# Patient Record
Sex: FEMALE | Race: WHITE | NOT HISPANIC OR LATINO | URBAN - METROPOLITAN AREA
[De-identification: names, ages, dates, MRNs, and addresses within clinical notes are randomized per-mention and may not be internally consistent; named-entity substitution may affect disease eponyms.]

---

## 2024-09-22 ENCOUNTER — OUTSIDE PLACE OF SERVICE (OUTPATIENT)
Dept: CARDIOTHORACIC SURGERY | Facility: CLINIC | Age: 74
End: 2024-09-22
Payer: MEDICARE

## 2024-09-22 PROCEDURE — 99222 1ST HOSP IP/OBS MODERATE 55: CPT | Mod: ,,, | Performed by: NURSE PRACTITIONER

## 2024-09-23 ENCOUNTER — OUTSIDE PLACE OF SERVICE (OUTPATIENT)
Dept: CARDIOTHORACIC SURGERY | Facility: CLINIC | Age: 74
End: 2024-09-23
Payer: MEDICARE

## 2024-09-23 PROCEDURE — 99232 SBSQ HOSP IP/OBS MODERATE 35: CPT | Mod: ,,, | Performed by: NURSE PRACTITIONER

## 2024-09-24 ENCOUNTER — HOSPITAL ENCOUNTER (INPATIENT)
Facility: HOSPITAL | Age: 74
LOS: 27 days | Discharge: NURSING, REHAB OR LONG-TERM CARE | DRG: 987 | End: 2024-10-21
Attending: INTERNAL MEDICINE | Admitting: INTERNAL MEDICINE
Payer: MEDICARE

## 2024-09-24 ENCOUNTER — OUTSIDE PLACE OF SERVICE (OUTPATIENT)
Dept: INTERVENTIONAL RADIOLOGY/VASCULAR | Facility: CLINIC | Age: 74
End: 2024-09-24
Payer: MEDICARE

## 2024-09-24 ENCOUNTER — OUTSIDE PLACE OF SERVICE (OUTPATIENT)
Dept: CARDIOTHORACIC SURGERY | Facility: CLINIC | Age: 74
End: 2024-09-24
Payer: MEDICARE

## 2024-09-24 DIAGNOSIS — I05.9 ENDOCARDITIS OF MITRAL VALVE: ICD-10-CM

## 2024-09-24 DIAGNOSIS — M79.89 LEG SWELLING: ICD-10-CM

## 2024-09-24 DIAGNOSIS — M00.9 PYOGENIC ARTHRITIS OF RIGHT KNEE JOINT, DUE TO UNSPECIFIED ORGANISM: ICD-10-CM

## 2024-09-24 DIAGNOSIS — I61.9 BRAIN BLEED: ICD-10-CM

## 2024-09-24 DIAGNOSIS — I67.1 SACCULAR ANEURYSM: ICD-10-CM

## 2024-09-24 DIAGNOSIS — I61.9 INTRAPARENCHYMAL HEMORRHAGE OF BRAIN: ICD-10-CM

## 2024-09-24 DIAGNOSIS — S06.300A: ICD-10-CM
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
## 2024-09-24 DIAGNOSIS — I49.9 ARRHYTHMIA: ICD-10-CM

## 2024-09-24 DIAGNOSIS — R52 PAIN: Primary | ICD-10-CM

## 2024-09-24 PROBLEM — B95.61 MSSA BACTEREMIA: Status: ACTIVE | Noted: 2024-09-24

## 2024-09-24 PROBLEM — R78.81 MSSA BACTEREMIA: Status: ACTIVE | Noted: 2024-09-24

## 2024-09-24 PROBLEM — S06.33AA INTRAPARENCHYMAL HEMATOMA OF BRAIN: Status: ACTIVE | Noted: 2024-09-24

## 2024-09-24 LAB
ABORH RETYPE: NORMAL
ALBUMIN SERPL-MCNC: 1.9 G/DL (ref 3.4–4.8)
ALBUMIN/GLOB SERPL: 0.6 RATIO (ref 1.1–2)
ALP SERPL-CCNC: 272 UNIT/L (ref 40–150)
ALT SERPL-CCNC: 79 UNIT/L (ref 0–55)
ANION GAP SERPL CALC-SCNC: 12 MEQ/L
APTT PPP: 27.5 SECONDS (ref 23.2–33.7)
AST SERPL-CCNC: 80 UNIT/L (ref 5–34)
BACTERIA #/AREA URNS AUTO: ABNORMAL /HPF
BASOPHILS # BLD AUTO: 0.05 X10(3)/MCL
BASOPHILS NFR BLD AUTO: 0.2 %
BILIRUB SERPL-MCNC: 0.5 MG/DL
BILIRUB UR QL STRIP.AUTO: NEGATIVE
BUN SERPL-MCNC: 25 MG/DL (ref 9.8–20.1)
CALCIUM SERPL-MCNC: 8.1 MG/DL (ref 8.4–10.2)
CHLORIDE SERPL-SCNC: 106 MMOL/L (ref 98–107)
CLARITY UR: ABNORMAL
CO2 SERPL-SCNC: 25 MMOL/L (ref 23–31)
COLOR UR AUTO: ABNORMAL
CREAT SERPL-MCNC: 0.61 MG/DL (ref 0.55–1.02)
CREAT/UREA NIT SERPL: 41
EOSINOPHIL # BLD AUTO: 0.01 X10(3)/MCL (ref 0–0.9)
EOSINOPHIL NFR BLD AUTO: 0 %
ERYTHROCYTE [DISTWIDTH] IN BLOOD BY AUTOMATED COUNT: 13.9 % (ref 11.5–17)
EST. AVERAGE GLUCOSE BLD GHB EST-MCNC: 108.3 MG/DL
GFR SERPLBLD CREATININE-BSD FMLA CKD-EPI: >60 ML/MIN/1.73/M2
GLOBULIN SER-MCNC: 3.3 GM/DL (ref 2.4–3.5)
GLUCOSE SERPL-MCNC: 131 MG/DL (ref 82–115)
GLUCOSE UR QL STRIP: NORMAL
GROUP & RH: NORMAL
HBA1C MFR BLD: 5.4 %
HCT VFR BLD AUTO: 33.3 % (ref 37–47)
HGB BLD-MCNC: 11.4 G/DL (ref 12–16)
HGB UR QL STRIP: ABNORMAL
IMM GRANULOCYTES # BLD AUTO: 0.74 X10(3)/MCL (ref 0–0.04)
IMM GRANULOCYTES NFR BLD AUTO: 3.4 %
INDIRECT COOMBS: NORMAL
INR PPP: 1.1
KETONES UR QL STRIP: NEGATIVE
LEUKOCYTE ESTERASE UR QL STRIP: 250
LYMPHOCYTES # BLD AUTO: 1.09 X10(3)/MCL (ref 0.6–4.6)
LYMPHOCYTES NFR BLD AUTO: 5 %
MAGNESIUM SERPL-MCNC: 2.2 MG/DL (ref 1.6–2.6)
MCH RBC QN AUTO: 31.9 PG (ref 27–31)
MCHC RBC AUTO-ENTMCNC: 34.2 G/DL (ref 33–36)
MCV RBC AUTO: 93.3 FL (ref 80–94)
MONOCYTES # BLD AUTO: 1.2 X10(3)/MCL (ref 0.1–1.3)
MONOCYTES NFR BLD AUTO: 5.5 %
MUCOUS THREADS URNS QL MICRO: ABNORMAL /LPF
NEUTROPHILS # BLD AUTO: 18.72 X10(3)/MCL (ref 2.1–9.2)
NEUTROPHILS NFR BLD AUTO: 85.9 %
NITRITE UR QL STRIP: NEGATIVE
NRBC BLD AUTO-RTO: 0 %
PH UR STRIP: 5.5 [PH]
PHOSPHATE SERPL-MCNC: 3.8 MG/DL (ref 2.3–4.7)
PLATELET # BLD AUTO: 212 X10(3)/MCL (ref 130–400)
PMV BLD AUTO: 9.2 FL (ref 7.4–10.4)
POTASSIUM SERPL-SCNC: 3.4 MMOL/L (ref 3.5–5.1)
PROT SERPL-MCNC: 5.2 GM/DL (ref 5.8–7.6)
PROT UR QL STRIP: ABNORMAL
PROTHROMBIN TIME: 14.8 SECONDS (ref 12.5–14.5)
RBC # BLD AUTO: 3.57 X10(6)/MCL (ref 4.2–5.4)
RBC #/AREA URNS AUTO: >100 /HPF
SODIUM SERPL-SCNC: 143 MMOL/L (ref 136–145)
SP GR UR STRIP.AUTO: 1.02 (ref 1–1.03)
SPECIMEN OUTDATE: NORMAL
SQUAMOUS #/AREA URNS LPF: ABNORMAL /HPF
TSH SERPL-ACNC: 1.82 UIU/ML (ref 0.35–4.94)
UROBILINOGEN UR STRIP-ACNC: NORMAL
WBC # BLD AUTO: 21.81 X10(3)/MCL (ref 4.5–11.5)
WBC #/AREA URNS AUTO: ABNORMAL /HPF

## 2024-09-24 PROCEDURE — 86900 BLOOD TYPING SEROLOGIC ABO: CPT | Performed by: STUDENT IN AN ORGANIZED HEALTH CARE EDUCATION/TRAINING PROGRAM

## 2024-09-24 PROCEDURE — 25000003 PHARM REV CODE 250: Performed by: STUDENT IN AN ORGANIZED HEALTH CARE EDUCATION/TRAINING PROGRAM

## 2024-09-24 PROCEDURE — 83735 ASSAY OF MAGNESIUM: CPT | Performed by: STUDENT IN AN ORGANIZED HEALTH CARE EDUCATION/TRAINING PROGRAM

## 2024-09-24 PROCEDURE — 63600175 PHARM REV CODE 636 W HCPCS: Performed by: STUDENT IN AN ORGANIZED HEALTH CARE EDUCATION/TRAINING PROGRAM

## 2024-09-24 PROCEDURE — 36415 COLL VENOUS BLD VENIPUNCTURE: CPT | Performed by: STUDENT IN AN ORGANIZED HEALTH CARE EDUCATION/TRAINING PROGRAM

## 2024-09-24 PROCEDURE — 84443 ASSAY THYROID STIM HORMONE: CPT | Performed by: STUDENT IN AN ORGANIZED HEALTH CARE EDUCATION/TRAINING PROGRAM

## 2024-09-24 PROCEDURE — 86850 RBC ANTIBODY SCREEN: CPT | Performed by: STUDENT IN AN ORGANIZED HEALTH CARE EDUCATION/TRAINING PROGRAM

## 2024-09-24 PROCEDURE — 25000003 PHARM REV CODE 250: Performed by: INTERNAL MEDICINE

## 2024-09-24 PROCEDURE — 99232 SBSQ HOSP IP/OBS MODERATE 35: CPT | Mod: ,,, | Performed by: THORACIC SURGERY (CARDIOTHORACIC VASCULAR SURGERY)

## 2024-09-24 PROCEDURE — 80053 COMPREHEN METABOLIC PANEL: CPT | Performed by: STUDENT IN AN ORGANIZED HEALTH CARE EDUCATION/TRAINING PROGRAM

## 2024-09-24 PROCEDURE — 87154 CUL TYP ID BLD PTHGN 6+ TRGT: CPT | Performed by: STUDENT IN AN ORGANIZED HEALTH CARE EDUCATION/TRAINING PROGRAM

## 2024-09-24 PROCEDURE — 51702 INSERT TEMP BLADDER CATH: CPT

## 2024-09-24 PROCEDURE — 87184 SC STD DISK METHOD PER PLATE: CPT | Performed by: STUDENT IN AN ORGANIZED HEALTH CARE EDUCATION/TRAINING PROGRAM

## 2024-09-24 PROCEDURE — 36415 COLL VENOUS BLD VENIPUNCTURE: CPT | Performed by: INTERNAL MEDICINE

## 2024-09-24 PROCEDURE — 85730 THROMBOPLASTIN TIME PARTIAL: CPT | Performed by: STUDENT IN AN ORGANIZED HEALTH CARE EDUCATION/TRAINING PROGRAM

## 2024-09-24 PROCEDURE — 87086 URINE CULTURE/COLONY COUNT: CPT | Performed by: STUDENT IN AN ORGANIZED HEALTH CARE EDUCATION/TRAINING PROGRAM

## 2024-09-24 PROCEDURE — 63600175 PHARM REV CODE 636 W HCPCS: Performed by: INTERNAL MEDICINE

## 2024-09-24 PROCEDURE — 81001 URINALYSIS AUTO W/SCOPE: CPT | Performed by: STUDENT IN AN ORGANIZED HEALTH CARE EDUCATION/TRAINING PROGRAM

## 2024-09-24 PROCEDURE — 86901 BLOOD TYPING SEROLOGIC RH(D): CPT | Performed by: STUDENT IN AN ORGANIZED HEALTH CARE EDUCATION/TRAINING PROGRAM

## 2024-09-24 PROCEDURE — 85610 PROTHROMBIN TIME: CPT | Performed by: STUDENT IN AN ORGANIZED HEALTH CARE EDUCATION/TRAINING PROGRAM

## 2024-09-24 PROCEDURE — 84100 ASSAY OF PHOSPHORUS: CPT | Performed by: STUDENT IN AN ORGANIZED HEALTH CARE EDUCATION/TRAINING PROGRAM

## 2024-09-24 PROCEDURE — 85025 COMPLETE CBC W/AUTO DIFF WBC: CPT | Performed by: STUDENT IN AN ORGANIZED HEALTH CARE EDUCATION/TRAINING PROGRAM

## 2024-09-24 PROCEDURE — 83036 HEMOGLOBIN GLYCOSYLATED A1C: CPT | Performed by: STUDENT IN AN ORGANIZED HEALTH CARE EDUCATION/TRAINING PROGRAM

## 2024-09-24 PROCEDURE — 87040 BLOOD CULTURE FOR BACTERIA: CPT | Performed by: STUDENT IN AN ORGANIZED HEALTH CARE EDUCATION/TRAINING PROGRAM

## 2024-09-24 PROCEDURE — 20000000 HC ICU ROOM

## 2024-09-24 RX ORDER — SODIUM CHLORIDE 0.9 % (FLUSH) 0.9 %
10 SYRINGE (ML) INJECTION
Status: DISCONTINUED | OUTPATIENT
Start: 2024-09-24 | End: 2024-10-21 | Stop reason: HOSPADM

## 2024-09-24 RX ORDER — BISACODYL 10 MG/1
10 SUPPOSITORY RECTAL DAILY PRN
Status: DISCONTINUED | OUTPATIENT
Start: 2024-09-24 | End: 2024-10-21 | Stop reason: HOSPADM

## 2024-09-24 RX ORDER — FAMOTIDINE 10 MG/ML
20 INJECTION INTRAVENOUS 2 TIMES DAILY
Status: DISCONTINUED | OUTPATIENT
Start: 2024-09-24 | End: 2024-10-02

## 2024-09-24 RX ORDER — LABETALOL HYDROCHLORIDE 5 MG/ML
10 INJECTION, SOLUTION INTRAVENOUS
Status: DISCONTINUED | OUTPATIENT
Start: 2024-09-24 | End: 2024-10-21 | Stop reason: HOSPADM

## 2024-09-24 RX ORDER — ACETAMINOPHEN 650 MG/1
650 SUPPOSITORY RECTAL EVERY 4 HOURS PRN
Status: DISCONTINUED | OUTPATIENT
Start: 2024-09-24 | End: 2024-09-25

## 2024-09-24 RX ORDER — MUPIROCIN 20 MG/G
OINTMENT TOPICAL 2 TIMES DAILY
Status: COMPLETED | OUTPATIENT
Start: 2024-09-26 | End: 2024-09-30

## 2024-09-24 RX ADMIN — PIPERACILLIN AND TAZOBACTAM 4.5 G: 4; .5 INJECTION, POWDER, LYOPHILIZED, FOR SOLUTION INTRAVENOUS; PARENTERAL at 09:09

## 2024-09-24 RX ADMIN — FAMOTIDINE 20 MG: 10 INJECTION, SOLUTION INTRAVENOUS at 09:09

## 2024-09-24 RX ADMIN — VANCOMYCIN HYDROCHLORIDE 1500 MG: 1.5 INJECTION, POWDER, LYOPHILIZED, FOR SOLUTION INTRAVENOUS at 11:09

## 2024-09-24 RX ADMIN — LABETALOL HYDROCHLORIDE 10 MG: 5 INJECTION, SOLUTION INTRAVENOUS at 10:09

## 2024-09-25 ENCOUNTER — ANESTHESIA (OUTPATIENT)
Dept: INTERVENTIONAL RADIOLOGY/VASCULAR | Facility: HOSPITAL | Age: 74
End: 2024-09-25
Payer: MEDICARE

## 2024-09-25 PROBLEM — I67.1 SACCULAR ANEURYSM: Status: ACTIVE | Noted: 2024-09-25

## 2024-09-25 PROBLEM — S06.300A: Status: ACTIVE | Noted: 2024-09-25

## 2024-09-25 LAB
ACINETOBACTER CALCOACETICUS-BAUMANNII COMPLEX (OHS): NOT DETECTED
ALBUMIN SERPL-MCNC: 1.8 G/DL (ref 3.4–4.8)
ALBUMIN/GLOB SERPL: 0.6 RATIO (ref 1.1–2)
ALP SERPL-CCNC: 239 UNIT/L (ref 40–150)
ALT SERPL-CCNC: 64 UNIT/L (ref 0–55)
ANION GAP SERPL CALC-SCNC: 11 MEQ/L
APICAL FOUR CHAMBER EJECTION FRACTION: 64 %
APICAL TWO CHAMBER EJECTION FRACTION: 64 %
AST SERPL-CCNC: 46 UNIT/L (ref 5–34)
AV INDEX (PROSTH): 0.69
AV MEAN GRADIENT: 6 MMHG
AV PEAK GRADIENT: 12 MMHG
AV REGURGITATION PRESSURE HALF TIME: 375 MS
AV VALVE AREA BY VELOCITY RATIO: 2.01 CM²
AV VALVE AREA: 2.4 CM²
AV VELOCITY RATIO: 0.58
BACTEROIDES FRAGILIS (OHS): NOT DETECTED
BASOPHILS # BLD AUTO: 0.05 X10(3)/MCL
BASOPHILS NFR BLD AUTO: 0.3 %
BILIRUB SERPL-MCNC: 0.7 MG/DL
BSA FOR ECHO PROCEDURE: 1.62 M2
BUN SERPL-MCNC: 21.2 MG/DL (ref 9.8–20.1)
C AURIS DNA BLD POS QL NAA+NON-PROBE: NOT DETECTED
C GATTII+NEOFOR DNA CSF QL NAA+NON-PROBE: NOT DETECTED
CALCIUM SERPL-MCNC: 8 MG/DL (ref 8.4–10.2)
CANDIDA ALBICANS (OHS): NOT DETECTED
CANDIDA GLABRATA (OHS): NOT DETECTED
CANDIDA KRUSEI (OHS): NOT DETECTED
CANDIDA PARAPSILOSIS (OHS): NOT DETECTED
CANDIDA TROPICALIS (OHS): NOT DETECTED
CHLORIDE SERPL-SCNC: 106 MMOL/L (ref 98–107)
CO2 SERPL-SCNC: 26 MMOL/L (ref 23–31)
CREAT SERPL-MCNC: 0.62 MG/DL (ref 0.55–1.02)
CREAT/UREA NIT SERPL: 34
CRP SERPL-MCNC: 227.5 MG/L
CTX-M (OHS): ABNORMAL
CV ECHO LV RWT: 0.45 CM
DOP CALC AO PEAK VEL: 1.76 M/S
DOP CALC AO VTI: 23.8 CM
DOP CALC LVOT AREA: 3.5 CM2
DOP CALC LVOT DIAMETER: 2.1 CM
DOP CALC LVOT PEAK VEL: 1.02 M/S
DOP CALC LVOT STROKE VOLUME: 57.12 CM3
DOP CALC MV VTI: 39.3 CM
DOP CALCLVOT PEAK VEL VTI: 16.5 CM
E WAVE DECELERATION TIME: 185 MSEC
E/A RATIO: 0.89
E/E' RATIO: 11.4 M/S
ECHO LV POSTERIOR WALL: 1.22 CM (ref 0.6–1.1)
ENTEROBACTER CLOACAE COMPLEX (OHS): NOT DETECTED
ENTEROBACTERALES (OHS): NOT DETECTED
ENTEROCOCCUS FAECALIS (OHS): NOT DETECTED
ENTEROCOCCUS FAECIUM (OHS): NOT DETECTED
EOSINOPHIL # BLD AUTO: 0.03 X10(3)/MCL (ref 0–0.9)
EOSINOPHIL NFR BLD AUTO: 0.2 %
ERYTHROCYTE [DISTWIDTH] IN BLOOD BY AUTOMATED COUNT: 14.1 % (ref 11.5–17)
ERYTHROCYTE [SEDIMENTATION RATE] IN BLOOD: 95 MM/HR (ref 0–20)
ESCHERICHIA COLI (OHS): NOT DETECTED
FRACTIONAL SHORTENING: 31 % (ref 28–44)
GFR SERPLBLD CREATININE-BSD FMLA CKD-EPI: >60 ML/MIN/1.73/M2
GLOBULIN SER-MCNC: 3.1 GM/DL (ref 2.4–3.5)
GLUCOSE SERPL-MCNC: 134 MG/DL (ref 82–115)
GP B STREP DNA CSF QL NAA+NON-PROBE: NOT DETECTED
HAEM INFLU DNA CSF QL NAA+NON-PROBE: NOT DETECTED
HCT VFR BLD AUTO: 32.8 % (ref 37–47)
HGB BLD-MCNC: 11 G/DL (ref 12–16)
IMM GRANULOCYTES # BLD AUTO: 0.59 X10(3)/MCL (ref 0–0.04)
IMM GRANULOCYTES NFR BLD AUTO: 3.1 %
IMP (OHS): ABNORMAL
INTERVENTRICULAR SEPTUM: 1.29 CM (ref 0.6–1.1)
KLEBSIELLA AEROGENES (OHS): NOT DETECTED
KLEBSIELLA OXYTOCA (OHS): NOT DETECTED
KLEBSIELLA PNEUMONIAE GROUP (OHS): NOT DETECTED
KPC (OHS): ABNORMAL
L MONOCYTOG DNA CSF QL NAA+NON-PROBE: NOT DETECTED
LEFT ATRIUM AREA SYSTOLIC (APICAL 2 CHAMBER): 24.9 CM2
LEFT ATRIUM AREA SYSTOLIC (APICAL 4 CHAMBER): 25.4 CM2
LEFT ATRIUM SIZE: 3.9 CM
LEFT ATRIUM VOLUME INDEX MOD: 50.2 ML/M2
LEFT ATRIUM VOLUME MOD: 83.8 ML
LEFT INTERNAL DIMENSION IN SYSTOLE: 3.74 CM (ref 2.1–4)
LEFT VENTRICLE DIASTOLIC VOLUME INDEX: 84.43 ML/M2
LEFT VENTRICLE DIASTOLIC VOLUME: 141 ML
LEFT VENTRICLE END DIASTOLIC VOLUME APICAL 2 CHAMBER: 73.9 ML
LEFT VENTRICLE END DIASTOLIC VOLUME APICAL 4 CHAMBER: 79 ML
LEFT VENTRICLE END SYSTOLIC VOLUME APICAL 2 CHAMBER: 82.8 ML
LEFT VENTRICLE END SYSTOLIC VOLUME APICAL 4 CHAMBER: 82.8 ML
LEFT VENTRICLE MASS INDEX: 168 G/M2
LEFT VENTRICLE SYSTOLIC VOLUME INDEX: 35.7 ML/M2
LEFT VENTRICLE SYSTOLIC VOLUME: 59.6 ML
LEFT VENTRICULAR INTERNAL DIMENSION IN DIASTOLE: 5.39 CM (ref 3.5–6)
LEFT VENTRICULAR MASS: 280.52 G
LV LATERAL E/E' RATIO: 11.4 M/S
LV SEPTAL E/E' RATIO: 11.4 M/S
LVED V (TEICH): 141 ML
LVES V (TEICH): 59.6 ML
LVOT MG: 2 MMHG
LVOT MV: 0.68 CM/S
LYMPHOCYTES # BLD AUTO: 1.11 X10(3)/MCL (ref 0.6–4.6)
LYMPHOCYTES NFR BLD AUTO: 5.9 %
MAGNESIUM SERPL-MCNC: 2.1 MG/DL (ref 1.6–2.6)
MCH RBC QN AUTO: 31.9 PG (ref 27–31)
MCHC RBC AUTO-ENTMCNC: 33.5 G/DL (ref 33–36)
MCR-1 (OHS): ABNORMAL
MCV RBC AUTO: 95.1 FL (ref 80–94)
MECA/C (OHS): ABNORMAL
MECA/C AND MREJ (MRSA)(OHS): NOT DETECTED
MONOCYTES # BLD AUTO: 1.19 X10(3)/MCL (ref 0.1–1.3)
MONOCYTES NFR BLD AUTO: 6.3 %
MV MEAN GRADIENT: 5 MMHG
MV PEAK A VEL: 1.28 M/S
MV PEAK E VEL: 1.14 M/S
MV PEAK GRADIENT: 10 MMHG
MV STENOSIS PRESSURE HALF TIME: 68 MS
MV VALVE AREA BY CONTINUITY EQUATION: 1.45 CM2
MV VALVE AREA P 1/2 METHOD: 3.24 CM2
N MEN DNA CSF QL NAA+NON-PROBE: NOT DETECTED
NDM (OHS): ABNORMAL
NEUTROPHILS # BLD AUTO: 15.9 X10(3)/MCL (ref 2.1–9.2)
NEUTROPHILS NFR BLD AUTO: 84.2 %
NRBC BLD AUTO-RTO: 0 %
OHS CV RV/LV RATIO: 0.42 CM
OHS LV EJECTION FRACTION SIMPSONS BIPLANE MOD: 62 %
OXA-48-LIKE (OHS): ABNORMAL
PHOSPHATE SERPL-MCNC: 3.5 MG/DL (ref 2.3–4.7)
PISA AR MAX VEL: 2.27 M/S
PISA MRMAX VEL: 4.5 M/S
PISA TR MAX VEL: 2.73 M/S
PLATELET # BLD AUTO: 236 X10(3)/MCL (ref 130–400)
PMV BLD AUTO: 9.4 FL (ref 7.4–10.4)
POTASSIUM SERPL-SCNC: 3.1 MMOL/L (ref 3.5–5.1)
PROT SERPL-MCNC: 4.9 GM/DL (ref 5.8–7.6)
PROTEUS SPP. (OHS): NOT DETECTED
PSEUDOMONAS AERUGINOSA (OHS): NOT DETECTED
PV PEAK GRADIENT: 6 MMHG
PV PEAK VELOCITY: 1.25 M/S
RA PRESSURE ESTIMATED: 8 MMHG
RBC # BLD AUTO: 3.45 X10(6)/MCL (ref 4.2–5.4)
RIGHT VENTRICULAR END-DIASTOLIC DIMENSION: 2.24 CM
RV TB RVSP: 11 MMHG
S ENT+BONG DNA STL QL NAA+NON-PROBE: NOT DETECTED
S PNEUM DNA CSF QL NAA+NON-PROBE: NOT DETECTED
SERRATIA MARCESCENS (OHS): NOT DETECTED
SODIUM SERPL-SCNC: 143 MMOL/L (ref 136–145)
STAPHYLOCOCCUS AUREUS (OHS): DETECTED
STAPHYLOCOCCUS EPIDERMIDIS (OHS): NOT DETECTED
STAPHYLOCOCCUS LUGDUNENSIS (OHS): NOT DETECTED
STAPHYLOCOCCUS SPP. (OHS): DETECTED
STENOTROPHOMONAS MALTOPHILIA (OHS): NOT DETECTED
STREPTOCOCCUS PYOGENES (GROUP A)(OHS): NOT DETECTED
STREPTOCOCCUS SPP. (OHS): NOT DETECTED
TDI LATERAL: 0.1 M/S
TDI SEPTAL: 0.1 M/S
TDI: 0.1 M/S
TR MAX PG: 30 MMHG
TRICUSPID ANNULAR PLANE SYSTOLIC EXCURSION: 2.96 CM
TV REST PULMONARY ARTERY PRESSURE: 38 MMHG
VANA/B (OHS): ABNORMAL
VIM (OHS): ABNORMAL
WBC # BLD AUTO: 18.87 X10(3)/MCL (ref 4.5–11.5)
Z-SCORE OF LEFT VENTRICULAR DIMENSION IN END DIASTOLE: 1.44
Z-SCORE OF LEFT VENTRICULAR DIMENSION IN END SYSTOLE: 2.03

## 2024-09-25 PROCEDURE — 63600175 PHARM REV CODE 636 W HCPCS: Performed by: GENERAL PRACTICE

## 2024-09-25 PROCEDURE — 25000003 PHARM REV CODE 250: Performed by: GENERAL PRACTICE

## 2024-09-25 PROCEDURE — 36227 PLACE CATH XTRNL CAROTID: CPT | Mod: 50,,, | Performed by: PSYCHIATRY & NEUROLOGY

## 2024-09-25 PROCEDURE — 80053 COMPREHEN METABOLIC PANEL: CPT | Performed by: STUDENT IN AN ORGANIZED HEALTH CARE EDUCATION/TRAINING PROGRAM

## 2024-09-25 PROCEDURE — 63600175 PHARM REV CODE 636 W HCPCS: Performed by: INTERNAL MEDICINE

## 2024-09-25 PROCEDURE — 76937 US GUIDE VASCULAR ACCESS: CPT | Mod: 26,,, | Performed by: PSYCHIATRY & NEUROLOGY

## 2024-09-25 PROCEDURE — 51798 US URINE CAPACITY MEASURE: CPT

## 2024-09-25 PROCEDURE — 36224 PLACE CATH CAROTD ART: CPT | Mod: 50,,, | Performed by: PSYCHIATRY & NEUROLOGY

## 2024-09-25 PROCEDURE — 99223 1ST HOSP IP/OBS HIGH 75: CPT | Mod: FS,,, | Performed by: GENERAL PRACTICE

## 2024-09-25 PROCEDURE — 76377 3D RENDER W/INTRP POSTPROCES: CPT | Mod: 26,,, | Performed by: PSYCHIATRY & NEUROLOGY

## 2024-09-25 PROCEDURE — 25000003 PHARM REV CODE 250: Performed by: NURSE ANESTHETIST, CERTIFIED REGISTERED

## 2024-09-25 PROCEDURE — 85652 RBC SED RATE AUTOMATED: CPT | Performed by: PHYSICIAN ASSISTANT

## 2024-09-25 PROCEDURE — 25500020 PHARM REV CODE 255: Performed by: INTERNAL MEDICINE

## 2024-09-25 PROCEDURE — 85025 COMPLETE CBC W/AUTO DIFF WBC: CPT | Performed by: STUDENT IN AN ORGANIZED HEALTH CARE EDUCATION/TRAINING PROGRAM

## 2024-09-25 PROCEDURE — 25000003 PHARM REV CODE 250: Performed by: PSYCHIATRY & NEUROLOGY

## 2024-09-25 PROCEDURE — 36415 COLL VENOUS BLD VENIPUNCTURE: CPT | Performed by: STUDENT IN AN ORGANIZED HEALTH CARE EDUCATION/TRAINING PROGRAM

## 2024-09-25 PROCEDURE — 63600175 PHARM REV CODE 636 W HCPCS: Performed by: STUDENT IN AN ORGANIZED HEALTH CARE EDUCATION/TRAINING PROGRAM

## 2024-09-25 PROCEDURE — 99223 1ST HOSP IP/OBS HIGH 75: CPT | Mod: 25,FS,, | Performed by: PSYCHIATRY & NEUROLOGY

## 2024-09-25 PROCEDURE — B3181ZZ FLUOROSCOPY OF BILATERAL INTERNAL CAROTID ARTERIES USING LOW OSMOLAR CONTRAST: ICD-10-PCS | Performed by: PSYCHIATRY & NEUROLOGY

## 2024-09-25 PROCEDURE — 86140 C-REACTIVE PROTEIN: CPT | Performed by: PHYSICIAN ASSISTANT

## 2024-09-25 PROCEDURE — 51700 IRRIGATION OF BLADDER: CPT

## 2024-09-25 PROCEDURE — 99223 1ST HOSP IP/OBS HIGH 75: CPT | Mod: FS,,, | Performed by: NEUROLOGICAL SURGERY

## 2024-09-25 PROCEDURE — 84100 ASSAY OF PHOSPHORUS: CPT | Performed by: STUDENT IN AN ORGANIZED HEALTH CARE EDUCATION/TRAINING PROGRAM

## 2024-09-25 PROCEDURE — 63600175 PHARM REV CODE 636 W HCPCS: Performed by: NURSE ANESTHETIST, CERTIFIED REGISTERED

## 2024-09-25 PROCEDURE — B3121ZZ FLUOROSCOPY OF LEFT SUBCLAVIAN ARTERY USING LOW OSMOLAR CONTRAST: ICD-10-PCS | Performed by: PSYCHIATRY & NEUROLOGY

## 2024-09-25 PROCEDURE — 36226 PLACE CATH VERTEBRAL ART: CPT | Mod: LT,,, | Performed by: PSYCHIATRY & NEUROLOGY

## 2024-09-25 PROCEDURE — 36415 COLL VENOUS BLD VENIPUNCTURE: CPT | Performed by: PHYSICIAN ASSISTANT

## 2024-09-25 PROCEDURE — B31C1ZZ FLUOROSCOPY OF BILATERAL EXTERNAL CAROTID ARTERIES USING LOW OSMOLAR CONTRAST: ICD-10-PCS | Performed by: PSYCHIATRY & NEUROLOGY

## 2024-09-25 PROCEDURE — 83735 ASSAY OF MAGNESIUM: CPT | Performed by: STUDENT IN AN ORGANIZED HEALTH CARE EDUCATION/TRAINING PROGRAM

## 2024-09-25 PROCEDURE — 36225 PLACE CATH SUBCLAVIAN ART: CPT | Mod: 59,RT,, | Performed by: PSYCHIATRY & NEUROLOGY

## 2024-09-25 PROCEDURE — 25000003 PHARM REV CODE 250: Performed by: STUDENT IN AN ORGANIZED HEALTH CARE EDUCATION/TRAINING PROGRAM

## 2024-09-25 PROCEDURE — B31F1ZZ FLUOROSCOPY OF LEFT VERTEBRAL ARTERY USING LOW OSMOLAR CONTRAST: ICD-10-PCS | Performed by: PSYCHIATRY & NEUROLOGY

## 2024-09-25 PROCEDURE — B3151ZZ FLUOROSCOPY OF BILATERAL COMMON CAROTID ARTERIES USING LOW OSMOLAR CONTRAST: ICD-10-PCS | Performed by: PSYCHIATRY & NEUROLOGY

## 2024-09-25 PROCEDURE — 20000000 HC ICU ROOM

## 2024-09-25 RX ORDER — POTASSIUM CHLORIDE 14.9 MG/ML
40 INJECTION INTRAVENOUS ONCE
Status: COMPLETED | OUTPATIENT
Start: 2024-09-25 | End: 2024-09-25

## 2024-09-25 RX ORDER — FENTANYL CITRATE 50 UG/ML
INJECTION, SOLUTION INTRAMUSCULAR; INTRAVENOUS
Status: DISCONTINUED | OUTPATIENT
Start: 2024-09-25 | End: 2024-09-25

## 2024-09-25 RX ORDER — QUETIAPINE FUMARATE 25 MG/1
25 TABLET, FILM COATED ORAL NIGHTLY PRN
Status: DISCONTINUED | OUTPATIENT
Start: 2024-09-25 | End: 2024-09-26

## 2024-09-25 RX ORDER — MORPHINE SULFATE 4 MG/ML
2 INJECTION, SOLUTION INTRAMUSCULAR; INTRAVENOUS EVERY 6 HOURS PRN
Status: DISCONTINUED | OUTPATIENT
Start: 2024-09-25 | End: 2024-10-21 | Stop reason: HOSPADM

## 2024-09-25 RX ORDER — SODIUM CHLORIDE 9 MG/ML
INJECTION, SOLUTION INTRAVENOUS CONTINUOUS
Status: ACTIVE | OUTPATIENT
Start: 2024-09-25 | End: 2024-09-26

## 2024-09-25 RX ORDER — DEXMEDETOMIDINE HYDROCHLORIDE 4 UG/ML
0-1.4 INJECTION, SOLUTION INTRAVENOUS CONTINUOUS
Status: DISCONTINUED | OUTPATIENT
Start: 2024-09-25 | End: 2024-09-26

## 2024-09-25 RX ORDER — HYDROCODONE BITARTRATE AND ACETAMINOPHEN 5; 325 MG/1; MG/1
1 TABLET ORAL
OUTPATIENT
Start: 2024-09-25

## 2024-09-25 RX ORDER — SODIUM CHLORIDE 9 MG/ML
INJECTION, SOLUTION INTRAVENOUS CONTINUOUS
Status: DISCONTINUED | OUTPATIENT
Start: 2024-09-25 | End: 2024-09-25

## 2024-09-25 RX ORDER — ACETAMINOPHEN 325 MG/1
650 TABLET ORAL EVERY 6 HOURS PRN
Status: DISCONTINUED | OUTPATIENT
Start: 2024-09-25 | End: 2024-09-25

## 2024-09-25 RX ORDER — IOPAMIDOL 755 MG/ML
80 INJECTION, SOLUTION INTRAVASCULAR
Status: COMPLETED | OUTPATIENT
Start: 2024-09-25 | End: 2024-09-25

## 2024-09-25 RX ORDER — ONDANSETRON 4 MG/1
8 TABLET, ORALLY DISINTEGRATING ORAL EVERY 8 HOURS PRN
Status: DISCONTINUED | OUTPATIENT
Start: 2024-09-25 | End: 2024-10-21 | Stop reason: HOSPADM

## 2024-09-25 RX ORDER — POTASSIUM CHLORIDE 14.9 MG/ML
20 INJECTION INTRAVENOUS ONCE
Status: COMPLETED | OUTPATIENT
Start: 2024-09-25 | End: 2024-09-25

## 2024-09-25 RX ORDER — ACETAMINOPHEN 325 MG/1
650 TABLET ORAL EVERY 6 HOURS PRN
Status: DISCONTINUED | OUTPATIENT
Start: 2024-09-25 | End: 2024-10-21 | Stop reason: HOSPADM

## 2024-09-25 RX ORDER — LIDOCAINE 50 MG/G
1 PATCH TOPICAL
Status: DISCONTINUED | OUTPATIENT
Start: 2024-09-25 | End: 2024-10-21 | Stop reason: HOSPADM

## 2024-09-25 RX ADMIN — FAMOTIDINE 20 MG: 10 INJECTION, SOLUTION INTRAVENOUS at 08:09

## 2024-09-25 RX ADMIN — IOHEXOL 50 ML: 350 INJECTION, SOLUTION INTRAVENOUS at 04:09

## 2024-09-25 RX ADMIN — POTASSIUM CHLORIDE 40 MEQ: 14.9 INJECTION, SOLUTION INTRAVENOUS at 08:09

## 2024-09-25 RX ADMIN — FENTANYL CITRATE 50 MCG: 50 INJECTION, SOLUTION INTRAMUSCULAR; INTRAVENOUS at 05:09

## 2024-09-25 RX ADMIN — OXACILLIN 2 G: 2 INJECTION, POWDER, FOR SOLUTION INTRAMUSCULAR; INTRAVENOUS at 03:09

## 2024-09-25 RX ADMIN — IOPAMIDOL 80 ML: 755 INJECTION, SOLUTION INTRAVENOUS at 06:09

## 2024-09-25 RX ADMIN — SODIUM CHLORIDE: 9 INJECTION, SOLUTION INTRAVENOUS at 06:09

## 2024-09-25 RX ADMIN — PIPERACILLIN AND TAZOBACTAM 4.5 G: 4; .5 INJECTION, POWDER, LYOPHILIZED, FOR SOLUTION INTRAVENOUS; PARENTERAL at 12:09

## 2024-09-25 RX ADMIN — LABETALOL HYDROCHLORIDE 10 MG: 5 INJECTION, SOLUTION INTRAVENOUS at 08:09

## 2024-09-25 RX ADMIN — OXACILLIN 2 G: 2 INJECTION, POWDER, FOR SOLUTION INTRAMUSCULAR; INTRAVENOUS at 06:09

## 2024-09-25 RX ADMIN — LIDOCAINE 1 PATCH: 50 PATCH CUTANEOUS at 04:09

## 2024-09-25 RX ADMIN — SODIUM CHLORIDE: 9 INJECTION, SOLUTION INTRAVENOUS at 01:09

## 2024-09-25 RX ADMIN — POTASSIUM CHLORIDE 20 MEQ: 14.9 INJECTION, SOLUTION INTRAVENOUS at 01:09

## 2024-09-25 RX ADMIN — SODIUM CHLORIDE, SODIUM GLUCONATE, SODIUM ACETATE, POTASSIUM CHLORIDE AND MAGNESIUM CHLORIDE: 526; 502; 368; 37; 30 INJECTION, SOLUTION INTRAVENOUS at 05:09

## 2024-09-25 RX ADMIN — Medication: at 04:09

## 2024-09-25 RX ADMIN — MORPHINE SULFATE 2 MG: 4 INJECTION, SOLUTION INTRAMUSCULAR; INTRAVENOUS at 04:09

## 2024-09-25 RX ADMIN — MORPHINE SULFATE 2 MG: 4 INJECTION, SOLUTION INTRAMUSCULAR; INTRAVENOUS at 10:09

## 2024-09-25 RX ADMIN — DEXMEDETOMIDINE HYDROCHLORIDE 0.4 MCG/KG/HR: 400 INJECTION INTRAVENOUS at 01:09

## 2024-09-25 RX ADMIN — DEXMEDETOMIDINE HYDROCHLORIDE 0.8 MCG/KG/HR: 400 INJECTION INTRAVENOUS at 11:09

## 2024-09-25 RX ADMIN — LABETALOL HYDROCHLORIDE 10 MG: 5 INJECTION, SOLUTION INTRAVENOUS at 12:09

## 2024-09-25 RX ADMIN — OXACILLIN 2 G: 2 INJECTION, POWDER, FOR SOLUTION INTRAMUSCULAR; INTRAVENOUS at 11:09

## 2024-09-25 RX ADMIN — PIPERACILLIN AND TAZOBACTAM 4.5 G: 4; .5 INJECTION, POWDER, LYOPHILIZED, FOR SOLUTION INTRAVENOUS; PARENTERAL at 04:09

## 2024-09-25 RX ADMIN — LABETALOL HYDROCHLORIDE 10 MG: 5 INJECTION, SOLUTION INTRAVENOUS at 02:09

## 2024-09-25 NOTE — BRIEF OP NOTE
Name: Orlando Black  MRN: [id number]  Age: 73 y.o.  Gender: female    Date of Procedure: 09/25/2024    Pre-operative Diagnosis: Left occipital hemorrhage, endocarditis, concern for mycotic aneurysm    Post-operative Diagnosis: no evidence of mycotic aneurysm, incidental left cavernous ICA aneurysm    Procedure: Diagnostic cerebral angiogram    Access/Closure: Right femoral artery access closed with manual compression    Surgeon: Zion Solis MD    Anesthesia: LA and MAC    EBL: min ml    Description of findings:  - 5.5 x 4.6 mm wide necked left cavernous ICA aneurysm  - no evidence of mycotic aneurysm, AVM, AV fistula    Patient condition:   stable    Implant/specimen(s):  none    Plan:  - -140  - no antiplatelet or anticoagulation for now  - follow up outpatient for the cavernous ICA aneurysm   - no further endovascular intervention indicated at this time  - treatment of endocarditis per primary team      Zion Solis MD  Interventional Neurology

## 2024-09-25 NOTE — CONSULTS
[facility] - 7th Floor ICU  Neurology  Consult Note    Patient Name: Orlando Black  MRN: [id number]  Admission Date: 9/24/2024  Hospital Length of Stay: 1 days  Code Status: Full Code   Attending Provider: Quinn Juarez MD   Consulting Provider: Silvana Mayo NP  Primary Care Physician: No primary care provider on file.  Principal Problem:Intraparenchymal hematoma of brain    Inpatient consult to Vascular (Stroke) Neurology  Consult performed by: Silvana Mayo NP  Consult ordered by: Divya Lewis MD         Subjective:     Chief Complaint:  altered mental status.      HPI:   Orlando Black is a 73 y.o. female no known past medical history (last hospitalization 1970s for childbirth) presented as a transfer patient from [facility] in [place] on 09/24 to [facility] for neurosurgery and CV services related to new left occipital intraparenchymal hemorrhage and mitral valve endocarditis in setting of staph bacteremia.  Presented on 9/21/2024 to outside hospital with fever, arthralgia, and back pain.  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  Patient developed altered mental status overnight on 9/23/2024 and CT head 9/24/2024 revealed new left occipital intraparenchymal hematoma measuring 2.02 x 1.8 x 1.5 cm.   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          No past medical history on file.    No past surgical history on file.    Review of patient's allergies indicates:  Not on File    No current facility-administered medications on file prior to encounter.     No current outpatient medications on file prior to encounter.      Family History    None       Tobacco Use    Smoking status: Not on file    Smokeless tobacco: Not on file   Substance and Sexual Activity    Alcohol use: Not on file    Drug use: Not on file    Sexual activity: Not on file     Review of Systems   Constitutional:  Positive for activity change. Negative for appetite change.   Neurological:  Positive for weakness (of both legs reported limited by pain of hips). Negative for dizziness, seizures, syncope, facial asymmetry, speech difficulty, light-headedness and headaches.       Objective:     Vital Signs (Most Recent):  Temp: 98.4 °F (36.9 °C) (09/25/24 0400)  Pulse: 95 (09/25/24 0600)  Resp: 19 (09/25/24 0600)  BP: (!) 145/65 (09/25/24 0600)  SpO2: 95 % (09/25/24 0600) Vital Signs (24h Range):  Temp:  [97.5 °F (36.4 °C)-98.6 °F (37 °C)] 98.4 °F (36.9 °C)  Pulse:  [] 95  Resp:  [12-20] 19  SpO2:  [92 %-97 %] 95 %  BP: (112-154)/(62-99) 145/65     Weight: 52.7 kg (116 lb 2.9 oz)  Body mass index is 16.2 kg/m².     Physical Exam  Vitals and nursing note reviewed.   Constitutional:       General: She is awake. She is not in acute distress.     Appearance: Normal appearance. She is well-developed. She is not ill-appearing or toxic-appearing.   HENT:      Head: Normocephalic.      Right Ear: External ear normal.      Left Ear: External ear normal.      Nose: Nose normal.      Mouth/Throat:      Mouth: Mucous membranes are moist.      Pharynx: Oropharynx is clear.   Eyes:      Extraocular Movements: Extraocular movements intact.      Conjunctiva/sclera: Conjunctivae normal.      Pupils: Pupils are equal, round, and reactive to light.   Cardiovascular:      Rate and Rhythm: Normal rate.   Pulmonary:      Effort: Pulmonary effort is normal.   Abdominal:      General: Abdomen is flat.   Musculoskeletal:         General: Normal range of motion.   Lymphadenopathy:      Cervical: No cervical adenopathy.   Skin:     General: Skin is warm and dry.   Neurological:       General: No focal deficit present.      Mental Status: She is alert and oriented to person, place, and time.      Cranial Nerves: Cranial nerves 2-12 are intact.      Coordination: Finger-Nose-Finger Test and Heel to Shin Test normal.   Psychiatric:         Mood and Affect: Mood normal.         Speech: Speech normal.         Behavior: Behavior normal. Behavior is cooperative.         Thought Content: Thought content normal.         Judgment: Judgment normal.          NEUROLOGICAL EXAMINATION:     MENTAL STATUS   Oriented to person, place, and time.   Attention: normal.   Speech: speech is normal   Level of consciousness: alert  Knowledge: good.   Able to name object. Able to repeat.     CRANIAL NERVES   Cranial nerves II through XII intact.     CN II   Visual fields full to confrontation.     CN III, IV, VI   Pupils are equal, round, and reactive to light.    CN V   Facial sensation intact.     CN VII   Facial expression full, symmetric.     CN XII   Tongue: not atrophic  Fasciculations: absent  Tongue deviation: none    MOTOR EXAM   Muscle bulk: normal  Overall muscle tone: normal       Drift.  Strength of bilateral upper and lower extremities normal 5/5.     SENSORY EXAM   Light touch normal.     GAIT AND COORDINATION      Coordination   Finger to nose coordination: normal  Heel to shin coordination: normal    Tremor   Resting tremor: absent    Significant Labs:   Recent Lab Results         09/25/24  0407   09/24/24  2154   09/24/24  2153   09/24/24  2034        Albumin/Globulin Ratio 0.6       0.6       ABO and RH   O POS           Albumin 1.8       1.9              272       ALT 64       79       Anion Gap 11.0       12.0       Appearance, UA     Turbid         PTT       27.5  Comment: For Minimal Heparin Infusion, the goal aPTT 64-85 seconds corresponds to an anti-Xa of 0.3-0.5.    For Low Intensity and High Intensity Heparin, the goal aPTT  seconds corresponds to an anti-Xa of 0.3-0.7       AST  46       80       Bacteria, UA     Occasional         Baso # 0.05       0.05       Basophil % 0.3       0.2       Bilirubin (UA)     Negative         BILIRUBIN TOTAL 0.7       0.5       BUN 21.2       25.0       BUN/CREAT RATIO 34       41       Calcium 8.0       8.1       Chloride 106       106       CO2 26       25       Color, UA     Dark-Brown         Creatinine 0.62       0.61       eGFR >60       >60       Eos # 0.03       0.01       Eos % 0.2       0.0       Estimated Avg Glucose       108.3       Globulin, Total 3.1       3.3       Glucose 134       131       Glucose, UA     Normal         Group & Rh       O POS       Hematocrit 32.8       33.3       Hemoglobin 11.0       11.4       Hemoglobin A1C External       5.4       Immature Grans (Abs) 0.59       0.74       Immature Granulocytes 3.1       3.4       Indirect Rayray GEL       NEG       INR       1.1       Ketones, UA     Negative         Leukocyte Esterase, UA     250         Lymph # 1.11       1.09       LYMPH % 5.9       5.0       Magnesium  2.10       2.20       MCH 31.9       31.9       MCHC 33.5       34.2       MCV 95.1       93.3       Mono # 1.19       1.20       Mono % 6.3       5.5       MPV 9.4       9.2       Mucous, UA     Trace         Neut # 15.90       18.72       Neut % 84.2       85.9       NITRITE UA     Negative         nRBC 0.0       0.0       Blood, UA     3+         pH, UA     5.5         Phosphorus Level 3.5       3.8       Platelet Count 236       212       Potassium 3.1       3.4       PROTEIN TOTAL 4.9       5.2       Protein, UA     1+         PT       14.8       RBC 3.45       3.57       RBC, UA     >100         RDW 14.1       13.9       Sodium 143       143       Specific Gravity,UA     1.016         Specimen Outdate       09/27/2024 23:59       Squamous Epithelial Cells, UA     None Seen         TSH       1.825       Urobilinogen, UA     Normal         WBC, UA     11-20         WBC 18.87       21.81                Significant Imaging: I have reviewed all pertinent imaging results/findings within the past 24 hours.  Assessment and Plan:     * Intraparenchymal hematoma of brain  Assessment  CTH stable Left occipital lobe parenchymal hematoma with mild surrounding edema. Chronic microvascular ischemic changes.  No drift. No slurred speech, oriented x 3  Plan  Continued management with Neurosurgery     4 mm saccular aneurysm of the left cavernous ICA  Assessment  - 9/25 CTH Impression Left occipital lobe parenchymal hematoma with mild surrounding edema. Chronic microvascular ischemic changes.  - 9/25 CTA head and neck Impression:  1. No large vessel occlusion or flow-limiting stenosis.  2. 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  3. Partially visualized indeterminate retrosternal soft tissue.    Current NIH Stroke Score Values      Flowsheet Row Most Recent Value   Interval baseline   1a. Level of Consciousness 1-->Not alert, but arousable by minor stimulation to obey, answer, or respond   1b. LOC Questions 1-->Answers one question correctly   1c. LOC Commands 0-->Performs both tasks correctly   2. Best Gaze 0-->Normal   3. Visual 0-->No visual loss   4. Facial Palsy 0-->Normal symmetrical movements   5a. Motor Arm, Left 0-->No drift, limb holds 90 (or 45) degrees for full 10 secs   5b. Motor Arm, Right 0-->No drift, limb holds 90 (or 45) degrees for full 10 secs   6a. Motor Leg, Left 0-->No drift, leg holds 30 degree position for full 5 secs   6b. Motor Leg, Right 3-->No effort against gravity, leg falls to bed immediately   7. Limb Ataxia 0-->Absent   8. Sensory 0-->Normal, no sensory loss   9. Best Language 0-->No aphasia, normal   10. Dysarthria 0-->Normal   11. Extinction and Inattention (formerly Neglect) 0-->No abnormality   Total (NIH Stroke Scale) 5            Plan  - NPO   - Diagnostic angiogram of brain today with intervention if indicated  - Further recommendations to follow from  MD          VTE Risk Mitigation (From admission, onward)           Ordered     IP VTE HIGH RISK PATIENT  Once         09/24/24 1938     Place sequential compression device  Until discontinued         09/24/24 1938     Reason for No Pharmacological VTE Prophylaxis  Once        Question:  Reasons:  Answer:  Physician Provided (leave comment)  Comment:  intraparenchymal hemorrhage    09/24/24 1938     Place sequential compression device  Until discontinued         09/24/24 1902                    Silvana Mayo NP  Neurology  [facility] - 7th Floor ICU

## 2024-09-25 NOTE — NURSING
Nurses Note -- 4 Eyes      9/25/2024   7:33 AM      Skin assessed during: Daily Assessment      [] No Altered Skin Integrity Present    [x]Prevention Measures Documented      [x] Yes- Altered Skin Integrity Present or Discovered   [] LDA Added if Not in Epic (Describe Wound)   [] New Altered Skin Integrity was Present on Admit and Documented in LDA   [] Wound Image Taken    Wound Care Consulted? No    Attending Nurse:  GLYNN Camacho    Second RN/Staff Member:  GLYNN Sinha

## 2024-09-25 NOTE — SUBJECTIVE & OBJECTIVE
No past medical history on file.    No past surgical history on file.    Review of patient's allergies indicates:  Not on File    No current facility-administered medications on file prior to encounter.     No current outpatient medications on file prior to encounter.     Family History    None       Tobacco Use    Smoking status: Not on file    Smokeless tobacco: Not on file   Substance and Sexual Activity    Alcohol use: Not on file    Drug use: Not on file    Sexual activity: Not on file     Review of Systems   Constitutional:  Positive for activity change. Negative for appetite change.   Neurological:  Positive for weakness (of both legs reported limited by pain of hips). Negative for dizziness, seizures, syncope, facial asymmetry, speech difficulty, light-headedness and headaches.       Objective:     Vital Signs (Most Recent):  Temp: 98.4 °F (36.9 °C) (09/25/24 0400)  Pulse: 95 (09/25/24 0600)  Resp: 19 (09/25/24 0600)  BP: (!) 145/65 (09/25/24 0600)  SpO2: 95 % (09/25/24 0600) Vital Signs (24h Range):  Temp:  [97.5 °F (36.4 °C)-98.6 °F (37 °C)] 98.4 °F (36.9 °C)  Pulse:  [] 95  Resp:  [12-20] 19  SpO2:  [92 %-97 %] 95 %  BP: (112-154)/(62-99) 145/65     Weight: 52.7 kg (116 lb 2.9 oz)  Body mass index is 16.2 kg/m².     Physical Exam  Vitals and nursing note reviewed.   Constitutional:       General: She is awake. She is not in acute distress.     Appearance: Normal appearance. She is well-developed. She is not ill-appearing or toxic-appearing.   HENT:      Head: Normocephalic.      Right Ear: External ear normal.      Left Ear: External ear normal.      Nose: Nose normal.      Mouth/Throat:      Mouth: Mucous membranes are moist.      Pharynx: Oropharynx is clear.   Eyes:      Extraocular Movements: Extraocular movements intact.      Conjunctiva/sclera: Conjunctivae normal.      Pupils: Pupils are equal, round, and reactive to light.   Cardiovascular:      Rate and Rhythm: Normal rate.   Pulmonary:       Effort: Pulmonary effort is normal.   Abdominal:      General: Abdomen is flat.   Musculoskeletal:         General: Normal range of motion.   Lymphadenopathy:      Cervical: No cervical adenopathy.   Skin:     General: Skin is warm and dry.   Neurological:      General: No focal deficit present.      Mental Status: She is alert and oriented to person, place, and time.      Cranial Nerves: Cranial nerves 2-12 are intact.      Coordination: Finger-Nose-Finger Test and Heel to Shin Test normal.   Psychiatric:         Mood and Affect: Mood normal.         Speech: Speech normal.         Behavior: Behavior normal. Behavior is cooperative.         Thought Content: Thought content normal.         Judgment: Judgment normal.          NEUROLOGICAL EXAMINATION:     MENTAL STATUS   Oriented to person, place, and time.   Attention: normal.   Speech: speech is normal   Level of consciousness: alert  Knowledge: good.   Able to name object. Able to repeat.     CRANIAL NERVES   Cranial nerves II through XII intact.     CN II   Visual fields full to confrontation.     CN III, IV, VI   Pupils are equal, round, and reactive to light.    CN V   Facial sensation intact.     CN VII   Facial expression full, symmetric.     CN XII   Tongue: not atrophic  Fasciculations: absent  Tongue deviation: none    MOTOR EXAM   Muscle bulk: normal  Overall muscle tone: normal       Drift.  Strength of bilateral upper and lower extremities normal 5/5.     SENSORY EXAM   Light touch normal.     GAIT AND COORDINATION      Coordination   Finger to nose coordination: normal  Heel to shin coordination: normal    Tremor   Resting tremor: absent    Significant Labs:   Recent Lab Results         09/25/24  0407   09/24/24  2154   09/24/24  2153   09/24/24  2034        Albumin/Globulin Ratio 0.6       0.6       ABO and RH   O POS           Albumin 1.8       1.9              272       ALT 64       79       Anion Gap 11.0       12.0       Appearance, UA      Turbid         PTT       27.5  Comment: For Minimal Heparin Infusion, the goal aPTT 64-85 seconds corresponds to an anti-Xa of 0.3-0.5.    For Low Intensity and High Intensity Heparin, the goal aPTT  seconds corresponds to an anti-Xa of 0.3-0.7       AST 46       80       Bacteria, UA     Occasional         Baso # 0.05       0.05       Basophil % 0.3       0.2       Bilirubin (UA)     Negative         BILIRUBIN TOTAL 0.7       0.5       BUN 21.2       25.0       BUN/CREAT RATIO 34       41       Calcium 8.0       8.1       Chloride 106       106       CO2 26       25       Color, UA     Dark-Brown         Creatinine 0.62       0.61       eGFR >60       >60       Eos # 0.03       0.01       Eos % 0.2       0.0       Estimated Avg Glucose       108.3       Globulin, Total 3.1       3.3       Glucose 134       131       Glucose, UA     Normal         Group & Rh       O POS       Hematocrit 32.8       33.3       Hemoglobin 11.0       11.4       Hemoglobin A1C External       5.4       Immature Grans (Abs) 0.59       0.74       Immature Granulocytes 3.1       3.4       Indirect Rayray GEL       NEG       INR       1.1       Ketones, UA     Negative         Leukocyte Esterase, UA     250         Lymph # 1.11       1.09       LYMPH % 5.9       5.0       Magnesium  2.10       2.20       MCH 31.9       31.9       MCHC 33.5       34.2       MCV 95.1       93.3       Mono # 1.19       1.20       Mono % 6.3       5.5       MPV 9.4       9.2       Mucous, UA     Trace         Neut # 15.90       18.72       Neut % 84.2       85.9       NITRITE UA     Negative         nRBC 0.0       0.0       Blood, UA     3+         pH, UA     5.5         Phosphorus Level 3.5       3.8       Platelet Count 236       212       Potassium 3.1       3.4       PROTEIN TOTAL 4.9       5.2       Protein, UA     1+         PT       14.8       RBC 3.45       3.57       RBC, UA     >100         RDW 14.1       13.9       Sodium 143       143        Specific Gravity,UA     1.016         Specimen Outdate       09/27/2024 23:59       Squamous Epithelial Cells, UA     None Seen         TSH       1.825       Urobilinogen, UA     Normal         WBC, UA     11-20         WBC 18.87       21.81               Significant Imaging: I have reviewed all pertinent imaging results/findings within the past 24 hours.

## 2024-09-25 NOTE — ANESTHESIA PREPROCEDURE EVALUATION
09/25/2024  Orlando Black is a 73 y.o., female.  HPI:   Orlando Black is a 73 y.o. female no known past medical history (last hospitalization 1970s for childbirth) presented as a transfer patient from [facility] on 09/24 to [facility] for neurosurgery and CV services related to new left occipital intraparenchymal hemorrhage and mitral valve endocarditis in setting of staph bacteremia.  Presented on 9/21/2024 to outside hospital with fever, arthralgia, and back pain.  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  Patient developed altered mental status overnight on 9/23/2024 and CT head 9/24/2024 revealed new left occipital intraparenchymal hematoma measuring 2.02 x 1.8 x 1.5 cm.   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       Assessment and Plan:      * Intraparenchymal hematoma of brain  Assessment  CTH stable Left occipital lobe parenchymal hematoma with mild surrounding edema. Chronic microvascular ischemic changes.  No drift. No slurred speech, oriented x 3  Plan  Continued management with Neurosurgery      4 mm saccular aneurysm of the left cavernous ICA  Assessment  - 9/25 CTH Impression Left occipital lobe parenchymal hematoma with mild surrounding edema. Chronic microvascular ischemic changes.  - 9/25 CTA head and neck Impression:  1. No large vessel occlusion or flow-limiting stenosis.  2. 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  3. Partially visualized indeterminate retrosternal soft  tissue.         Pre-op Assessment    I have reviewed the NPO Status.      Review of Systems  TTE      Summary         Left Ventricle: The left ventricle is normal in size. Mildly increased wall thickness. There is normal systolic function with a visually estimated ejection fraction of 55 - 60%. There is normal diastolic function.    Right Ventricle: Normal right ventricular cavity size. Systolic function is normal.    Aortic Valve: There is no stenosis. Aortic valve peak velocity is 1.76 m/s. Mean gradient is 6 mmHg. There is mild aortic regurgitation.    Mitral Valve: There is a possible medium mobile mass on the posterior leaflet. There is moderate regurgitation.    Tricuspid Valve: There is moderate regurgitation with a RVSP of 38mmHg.    IVC/SVC: Intermediate venous pressure at 8 mmHg.    Pericardium: There is no pericardial effusion.    Consider TAWANDA      Latest Reference Range & Units 09/25/24 04:07   WBC 4.50 - 11.50 x10(3)/mcL 18.87 (H)   Hemoglobin 12.0 - 16.0 g/dL 11.0 (L)   Hematocrit 37.0 - 47.0 % 32.8 (L)   Platelet Count 130 - 400 x10(3)/mcL 236   Sodium 136 - 145 mmol/L 143   Potassium 3.5 - 5.1 mmol/L 3.1 (L)   Chloride 98 - 107 mmol/L 106   CO2 23 - 31 mmol/L 26   Anion Gap mEq/L 11.0   BUN 9.8 - 20.1 mg/dL 21.2 (H)   Creatinine 0.55 - 1.02 mg/dL 0.62   BUN/CREAT RATIO  34   eGFR mL/min/1.73/m2 >60   Glucose 82 - 115 mg/dL 134 (H)   Calcium 8.4 - 10.2 mg/dL 8.0 (L)   Phosphorus Level 2.3 - 4.7 mg/dL 3.5   Magnesium  1.60 - 2.60 mg/dL 2.10   ALP 40 - 150 unit/L 239 (H)   PROTEIN TOTAL 5.8 - 7.6 gm/dL 4.9 (L)   Albumin 3.4 - 4.8 g/dL 1.8 (L)   Albumin/Globulin Ratio 1.1 - 2.0 ratio 0.6 (L)   BILIRUBIN TOTAL <=1.5 mg/dL 0.7   AST 5 - 34 unit/L 46 (H)   ALT 0 - 55 unit/L 64 (H)   (H): Data is abnormally high  (L): Data is abnormally low       Anesthesia Plan  Type of Anesthesia, risks & benefits discussed:    Anesthesia Type: Gen Natural Airway, Gen ETT  Intra-op Monitoring Plan: Standard ASA  Monitors  Post Op Pain Control Plan: IV/PO Opioids PRN  Induction:  IV  Airway Plan: Direct  Informed Consent: Informed consent signed with the Patient and all parties understand the risks and agree with anesthesia plan.  All questions answered. Patient consented to blood products? No  ASA Score: 4 Emergent  Day of Surgery Review of History & Physical: H&P Update referred to the surgeon/provider.  Anesthesia Plan Notes: Nasal cannula vs facemask supplemental oxygenation   For patients with REGLA/obesity, may consider SuperNoval Nasal CPAP versus GETA  Possible arterial line  ICU postop       Ready For Surgery From Anesthesia Perspective.     .

## 2024-09-25 NOTE — OP NOTE
[facility]                       [address] [place] [id number]     PATIENT NAME:Orlando Black    YOB: 1950      DATE OF SURGERY: 9/25/24      SURGEON:  Zion Solis MD     PREOPERATIVE DIAGNOSIS:  left occipital hemorrhage, concern for mycotic aneurysm.      POSTOPERATIVE DIAGNOSIS:  no evidence of mycotic aneurysm, incidental left cavernous ICA aneurysm     PROCEDURE PERFORMED:    Cerebral angiogram with catheter insertion in the following arteries:  Right common carotid, right internal carotid, right external carotid artery, right subclavian, left common carotid, left internal carotid, left external carotid artery, left subclavian, left vertebral.  Interpretation of images  3D cerebral angiogram with interpretation of films on a separate workstation  Ultrasound-guided right femoral artery access       LEVEL OF SEDATION:  MAC provided by anesthesia team     TOTAL INTRASERVICE SEDATION TIME:  45 minutes.     COMPLICATIONS:  None.    APPROACH:  Right femoral artery      VESSELS SELECTIVELY CATHETERIZED:   Right common carotid artery   Right internal carotid artery   Right external carotid artery   Left common carotid artery  Left internal carotid artery   Left external carotid artery   Right subclavian artery   Left subclavian artery   Left vertebral artery    DEVICES USED:  5 Fr short sheath  5 Fr sim 2 catheter  035 glidewire  Mynx  Micropuncture kit       INDICATION:  73 y.o. years old female with a history of endocarditis, osteomyelitis presented with left occipital ICH. She presents for a cerebral angiogram for evaluation of mycotic aneurysm.    DETAILED DESCRIPTION:      Risks/benefits were thoroughly reviewed with patient/family prior to the procedure.  Risks inclusive but not limited to death, stroke, contrast reaction/nephropathy, access/vascular injury, and other unforeseen risks.  Patient/family provided  informed consent.  Patient supine on the angio table, groin prepped and draped in routine fashion.  Moderate conscious sedation was administered along with local anesthesia.    The right femoral artery was sonographically evaluated and judged to be patent.  Real-time ultrasound was used to visualize needle entry into the vessel and a permanent image was stored. Using a micropuncture kit modified Seldinger technique,, an arterial sheath was placed the right femoral artery.  Diagnostic catheter telescoping over the 035 glidewire advanced to the arch and double flushed.  Enumerated vessels were then selectively catheterized and angiograms obtained as listed below.  Multiple cervical and intracranial runs were obtained in AP and lateral projection.  The films were reviewed and determined to be of good diagnostic quality.  Three-dimensional angiography was performed, the source data transferred to a separate workstation and personally processed by the angiographer. Specific views were then chosen that would best showed the lesion on multiple planes.  The selected coordinates were then transferred to the biplane angiographic suite and used to obtain confirmatory angiographic views of the lesion.  Diagnostic catheter and sheath were then withdrawn and hemostasis was obtained with the manual compression.  No immediate complications were noted.  Patient tolerated the procedure well.    Angiograms performed and findings:    Right femoral artery:  Sheath placement in the right common femoral artery above the femoral bifurcation.  There is no evidence of stenosis, dissection, atherosclerosis or contrast extravasation at the site of puncture.  Right common carotid artery - cervical:  Unremarkable right common carotid artery and carotid bifurcation.  The internal carotid artery is widely patent distally.  The right external carotid artery and its branches appear unremarkable.    Right internal carotid artery - cerebral:  The  distal cervical, petrous, cavernous, supraclinoid segments of the right ICA appear patent.  There is flash filling of the  posterior communicating artery.  There is normal flow and branching noted in right CATRINA and MCA territory.  The capillary and venous phases appear unremarkable.no evidence of mycotic aneurysm noted.   Right external carotid artery - cerebral:  The right external carotid artery and its branches appear unremarkable.  There is no evidence of AV fistula.  Right subclavian artery - cervical/cranial:  Normal origin and cervical course of the right vertebral artery.  No evidence of obvious aneurysm of right PICA. No evidence of AVM or AVF.   Left common carotid artery - cervical:  Unremarkable left common carotid and carotid bifurcation.  The left internal carotid artery is widely patent distally the left external carotid artery and its branches appear unremarkable.    Left internal carotid artery - cerebral:  The distal cervical, petrous, cavernous, supraclinoid segments of the left ICA appeared patent.  There is a posterior communicating artery supplying the PCA territory.  There is normal flow and branching noted in the left CATRINA and MCA territory.  The capillary and venous phases appear unremarkable. No evidence of mycotic aneurysm, AVM, AVF.   3D angiogram of the left ICA injection demonstrates the wide necked aneurysm in the cavernous segment measuring 5.5 x 4.6 mm.   Left external carotid artery - cerebral:  The left external carotid artery and its branches appear unremarkable.  There is no evidence of AV fistula.    Left vertebral artery - cerebral:  Left vertebral artery is codominant.  Unremarkable distal cervical and intracranial segments of the left vertebral artery.  Left PICA, bilateral AICA, SCA, PCA visualized.  The basilar artery appears patent.  There is normal capillary and venous phase.      OVERALL IMPRESSION:  No evidence of mycotic aneurysm in the left occipita region noted.   No  evidence of AVM or AV fistula.   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         ______________________________  Zion Solis MD  Vascular and Interventional Neurology

## 2024-09-25 NOTE — TRANSFER OF CARE
"Anesthesia Transfer of Care Note    Patient: Orlando Black    Procedure(s) Performed: * No procedures listed *    Patient location: ICU    Anesthesia Type: MAC    Transport from OR: Transported from OR on 2-3 L/min O2 by NC with adequate spontaneous ventilation    Post pain: adequate analgesia    Post assessment: no apparent anesthetic complications and tolerated procedure well    Post vital signs: stable    Level of consciousness: responds to stimulation    Nausea/Vomiting: no nausea/vomiting    Complications: none    Transfer of care protocol was followed    Last vitals: Visit Vitals  /72 (BP Location: Left arm)   Pulse 97   Temp 36.3 °C (97.3 °F)   Resp 18   Ht 5' 11" (1.803 m)   Wt 52.7 kg (116 lb 2.9 oz)   SpO2 97%   BMI 16.20 kg/m²     "

## 2024-09-25 NOTE — ASSESSMENT & PLAN NOTE
Assessment  CTH stable Left occipital lobe parenchymal hematoma with mild surrounding edema. Chronic microvascular ischemic changes.  No drift. No slurred speech, oriented x 3  Plan  Continued management with Neurosurgery

## 2024-09-25 NOTE — ASSESSMENT & PLAN NOTE
Assessment  - 9/25 CTH Impression Left occipital lobe parenchymal hematoma with mild surrounding edema. Chronic microvascular ischemic changes.  - 9/25 CTA head and neck Impression:  1. No large vessel occlusion or flow-limiting stenosis.  2. 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  3. Partially visualized indeterminate retrosternal soft tissue.    Current NIH Stroke Score Values      Flowsheet Row Most Recent Value   Interval baseline   1a. Level of Consciousness 1-->Not alert, but arousable by minor stimulation to obey, answer, or respond   1b. LOC Questions 1-->Answers one question correctly   1c. LOC Commands 0-->Performs both tasks correctly   2. Best Gaze 0-->Normal   3. Visual 0-->No visual loss   4. Facial Palsy 0-->Normal symmetrical movements   5a. Motor Arm, Left 0-->No drift, limb holds 90 (or 45) degrees for full 10 secs   5b. Motor Arm, Right 0-->No drift, limb holds 90 (or 45) degrees for full 10 secs   6a. Motor Leg, Left 0-->No drift, leg holds 30 degree position for full 5 secs   6b. Motor Leg, Right 3-->No effort against gravity, leg falls to bed immediately   7. Limb Ataxia 0-->Absent   8. Sensory 0-->Normal, no sensory loss   9. Best Language 0-->No aphasia, normal   10. Dysarthria 0-->Normal   11. Extinction and Inattention (formerly Neglect) 0-->No abnormality   Total (NIH Stroke Scale) 5            Plan  - NPO   - Diagnostic angiogram of brain today with intervention if indicated  - Further recommendations to follow from MD

## 2024-09-25 NOTE — PLAN OF CARE
Called Geraldo and sent request for blood cultures with speciation and sensitivities, body fluid culture results, urine culture results via fax cover sheet (fax [phone]).

## 2024-09-25 NOTE — H&P
[facility] - 7th Floor ICU  Pulmonary Critical Care Note    Patient Name: Orlando Black  MRN: [id number]  Admission Date: 9/24/2024  Hospital Length of Stay: 0 days  Code Status: Full Code  Attending Provider: Quinn Juarez MD  Primary Care Provider: No primary care provider on file.     Subjective:     HPI:   Orlando Black is a 73 y.o. female with an no known pmh (last hospitalization in 1970s for childbirth) who presented to [facility] as a transfer from [facility] on 9/24/2024 for Neurosurgery and CV Surgery services given new left occipital intraparenchymal hemorrhage and mitral valve endocarditis in the setting of Staph bacteremia. Patient presented to outside hospital on 9/21/2024 for fever, arthralgia, and back pain. Admitted for sepsis work up. 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 Patient then developed altered mental status overnight on 9/23/2024 and underwent CT Head on 9/24/2024 which showed new left occipital intraparenchymal hematoma measuring 2.0 x 1.8 x 1.5 cm. Transferred to [facility] for Neurosurgery and CV Surgery services.     Hospital Course/Significant events:  9/24/2024: Admitted to ICU with new left occipital IPH, transfer from [facility]    24 Hour Interval History:  .    No past medical history on file.    No past surgical history on file.    Social History     Socioeconomic History    Marital status: Single           No current outpatient medications    Current Inpatient Medications      Current Intravenous Infusions        Review of Systems   Unable to perform ROS: Mental acuity          Objective:     No intake or output data in the 24 hours ending 09/24/24 1903      Vital Signs (Most Recent):     There is no height or weight on file to calculate BMI.    Vital Signs (24h Range):        Physical  "Exam  Constitutional:       General: She is not in acute distress.     Appearance: She is not diaphoretic.   HENT:      Head: Normocephalic and atraumatic.   Eyes:      Conjunctiva/sclera: Conjunctivae normal.      Pupils: Pupils are equal, round, and reactive to light.   Cardiovascular:      Rate and Rhythm: Normal rate.      Heart sounds: Murmur heard.   Pulmonary:      Effort: No respiratory distress.      Breath sounds: Normal breath sounds. No wheezing.   Abdominal:      General: There is no distension.      Palpations: Abdomen is soft.      Tenderness: There is no abdominal tenderness.   Musculoskeletal:         General: Swelling (Right knee edema) and tenderness (Right hip tender to palpation) present.   Skin:     General: Skin is warm and dry.   Neurological:      Comments: Lethargic, awakens to voice, delayed responses to questions, moves extremities, difficult to assess motor strength as patient does not consistently follow commands, facial symmetry noted           Lines/Drains/Airways       None                   Significant Labs:    No results found for: "WBC", "HGB", "HCT", "MCV", "PLT"        BMP  No results found for: "NA", "K", "CHLORIDE", "CO2", "BUN", "CREATININE", "CALCIUM", "AGAP", "ESTGFRAFRICA", "EGFRNONAA"      ABG  No results for input(s): "PH", "PO2", "PCO2", "HCO3", "POCBASEDEF" in the last 168 hours.    Mechanical Ventilation Support:         Micro:  Outside Hospital  Blood culture 9/22/2024: Staph aureus (BCID mecA not applicable) so MSSA    Urine culture 9/22/2024: no growth in 24 hours    Body Fluid Culture 9/24/2024 (right knee effusion): Rare GPC, Many WBCs    Significant Imaging:  I have reviewed the pertinent imaging within the past 24 hours.    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 No convincing acute infection in the cervical spine. Multilevel DJD. Osteopenia about left C3-4 facet joint is favored to be degenerative although it is difficult to exclude osteomyelitis entirely. MRI with and without contrast suggested.  2. Suspected acute osteomyelitis/septic arthritis of the sternomanubrial joint.  3. No definitive acute infection in the thoracic spine  4. No definitive acute infection in the lumbar spine  5. Incidental note of a left occipital parenchymal hematoma  6. Cholelithiasis     CT Head wo 9/24/2024 1004:  There is a 2 cm intraparenchymal hematoma within the left occipital lobe. Measures 2.0 x 1.8 x 1.5 cm. No intracranial mass. No mass effect.    US Abdomen 9/22/2024 0958: Cholelithiasis, fatty hepatomegaly, 1 cm hepatic hemangioma suspected in left lobe, non-emergent liver protocol CT recommended for further characterization     CT Head 9/21/2024: No acute intracranial abnormalities    CT Chest 9/21/2024: No acute or suspicious intrathoracic disease    CT Abdomen Pelvis 9/21/2024: No acute or suspicious abnormalities. Cholelithiasis.    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    Plan  Admitted to ICU for close monitoring  Consult Neurosurgery and Vascular Neuro for IPH  Q1h neuro checks, SBP<140  CT Head in the morning, repeat if neuro status change overnight  Consult CV Surgery for MV endocarditis  CMP, CBC, Mg, Ph, TSH, A1c, PT/INR, PTT, Type & Screen  Blood cultures, UA with reflex to urine culture  Start broad spectrum antibiotics: Vanc and Zosyn  Spoke to patient's  via phone  who expressed the desire for FULL CODE status tonight, states he will visit tomorrow 9/25/2024    DVT Prophylaxis: SCDs  GI Prophylaxis: H2B     32 minutes of critical care was time spent personally by me on the following activities: development of treatment plan with patient or surrogate and bedside caregivers, discussions with consultants, evaluation of patient's response to treatment, examination of patient, ordering and performing treatments and interventions, ordering and review of laboratory studies, ordering and review of radiographic studies, pulse oximetry, re-evaluation of patient's condition.  This critical care time did not overlap with that of any other provider or involve time for any procedures.     Divya Lewis MD  Pulmonary Critical Care Medicine  [facility] - 7th Floor ICU  DOS: 09/24/2024

## 2024-09-25 NOTE — ASSESSMENT & PLAN NOTE
IPH   - NPO   - Diagnostic angiogram today   - 9/25 CTH Impression Left occipital lobe parenchymal hematoma with mild surrounding edema. Chronic microvascular ischemic changes.  - 9/25 CTA head and neck Impression:  1. No large vessel occlusion or flow-limiting stenosis.  2. 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  3. Partially visualized indeterminate retrosternal soft tissue.    -hourly neuro checks ... notify neurology of any neuro change  -strict blood pressure control ... -150 for the first 24 hours, then SBP less than 140  -avoid antiplatelet or anticoagulation at this time  -seizure precautions ... notify neurology of any seizure-like activity  -therapy evaluations     Current NIH Stroke Score Values      Flowsheet Row Most Recent Value   Interval baseline   1a. Level of Consciousness 1-->Not alert, but arousable by minor stimulation to obey, answer, or respond   1b. LOC Questions 1-->Answers one question correctly   1c. LOC Commands 0-->Performs both tasks correctly   2. Best Gaze 0-->Normal   3. Visual 0-->No visual loss   4. Facial Palsy 0-->Normal symmetrical movements   5a. Motor Arm, Left 0-->No drift, limb holds 90 (or 45) degrees for full 10 secs   5b. Motor Arm, Right 0-->No drift, limb holds 90 (or 45) degrees for full 10 secs   6a. Motor Leg, Left 0-->No drift, leg holds 30 degree position for full 5 secs   6b. Motor Leg, Right 3-->No effort against gravity, leg falls to bed immediately   7. Limb Ataxia 0-->Absent   8. Sensory 0-->Normal, no sensory loss   9. Best Language 0-->No aphasia, normal   10. Dysarthria 0-->Normal   11. Extinction and Inattention (formerly Neglect) 0-->No abnormality   Total (NIH Stroke Scale) 5

## 2024-09-25 NOTE — HPI
Orlando Black is a 73 y.o. female no known past medical history (last hospitalization 1970s for childbirth) presented as a transfer patient from [facility] in [place] on 09/24 to [facility] for neurosurgery and CV services related to new left occipital intraparenchymal hemorrhage and mitral valve endocarditis in setting of staph bacteremia.  Presented on 9/21/2024 to outside hospital with fever, arthralgia, and back pain.  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  Patient developed altered mental status overnight on 9/23/2024 and CT head 9/24/2024 revealed new left occipital intraparenchymal hematoma measuring 2.02 x 1.8 x 1.5 cm.   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

## 2024-09-25 NOTE — CONSULTS
[facility] - 7th Floor ICU  Neurosurgery  Consult Note    Inpatient consult to Neurosurgery  Consult performed by: Yani Kaplan PA  Consult ordered by: Divya Lewis MD        Subjective:     Chief Complaint/Reason for Admission: Head trauma s/p syncopal fall    History of Present Illness: Patient is a 73 y.o. female with no known pmh (last hospitalization in 1970s for childbirth) who presented to [facility] as a transfer from [place] on 9/24/2024 for Neurosurgery and CV Surgery services given new left occipital intraparenchymal hemorrhage and mitral valve endocarditis in the setting of Staph bacteremia. Patient presented to outside hospital on 9/21/2024 for fever, arthralgia, and back pain. Admitted for sepsis work up. 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 Patient then developed altered mental status overnight on 9/23/2024 and underwent CT Head on 9/24/2024 which showed new left occipital intraparenchymal hematoma measuring 2.0 x 1.8 x 1.5 cm. Transferred to [facility] for Neurosurgery and CV Surgery services. Dr. Macedo was consulted for evaluation and treatment recommendations.    On PE today she is sitting up in bed, NAD. She denies HA, blurred vision and N/V. Speech is clear. She is very confused about situation and how she got here but she is oriented to self, place and year. She has no c/o pain at time of rounds, undergoing echo. She denies numbness and tingling in all ext.     WBC: 18.87, repeat blood cultures pending.    No medications prior to admission.       Review of patient's allergies indicates:  Not on File    No past medical history on file.  No past surgical history on file.  Family History    None       Tobacco Use    Smoking status: Not on file    Smokeless tobacco: Not on file   Substance and Sexual Activity     "Alcohol use: Not on file    Drug use: Not on file    Sexual activity: Not on file     Review of Systems  Objective:   12 pt ROS WNL, except for HPI    Weight: 52.7 kg (116 lb 2.9 oz)  Body mass index is 16.2 kg/m².  Vital Signs (Most Recent):  Temp: 100 °F (37.8 °C) (09/25/24 0800)  Pulse: 93 (09/25/24 0800)  Resp: 15 (09/25/24 0800)  BP: 138/72 (09/25/24 0800)  SpO2: 96 % (09/25/24 0800) Vital Signs (24h Range):  Temp:  [97.5 °F (36.4 °C)-100 °F (37.8 °C)] 100 °F (37.8 °C)  Pulse:  [] 93  Resp:  [12-23] 15  SpO2:  [92 %-97 %] 96 %  BP: (112-154)/(62-99) 138/72     Date 09/25/24 0700 - 09/26/24 0659   Shift 4520-1904 9180-3037 5565-4551 24 Hour Total   INTAKE   Shift Total(mL/kg)       OUTPUT   Urine(mL/kg/hr) 225   225   Shift Total(mL/kg) 225(4.3)   225(4.3)   Weight (kg) 52.7 52.7 52.7 52.7                            Urethral Catheter 09/24/24 2130 Silicone 16 Fr. (Active)   $ Tom Insertion Bedside Insertion Performed 09/24/24 2200   Site Assessment Clean;Intact;Dry 09/25/24 0800   Collection Container Urimeter 09/25/24 0800   Securement Method secured to top of thigh w/ adhesive device 09/25/24 0800   Catheter Care Performed yes 09/25/24 0800   Reason for Continuing Urinary Catheterization Critically ill in ICU and requiring hourly monitoring of intake/output 09/25/24 0800   CAUTI Prevention Bundle Securement Device in place with 1" slack;Intact seal between catheter & drainage tubing;Drainage bag/urimeter off the floor;Sheeting clip in use;No dependent loops or kinks;Drainage bag/urimeter not overfilled (<2/3 full);Drainage bag/urimeter below bladder 09/25/24 0800   Output (mL) 225 mL 09/25/24 0800       Physical Exam:  Nursing note and vitals reviewed.    Constitutional: She appears well-developed and well-nourished. No distress.     Eyes: Pupils are equal, round, and reactive to light. EOM are normal.     Cardiovascular: Intact distal pulses.     Abdominal: Soft.     Psych/Behavior: She is alert. She " is oriented to person, place, and time. She has a normal mood and affect.   Confused on situation.     Musculoskeletal:        Neck: Range of motion is full. There is no tenderness.        Back: Range of motion is full. There is no tenderness.        Right Upper Extremities: Range of motion is full. Muscle strength is 5/5.        Left Upper Extremities: Range of motion is full. Muscle strength is 5/5.       Right Lower Extremities: Range of motion is limited. There is tenderness. Tenderness is located Right hip and knee. Some guarding with motion.. Muscle strength is 5/5.        Left Lower Extremities: Range of motion is full. Muscle strength is 5/5.     Neurological:        Sensory: There is no sensory deficit in the trunk. There is no sensory deficit in the extremities.        DTRs: DTRs are DTRS NORMAL AND SYMMETRICnormal and symmetric. She displays no Babinski's sign on the right side. She displays no Babinski's sign on the left side.        Cranial nerves: Cranial nerve(s) II, III, IV, V, VI, VII, VIII, IX, X, XI and XII are intact.       Significant Labs:  Recent Labs   Lab 09/24/24 2034 09/25/24  0407    143   K 3.4* 3.1*    106   CO2 25 26   BUN 25.0* 21.2*   CREATININE 0.61 0.62   CALCIUM 8.1* 8.0*   MG 2.20 2.10     Recent Labs   Lab 09/24/24 2034 09/25/24  0407   WBC 21.81* 18.87*   HGB 11.4* 11.0*   HCT 33.3* 32.8*    236     Recent Labs   Lab 09/24/24 2034   INR 1.1   APTT 27.5     Microbiology Results (last 7 days)       Procedure Component Value Units Date/Time    Urine culture [[id number]] Collected: 09/24/24 2153    Order Status: Completed Specimen: Urine, Catheterized Updated: 09/25/24 0735     Urine Culture No Growth At 24 Hours    Blood Culture [[id number]] Collected: 09/24/24 2034    Order Status: Resulted Specimen: Blood Updated: 09/24/24 2108    Blood Culture [[id number]] Collected: 09/24/24 2034    Order Status: Resulted Specimen: Blood Updated: 09/24/24 2108           Significant Diagnostics:  CTA Head and Neck (xpd) [[id number]] Resulted: 09/25/24 1023   Order Status: Completed Updated: 09/25/24 1026   Narrative:     EXAMINATION:  CTA HEAD AND NECK (XPD)    CLINICAL HISTORY:  Stroke, hemorrhagic;    TECHNIQUE:  Axial images obtained through the cervical region and [place] of [place] before and after the administration of intravenous contrast.    Coronal, sagittal, MIP and 3D reconstructions were obtained from the axial data.    Automatic exposure control was utilized to limit radiation dose.    Radiation Dose:    Total DLP: 1550 mGy*cm    COMPARISON:  CT head dated 09/25/2024    FINDINGS:  Head CT with contrast:    No interval changes when compared to the previous CT.    No enhancing abnormalities.    If present, stenosis of the carotid bulbs is measured based on NASCET criteria,    i.e. area of maximal stenosis compared to the cervical ICA distal to the bulb.    Cervical CTA:    The origins of the great vessels are patent.    The common carotid arteries are patent.  There are calcifications at the carotid bulbs without hemodynamically significant stenosis.  The internal carotid arteries are patent.    The vertebral arteries are patent.    Intracranial CTA:    The internal carotid arteries are patent with minimal scattered calcifications.  There is a saccular aneurysm arising from the cavernous segment of the left internal carotid artery measuring up to 4 mm in size (series 17, image 148).  The middle cerebral arteries and anterior cerebral arteries are patent.    The vertebral arteries, basilar artery and posterior cerebral arteries are patent.    The dural venous sinuses are patent.    Additional findings:    There is partially visualized indeterminate soft tissue in the retrosternal space (series 11, image 119).    Small left pleural effusion.   Impression:       1. No large vessel occlusion or flow-limiting stenosis.  2. 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  3. Partially visualized indeterminate retrosternal soft tissue.      Electronically signed by: Petrona Nguyen  Date: 09/25/2024  Time: 10:23   CT Head Without Contrast [[id number]] Resulted: 09/25/24 1017   Order Status: Completed Updated: 09/25/24 1019   Narrative:     EXAMINATION:  CT HEAD WITHOUT CONTRAST    CLINICAL HISTORY:  Stroke, follow up;Left occipital IPH;    TECHNIQUE:  Axial scans were obtained from skull base to the vertex.    Coronal and sagittal reconstructions obtained from the axial data.    Automatic exposure control was utilized to limit radiation dose.    Contrast: None    Radiation Dose:    Total DLP: 970 mGy*cm    COMPARISON:  None    FINDINGS:  A known left occipital lobe parenchymal hemorrhage measures 1.1 x 2.2 cm in size with mild surrounding edema.  Patchy hypodensities in the subcortical and periventricular white matter likely represent chronic microvascular ischemic changes.    There is no significant mass effect or midline shift.  The basal cisterns are patent.  There is mild diffuse parenchymal volume loss.  There is no hydrocephalus or abnormal extra-axial fluid collection.  The calvarium and skull base are intact.  There is mild scattered paranasal sinus mucosal thickening.   Impression:       1. Left occipital lobe parenchymal hematoma with mild surrounding edema.  2. Chronic microvascular ischemic changes.     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 I will follow-up with patient. Please contact us if you have any additional questions.    AFRICA Lakhani  Neurosurgery  [facility] - 7th Floor ICU

## 2024-09-25 NOTE — ANESTHESIA POSTPROCEDURE EVALUATION
Anesthesia Post Evaluation    Patient: Orlando Black    Procedure(s) Performed: * No procedures listed *    Final Anesthesia Type: MAC      Patient location during evaluation: PACU  Patient participation: No - Unable to Participate, Coma/Other Inability to Communicate  Level of consciousness: awake  Post-procedure vital signs: reviewed and stable  Pain management: adequate  Airway patency: patent    PONV status at discharge: No PONV  Anesthetic complications: no      Cardiovascular status: blood pressure returned to baseline and hemodynamically stable  Respiratory status: unassisted and spontaneous ventilation  Hydration status: euvolemic  Follow-up not needed.              Vitals Value Taken Time   /57 09/25/24 1630   Temp 37.1 °C (98.7 °F) 09/25/24 1200   Pulse 90 09/25/24 1639   Resp 12 09/25/24 1639   SpO2 92 % 09/25/24 1639   Vitals shown include unfiled device data.      No case tracking events are documented in the log.      Pain/Britta Score: Pain Rating Prior to Med Admin: 10 (9/25/2024  4:12 PM)

## 2024-09-25 NOTE — PLAN OF CARE
Problem: Adult Inpatient Plan of Care  Goal: Plan of Care Review  Outcome: Progressing  Goal: Patient-Specific Goal (Individualized)  Outcome: Progressing  Goal: Absence of Hospital-Acquired Illness or Injury  Outcome: Progressing  Goal: Optimal Comfort and Wellbeing  Outcome: Progressing  Goal: Readiness for Transition of Care  Outcome: Progressing     Problem: Stroke, Subarachnoid Hemorrhage  Goal: Optimal Coping  Outcome: Progressing  Goal: Effective Bowel Elimination  Outcome: Progressing  Goal: Optimal Cerebral Tissue Perfusion  Outcome: Progressing  Goal: Optimal Cognitive Function  Outcome: Progressing  Goal: Effective Communication Skills  Outcome: Progressing  Goal: Optimal Functional Ability  Outcome: Progressing  Goal: Optimal Nutrition Intake  Outcome: Progressing  Goal: Optimal Pain Control and Function  Outcome: Progressing  Goal: Effective Oxygenation and Ventilation  Outcome: Progressing  Goal: Improved Sensorimotor Function  Outcome: Progressing  Goal: Safe and Effective Swallow  Outcome: Progressing  Goal: Effective Urinary Elimination  Outcome: Progressing     Problem: Skin Injury Risk Increased  Goal: Skin Health and Integrity  Outcome: Progressing     Problem: Wound  Goal: Optimal Coping  Outcome: Progressing  Goal: Optimal Functional Ability  Outcome: Progressing  Goal: Absence of Infection Signs and Symptoms  Outcome: Progressing  Goal: Improved Oral Intake  Outcome: Progressing  Goal: Optimal Pain Control and Function  Outcome: Progressing  Goal: Skin Health and Integrity  Outcome: Progressing  Goal: Optimal Wound Healing  Outcome: Progressing     Problem: Infection  Goal: Absence of Infection Signs and Symptoms  Outcome: Progressing

## 2024-09-25 NOTE — PLAN OF CARE
09/25/24 1307   Discharge Assessment   Assessment Type Discharge Planning Assessment   Confirmed/corrected address, phone number and insurance Yes   Confirmed Demographics Correct on Facesheet   Source of Information family;patient   Communicated JALYN with patient/caregiver Date not available/Unable to determine   Reason For Admission Intraparenchymal hematoma of brain   People in Home spouse;child(wilber), adult;grandchild(wilber)   Do you expect to return to your current living situation? Yes   Do you have help at home or someone to help you manage your care at home? Yes   Who are your caregiver(s) and their phone number(s)? , Edil Wayne [phone] and daughterLenore   Prior to hospitilization cognitive status: Alert/Oriented   Current cognitive status: Alert/Oriented   Walking or Climbing Stairs Difficulty no   Dressing/Bathing Difficulty no   Home Accessibility wheelchair accessible   Equipment Currently Used at Home none   Patient currently being followed by outpatient case management? No   Do you currently have service(s) that help you manage your care at home? No   Do you take prescription medications? No   Do you have prescription coverage? Yes   Coverage [employer] Managed care   Who is going to help you get home at discharge? family   How do you get to doctors appointments? car, drives self   Are you on dialysis? No   Do you take coumadin? No   Discharge Plan A Other  (to be determined)   Discharge Plan B Other  (to be determined)   DME Needed Upon Discharge  other (see comments)  (to be determined)   Discharge Plan discussed with: Spouse/sig other;Adult children   Name(s) and Number(s) Edil and daughter Lenore   Transition of Care Barriers None   Housing Stability   In the last 12 months, was there a time when you were not able to pay the mortgage or rent on time? N   At any time in the past 12 months, were you homeless or living in a shelter (including now)? N   Transportation Needs    Has the lack of transportation kept you from medical appointments, meetings, work or from getting things needed for daily living? No   Food Insecurity   Within the past 12 months, you worried that your food would run out before you got the money to buy more. Never true   Within the past 12 months, the food you bought just didn't last and you didn't have money to get more. Never true   Social Isolation   How often do you feel lonely or isolated from those around you?  Never   RIWI   In the past 12 months has the electric, gas, oil, or water company threatened to shut off services in your home? No   Health Literacy   How often do you need to have someone help you when you read instructions, pamphlets, or other written material from your doctor or pharmacy? Never   OTHER   Name(s) of People in Home Lenore, daughter and her family and , Edil     Patient lives with family Was working and driving. Independent prior to hospitalization. Has not seen PCP yet. Will need appointment. No DME in home.

## 2024-09-25 NOTE — CONSULTS
[facility]                       [address] [place] [address]    PATIENT NAME:       DORCAS MCKEON   YOB: 1950  CSN:                [id number]   MRN:                [id number]  ADMIT DATE:         09/24/2024 18:31:00  PHYSICIAN:          Todd Wise MD                            CONSULTATION    DATE OF CONSULT:  09/25/2024 00:00:00    REASON FOR CONSULTATION:  This is a consult note for a mitral valve   endocarditis.    HISTORY OF PRESENT ILLNESS:  The patient is a 73-year-old female, who was   transferred from [place] for left occipital intraparenchymal hemorrhage and   mitral valve endocarditis with Staph bacteremia.  I was consulted for possible   valve replacement.  She has an echocardiogram revealed evidence of moderate   mitral regurgitation with mitral valve endocarditis.  CT of the head on   09/24/2024 revealed an intraparenchymal hematoma measuring 2 x 1.8 x 1.5 cm in   the left occipital intraparenchymal area.  CTA of the head and neck revealed a 4   mm saccular aneurysm of the left cavernous internal carotid artery.    MEDICATIONS:  Per MAR.    REVIEW OF SYSTEM:  Positive for activity change.  Positive for weakness in both   legs.    PHYSICAL EXAMINATION:  GENERAL:  She is currently in no acute distress.  VITAL SIGNS:  Revealed temperature of 94 and blood pressure 145/65.  NECK:  Trachea midline.  No carotid bruits.  HEENT:  Eyes, EOMI.  PERRLA.  LUNGS:  Good air entry bilaterally.  HEART:  Normal S1, S2.    ABDOMEN:  Soft.  EXTREMITIES:  Warm.  NEUROLOGICAL:  2+ motor intact.  She is little bit confused.    PSYCHIATRIC:  Mood appropriate.    ASSESSMENT AND PLAN:  Mitral valve endocarditis with moderate mitral   regurgitation.  Recommend 6 weeks of antibiotics with repeat echocardiogram and   evaluation.  Please reconsult if needed.        ______________________________  Todd Wise  MD    MAA/CHELSEY  DD:  09/25/2024  Time:  02:15PM  DT:  09/25/2024  Time:  03:12PM  Job #:  [id number]      CONSULTATION

## 2024-09-25 NOTE — PROGRESS NOTES
Orthopaedic Note    Chart reviewed as patient is going to cath lab currently.  Orthopedics consulted for evaluation of her right hip and knee.  CT of the right knee and right hip were performed on 09/22.  She was noted to have arthritis of the knee with the effusion and Baker's cyst.  Arthritis and suspected avascular necrosis of the femoral head on her hip CT.  I do not have images.  X-rays has been ordered.  No mention of any fluid around the hip.  Effusion to the knee possibly related to arthritic changes.  No enhancement noted.  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    I will follow up imaging and evaluate.    Omid Castro MD

## 2024-09-26 PROBLEM — I61.9 INTRAPARENCHYMAL HEMORRHAGE OF BRAIN: Status: ACTIVE | Noted: 2024-09-24

## 2024-09-26 LAB
ALBUMIN SERPL-MCNC: 1.6 G/DL (ref 3.4–4.8)
ALBUMIN/GLOB SERPL: 0.5 RATIO (ref 1.1–2)
ALP SERPL-CCNC: 254 UNIT/L (ref 40–150)
ALT SERPL-CCNC: 76 UNIT/L (ref 0–55)
ANION GAP SERPL CALC-SCNC: 10 MEQ/L
AST SERPL-CCNC: 74 UNIT/L (ref 5–34)
BACTERIA UR CULT: NO GROWTH
BASOPHILS # BLD AUTO: 0.02 X10(3)/MCL
BASOPHILS NFR BLD AUTO: 0.1 %
BILIRUB SERPL-MCNC: 0.7 MG/DL
BUN SERPL-MCNC: 16.3 MG/DL (ref 9.8–20.1)
CALCIUM SERPL-MCNC: 7.6 MG/DL (ref 8.4–10.2)
CHLORIDE SERPL-SCNC: 113 MMOL/L (ref 98–107)
CO2 SERPL-SCNC: 23 MMOL/L (ref 23–31)
CREAT SERPL-MCNC: 0.59 MG/DL (ref 0.55–1.02)
CREAT/UREA NIT SERPL: 28
EOSINOPHIL # BLD AUTO: 0.06 X10(3)/MCL (ref 0–0.9)
EOSINOPHIL NFR BLD AUTO: 0.4 %
ERYTHROCYTE [DISTWIDTH] IN BLOOD BY AUTOMATED COUNT: 14.1 % (ref 11.5–17)
GFR SERPLBLD CREATININE-BSD FMLA CKD-EPI: >60 ML/MIN/1.73/M2
GLOBULIN SER-MCNC: 3.1 GM/DL (ref 2.4–3.5)
GLUCOSE SERPL-MCNC: 117 MG/DL (ref 82–115)
HCT VFR BLD AUTO: 30.5 % (ref 37–47)
HGB BLD-MCNC: 10.3 G/DL (ref 12–16)
IMM GRANULOCYTES # BLD AUTO: 0.29 X10(3)/MCL (ref 0–0.04)
IMM GRANULOCYTES NFR BLD AUTO: 1.7 %
LYMPHOCYTES # BLD AUTO: 0.96 X10(3)/MCL (ref 0.6–4.6)
LYMPHOCYTES NFR BLD AUTO: 5.8 %
MAGNESIUM SERPL-MCNC: 2.2 MG/DL (ref 1.6–2.6)
MCH RBC QN AUTO: 31.8 PG (ref 27–31)
MCHC RBC AUTO-ENTMCNC: 33.8 G/DL (ref 33–36)
MCV RBC AUTO: 94.1 FL (ref 80–94)
MONOCYTES # BLD AUTO: 0.85 X10(3)/MCL (ref 0.1–1.3)
MONOCYTES NFR BLD AUTO: 5.1 %
NEUTROPHILS # BLD AUTO: 14.48 X10(3)/MCL (ref 2.1–9.2)
NEUTROPHILS NFR BLD AUTO: 86.9 %
NRBC BLD AUTO-RTO: 0 %
PHOSPHATE SERPL-MCNC: 3.7 MG/DL (ref 2.3–4.7)
PLATELET # BLD AUTO: 251 X10(3)/MCL (ref 130–400)
PMV BLD AUTO: 9.3 FL (ref 7.4–10.4)
POTASSIUM SERPL-SCNC: 3.8 MMOL/L (ref 3.5–5.1)
PROT SERPL-MCNC: 4.7 GM/DL (ref 5.8–7.6)
RBC # BLD AUTO: 3.24 X10(6)/MCL (ref 4.2–5.4)
SODIUM SERPL-SCNC: 146 MMOL/L (ref 136–145)
WBC # BLD AUTO: 16.66 X10(3)/MCL (ref 4.5–11.5)

## 2024-09-26 PROCEDURE — 84100 ASSAY OF PHOSPHORUS: CPT | Performed by: STUDENT IN AN ORGANIZED HEALTH CARE EDUCATION/TRAINING PROGRAM

## 2024-09-26 PROCEDURE — A9577 INJ MULTIHANCE: HCPCS | Performed by: INTERNAL MEDICINE

## 2024-09-26 PROCEDURE — 80053 COMPREHEN METABOLIC PANEL: CPT | Performed by: STUDENT IN AN ORGANIZED HEALTH CARE EDUCATION/TRAINING PROGRAM

## 2024-09-26 PROCEDURE — 83735 ASSAY OF MAGNESIUM: CPT | Performed by: STUDENT IN AN ORGANIZED HEALTH CARE EDUCATION/TRAINING PROGRAM

## 2024-09-26 PROCEDURE — 25000003 PHARM REV CODE 250: Performed by: INTERNAL MEDICINE

## 2024-09-26 PROCEDURE — 93005 ELECTROCARDIOGRAM TRACING: CPT

## 2024-09-26 PROCEDURE — 87077 CULTURE AEROBIC IDENTIFY: CPT | Performed by: GENERAL PRACTICE

## 2024-09-26 PROCEDURE — 63600175 PHARM REV CODE 636 W HCPCS

## 2024-09-26 PROCEDURE — 63600175 PHARM REV CODE 636 W HCPCS: Performed by: STUDENT IN AN ORGANIZED HEALTH CARE EDUCATION/TRAINING PROGRAM

## 2024-09-26 PROCEDURE — 85025 COMPLETE CBC W/AUTO DIFF WBC: CPT | Performed by: STUDENT IN AN ORGANIZED HEALTH CARE EDUCATION/TRAINING PROGRAM

## 2024-09-26 PROCEDURE — 99223 1ST HOSP IP/OBS HIGH 75: CPT | Mod: 57,,, | Performed by: REHABILITATION UNIT

## 2024-09-26 PROCEDURE — 63600175 PHARM REV CODE 636 W HCPCS: Performed by: GENERAL PRACTICE

## 2024-09-26 PROCEDURE — 20000000 HC ICU ROOM

## 2024-09-26 PROCEDURE — 25500020 PHARM REV CODE 255: Performed by: INTERNAL MEDICINE

## 2024-09-26 PROCEDURE — 25000003 PHARM REV CODE 250: Performed by: GENERAL PRACTICE

## 2024-09-26 PROCEDURE — 36415 COLL VENOUS BLD VENIPUNCTURE: CPT | Performed by: GENERAL PRACTICE

## 2024-09-26 PROCEDURE — 36415 COLL VENOUS BLD VENIPUNCTURE: CPT | Performed by: STUDENT IN AN ORGANIZED HEALTH CARE EDUCATION/TRAINING PROGRAM

## 2024-09-26 PROCEDURE — 99232 SBSQ HOSP IP/OBS MODERATE 35: CPT | Mod: FS,,, | Performed by: PSYCHIATRY & NEUROLOGY

## 2024-09-26 PROCEDURE — 87184 SC STD DISK METHOD PER PLATE: CPT | Performed by: GENERAL PRACTICE

## 2024-09-26 PROCEDURE — 93010 ELECTROCARDIOGRAM REPORT: CPT | Mod: ,,, | Performed by: INTERNAL MEDICINE

## 2024-09-26 PROCEDURE — 25000003 PHARM REV CODE 250: Performed by: STUDENT IN AN ORGANIZED HEALTH CARE EDUCATION/TRAINING PROGRAM

## 2024-09-26 PROCEDURE — 99291 CRITICAL CARE FIRST HOUR: CPT | Mod: FS,,, | Performed by: GENERAL PRACTICE

## 2024-09-26 RX ORDER — LORAZEPAM 2 MG/ML
INJECTION INTRAMUSCULAR
Status: COMPLETED
Start: 2024-09-26 | End: 2024-09-26

## 2024-09-26 RX ORDER — QUETIAPINE FUMARATE 25 MG/1
25 TABLET, FILM COATED ORAL 2 TIMES DAILY PRN
Status: DISCONTINUED | OUTPATIENT
Start: 2024-09-26 | End: 2024-09-30

## 2024-09-26 RX ORDER — MIDAZOLAM HYDROCHLORIDE 2 MG/2ML
0.5 INJECTION, SOLUTION INTRAMUSCULAR; INTRAVENOUS ONCE
Status: COMPLETED | OUTPATIENT
Start: 2024-09-26 | End: 2024-09-26

## 2024-09-26 RX ORDER — LEVETIRACETAM 500 MG/1
500 TABLET ORAL 2 TIMES DAILY
Status: DISCONTINUED | OUTPATIENT
Start: 2024-09-27 | End: 2024-10-21 | Stop reason: HOSPADM

## 2024-09-26 RX ADMIN — FAMOTIDINE 20 MG: 10 INJECTION, SOLUTION INTRAVENOUS at 08:09

## 2024-09-26 RX ADMIN — MORPHINE SULFATE 2 MG: 4 INJECTION, SOLUTION INTRAMUSCULAR; INTRAVENOUS at 04:09

## 2024-09-26 RX ADMIN — OXACILLIN 2 G: 2 INJECTION, POWDER, FOR SOLUTION INTRAMUSCULAR; INTRAVENOUS at 02:09

## 2024-09-26 RX ADMIN — OXACILLIN 2 G: 2 INJECTION, POWDER, FOR SOLUTION INTRAMUSCULAR; INTRAVENOUS at 08:09

## 2024-09-26 RX ADMIN — DEXMEDETOMIDINE HYDROCHLORIDE 1.2 MCG/KG/HR: 400 INJECTION INTRAVENOUS at 08:09

## 2024-09-26 RX ADMIN — DEXMEDETOMIDINE HYDROCHLORIDE 0.8 MCG/KG/HR: 400 INJECTION INTRAVENOUS at 06:09

## 2024-09-26 RX ADMIN — LORAZEPAM: 2 INJECTION INTRAMUSCULAR; INTRAVENOUS at 02:09

## 2024-09-26 RX ADMIN — MUPIROCIN: 20 OINTMENT TOPICAL at 08:09

## 2024-09-26 RX ADMIN — MORPHINE SULFATE 2 MG: 4 INJECTION, SOLUTION INTRAMUSCULAR; INTRAVENOUS at 08:09

## 2024-09-26 RX ADMIN — IOHEXOL 80 ML: 350 INJECTION, SOLUTION INTRAVENOUS at 04:09

## 2024-09-26 RX ADMIN — MIDAZOLAM HYDROCHLORIDE 0.5 MG: 1 INJECTION, SOLUTION INTRAMUSCULAR; INTRAVENOUS at 01:09

## 2024-09-26 RX ADMIN — OXACILLIN 2 G: 2 INJECTION, POWDER, FOR SOLUTION INTRAMUSCULAR; INTRAVENOUS at 04:09

## 2024-09-26 RX ADMIN — OXACILLIN 2 G: 2 INJECTION, POWDER, FOR SOLUTION INTRAMUSCULAR; INTRAVENOUS at 11:09

## 2024-09-26 RX ADMIN — MUPIROCIN: 20 OINTMENT TOPICAL at 09:09

## 2024-09-26 RX ADMIN — OXACILLIN 2 G: 2 INJECTION, POWDER, FOR SOLUTION INTRAMUSCULAR; INTRAVENOUS at 06:09

## 2024-09-26 RX ADMIN — GADOBENATE DIMEGLUMINE 10 ML: 529 INJECTION, SOLUTION INTRAVENOUS at 02:09

## 2024-09-26 NOTE — PT/OT/SLP PROGRESS
Occupational Therapy      Patient Name:  Orlando Black   MRN:  [id number]    Patient not seen today secondary to pending further neurosx recommendations prior to ADLs/mobility. Will f/u as appropriate.     9/26/2024

## 2024-09-26 NOTE — NURSING
Nurses Note -- 4 Eyes      9/26/2024   4:26 AM      Skin assessed during: Daily Assessment      [] No Altered Skin Integrity Present    [x]Prevention Measures Documented      [x] Yes- Altered Skin Integrity Present or Discovered   [] LDA Added if Not in Epic (Describe Wound)   [] New Altered Skin Integrity was Present on Admit and Documented in LDA   [] Wound Image Taken    Wound Care Consulted? No    Attending Nurse:  Toni Vega RN/Staff Member:  GLYNN Esqueda

## 2024-09-26 NOTE — ASSESSMENT & PLAN NOTE
Assessment/Plan  Status post 9/25/2024 Diagnostic angiogram of brain   OVERALL IMPRESSION:  1. No evidence of mycotic aneurysm in the left occipita region noted.   2. No evidence of AVM or AV fistula.   3. 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     DSA did not reveal any mycotic aneurysms in the region of the hemorrhage but showed an incidental left cavernous ICA aneurysm.      Suspect ICH secondary to hypertension. No evidence of mycotic aneurysm on DSA    9/25/2024 CTH stable Left occipital lobe parenchymal hemorrhage with mild surrounding edema. Chronic microvascular ischemic changes.  No drift. No slurred speech, oriented x 3. Pupils equal/brisk.    Plan:   - continue care in ICU for today, repeat CT head in AM  - IV antibiotics for endocarditis per ID/hospital med/cardiology  - -140  - SCD for DVT prophylaxis.   - PT/OT/speech eval  - if CT head in morning of 9/27 remains stable, neurology will sign off, let us know if you have questions or concerns.   - follow up with Mariann Moser NP in 2 months to discuss management of the cavernous ICA aneurysm.

## 2024-09-26 NOTE — PROGRESS NOTES
[facility] - 7th Floor ICU  Neurology  Progress Note    Patient Name: Orlando Black  MRN: [id number]  Admission Date: 9/24/2024  Hospital Length of Stay: 2 days  Code Status: Full Code   Attending Provider: Quinn Juarez MD  Primary Care Physician: Janeth, Primary Doctor   Principal Problem:Intraparenchymal hemorrhage of brain    HPI:   Orlando Black is a 73 y.o. female no known past medical history (last hospitalization 1970s for childbirth) presented as a transfer patient from [facility] in [place] on 09/24 to [facility] for neurosurgery and CV services related to new left occipital intraparenchymal hemorrhage and mitral valve endocarditis in setting of staph bacteremia.  Presented on 9/21/2024 to outside hospital with fever, arthralgia, and back pain.  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  Patient developed altered mental status overnight on 9/23/2024 and CT head 9/24/2024 revealed new left occipital intraparenchymal hematoma measuring 2.02 x 1.8 x 1.5 cm.   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         Overview/Hospital Course:  No notes on file        Subjective:     Interval History: per nurse report patient had pupil change asymmetry during yesterday evening post angiogram of bran with some increased confusion, Dr. Solis notified, stat CT head done at 20:23 that revealed stable left occipital lobe hemorrhage and mild surrounding edema. No new findings. Patient is improved today, pupils equal and reactive.       Current Facility-Administered Medications   Medication Dose Route Frequency Provider Last Rate Last Admin     0.9%  NaCl infusion   Intravenous Continuous Zion Solis  mL/hr at 09/25/24 1855 New Bag at 09/25/24 1855    acetaminophen tablet 650 mg  650 mg Oral Q6H PRN Divya Lewis MD        bisacodyL suppository 10 mg  10 mg Rectal Daily PRN Divya Lewis MD        camphor-methyl salicyl-menthoL 4-30-10 % Crea   Topical (Top) BID PRN Divya Lewis MD   Given at 09/25/24 1612    dexmedetomidine (PRECEDEX) 400mcg/100mL 0.9% NaCL infusion  0-1.4 mcg/kg/hr Intravenous Continuous Divya Lewis MD 10.54 mL/hr at 09/25/24 2302 0.8 mcg/kg/hr at 09/25/24 2302    famotidine (PF) injection 20 mg  20 mg Intravenous BID Divya Lewis MD   20 mg at 09/25/24 2036    labetaloL injection 10 mg  10 mg Intravenous Q15 Min PRN Divya Lewis MD   10 mg at 09/25/24 1200    LIDOcaine 5 % patch 1 patch  1 patch Transdermal Q24H PRN Divya Lewis MD   1 patch at 09/25/24 1612    morphine injection 2 mg  2 mg Intravenous Q6H PRN Divya Lewis MD   2 mg at 09/25/24 2250    mupirocin 2 % ointment   Nasal BID Quinn Juarez MD        ondansetron disintegrating tablet 8 mg  8 mg Oral Q8H PRN Zion Solis MD        oxacillin 2 g in 0.9% NaCl 100 mL IVPB (MB+)  2 g Intravenous Q4H Saúl Olivarez MD   Stopped at 09/26/24 0431    QUEtiapine tablet 25 mg  25 mg Oral Nightly PRN Divya Lewis MD        sodium chloride 0.9% flush 10 mL  10 mL Intravenous PRN Divya Lewis MD        sodium chloride 0.9% flush 10 mL  10 mL Intravenous PRN Divya Lewis MD           Review of Systems   Constitutional:  Positive for activity change. Negative for appetite change.   Neurological:  Negative for dizziness, tremors, seizures, syncope, facial asymmetry, speech difficulty, light-headedness, numbness and headaches. Weakness: legs limited by pain compliant of hips.      Objective:     Vital Signs (Most Recent):  Temp: 97.9 °F (36.6 °C) (09/26/24 0400)  Pulse: 83 (09/26/24 0515)  Resp: 14 (09/26/24 0515)  BP: 132/64 (09/26/24 0515)  SpO2: (!) 93 %  (09/26/24 0515) Vital Signs (24h Range):  Temp:  [97.3 °F (36.3 °C)-100 °F (37.8 °C)] 97.9 °F (36.6 °C)  Pulse:  [] 83  Resp:  [12-29] 14  SpO2:  [91 %-97 %] 93 %  BP: ()/(56-93) 132/64     Weight: 52.7 kg (116 lb 2.9 oz)  Body mass index is 16.2 kg/m².     Physical Exam  Vitals and nursing note reviewed.   Constitutional:       General: She is awake.      Appearance: She is well-developed.   HENT:      Nose: Nose normal.      Mouth/Throat:      Mouth: Mucous membranes are moist.   Eyes:      Extraocular Movements: Extraocular movements intact.      Conjunctiva/sclera: Conjunctivae normal.      Pupils: Pupils are equal, round, and reactive to light.   Cardiovascular:      Rate and Rhythm: Normal rate.   Pulmonary:      Effort: Pulmonary effort is normal.   Abdominal:      General: Abdomen is flat.   Musculoskeletal:         General: No swelling.   Skin:     General: Skin is warm and dry.      Capillary Refill: Capillary refill takes less than 2 seconds.   Neurological:      Mental Status: She is alert and oriented to person, place, and time.      Coordination: Finger-Nose-Finger Test normal.   Psychiatric:         Mood and Affect: Mood normal.         Speech: Speech normal.         Behavior: Behavior normal. Behavior is cooperative.          NEUROLOGICAL EXAMINATION:     MENTAL STATUS   Oriented to person, place, and time.   Attention: normal. Concentration: decreased.   Speech: speech is normal   Level of consciousness: alert  Knowledge: good.   Able to name object. Able to repeat. Normal comprehension.     CRANIAL NERVES     CN III, IV, VI   Pupils are equal, round, and reactive to light.    CN V   Facial sensation intact.     CN VII   Facial expression full, symmetric.     CN XII   Tongue: not atrophic  Fasciculations: absent  Tongue deviation: none       Patient is inconsistent with visual field exam. Utilized glasses which improved consistency. Able to see peripheral movement all fields,  inconsistent with identifying number of fingers held bilateral peripheral visual fields      MOTOR EXAM   Muscle bulk: normal  Overall muscle tone: normal       No drift bilateral UE and LE. UE strength bilateral normal 5/5. Bilateral LE strength 3-4/5, limited by hip pain complaint      SENSORY EXAM   Light touch normal.     GAIT AND COORDINATION      Coordination   Finger to nose coordination: normal    Tremor   Resting tremor: absent      Significant Labs:   Recent Lab Results         09/26/24  0323   09/25/24  1039   09/25/24  0956        Albumin/Globulin Ratio 0.5           A2C EF     64       A4C EF     64       Albumin 1.6                      ALT 76           Anion Gap 10.0           Ao peak joseph     1.76       Ao VTI     23.80       AR Max Joseph     2.27       AST 74           AV valve area     2.40       ZINA by Velocity Ratio     2.01       AV mean gradient     6       AV index (prosthetic)     0.69       AV peak gradient     12       AV regurgitation pressure 1/2 time     375       AV Velocity Ratio     0.58       Baso # 0.02           Basophil % 0.1           BILIRUBIN TOTAL 0.7           BSA     1.62       BUN 16.3           BUN/CREAT RATIO 28           Calcium 7.6           Chloride 113           CO2 23           Creatinine 0.59           CRP   227.50         Left Ventricle Relative Wall Thickness     0.45       E/A ratio     0.89       E/E' ratio     11.40       eGFR >60           Eos # 0.06           Eos % 0.4           E wave deceleration time     185.00       FS     31       Globulin, Total 3.1           Glucose 117           Hematocrit 30.5           Hemoglobin 10.3           Immature Grans (Abs) 0.29           Immature Granulocytes 1.7           IVSd     1.29       LA area A2C     24.90       LA area A4C     25.40       LA size     3.90       LA volume     83.80       LA Volume Index (Mod)     50.2       LVOT area     3.5       LV LATERAL E/E' RATIO     11.40       LV SEPTAL E/E' RATIO      11.40       LV EDV BP     141.00       LV Diastolic Volume Index     84.43       Left Ventricular End Diastolic Volume by Teichholz Method     141.00       LV EDV A2C     [id number]       LV EDV A4C     79.00       Left Ventricular End Systolic Volume by Teichholz Method     59.60       LV ESV A2C     82.80       LV ESV A4C     82.80       LVIDd     5.39       LVIDs     3.74       LV mass     280.52       LV Mass Index     168       Left Ventricular Outflow Tract Mean Gradient     2.00       Left Ventricular Outflow Tract Mean Velocity     0.68       LVOT diameter     2.10       LVOT peak joseph     1.02       LVOT stroke volume     57.12       LVOT peak VTI     16.50       LV ESV BP     59.60       LV Systolic Volume Index     35.7       Lymph # 0.96           LYMPH % 5.8           Magnesium  2.20           MCH 31.8           MCHC 33.8           MCV 94.1           Mean e'     0.10       Mono # 0.85           Mono % 5.1           MPV 9.3           Mr max joseph     4.50       MV valve area p 1/2 method     3.24       MV valve area by continuity eq     1.45       MV mean gradient     5       MV peak gradient     10       MV Peak A Joseph     1.28       MV Peak E Joseph     1.14       MV stenosis pressure 1/2 time     68.00       MV VTI     39.3       Neut # 14.48           Neut % 86.9           nRBC 0.0           Mcneill's Biplane MOD Ejection Fraction     62       Phosphorus Level 3.7           Platelet Count 251           Potassium 3.8           PROTEIN TOTAL 4.7           PV peak gradient     6       PV PEAK VELOCITY     1.25       Posterior Wall     1.22       Est. RA pres     8       RBC 3.24           RDW 14.1           RV TB RVSP     11       RV/LV Ratio     0.42       RVDD     2.24       Sed Rate   95         Sodium 146           TAPSE     2.96       TDI SEPTAL     0.10       TDI LATERAL     0.10       Triscuspid Valve Regurgitation Peak Gradient     30       TR Max Joseph     2.73       TV resting pulmonary artery  pressure     38       WBC 16.66           ZLVIDD     1.44       ZLVIDS     2.03               Significant Imaging: I have reviewed all pertinent imaging results/findings within the past 24 hours.  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 Chronic microvascular ischemic changes.  - 9/25 CTA head and neck Impression:  1. No large vessel occlusion or flow-limiting stenosis.  2. 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  3. Partially visualized indeterminate retrosternal soft tissue.    Current NIH Stroke Score Values      Flowsheet Row Most Recent Value   Interval baseline   1a. Level of Consciousness 1-->Not alert, but arousable by minor stimulation to obey, answer, or respond   1b. LOC Questions 1-->Answers one question correctly   1c. LOC Commands 0-->Performs both tasks correctly   2. Best Gaze 0-->Normal   3. Visual 0-->No visual loss   4. Facial Palsy 0-->Normal symmetrical movements   5a. Motor Arm, Left 0-->No drift, limb holds 90 (or 45) degrees for full 10 secs   5b. Motor Arm, Right 0-->No drift, limb holds 90 (or 45) degrees for full 10 secs   6a. Motor Leg, Left 0-->No drift, leg holds 30 degree position for full 5 secs   6b. Motor Leg, Right 3-->No effort against gravity, leg falls to bed immediately   7. Limb Ataxia 0-->Absent   8. Sensory 0-->Normal, no sensory loss   9. Best Language 0-->No aphasia, normal   10. Dysarthria 0-->Normal   11. 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   - follow up 2 months with interventional neurology, Dr. Solis's office with Mariann Antoine NP to discuss management of the cavernous ICA aneurysm.     Intraparenchymal  hemorrhage of brain  Assessment/Plan  Status post 9/25/2024 Diagnostic angiogram of brain   OVERALL IMPRESSION:  1. No evidence of mycotic aneurysm in the left occipita region noted.   2. No evidence of AVM or AV fistula.   3. 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     DSA did not reveal any mycotic aneurysms in the region of the hemorrhage but showed an incidental left cavernous ICA aneurysm.      Suspect ICH secondary to hypertension. No evidence of mycotic aneurysm on DSA    9/25/2024 CTH stable Left occipital lobe parenchymal hemorrhage with mild surrounding edema. Chronic microvascular ischemic changes.  No drift. No slurred speech, oriented x 3. Pupils equal/brisk.    Plan:   - continue care in ICU for today, repeat CT head in AM  - IV antibiotics for endocarditis per ID/hospital med/cardiology  - -140  - SCD for DVT prophylaxis.   - PT/OT/speech eval  - if CT head in morning of 9/27 remains stable, neurology will sign off, let us know if you have questions or concerns.   - follow up with Mariann Moser NP in 2 months to discuss management of the cavernous ICA aneurysm.              VTE Risk Mitigation (From admission, onward)           Ordered     IP VTE HIGH RISK PATIENT  Once         09/24/24 1938     Place sequential compression device  Until discontinued         09/24/24 1938     Reason for No Pharmacological VTE Prophylaxis  Once        Question:  Reasons:  Answer:  Physician Provided (leave comment)  Comment:  intraparenchymal hemorrhage    09/24/24 1938     Place sequential compression device  Until discontinued         09/24/24 1902                    Silvana Mayo NP  Neurology  [facility] - 7th Floor ICU

## 2024-09-26 NOTE — PROGRESS NOTES
Inpatient Nutrition Evaluation    Admit Date: 9/24/2024   Total duration of encounter: 2 days   Patient Age: 73 y.o.    Nutrition Recommendation/Prescription     Advance to Regular Diet as appropriate  Add Boost Plus (provides 360 kcal, 14 g protein per serving) once daily    Nutrition Assessment     Chart Review    Reason Seen: malnutrition screening tool (MST)    Malnutrition Screening Tool Results   Have you recently lost weight without trying?: Unsure  Have you been eating poorly because of a decreased appetite?: Yes   MST Score: 3   Diagnosis:  Left occipital intraparenchymal hematoma  Staph bacteremia  MV endocarditis with mitral valve regurgitation  Suspected acute osteomyelitis/septic arthritis of the sternomanubrial joint  Possible osteomyelitis of left C3-4 facet joint vs degenerative   Right hip arthritis, 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    Relevant Medical History:   None on file    Scheduled Medications:  famotidine (PF), 20 mg, BID  mupirocin, , BID  oxacillin IV (PEDS and ADULTS), 2 g, Q4H    Continuous Infusions:   PRN Medications:   Current Facility-Administered Medications:     acetaminophen, 650 mg, Oral, Q6H PRN    bisacodyL, 10 mg, Rectal, Daily PRN    camphor-methyl salicyl-menthoL, , Topical (Top), BID PRN    labetalol, 10 mg, Intravenous, Q15 Min PRN    LIDOcaine, 1 patch, Transdermal, Q24H PRN    morphine, 2 mg, Intravenous, Q6H PRN    ondansetron, 8 mg, Oral, Q8H PRN    QUEtiapine, 25 mg, Oral, BID PRN    sodium chloride 0.9%, 10 mL, Intravenous, PRN    sodium chloride 0.9%, 10 mL, Intravenous, PRN    Recent Labs   Lab 09/24/24 2034 09/25/24  0407 09/25/24  1039 09/26/24  0323    143  --  146*   K 3.4* 3.1*  --  3.8   CALCIUM 8.1* 8.0*  --  7.6*   PHOS 3.8 3.5  --  3.7   MG 2.20 2.10  --  2.20    106  --  113*   CO2 25 26  --  23   BUN 25.0* 21.2*  --  16.3   CREATININE 0.61 0.62  --  0.59   EGFRNORACEVR >60 >60  --  >60   GLUCOSE 131* 134*  --  117*  "  BILITOT 0.5 0.7  --  0.7   ALKPHOS 272* 239*  --  254*   ALT 79* 64*  --  76*   AST 80* 46*  --  74*   ALBUMIN 1.9* 1.8*  --  1.6*   CRP  --   --  227.50*  --    HGBA1C 5.4  --   --   --    WBC 21.81* 18.87*  --  16.66*   HGB 11.4* 11.0*  --  10.3*   HCT 33.3* 32.8*  --  30.5*     Nutrition Orders:  Diet NPO      Appetite/Oral Intake: NPO/NPO  Factors Affecting Nutritional Intake: NPO  Food/Methodist/Cultural Preferences: none reported  Food Allergies: no known food allergies  Last Bowel Movement: 09/23/24  Wound(s):  No partial or full thickness pressure injuries documented    Comments    9/26/24: Pt currently NPO. Pt denies any decreased appetite PTA, unsure of UBW but states she suspects it has been stable. ONS added for extra nourishment once diet has advanced.     Anthropometrics    Height: 5' 11" (180.3 cm), Height Method: Estimated  Last Weight: 52.7 kg (116 lb 2.9 oz) (09/24/24 1835), Weight Method: Bed Scale  BMI (Calculated): 16.2  BMI Classification: underweight (BMI less than 22 if >65 years of age)     Ideal Body Weight (IBW), Female: 155 lb     % Ideal Body Weight, Female (lb): 74.95 %                             Usual Weight Provided By: patient denies unintentional weight loss    Wt Readings from Last 5 Encounters:   09/24/24 52.7 kg (116 lb 2.9 oz)     Weight Change(s) Since Admission:   Wt Readings from Last 1 Encounters:   09/24/24 1835 52.7 kg (116 lb 2.9 oz)   Admit Weight: 52.7 kg (116 lb 2.9 oz) (09/24/24 1835), Weight Method: Bed Scale    Patient Education     Not applicable.    Nutrition Goals & Monitoring     Dietitian will monitor: energy intake and weight    Nutrition Risk/Follow-Up: low (follow-up in 5-7 days)  Patients assigned 'low nutrition risk' status do not qualify for a full nutritional assessment but will be monitored and re-evaluated in a 5-7 day time period. Please consult if re-evaluation needed sooner.    "

## 2024-09-26 NOTE — CONSULTS
Infectious Disease  Consult Note    Patient Name: Orlando Black   MRN: [id number]   Admission Date: 9/24/2024   Hospital Length of Stay: 2 days  Attending Physician: Quinn Juarez MD   Primary Care Provider: No, Primary Doctor     Isolation Status: No active isolations       Assessment/Plan:       73-year-old female patient without any known past medical history, presented to outside hospital on 09/21/2024 with fever, generalized myalgias, back pain, confusion, admitted for sepsis, had MSSA bacteremia, noted to have mitral valve endocarditis, concern for right hip infection as well as right knee effusion, GPCs noted in knee fluid cultures, also had 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  She had worsening mental status on 09/23/2024 had a CT of the head on 09/24/2024 showed new left intraparenchymal hematoma and was transferred to [facility] for Neurosurgery, CV surgery, Infectious Disease Services.  Of note, the patient's  reports that the patient has had some eczematous rash that has been ongoing for the past month or so, patient's  has been helping manage this with over-the-counter medication, has not seen a physician ID is consulted for assistance in management     Severe sepsis   MSSA bacteremia   Mitral valve endocarditis  Concern for C-spine infection   Concern for right knee septic arthritis   Right hip septic arthritis   Intracranial hemorrhage    Recommendations:  -discontinue piperacillin/tazobactam, discontinue vancomycin   -start oxacillin 2 g IV q.4 hours   -repeat blood cultures   -Neurosurgery, CV surgery, orthopedic surgery consulted  -MRI of the brain and cervical spine ordered by Neurosurgery   -specialties input noted and appreciated  -TTE with mitral valve regurgitation, recommend obtaining a TAWANDA as well   -discussed with the patient, family at bedside       Thank you for your consult. ID will continue following. Please contact us with  any questions or concerns.    Antibiotics History:  Piperacillin/tazobactam 09/24 - 09/25  Vancomycin 09/24 - 09/25  Oxacillin 09/25 - Present    Portions of this note dictated using EMR integrated voice recognition software, and may be subject to voice recognition errors not corrected at proofreading. Please contact writer for clarification if needed.    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  She had worsening mental status on 09/23/2024 had a CT of the head on 09/24/2024 showed new left intraparenchymal hematoma and was transferred to [facility] for Neurosurgery, CV surgery, Infectious Disease Services.  Of note, the patient's  reports that the patient has had some eczematous rash that has been ongoing for the past month or so, patient's  has been helping manage this with over-the-counter medication, has not seen a physician ID is consulted for assistance in management     History reviewed. No pertinent past medical history.     History reviewed. No pertinent surgical history.    Review of patient's allergies indicates:  Not on File     Family History    Reviewed and non contributory          Social History     Socioeconomic History    Marital status: Single     Social Determinants of Health     Food Insecurity: No Food Insecurity (9/25/2024)    Hunger Vital Sign     Worried About Running Out of Food in the Last Year: Never true     Ran Out of Food in the Last Year: Never true   Transportation Needs: No Transportation Needs  (9/25/2024)    TRANSPORTATION NEEDS     Transportation : No   Housing Stability: Low Risk  (9/25/2024)    Housing Stability Vital Sign     Unable to Pay for Housing in the Last Year: No     Homeless in the Last Year: No        Lines/Drains/Airways       Drain  Duration                  Urethral Catheter 09/24/24 2130 Silicone 16 Fr. 1 day              Peripheral Intravenous Line  Duration                  Peripheral IV - Single Lumen 09/24/24 2120 20 G Anterior;Left Forearm 1 day         Peripheral IV - Single Lumen 09/25/24 0849 20 G Anterior;Right Upper Arm 1 day                     Medication:  No medications prior to admission.          Antimicrobials:  Antibiotics (From admission, onward)      Start     Stop Route Frequency Ordered    09/26/24 0900  mupirocin 2 % ointment         10/01/24 0859 Nasl 2 times daily 09/24/24 1904    09/25/24 1545  oxacillin 2 g in 0.9% NaCl 100 mL IVPB (MB+)         -- IV Every 4 hours (non-standard times) 09/25/24 1444             Antifungals (From admission, onward)      None            Antivirals (From admission, onward)      None               Review of Systems   Review of Systems   All other systems reviewed and are negative.        Objective:     Vital Signs (Most Recent):  Temp: 97.9 °F (36.6 °C) (09/26/24 0400)  Pulse: 80 (09/26/24 0615)  Resp: 16 (09/26/24 0826)  BP: (!) 120/55 (09/26/24 0615)  SpO2: 95 % (09/26/24 0615)  Vital Signs (24h Range):  Temp:  [97.3 °F (36.3 °C)-98.7 °F (37.1 °C)] 97.9 °F (36.6 °C)  Pulse:  [] 80  Resp:  [12-23] 16  SpO2:  [91 %-97 %] 95 %  BP: ()/(55-89) 120/55      Weight:   Wt Readings from Last 1 Encounters:   09/24/24 52.7 kg (116 lb 2.9 oz)      Body mass index is Body mass index is 16.2 kg/m².     Estimated Creatinine Clearance: Estimated Creatinine Clearance: 70.7 mL/min (based on SCr of 0.59 mg/dL).     Physical Exam  Physical Exam  Constitutional:       Appearance: Normal appearance.   HENT:      Head: Normocephalic and  atraumatic.      Mouth/Throat:      Pharynx: No oropharyngeal exudate or posterior oropharyngeal erythema.   Eyes:      Extraocular Movements: Extraocular movements intact.      Pupils: Pupils are equal, round, and reactive to light.   Cardiovascular:      Rate and Rhythm: Normal rate and regular rhythm.      Heart sounds: Murmur heard.      Comments: Pectus carinatum  Pulmonary:      Effort: No respiratory distress.      Breath sounds: No wheezing, rhonchi or rales.   Abdominal:      General: Bowel sounds are normal. There is no distension.      Palpations: Abdomen is soft.      Tenderness: There is abdominal tenderness (mild non specific, poorly localized).   Musculoskeletal:         General: No swelling or tenderness.      Cervical back: Neck supple. No rigidity or tenderness.      Comments: Tenderness at the cervical spine, non on the thoracic or lumbar spine currently  R knee and hip significant tenderness   Lymphadenopathy:      Cervical: No cervical adenopathy.   Skin:     Findings: Rash (scattered lesions on the delvis extremities) present. No lesion.   Neurological:      General: No focal deficit present.      Mental Status: She is alert and oriented to person, place, and time. Mental status is at baseline.      Cranial Nerves: No cranial nerve deficit.      Motor: No weakness.      Comments: Lethargic but arousable and although has some slurred speech, she is conversational and able to participate in evaluation   Psychiatric:         Mood and Affect: Mood normal.         Behavior: Behavior normal.       Significant Labs: CBC:   Recent Labs   Lab 09/24/24 2034 09/25/24 0407 09/26/24  0323   WBC 21.81* 18.87* 16.66*   HGB 11.4* 11.0* 10.3*   HCT 33.3* 32.8* 30.5*    236 251     CMP:   Recent Labs   Lab 09/24/24 2034 09/25/24 0407 09/26/24  0323    143 146*   K 3.4* 3.1* 3.8    106 113*   CO2 25 26 23   BUN 25.0* 21.2* 16.3   CREATININE 0.61 0.62 0.59   CALCIUM 8.1* 8.0* 7.6*   ALBUMIN  1.9* 1.8* 1.6*   BILITOT 0.5 0.7 0.7   ALKPHOS 272* 239* 254*   AST 80* 46* 74*   ALT 79* 64* 76*         Microbiology Results (last 7 days)       Procedure Component Value Units Date/Time    Urine culture [[id number]] Collected: 09/24/24 2153    Order Status: Completed Specimen: Urine, Catheterized Updated: 09/26/24 0827     Urine Culture No Growth    Blood Culture [[id number]]  (Abnormal) Collected: 09/24/24 2034    Order Status: Completed Specimen: Blood Updated: 09/26/24 0708     Blood Culture Susceptibility To Follow      Staphylococcus aureus     GRAM STAIN Gram Positive Cocci, probable Staphylococcus      Seen in gram stain of broth only      2 of 2 bottles positive    Blood Culture [[id number]]  (Abnormal) Collected: 09/24/24 2034    Order Status: Completed Specimen: Blood Updated: 09/26/24 0706     Blood Culture Susceptibility To Follow      Staphylococcus aureus     GRAM STAIN Gram Positive Cocci, probable Staphylococcus      Seen in gram stain of broth only      2 of 2 bottles positive    Blood Culture [[id number]] Collected: 09/26/24 0003    Order Status: Resulted Specimen: Blood Updated: 09/26/24 0012    Blood Culture [[id number]] Collected: 09/26/24 0004    Order Status: Resulted Specimen: Blood Updated: 09/26/24 0012    BCID2 Panel [[id number]]  (Abnormal) Collected: 09/24/24 2034    Order Status: Completed Specimen: Blood Updated: 09/25/24 1253     CTX-M (ESBL ) N/A     IMP (Cabapenemase ) N/A     KPC resistance gene (Carbapenemase ) N/A     mcr-1 N/A     mecA ID N/A     Comment: Note: Antimicrobial resistance can occur via multiple mechanisms. A Not Detected result for antimicrobial resistance gene(s) does not indicate antimicrobial susceptibility. Subculturing is required for species identification and susceptibility testing of   isolates.        mecA/C and MREJ (MRSA) gene Not Detected     NDM (Carbapenemase ) N/A     OXA-48-like (Carbapenemase ) N/A      Maria L/B (VRE gene) N/A     VIM (Carbapenemase ) N/A     Enterococcus faecalis Not Detected     Enterococcus faecium Not Detected     Listeria monocytogenes Not Detected     Staphylococcus spp. Detected     Staphylococcus aureus Detected     Staphylococcus epidermidis Not Detected     Staphylococcus lugdunensis Not Detected     Streptococcus spp. Not Detected     Streptococcus agalactiae (Group B) Not Detected     Streptococcus pneumoniae Not Detected     Streptococcus pyogenes (Group A) Not Detected     Acinetobacter calcoaceticus/baumannii complex Not Detected     Bacteroides fragilis Not Detected     Enterobacterales Not Detected     Enterobacter cloacae complex Not Detected     Escherichia coli Not Detected     Klebsiella aerogenes Not Detected     Klebsiella oxytoca Not Detected     Klebsiella pneumoniae group Not Detected     Proteus spp. Not Detected     Salmonella spp. Not Detected     Serratia marcescens Not Detected     Haemophilus influenzae Not Detected     Neisseria meningitidis Not Detected     Pseudomonas aeruginosa Not Detected     Stenotrophomonas maltophilia Not Detected     Candida albicans Not Detected     Candida auris Not Detected     Candida glabrata Not Detected     Candida krusei Not Detected     Candida parapsilosis Not Detected     Candida tropicalis Not Detected     Cryptococcus neoformans/gattii Not Detected    Narrative:      The [employer] BCID2 Panel is a multiplexed nucleic acid test intended for the use with Cydan® 2.0 or Cydan® [employer] for the simultaneous qualitative detection and identification of multiple bacterial and yeast nucleic acids and select genetic determinants associated with antimicrobial resistance.  The [employer] BCID2 Panel test is performed directly on blood culture samples identified as positive by a continuous monitoring blood culture system.  Results are intended to be interpreted in conjunction with Gram stain results.              Significant Imaging: I have reviewed all pertinent imaging results/findings within the past 24 hours.      Saúl Olivarez MD  Infectious Disease  [facility]

## 2024-09-26 NOTE — PROGRESS NOTES
Infectious Disease  Progress Note    Patient Name: Orlando Black   MRN: [id number]   Admission Date: 9/24/2024   Hospital Length of Stay: 3 days  Attending Physician: Quinn Juarez MD   Primary Care Provider: No, Primary Doctor     Isolation Status: No active isolations     Assessment/Plan:        73-year-old female patient without any known past medical history, presented to outside hospital on 09/21/2024 with fever, generalized myalgias, back pain, confusion, admitted for sepsis, had MSSA bacteremia, noted to have mitral valve endocarditis, concern for right hip infection as well as right knee effusion, GPCs noted in knee fluid cultures, also had 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  She had worsening mental status on 09/23/2024 had a CT of the head on 09/24/2024 showed new left intraparenchymal hematoma and was transferred to [facility] for Neurosurgery, CV surgery, Infectious Disease Services.  Of note, the patient's  reports that the patient has had some eczematous rash that has been ongoing for the past month or so, patient's  has been helping manage this with over-the-counter medication, has not seen a physician ID is consulted for assistance in management      Severe sepsis   MSSA bacteremia   Mitral valve endocarditis  Concern for C-spine infection   Concern for right knee septic arthritis   Right hip septic arthritis   Intracranial hemorrhage     -CT of the R hip, MRI of the brain and spine pending  -Asked to upload CT scan of images done as outpatient  -Report of Lobulated mass in the mediastinum   -report of TAWANDA with findings of ruptured leaflet of MV  -persistently bacteremic  -R knee with GPCs in the records from the physical chart    Recommendations:  -continue oxacillin 2 g IV q.4 hours   -repeat blood cultures   -Neurosurgery, CV surgery, orthopedic surgery consulted  -Discussed with orthopedics surgeon who will evaluate knee and plan for  washout   -Discussed with CV surgeon who will review CT scan of the chest and consider intervention should family wish to pursue this and bacteremia persists   -MRI of the brain and cervical spine ordered by Neurosurgery done and reports pending   -TTE with mitral valve regurgitation, recommend obtaining a TAWANDA as well, this appears to have been done at OSH, need ot request records (images and official report)   -discussed with the patient, no family at bedside      Thank you for your consult. ID will continue following. Please contact us with any questions or concerns.     Antibiotics History:  Piperacillin/tazobactam 09/24 - 09/25  Vancomycin 09/24 - 09/25  Oxacillin 09/25 - Present     Portions of this note dictated using EMR integrated voice recognition software, and may be subject to voice recognition errors not corrected at proofreading. Please contact writer for clarification if needed.    35 minutes of critical care was time spent personally by me on the following activities: development of treatment plan with patient or surrogate and bedside caregivers, discussions with consultants and or primary team, evaluation of patient's response to treatment, examination of patient, ordering and performing treatments and interventions, ordering and review of laboratory studies, ordering and review of radiographic studies.  This critical care time did not overlap with that of any other provider or involve time for any procedures.      Subjective:     Principal Problem: <principal problem not specified>     Interval History:   Seen post MRI, had just received sedation, arousable but mostly somnolent and lethargic    Review of Systems   Review of Systems   All other systems reviewed and are negative.       Objective:     Vital Signs (Most Recent):  Temp: 98.9 °F (37.2 °C) (09/26/24 2000)  Pulse: (!) 115 (09/26/24 2200)  Resp: 19 (09/26/24 2200)  BP: (!) 138/92 (09/26/24 2000)  SpO2: 99 % (09/26/24 2200)  Vital Signs (24h  Range):  Temp:  [97.9 °F (36.6 °C)-98.9 °F (37.2 °C)] 98.9 °F (37.2 °C)  Pulse:  [] 115  Resp:  [11-19] 19  SpO2:  [85 %-99 %] 99 %  BP: ()/(55-92) 138/92      Weight:   Wt Readings from Last 1 Encounters:   09/24/24 52.7 kg (116 lb 2.9 oz)      Body mass index is Body mass index is 16.2 kg/m².     Estimated Creatinine Clearance: Estimated Creatinine Clearance: 70.7 mL/min (based on SCr of 0.59 mg/dL).     Lines/Drains/Airways       Drain  Duration                  Urethral Catheter 09/24/24 2130 Silicone 16 Fr. 2 days              Peripheral Intravenous Line  Duration                  Peripheral IV - Single Lumen 09/24/24 2120 20 G Anterior;Left Forearm 2 days         Peripheral IV - Single Lumen 09/25/24 0849 20 G Anterior;Right Upper Arm 1 day                     Physical Exam  Physical Exam  Constitutional:       Appearance: She is ill-appearing.   HENT:      Head: Normocephalic and atraumatic.      Mouth/Throat:      Pharynx: No oropharyngeal exudate or posterior oropharyngeal erythema.   Eyes:      Extraocular Movements: Extraocular movements intact.      Pupils: Pupils are equal, round, and reactive to light.   Cardiovascular:      Rate and Rhythm: Normal rate and regular rhythm.      Heart sounds: Murmur heard.   Pulmonary:      Effort: No respiratory distress.      Breath sounds: Rhonchi present. No wheezing or rales.   Abdominal:      General: Bowel sounds are normal. There is no distension.      Palpations: Abdomen is soft.      Tenderness: There is no abdominal tenderness. There is no right CVA tenderness or left CVA tenderness.   Musculoskeletal:         General: Swelling (R knee) and tenderness (R knee and R hip, concern about R shoulder as well) present.      Cervical back: Neck supple. No rigidity or tenderness.   Lymphadenopathy:      Cervical: No cervical adenopathy.   Skin:     Findings: No lesion or rash.   Neurological:      Mental Status: She is alert.      Comments: Lethargic but  responsive, post sedation for MRI   Psychiatric:         Mood and Affect: Mood normal.         Behavior: Behavior normal.          Significant Labs: CBC:   Recent Labs   Lab 09/25/24  0407 09/26/24  0323   WBC 18.87* 16.66*   HGB 11.0* 10.3*   HCT 32.8* 30.5*    251     CMP:   Recent Labs   Lab 09/25/24  0407 09/26/24  0323    146*   K 3.1* 3.8    113*   CO2 26 23   BUN 21.2* 16.3   CREATININE 0.62 0.59   CALCIUM 8.0* 7.6*   ALBUMIN 1.8* 1.6*   BILITOT 0.7 0.7   ALKPHOS 239* 254*   AST 46* 74*   ALT 64* 76*       Microbiology Results (last 7 days)       Procedure Component Value Units Date/Time    Blood Culture [[id number]]  (Abnormal) Collected: 09/26/24 0004    Order Status: Completed Specimen: Blood Updated: 09/26/24 1811     GRAM STAIN Gram Positive Cocci, probable Staphylococcus      Seen in gram stain of broth only      1 of 2 Aerobic bottles positive    Blood Culture [[id number]]  (Abnormal) Collected: 09/26/24 0003    Order Status: Completed Specimen: Blood Updated: 09/26/24 1811     GRAM STAIN Gram Positive Cocci, probable Staphylococcus      Seen in gram stain of broth only      2 of 2 bottles positive    Urine culture [[id number]] Collected: 09/24/24 2153    Order Status: Completed Specimen: Urine, Catheterized Updated: 09/26/24 0827     Urine Culture No Growth    Blood Culture [[id number]]  (Abnormal) Collected: 09/24/24 2034    Order Status: Completed Specimen: Blood Updated: 09/26/24 0708     Blood Culture Susceptibility To Follow      Staphylococcus aureus     GRAM STAIN Gram Positive Cocci, probable Staphylococcus      Seen in gram stain of broth only      2 of 2 bottles positive    Blood Culture [[id number]]  (Abnormal) Collected: 09/24/24 2034    Order Status: Completed Specimen: Blood Updated: 09/26/24 0706     Blood Culture Susceptibility To Follow      Staphylococcus aureus     GRAM STAIN Gram Positive Cocci, probable Staphylococcus      Seen in gram stain of broth only      2  of 2 bottles positive    BCID2 Panel [[id number]]  (Abnormal) Collected: 09/24/24 2034    Order Status: Completed Specimen: Blood Updated: 09/25/24 1253     CTX-M (ESBL ) N/A     IMP (Cabapenemase ) N/A     KPC resistance gene (Carbapenemase ) N/A     mcr-1 N/A     mecA ID N/A     Comment: Note: Antimicrobial resistance can occur via multiple mechanisms. A Not Detected result for antimicrobial resistance gene(s) does not indicate antimicrobial susceptibility. Subculturing is required for species identification and susceptibility testing of   isolates.        mecA/C and MREJ (MRSA) gene Not Detected     NDM (Carbapenemase ) N/A     OXA-48-like (Carbapenemase ) N/A     Maria L/B (VRE gene) N/A     VIM (Carbapenemase ) N/A     Enterococcus faecalis Not Detected     Enterococcus faecium Not Detected     Listeria monocytogenes Not Detected     Staphylococcus spp. Detected     Staphylococcus aureus Detected     Staphylococcus epidermidis Not Detected     Staphylococcus lugdunensis Not Detected     Streptococcus spp. Not Detected     Streptococcus agalactiae (Group B) Not Detected     Streptococcus pneumoniae Not Detected     Streptococcus pyogenes (Group A) Not Detected     Acinetobacter calcoaceticus/baumannii complex Not Detected     Bacteroides fragilis Not Detected     Enterobacterales Not Detected     Enterobacter cloacae complex Not Detected     Escherichia coli Not Detected     Klebsiella aerogenes Not Detected     Klebsiella oxytoca Not Detected     Klebsiella pneumoniae group Not Detected     Proteus spp. Not Detected     Salmonella spp. Not Detected     Serratia marcescens Not Detected     Haemophilus influenzae Not Detected     Neisseria meningitidis Not Detected     Pseudomonas aeruginosa Not Detected     Stenotrophomonas maltophilia Not Detected     Candida albicans Not Detected     Candida auris Not Detected     Candida glabrata Not Detected     Iman krusei Not  Detected     Candida parapsilosis Not Detected     Candida tropicalis Not Detected     Cryptococcus neoformans/gattii Not Detected    Narrative:      The [employer] BCID2 Panel is a multiplexed nucleic acid test intended for the use with [employer]Me® 2.0 or SearchMe® [employer] for the simultaneous qualitative detection and identification of multiple bacterial and yeast nucleic acids and select genetic determinants associated with antimicrobial resistance.  The [employer] BCID2 Panel test is performed directly on blood culture samples identified as positive by a continuous monitoring blood culture system.  Results are intended to be interpreted in conjunction with Gram stain results.             Significant Imaging: I have reviewed all pertinent imaging results/findings within the past 24 hours.      Saúl Olivarez MD  Infectious Disease  [facility]

## 2024-09-26 NOTE — PT/OT/SLP PROGRESS
Physical Therapy      Patient Name:  Orlando Black   MRN:  [id number]    Patient not seen today secondary to pending further neurosx recommendations prior to mobility. Will f/u as appropriate.

## 2024-09-26 NOTE — PROGRESS NOTES
[facility] - 7th Floor ICU  Pulmonary Critical Care Note    Patient Name: Orlando Black  MRN: [id number]  Admission Date: 9/24/2024  Hospital Length of Stay: 2 days  Code Status: Full Code  Attending Provider: Quinn Juarez MD  Primary Care Provider: Janeth, Primary Doctor     Subjective:     HPI:   Orlando Black is a 73 y.o. female with an no known pmh (last hospitalization in 1970s for childbirth) who presented to [facility] as a transfer from [place] on 9/24/2024 for Neurosurgery and CV Surgery services given new left occipital intraparenchymal hemorrhage and mitral valve endocarditis in the setting of Staph bacteremia. Patient presented to outside hospital on 9/21/2024 for fever, arthralgia, and back pain. Admitted for sepsis work up. 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 Patient then developed altered mental status overnight on 9/23/2024 and underwent CT Head on 9/24/2024 which showed new left occipital intraparenchymal hematoma measuring 2.0 x 1.8 x 1.5 cm. Transferred to [facility] for Neurosurgery and CV Surgery services.     Hospital Course/Significant events:  9/24/2024: Admitted to ICU with new left occipital IPH, transfer from [place]  9/25/2024: Cerebral angiogram with incidental finding of left cavernous ICA aneurysm, no mycotic aneurysm    24 Hour Interval History:  Patient became altered and less responsive last night, repeat CT Head with stable left occipital lobe hemorrhage and mild surrounding edema. Patient now awake and oriented, following commands, normal pupillary response. WBC 16 from 18, Na 146 from 143, AST/ALT 74/76 from 46/64.    No past medical history on file.    No past surgical history on file.    Social History     Socioeconomic History    Marital status: Single     Social Determinants of Health      Food Insecurity: No Food Insecurity (9/25/2024)    Hunger Vital Sign     Worried About Running Out of Food in the Last Year: Never true     Ran Out of Food in the Last Year: Never true   Transportation Needs: No Transportation Needs (9/25/2024)    TRANSPORTATION NEEDS     Transportation : No   Housing Stability: Low Risk  (9/25/2024)    Housing Stability Vital Sign     Unable to Pay for Housing in the Last Year: No     Homeless in the Last Year: No           No current outpatient medications    Current Inpatient Medications   famotidine (PF)  20 mg Intravenous BID    mupirocin   Nasal BID    oxacillin IV (PEDS and ADULTS)  2 g Intravenous Q4H       Current Intravenous Infusions   dexmedeTOMIDine (Precedex) infusion (titrating)  0-1.4 mcg/kg/hr Intravenous Continuous 15.81 mL/hr at 09/26/24 0829 1.2 mcg/kg/hr at 09/26/24 0829         Review of Systems   Respiratory:  Negative for shortness of breath.    Cardiovascular:  Negative for chest pain.   Musculoskeletal:  Positive for joint pain (Minimal right hip and right knee pain, improved).          Objective:       Intake/Output Summary (Last 24 hours) at 9/26/2024 0930  Last data filed at 9/26/2024 0622  Gross per 24 hour   Intake 3070.72 ml   Output 2535 ml   Net 535.72 ml         Vital Signs (Most Recent):  Temp: 97.9 °F (36.6 °C) (09/26/24 0400)  Pulse: 80 (09/26/24 0615)  Resp: 16 (09/26/24 0826)  BP: (!) 120/55 (09/26/24 0615)  SpO2: 95 % (09/26/24 0615)  Body mass index is 16.2 kg/m².  Weight: 52.7 kg (116 lb 2.9 oz) Vital Signs (24h Range):  Temp:  [97.3 °F (36.3 °C)-98.7 °F (37.1 °C)] 97.9 °F (36.6 °C)  Pulse:  [] 80  Resp:  [12-29] 16  SpO2:  [91 %-97 %] 95 %  BP: ()/(55-89) 120/55     Physical Exam  Constitutional:       General: She is not in acute distress.     Appearance: She is not diaphoretic.   HENT:      Head: Normocephalic and atraumatic.   Eyes:      Conjunctiva/sclera: Conjunctivae normal.      Pupils: Pupils are equal, round, and  "reactive to light.   Cardiovascular:      Rate and Rhythm: Normal rate.      Heart sounds: Murmur heard.   Pulmonary:      Effort: No respiratory distress.      Breath sounds: Normal breath sounds. No wheezing.   Abdominal:      General: There is no distension.      Palpations: Abdomen is soft.      Tenderness: There is no abdominal tenderness.   Musculoskeletal:         General: Swelling (Right knee edema) and tenderness (Right hip tender to palpation) present.   Skin:     General: Skin is warm and dry.   Neurological:      Comments: Awake, alert, oriented, follows commands, clear speech, BUE 5/5, BLE 4/5           Lines/Drains/Airways       Drain  Duration                  Urethral Catheter 09/24/24 2130 Silicone 16 Fr. 1 day              Peripheral Intravenous Line  Duration                  Peripheral IV - Single Lumen 09/24/24 2120 20 G Anterior;Left Forearm 1 day         Peripheral IV - Single Lumen 09/25/24 0849 20 G Anterior;Right Upper Arm 1 day                    Significant Labs:    Lab Results   Component Value Date    WBC 16.66 (H) 09/26/2024    HGB 10.3 (L) 09/26/2024    HCT 30.5 (L) 09/26/2024    MCV 94.1 (H) 09/26/2024     09/26/2024           BMP  Lab Results   Component Value Date     (H) 09/26/2024    K 3.8 09/26/2024    CO2 23 09/26/2024    BUN 16.3 09/26/2024    CREATININE 0.59 09/26/2024    CALCIUM 7.6 (L) 09/26/2024    AGAP 10.0 09/26/2024         ABG  No results for input(s): "PH", "PO2", "PCO2", "HCO3", "POCBASEDEF" in the last 168 hours.    Mechanical Ventilation Support:         Micro:  Outside Hospital  Blood culture 9/22/2024: Staph aureus (BCID mecA not applicable) so MSSA    Urine culture 9/22/2024: no growth in 24 hours    Body Fluid Culture 9/24/2024 (right knee effusion): Rare GPC, Many WBCs    Significant Imaging:  I have reviewed the pertinent imaging within the past 24 hours.    X-Ray Knee Complete 4 Or More Views Right  Result Date: 9/26/2024  Degenerative changes. " Electronically signed by: Ibrahima Valentin Date:    09/26/2024 Time:    07:46    X-Ray Hip 2 or 3 views Right with Pelvis when performed  Result Date: 9/26/2024  Degenerative changes. Electronically signed by: Ibrahima Valentin Date:    09/26/2024 Time:    07:35    CT Head Without Contrast  Result Date: 9/25/2024  1.  Stable left occipital lobe hemorrhage and mild surrounding edema. 2.  No new findings. 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 No convincing acute infection in the cervical spine. Multilevel DJD. Osteopenia about left C3-4 facet joint is favored to be degenerative although it is difficult to exclude osteomyelitis entirely. MRI with and without contrast suggested.  2. Suspected acute osteomyelitis/septic arthritis of the sternomanubrial joint.  3. No definitive acute infection in the thoracic spine  4. No definitive acute infection in the lumbar spine  5. Incidental note of a left occipital parenchymal hematoma  6. Cholelithiasis     CT Head wo 9/24/2024 1004:  There is a 2 cm intraparenchymal hematoma within the left occipital lobe. Measures 2.0 x 1.8 x 1.5 cm. No intracranial mass. No mass effect.    US Abdomen 9/22/2024 0958: Cholelithiasis, fatty hepatomegaly, 1 cm hepatic hemangioma suspected in left lobe, non-emergent liver protocol CT recommended for further characterization     CT Head 9/21/2024: No acute intracranial abnormalities    CT Chest 9/21/2024: No acute or suspicious intrathoracic disease    CT Abdomen Pelvis 9/21/2024: No acute or suspicious abnormalities. Cholelithiasis.    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    Plan  Admitted to ICU for close monitoring  Neurosurgery and Vascular Neuro following  Q2h neuro checks, -140  Hold antiplatelet and anticoag post cerebral angiogram  Consulted CV Surgery for MV endocarditis, rec 6 week abx with repeat ECHO  Blood cultures 9/24 growing Staph aureus 4/4 bottles, Urine cx 9/24 no growth  Continue oxacillin (day 2)  ID consulted, awaiting recs  Monitor overnight, repeat CT Head in the morning per neuro  Ortho consulted for right hip and right knee, suspected osteonecrosis of femoral head chronic, additional recs pending    DVT Prophylaxis: SCDs  GI Prophylaxis: H2B     32 minutes of critical care was time spent personally by me on the following activities: development of treatment plan with patient or surrogate and bedside caregivers, discussions with consultants, evaluation of patient's response to treatment, examination of patient, ordering and performing treatments and interventions, ordering and review of laboratory studies, ordering and review of radiographic studies, pulse oximetry, re-evaluation of patient's condition.  This critical care time did not overlap with that of any other provider or involve time for any procedures.     Divya Lewis MD  Pulmonary Critical Care Medicine  [facility] - 7th Floor ICU  DOS: 09/26/2024

## 2024-09-26 NOTE — ASSESSMENT & PLAN NOTE
Assessment  - 9/25 CTH Impression Left occipital lobe parenchymal hematoma with mild surrounding edema. Chronic microvascular ischemic changes.  - 9/25 CTA head and neck Impression:  1. No large vessel occlusion or flow-limiting stenosis.  2. 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  3. Partially visualized indeterminate retrosternal soft tissue.    Current NIH Stroke Score Values      Flowsheet Row Most Recent Value   Interval baseline   1a. Level of Consciousness 1-->Not alert, but arousable by minor stimulation to obey, answer, or respond   1b. LOC Questions 1-->Answers one question correctly   1c. LOC Commands 0-->Performs both tasks correctly   2. Best Gaze 0-->Normal   3. Visual 0-->No visual loss   4. Facial Palsy 0-->Normal symmetrical movements   5a. Motor Arm, Left 0-->No drift, limb holds 90 (or 45) degrees for full 10 secs   5b. Motor Arm, Right 0-->No drift, limb holds 90 (or 45) degrees for full 10 secs   6a. Motor Leg, Left 0-->No drift, leg holds 30 degree position for full 5 secs   6b. Motor Leg, Right 3-->No effort against gravity, leg falls to bed immediately   7. Limb Ataxia 0-->Absent   8. Sensory 0-->Normal, no sensory loss   9. Best Language 0-->No aphasia, normal   10. Dysarthria 0-->Normal   11. 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   - follow up 2 months with interventional neurology, Dr. Solis's office with Mariann Antoine NP to discuss management of the cavernous ICA aneurysm.

## 2024-09-26 NOTE — SUBJECTIVE & OBJECTIVE
Subjective:     Interval History: per nurse report patient had pupil change asymmetry during yesterday evening post angiogram of bran with some increased confusion, Dr. Solis notified, stat CT head done at 20:23 that revealed stable left occipital lobe hemorrhage and mild surrounding edema. No new findings. Patient is improved today, pupils equal and reactive.       Current Facility-Administered Medications   Medication Dose Route Frequency Provider Last Rate Last Admin    0.9%  NaCl infusion   Intravenous Continuous Zion Solis  mL/hr at 09/25/24 1855 New Bag at 09/25/24 1855    acetaminophen tablet 650 mg  650 mg Oral Q6H PRN Divya Lewis MD        bisacodyL suppository 10 mg  10 mg Rectal Daily PRN Divya Lewis MD        camphor-methyl salicyl-menthoL 4-30-10 % Crea   Topical (Top) BID PRN Divya Lewis MD   Given at 09/25/24 1612    dexmedetomidine (PRECEDEX) 400mcg/100mL 0.9% NaCL infusion  0-1.4 mcg/kg/hr Intravenous Continuous Divya Lewis MD 10.54 mL/hr at 09/25/24 2302 0.8 mcg/kg/hr at 09/25/24 2302    famotidine (PF) injection 20 mg  20 mg Intravenous BID Divya Lewis MD   20 mg at 09/25/24 2036    labetaloL injection 10 mg  10 mg Intravenous Q15 Min PRN Divya Lewis MD   10 mg at 09/25/24 1200    LIDOcaine 5 % patch 1 patch  1 patch Transdermal Q24H PRN Divya Lewis MD   1 patch at 09/25/24 1612    morphine injection 2 mg  2 mg Intravenous Q6H PRN Divya Lewis MD   2 mg at 09/25/24 2250    mupirocin 2 % ointment   Nasal BID Quinn Juarez MD        ondansetron disintegrating tablet 8 mg  8 mg Oral Q8H PRN Zion Solis MD        oxacillin 2 g in 0.9% NaCl 100 mL IVPB (MB+)  2 g Intravenous Q4H Saúl Olivarez MD   Stopped at 09/26/24 0431    QUEtiapine tablet 25 mg  25 mg Oral Nightly PRN Divya Lewis MD        sodium chloride 0.9% flush 10 mL  10 mL Intravenous PRN Divya Lewis MD        sodium chloride 0.9% flush 10 mL  10 mL Intravenous PRN Divya Lewis MD           Review of  Systems   Constitutional:  Positive for activity change. Negative for appetite change.   Neurological:  Negative for dizziness, tremors, seizures, syncope, facial asymmetry, speech difficulty, light-headedness, numbness and headaches. Weakness: legs limited by pain compliant of hips.      Objective:     Vital Signs (Most Recent):  Temp: 97.9 °F (36.6 °C) (09/26/24 0400)  Pulse: 83 (09/26/24 0515)  Resp: 14 (09/26/24 0515)  BP: 132/64 (09/26/24 0515)  SpO2: (!) 93 % (09/26/24 0515) Vital Signs (24h Range):  Temp:  [97.3 °F (36.3 °C)-100 °F (37.8 °C)] 97.9 °F (36.6 °C)  Pulse:  [] 83  Resp:  [12-29] 14  SpO2:  [91 %-97 %] 93 %  BP: ()/(56-93) 132/64     Weight: 52.7 kg (116 lb 2.9 oz)  Body mass index is 16.2 kg/m².     Physical Exam  Vitals and nursing note reviewed.   Constitutional:       General: She is awake.      Appearance: She is well-developed.   HENT:      Nose: Nose normal.      Mouth/Throat:      Mouth: Mucous membranes are moist.   Eyes:      Extraocular Movements: Extraocular movements intact.      Conjunctiva/sclera: Conjunctivae normal.      Pupils: Pupils are equal, round, and reactive to light.   Cardiovascular:      Rate and Rhythm: Normal rate.   Pulmonary:      Effort: Pulmonary effort is normal.   Abdominal:      General: Abdomen is flat.   Musculoskeletal:         General: No swelling.   Skin:     General: Skin is warm and dry.      Capillary Refill: Capillary refill takes less than 2 seconds.   Neurological:      Mental Status: She is alert and oriented to person, place, and time.      Coordination: Finger-Nose-Finger Test normal.   Psychiatric:         Mood and Affect: Mood normal.         Speech: Speech normal.         Behavior: Behavior normal. Behavior is cooperative.          NEUROLOGICAL EXAMINATION:     MENTAL STATUS   Oriented to person, place, and time.   Attention: normal. Concentration: decreased.   Speech: speech is normal   Level of consciousness: alert  Knowledge:  good.   Able to name object. Able to repeat. Normal comprehension.     CRANIAL NERVES     CN III, IV, VI   Pupils are equal, round, and reactive to light.    CN V   Facial sensation intact.     CN VII   Facial expression full, symmetric.     CN XII   Tongue: not atrophic  Fasciculations: absent  Tongue deviation: none       Patient is inconsistent with visual field exam. Utilized glasses which improved consistency. Able to see peripheral movement all fields, inconsistent with identifying number of fingers held bilateral peripheral visual fields      MOTOR EXAM   Muscle bulk: normal  Overall muscle tone: normal       No drift bilateral UE and LE. UE strength bilateral normal 5/5. Bilateral LE strength 3-4/5, limited by hip pain complaint      SENSORY EXAM   Light touch normal.     GAIT AND COORDINATION      Coordination   Finger to nose coordination: normal    Tremor   Resting tremor: absent      Significant Labs:   Recent Lab Results         09/26/24  0323   09/25/24  1039   09/25/24  0956        Albumin/Globulin Ratio 0.5           A2C EF     64       A4C EF     64       Albumin 1.6                      ALT 76           Anion Gap 10.0           Ao peak joseph     1.76       Ao VTI     23.80       AR Max Joseph     2.27       AST 74           AV valve area     2.40       ZINA by Velocity Ratio     2.01       AV mean gradient     6       AV index (prosthetic)     0.69       AV peak gradient     12       AV regurgitation pressure 1/2 time     375       AV Velocity Ratio     0.58       Baso # 0.02           Basophil % 0.1           BILIRUBIN TOTAL 0.7           BSA     1.62       BUN 16.3           BUN/CREAT RATIO 28           Calcium 7.6           Chloride 113           CO2 23           Creatinine 0.59           CRP   227.50         Left Ventricle Relative Wall Thickness     0.45       E/A ratio     0.89       E/E' ratio     11.40       eGFR >60           Eos # 0.06           Eos % 0.4           E wave deceleration  time     185.00       FS     31       Globulin, Total 3.1           Glucose 117           Hematocrit 30.5           Hemoglobin 10.3           Immature Grans (Abs) 0.29           Immature Granulocytes 1.7           IVSd     1.29       LA area A2C     24.90       LA area A4C     25.40       LA size     3.90       LA volume     83.80       LA Volume Index (Mod)     50.2       LVOT area     3.5       LV LATERAL E/E' RATIO     11.40       LV SEPTAL E/E' RATIO     11.40       LV EDV BP     141.00       LV Diastolic Volume Index     84.43       Left Ventricular End Diastolic Volume by Teichholz Method     141.00       LV EDV A2C     [id number]       LV EDV A4C     79.00       Left Ventricular End Systolic Volume by Teichholz Method     59.60       LV ESV A2C     82.80       LV ESV A4C     82.80       LVIDd     5.39       LVIDs     3.74       LV mass     280.52       LV Mass Index     168       Left Ventricular Outflow Tract Mean Gradient     2.00       Left Ventricular Outflow Tract Mean Velocity     0.68       LVOT diameter     2.10       LVOT peak joseph     1.02       LVOT stroke volume     57.12       LVOT peak VTI     16.50       LV ESV BP     59.60       LV Systolic Volume Index     35.7       Lymph # 0.96           LYMPH % 5.8           Magnesium  2.20           MCH 31.8           MCHC 33.8           MCV 94.1           Mean e'     0.10       Mono # 0.85           Mono % 5.1           MPV 9.3           Mr max joseph     4.50       MV valve area p 1/2 method     3.24       MV valve area by continuity eq     1.45       MV mean gradient     5       MV peak gradient     10       MV Peak A Joseph     1.28       MV Peak E Joseph     1.14       MV stenosis pressure 1/2 time     68.00       MV VTI     39.3       Neut # 14.48           Neut % 86.9           nRBC 0.0           Mcneill's Biplane MOD Ejection Fraction     62       Phosphorus Level 3.7           Platelet Count 251           Potassium 3.8           PROTEIN TOTAL 4.7            PV peak gradient     6       PV PEAK VELOCITY     1.25       Posterior Wall     1.22       Est. RA pres     8       RBC 3.24           RDW 14.1           RV TB RVSP     11       RV/LV Ratio     0.42       RVDD     2.24       Sed Rate   95         Sodium 146           TAPSE     2.96       TDI SEPTAL     0.10       TDI LATERAL     0.10       Triscuspid Valve Regurgitation Peak Gradient     30       TR Max Joseph     2.73       TV resting pulmonary artery pressure     38       WBC 16.66           ZLVIDD     1.44       ZLVIDS     2.03               Significant Imaging: I have reviewed all pertinent imaging results/findings within the past 24 hours.

## 2024-09-26 NOTE — CONSULTS
[facility] - 7th Floor ICU  Orthopedic Surgery  Consult Note    Patient Name: Orlando Black  MRN: [id number]  Admission Date: 9/24/2024  Hospital Length of Stay: 2 days  Attending Provider: Quinn Juarez MD  Primary Care Provider: Janeth Primary Doctor    Inpatient consult to Orthopedic Surgery  Consult performed by: Omid Castro MD  Consult ordered by: Divya Lewis MD        Subjective:     Chief Complaint: R knee pain    HPI:   73F he was transferred in from [place] on 09/24 for Staph bacteremia.  She was consulted for neurosurgery and CV services.  She was a left occipital intraparenchymal hemorrhage and mitral valve endocarditis.  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  Orthopedics was consulted for her right lower extremity.  Her knee was aspirated at the outside hospital.  Initial studies had Gram-positive cocci.  We did not have the culture results.  She reports pain to her knee and hip.  She had a CT scan of her right hip showing arthritis and osteonecrosis of the femoral head.  No mention of effusion or fluid collection.  Her right knee CT scan showed arthritis and an effusion.  Patient has been on IV antibiotics and clinically stable.  She was taken yesterday evening for a cerebral angiogram.  Reportedly had pupil abnormalities post procedure but this has resolved.  She was stable in her ICU room today.    History reviewed. No pertinent past medical history.    History reviewed. No pertinent surgical history.    Review of patient's allergies indicates:  Not on File    Current Facility-Administered Medications   Medication    acetaminophen tablet 650 mg    bisacodyL suppository 10 mg    camphor-methyl salicyl-menthoL 4-30-10 % Crea    famotidine (PF) injection 20 mg    labetaloL injection 10 mg    LIDOcaine 5 % patch 1 patch    morphine injection 2 mg    mupirocin 2 % ointment    ondansetron disintegrating tablet 8 mg    oxacillin 2 g in  "0.9% NaCl 100 mL IVPB (MB+)    QUEtiapine tablet 25 mg    sodium chloride 0.9% flush 10 mL    sodium chloride 0.9% flush 10 mL     Family History    None       Tobacco Use    Smoking status: Not on file    Smokeless tobacco: Not on file   Substance and Sexual Activity    Alcohol use: Not on file    Drug use: Not on file    Sexual activity: Not on file       ROS:  Constitutional: Denies fever chills  Eyes: No change in vision  ENT: No ringing or current infections  CV: No chest pain  Resp: No labored breathing  MSK: Pain evident at site of injury located in HPI,   Integ: No signs of abrasions or lacerations  Neuro: No numbness or tingling  Lymphatic: No swelling outside the area of injury   Objective:     Vital Signs (Most Recent):  Temp: 98 °F (36.7 °C) (09/26/24 1200)  Pulse: 81 (09/26/24 1400)  Resp: 12 (09/26/24 1300)  BP: 131/82 (09/26/24 1300)  SpO2: (!) 94 % (09/26/24 1400) Vital Signs (24h Range):  Temp:  [97.3 °F (36.3 °C)-98.5 °F (36.9 °C)] 98 °F (36.7 °C)  Pulse:  [75-97] 81  Resp:  [11-23] 12  SpO2:  [91 %-97 %] 94 %  BP: ()/(55-89) 131/82     Weight: 52.7 kg (116 lb 2.9 oz)  Height: 5' 11" (180.3 cm)  Body mass index is 16.2 kg/m².      Intake/Output Summary (Last 24 hours) at 9/26/2024 1556  Last data filed at 9/26/2024 1300  Gross per 24 hour   Intake 3070.72 ml   Output 1910 ml   Net 1160.72 ml       General the patient is alert and oriented x3 no acute distress nontoxic-appearing appropriate affect.    Constitutional: Vital signs are examined and stable.  Resp: No signs of labored breathing  CV: Well-perfused  Abdomen: Nondistended    RLE: -Skin: CDI, No signs of new abrasions or lacerations, no scars.  Mild knee effusion.  Reduced and painful motion of the knee and hip.  Pain with log roll.           -MSK: EHL/FHL, Gastroc/Tib anterior Strength 4/5           -Neuro:  Sensation intact to light touch L3-S1 dermatomes           -Lymphatic: No signs of lymphadenopathy           -CV: Capillary " refill is less than 2 seconds. DP/PT pulses  2/4. Compartments soft and compressible.     Significant Labs: BMP:   Recent Labs   Lab 09/26/24 0323   *   K 3.8   *   CO2 23   BUN 16.3   CREATININE 0.59   CALCIUM 7.6*   MG 2.20     CBC:   Recent Labs   Lab 09/24/24 2034 09/25/24 0407 09/26/24  0323   WBC 21.81* 18.87* 16.66*   HGB 11.4* 11.0* 10.3*   HCT 33.3* 32.8* 30.5*    236 251     CMP:   Recent Labs   Lab 09/24/24 2034 09/25/24 0407 09/26/24 0323    143 146*   K 3.4* 3.1* 3.8    106 113*   CO2 25 26 23   BUN 25.0* 21.2* 16.3   CREATININE 0.61 0.62 0.59   CALCIUM 8.1* 8.0* 7.6*   ALBUMIN 1.9* 1.8* 1.6*   BILITOT 0.5 0.7 0.7   ALKPHOS 272* 239* 254*   AST 80* 46* 74*   ALT 79* 64* 76*     All pertinent labs within the past 24 hours have been reviewed.  Recent Lab Results  (Last 5 results in the past 72 hours)        09/26/24 0323 09/25/24  1039   09/25/24  0956   09/25/24 0407   09/24/24  2154        Albumin/Globulin Ratio 0.5       0.6         A2C EF     64           A4C EF     64           ABO and RH         O POS       Albumin 1.6       1.8                239         ALT 76       64         Anion Gap 10.0       11.0         Ao peak joseph     1.76           Ao VTI     23.80           Appearance, UA               AR Max Joseph     2.27           AST 74       46         AV valve area     2.40           ZINA by Velocity Ratio     2.01           AV mean gradient     6           AV index (prosthetic)     0.69           AV peak gradient     12           AV regurgitation pressure 1/2 time     375           AV Velocity Ratio     0.58           Bacteria, UA               Baso # 0.02       0.05         Basophil % 0.1       0.3         Bilirubin (UA)               BILIRUBIN TOTAL 0.7       0.7         BSA     1.62           BUN 16.3       21.2         BUN/CREAT RATIO 28       34         Calcium 7.6       8.0         Chloride 113       106         CO2 23       26         Color, UA                Creatinine 0.59       0.62         CRP   227.50             Left Ventricle Relative Wall Thickness     0.45           E/A ratio     0.89           E/E' ratio     11.40           eGFR >60       >60         Eos # 0.06       0.03         Eos % 0.4       0.2         E wave deceleration time     185.00           FS     31           Globulin, Total 3.1       3.1         Glucose 117       134         Glucose, UA               Hematocrit 30.5       32.8         Hemoglobin 10.3       11.0         Immature Grans (Abs) 0.29       0.59         Immature Granulocytes 1.7       3.1         IVSd     1.29           Ketones, UA               LA area A2C     24.90           LA area A4C     25.40           LA size     3.90           LA volume     83.80           LA Volume Index (Mod)     50.2           LVOT area     3.5           Leukocyte Esterase, UA               LV LATERAL E/E' RATIO     11.40           LV SEPTAL E/E' RATIO     11.40           LV EDV BP     141.00           LV Diastolic Volume Index     84.43           Left Ventricular End Diastolic Volume by Teichholz Method     141.00           LV EDV A2C     [id number]           LV EDV A4C     79.00           Left Ventricular End Systolic Volume by Teichholz Method     59.60           LV ESV A2C     82.80           LV ESV A4C     82.80           LVIDd     5.39           LVIDs     3.74           LV mass     280.52           LV Mass Index     168           Left Ventricular Outflow Tract Mean Gradient     2.00           Left Ventricular Outflow Tract Mean Velocity     0.68           LVOT diameter     2.10           LVOT peak cheli     1.02           LVOT stroke volume     57.12           LVOT peak VTI     16.50           LV ESV BP     59.60           LV Systolic Volume Index     35.7           Lymph # 0.96       1.11         LYMPH % 5.8       5.9         Magnesium  2.20       2.10         MCH 31.8       31.9         MCHC 33.8       33.5         MCV 94.1        95.1         Mean e'     0.10           Mono # 0.85       1.19         Mono % 5.1       6.3         MPV 9.3       9.4         Mr max jospeh     4.50           Mucous, UA               MV valve area p 1/2 method     3.24           MV valve area by continuity eq     1.45           MV mean gradient     5           MV peak gradient     10           MV Peak A Joseph     1.28           MV Peak E Joseph     1.14           MV stenosis pressure 1/2 time     68.00           MV VTI     39.3           Neut # 14.48       15.90         Neut % 86.9       84.2         NITRITE UA               nRBC 0.0       0.0         Blood, UA               Mcneill's Biplane MOD Ejection Fraction     62           pH, UA               Phosphorus Level 3.7       3.5         Platelet Count 251       236         Potassium 3.8       3.1         PROTEIN TOTAL 4.7       4.9         Protein, UA               PV peak gradient     6           PV PEAK VELOCITY     1.25           Posterior Wall     1.22           Est. RA pres     8           RBC 3.24       3.45         RBC, UA               RDW 14.1       14.1         RV TB RVSP     11           RV/LV Ratio     0.42           RVDD     2.24           Sed Rate   95             Sodium 146       143         Specific Gravity,UA               Squamous Epithelial Cells, UA               TAPSE     2.96           TDI SEPTAL     0.10           TDI LATERAL     0.10           Triscuspid Valve Regurgitation Peak Gradient     30           TR Max Joseph     2.73           TV resting pulmonary artery pressure     38           Urine Culture               Urobilinogen, UA               WBC, UA               WBC 16.66       18.87         ZLVIDD     1.44           ZLVIDS     2.03                                   Significant Imaging: I have reviewed all pertinent imaging results/findings.    Radiographs of the right knee and hip show degenerative changes with no acute fractures.    Outside imaging reports:  CT scan of her right hip showing  arthritis and osteonecrosis of the femoral head.  No mention of effusion or fluid collection.  Her right knee CT scan showed arthritis and an effusion.     CT of the hip with and without contrast is pending    MRI Brain W WO Contrast    Result Date: 9/26/2024  EXAMINATION: MRI BRAIN W WO CONTRAST CLINICAL HISTORY: Stroke, follow up;Sepsis; TECHNIQUE: Multiplanar multisequence MR imaging of the brain was performed without and with contrast. COMPARISON: CT scan dated 09/25/2024 FINDINGS: No intracranial mass or lesion is seen.  There is a punctate area of restricted diffusion in the right frontal lobe on image number 10 series 3.  There is a punctate area strict diffusion in the right frontal lobe on image 7 series 3 and in the left frontal region on image 8 series 3.  Other punctate areas of restricted diffusion are seen in the right temporal region.  There is an area of acute parenchymal hemorrhage in the left occipital lobe with some surrounding cytotoxic edema.  This was seen on recent CT scan as well.  It appears unchanged.  No new areas of hemorrhage are seen.  .  There is some cerebral atrophy seen along with some compensatory ventricular dilatation and periventricular white matter change consistent with the patient's age.  Posterior fossa appears normal.  Postcontrast imaging demonstrates no evidence of abnormal enhancement.  No mass is seen.  No vascular enhancement seen.  No dural enhancement is seen.  Calvarium is intact.  Paranasal sinuses appear grossly unremarkable.     Stable acute hemorrhage in the left occipital lobe Punctate areas of scattered restricted diffusion in the frontal lobes bilaterally as well as the right temporal region.  Findings are suggestive of possible embolic phenomena. Electronically signed by: Feliberto Cardona Date:    09/26/2024 Time:    14:56    X-Ray Knee Complete 4 Or More Views Right    Result Date: 9/26/2024  EXAMINATION: XR KNEE COMP 4 OR MORE VIEWS RIGHT CLINICAL HISTORY:  effusion;Pain, unspecified COMPARISON: None. FINDINGS: No acute displaced fractures or dislocations. There is narrowing of the lateral compartment of the knee joint with sclerosis and marginal osteophytosis there is minimal narrowing of the medial compartment of the knee joint articular spaces are otherwise preserved with smooth articular surfaces No blastic or lytic lesions. Soft tissues within normal limits.     Degenerative changes. Electronically signed by: Ibrahima Valentin Date:    09/26/2024 Time:    07:46    X-Ray Hip 2 or 3 views Right with Pelvis when performed    Result Date: 9/26/2024  EXAMINATION: XR HIP WITH PELVIS WHEN PERFORMED 2 OR 3 VIEWS RIGHT CLINICAL HISTORY: osteonecrosis;Pain, unspecified COMPARISON: None. FINDINGS: No acute displaced fractures or dislocations. There is narrowing of the inferior medial aspect of both hip joints with some degenerative changes of the sacroiliac joints and lumbosacral spine articular spaces are otherwise preserved with smooth articular surfaces No blastic or lytic lesions. Soft tissues within normal limits.     Degenerative changes. Electronically signed by: Ibrahima Valentin Date:    09/26/2024 Time:    07:35    CT Head Without Contrast    Result Date: 9/25/2024  EXAMINATION: CT HEAD WITHOUT CONTRAST CLINICAL HISTORY: Mental status change, unknown cause; TECHNIQUE: Sequential axial images were performed of the brain without contrast. Dose product length of 963 mGycm. Automated exposure control was utilized to minimize radiation dose. COMPARISON: September 25, 2024.. FINDINGS: There is no change in the size of left occipital lobe parenchymal hyperdense acute hemorrhage measuring 1.1 x 2.2 cm on image 22 series 2.  Surrounding brain edematous changes are similar.  There is no significant mass effect, midline shift or hydrocephalus..  There is no new sulcal effacement or low attenuation changes to suggest recent large vessel territory infarction.  There is chronic  microvascular ischemia and atrophy. There is no acute extra axial fluid collection. Visualized paranasal sinuses are clear without mucosal thickening, polypoidal abnormality or air-fluid levels. Mastoid air cells aeration is optimal.     1.  Stable left occipital lobe hemorrhage and mild surrounding edema. 2.  No new findings. Electronically signed by: Wade Marie Date:    09/25/2024 Time:    20:33    IR Embolization (CNS) Intracranial    Result Date: 9/25/2024  EXAMINATION: IR EMBOLIZATION (CNS) INTRACRANIAL CLINICAL HISTORY: embo; TECHNIQUE: Fluoroscopic support was utilized for procedural support during procedure.  Please refer to performing provider dictation. COMPARISON: None FINDINGS: Fluoroscopic support. Please refer to procedure of this dictation. Time: 8.6 minutes Dose: 340 mGy     Fluoroscopic support.  Please refer to procedure of this dictation. Electronically signed by: Petrona Nguyen Date:    09/25/2024 Time:    18:05    CTA Head and Neck (xpd)    Result Date: 9/25/2024  EXAMINATION: CTA HEAD AND NECK (XPD) CLINICAL HISTORY: Stroke, hemorrhagic; TECHNIQUE: Axial images obtained through the cervical region and [place] of [place] before and after the administration of intravenous contrast. Coronal, sagittal, MIP and 3D reconstructions were obtained from the axial data. Automatic exposure control was utilized to limit radiation dose. Radiation Dose: Total DLP: 1550 mGy*cm COMPARISON: CT head dated 09/25/2024 FINDINGS: Head CT with contrast: No interval changes when compared to the previous CT. No enhancing abnormalities. If present, stenosis of the carotid bulbs is measured based on NASCET criteria, i.e. area of maximal stenosis compared to the cervical ICA distal to the bulb. Cervical CTA: The origins of the great vessels are patent. The common carotid arteries are patent.  There are calcifications at the carotid bulbs without hemodynamically significant stenosis.  The internal carotid arteries are  patent. The vertebral arteries are patent. Intracranial CTA: The internal carotid arteries are patent with minimal scattered calcifications.  There is a saccular aneurysm arising from the cavernous segment of the left internal carotid artery measuring up to 4 mm in size (series 17, image 148).  The middle cerebral arteries and anterior cerebral arteries are patent. The vertebral arteries, basilar artery and posterior cerebral arteries are patent. The dural venous sinuses are patent. Additional findings: There is partially visualized indeterminate soft tissue in the retrosternal space (series 11, image 119). Small left pleural effusion.     1. No large vessel occlusion or flow-limiting stenosis. 2. 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 3. Partially visualized indeterminate retrosternal soft tissue. Electronically signed by: Petrona Nguyen Date:    09/25/2024 Time:    10:23    CT Head Without Contrast    Result Date: 9/25/2024  EXAMINATION: CT HEAD WITHOUT CONTRAST CLINICAL HISTORY: Stroke, follow up;Left occipital IPH; TECHNIQUE: Axial scans were obtained from skull base to the vertex. Coronal and sagittal reconstructions obtained from the axial data. Automatic exposure control was utilized to limit radiation dose. Contrast: None Radiation Dose: Total DLP: 970 mGy*cm COMPARISON: None FINDINGS: A known left occipital lobe parenchymal hemorrhage measures 1.1 x 2.2 cm in size with mild surrounding edema.  Patchy hypodensities in the subcortical and periventricular white matter likely represent chronic microvascular ischemic changes. There is no significant mass effect or midline shift.  The basal cisterns are patent.  There is mild diffuse parenchymal volume loss.  There is no hydrocephalus or abnormal extra-axial fluid collection.  The calvarium and skull base are intact.  There is mild scattered paranasal sinus mucosal thickening.     1. Left occipital lobe parenchymal hematoma with  mild surrounding edema. 2. Chronic microvascular ischemic changes. Electronically signed by: Petrona Nguyen Date:    09/25/2024 Time:    10:17    Echo    Result Date: 9/25/2024    Left Ventricle: The left ventricle is normal in size. Mildly increased wall thickness. There is normal systolic function with a visually estimated ejection fraction of 55 - 60%. There is normal diastolic function.   Right Ventricle: Normal right ventricular cavity size. Systolic function is normal.   Aortic Valve: There is no stenosis. Aortic valve peak velocity is 1.76 m/s. Mean gradient is 6 mmHg. There is mild aortic regurgitation.   Mitral Valve: There is a possible medium mobile mass on the posterior leaflet. There is moderate regurgitation.   Tricuspid Valve: There is moderate regurgitation with a RVSP of 38mmHg.   IVC/SVC: Intermediate venous pressure at 8 mmHg.   Pericardium: There is no pericardial effusion.   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  I have also discussed the patient with the ICU team and infectious disease.  Right knee aspiration from the outside hospital with GPC.  Plan for irrigation and debridement of the right knee  tomorrow.  CT scan of her hip has been ordered with and without contrast.  No images from her outside hospital are available for review.  The radiology read mentions arthritic changes and osteonecrosis.  No fluid collection or effusion is noted however she does have tenderness on exam.  If she has fluid then would like IR to aspirate.  MRI of her cervical spine has been completed but is still pending and would like clearance from the neurological services team.  NPO at midnight for irrigation and debridement of her right knee.  Continue antibiotics.  Pain control.  DVT prophylaxis as able.    Omid Castro MD  Orthopedic Surgery  [facility] - 7th Floor ICU

## 2024-09-26 NOTE — PROGRESS NOTES
09/26/24 1344   Missed Time Reason   Missed Treatment Reason Testing/imaging (xray/CT/MRI)

## 2024-09-27 ENCOUNTER — ANESTHESIA (OUTPATIENT)
Dept: SURGERY | Facility: HOSPITAL | Age: 74
DRG: 064 | End: 2024-09-27
Payer: MEDICARE

## 2024-09-27 ENCOUNTER — ANESTHESIA EVENT (OUTPATIENT)
Dept: SURGERY | Facility: HOSPITAL | Age: 74
DRG: 064 | End: 2024-09-27
Payer: MEDICARE

## 2024-09-27 LAB
ALBUMIN SERPL-MCNC: 1.7 G/DL (ref 3.4–4.8)
ALBUMIN/GLOB SERPL: 0.4 RATIO (ref 1.1–2)
ALP SERPL-CCNC: 331 UNIT/L (ref 40–150)
ALT SERPL-CCNC: 83 UNIT/L (ref 0–55)
ANION GAP SERPL CALC-SCNC: 12 MEQ/L
AST SERPL-CCNC: 79 UNIT/L (ref 5–34)
BACTERIA BLD CULT: ABNORMAL
BACTERIA BLD CULT: ABNORMAL
BASOPHILS # BLD AUTO: 0.05 X10(3)/MCL
BASOPHILS NFR BLD AUTO: 0.3 %
BILIRUB SERPL-MCNC: 0.7 MG/DL
BUN SERPL-MCNC: 13.4 MG/DL (ref 9.8–20.1)
CALCIUM SERPL-MCNC: 8.4 MG/DL (ref 8.4–10.2)
CHLORIDE SERPL-SCNC: 113 MMOL/L (ref 98–107)
CO2 SERPL-SCNC: 21 MMOL/L (ref 23–31)
CREAT SERPL-MCNC: 0.64 MG/DL (ref 0.55–1.02)
CREAT/UREA NIT SERPL: 21
EOSINOPHIL # BLD AUTO: 0.12 X10(3)/MCL (ref 0–0.9)
EOSINOPHIL NFR BLD AUTO: 0.7 %
ERYTHROCYTE [DISTWIDTH] IN BLOOD BY AUTOMATED COUNT: 14.2 % (ref 11.5–17)
GFR SERPLBLD CREATININE-BSD FMLA CKD-EPI: >60 ML/MIN/1.73/M2
GLOBULIN SER-MCNC: 3.9 GM/DL (ref 2.4–3.5)
GLUCOSE SERPL-MCNC: 92 MG/DL (ref 82–115)
GRAM STN SPEC: ABNORMAL
GRAM STN SPEC: NORMAL
GRAM STN SPEC: NORMAL
HCT VFR BLD AUTO: 32.6 % (ref 37–47)
HGB BLD-MCNC: 10.8 G/DL (ref 12–16)
IMM GRANULOCYTES # BLD AUTO: 0.45 X10(3)/MCL (ref 0–0.04)
IMM GRANULOCYTES NFR BLD AUTO: 2.4 %
LYMPHOCYTES # BLD AUTO: 1.33 X10(3)/MCL (ref 0.6–4.6)
LYMPHOCYTES NFR BLD AUTO: 7.2 %
MAGNESIUM SERPL-MCNC: 2.2 MG/DL (ref 1.6–2.6)
MCH RBC QN AUTO: 31.9 PG (ref 27–31)
MCHC RBC AUTO-ENTMCNC: 33.1 G/DL (ref 33–36)
MCV RBC AUTO: 96.2 FL (ref 80–94)
MONOCYTES # BLD AUTO: 1.09 X10(3)/MCL (ref 0.1–1.3)
MONOCYTES NFR BLD AUTO: 5.9 %
NEUTROPHILS # BLD AUTO: 15.4 X10(3)/MCL (ref 2.1–9.2)
NEUTROPHILS NFR BLD AUTO: 83.5 %
NRBC BLD AUTO-RTO: 0 %
OHS QRS DURATION: 78 MS
OHS QTC CALCULATION: 465 MS
PHOSPHATE SERPL-MCNC: 3.4 MG/DL (ref 2.3–4.7)
PLATELET # BLD AUTO: 320 X10(3)/MCL (ref 130–400)
PMV BLD AUTO: 9.1 FL (ref 7.4–10.4)
POTASSIUM SERPL-SCNC: 2.9 MMOL/L (ref 3.5–5.1)
PROT SERPL-MCNC: 5.6 GM/DL (ref 5.8–7.6)
RBC # BLD AUTO: 3.39 X10(6)/MCL (ref 4.2–5.4)
SODIUM SERPL-SCNC: 146 MMOL/L (ref 136–145)
WBC # BLD AUTO: 18.44 X10(3)/MCL (ref 4.5–11.5)

## 2024-09-27 PROCEDURE — 29871 ARTHRS KNEE SURG FOR INFCTJ: CPT | Mod: RT,,, | Performed by: REHABILITATION UNIT

## 2024-09-27 PROCEDURE — 63600175 PHARM REV CODE 636 W HCPCS: Performed by: GENERAL PRACTICE

## 2024-09-27 PROCEDURE — 25000003 PHARM REV CODE 250: Performed by: INTERNAL MEDICINE

## 2024-09-27 PROCEDURE — 71000033 HC RECOVERY, INTIAL HOUR: Performed by: REHABILITATION UNIT

## 2024-09-27 PROCEDURE — 85025 COMPLETE CBC W/AUTO DIFF WBC: CPT | Performed by: STUDENT IN AN ORGANIZED HEALTH CARE EDUCATION/TRAINING PROGRAM

## 2024-09-27 PROCEDURE — 27201423 OPTIME MED/SURG SUP & DEVICES STERILE SUPPLY: Performed by: REHABILITATION UNIT

## 2024-09-27 PROCEDURE — 25000003 PHARM REV CODE 250: Performed by: STUDENT IN AN ORGANIZED HEALTH CARE EDUCATION/TRAINING PROGRAM

## 2024-09-27 PROCEDURE — 20000000 HC ICU ROOM

## 2024-09-27 PROCEDURE — 87075 CULTR BACTERIA EXCEPT BLOOD: CPT | Performed by: REHABILITATION UNIT

## 2024-09-27 PROCEDURE — 36000710: Performed by: REHABILITATION UNIT

## 2024-09-27 PROCEDURE — 80053 COMPREHEN METABOLIC PANEL: CPT | Performed by: STUDENT IN AN ORGANIZED HEALTH CARE EDUCATION/TRAINING PROGRAM

## 2024-09-27 PROCEDURE — 63600175 PHARM REV CODE 636 W HCPCS

## 2024-09-27 PROCEDURE — 87205 SMEAR GRAM STAIN: CPT | Performed by: REHABILITATION UNIT

## 2024-09-27 PROCEDURE — 25000003 PHARM REV CODE 250: Performed by: GENERAL PRACTICE

## 2024-09-27 PROCEDURE — 99232 SBSQ HOSP IP/OBS MODERATE 35: CPT | Mod: ,,, | Performed by: REHABILITATION UNIT

## 2024-09-27 PROCEDURE — 37000008 HC ANESTHESIA 1ST 15 MINUTES: Performed by: REHABILITATION UNIT

## 2024-09-27 PROCEDURE — 25000003 PHARM REV CODE 250: Performed by: NEUROLOGICAL SURGERY

## 2024-09-27 PROCEDURE — 36000711: Performed by: REHABILITATION UNIT

## 2024-09-27 PROCEDURE — 37000009 HC ANESTHESIA EA ADD 15 MINS: Performed by: REHABILITATION UNIT

## 2024-09-27 PROCEDURE — 87070 CULTURE OTHR SPECIMN AEROBIC: CPT | Performed by: REHABILITATION UNIT

## 2024-09-27 PROCEDURE — 36415 COLL VENOUS BLD VENIPUNCTURE: CPT | Performed by: STUDENT IN AN ORGANIZED HEALTH CARE EDUCATION/TRAINING PROGRAM

## 2024-09-27 PROCEDURE — 63600175 PHARM REV CODE 636 W HCPCS: Performed by: STUDENT IN AN ORGANIZED HEALTH CARE EDUCATION/TRAINING PROGRAM

## 2024-09-27 PROCEDURE — 99233 SBSQ HOSP IP/OBS HIGH 50: CPT | Mod: FS,,, | Performed by: PSYCHIATRY & NEUROLOGY

## 2024-09-27 PROCEDURE — 84100 ASSAY OF PHOSPHORUS: CPT | Performed by: STUDENT IN AN ORGANIZED HEALTH CARE EDUCATION/TRAINING PROGRAM

## 2024-09-27 PROCEDURE — 25000003 PHARM REV CODE 250: Performed by: REHABILITATION UNIT

## 2024-09-27 PROCEDURE — 25000003 PHARM REV CODE 250

## 2024-09-27 PROCEDURE — 0SBC4ZZ EXCISION OF RIGHT KNEE JOINT, PERCUTANEOUS ENDOSCOPIC APPROACH: ICD-10-PCS | Performed by: REHABILITATION UNIT

## 2024-09-27 PROCEDURE — 83735 ASSAY OF MAGNESIUM: CPT | Performed by: STUDENT IN AN ORGANIZED HEALTH CARE EDUCATION/TRAINING PROGRAM

## 2024-09-27 RX ORDER — DEXAMETHASONE SODIUM PHOSPHATE 4 MG/ML
INJECTION, SOLUTION INTRA-ARTICULAR; INTRALESIONAL; INTRAMUSCULAR; INTRAVENOUS; SOFT TISSUE
Status: DISCONTINUED | OUTPATIENT
Start: 2024-09-27 | End: 2024-09-27

## 2024-09-27 RX ORDER — HYDROMORPHONE HYDROCHLORIDE 2 MG/ML
0.2 INJECTION, SOLUTION INTRAMUSCULAR; INTRAVENOUS; SUBCUTANEOUS EVERY 5 MIN PRN
Status: DISCONTINUED | OUTPATIENT
Start: 2024-09-27 | End: 2024-09-27

## 2024-09-27 RX ORDER — ONDANSETRON HYDROCHLORIDE 2 MG/ML
INJECTION, SOLUTION INTRAVENOUS
Status: DISCONTINUED | OUTPATIENT
Start: 2024-09-27 | End: 2024-09-27

## 2024-09-27 RX ORDER — FENTANYL CITRATE 50 UG/ML
INJECTION, SOLUTION INTRAMUSCULAR; INTRAVENOUS
Status: DISCONTINUED | OUTPATIENT
Start: 2024-09-27 | End: 2024-09-27

## 2024-09-27 RX ORDER — BUPIVACAINE HYDROCHLORIDE AND EPINEPHRINE 5; 5 MG/ML; UG/ML
INJECTION, SOLUTION EPIDURAL; INTRACAUDAL; PERINEURAL
Status: DISCONTINUED | OUTPATIENT
Start: 2024-09-27 | End: 2024-09-27 | Stop reason: HOSPADM

## 2024-09-27 RX ORDER — MEPERIDINE HYDROCHLORIDE 25 MG/ML
6.25 INJECTION INTRAMUSCULAR; INTRAVENOUS; SUBCUTANEOUS ONCE AS NEEDED
Status: DISCONTINUED | OUTPATIENT
Start: 2024-09-27 | End: 2024-09-27

## 2024-09-27 RX ORDER — PROCHLORPERAZINE EDISYLATE 5 MG/ML
5 INJECTION INTRAMUSCULAR; INTRAVENOUS EVERY 30 MIN PRN
Status: DISCONTINUED | OUTPATIENT
Start: 2024-09-27 | End: 2024-09-27

## 2024-09-27 RX ORDER — ONDANSETRON HYDROCHLORIDE 2 MG/ML
4 INJECTION, SOLUTION INTRAVENOUS ONCE
Status: DISCONTINUED | OUTPATIENT
Start: 2024-09-27 | End: 2024-09-27

## 2024-09-27 RX ORDER — HYDROMORPHONE HYDROCHLORIDE 2 MG/ML
0.5 INJECTION, SOLUTION INTRAMUSCULAR; INTRAVENOUS; SUBCUTANEOUS EVERY 5 MIN PRN
Status: DISCONTINUED | OUTPATIENT
Start: 2024-09-27 | End: 2024-09-27

## 2024-09-27 RX ORDER — LIDOCAINE HYDROCHLORIDE 20 MG/ML
INJECTION INTRAVENOUS
Status: DISCONTINUED | OUTPATIENT
Start: 2024-09-27 | End: 2024-09-27

## 2024-09-27 RX ORDER — GLUCAGON 1 MG
1 KIT INJECTION
Status: DISCONTINUED | OUTPATIENT
Start: 2024-09-27 | End: 2024-09-27

## 2024-09-27 RX ORDER — HYDROMORPHONE HYDROCHLORIDE 2 MG/ML
0.4 INJECTION, SOLUTION INTRAMUSCULAR; INTRAVENOUS; SUBCUTANEOUS EVERY 5 MIN PRN
Status: DISCONTINUED | OUTPATIENT
Start: 2024-09-27 | End: 2024-09-27

## 2024-09-27 RX ORDER — PROPOFOL 10 MG/ML
INJECTION, EMULSION INTRAVENOUS
Status: DISCONTINUED | OUTPATIENT
Start: 2024-09-27 | End: 2024-09-27

## 2024-09-27 RX ORDER — HALOPERIDOL 5 MG/ML
0.5 INJECTION INTRAMUSCULAR EVERY 10 MIN PRN
Status: DISCONTINUED | OUTPATIENT
Start: 2024-09-27 | End: 2024-09-27

## 2024-09-27 RX ORDER — CEFAZOLIN 2 G/1
INJECTION, POWDER, FOR SOLUTION INTRAMUSCULAR; INTRAVENOUS
Status: DISCONTINUED | OUTPATIENT
Start: 2024-09-27 | End: 2024-09-27

## 2024-09-27 RX ORDER — DIPHENHYDRAMINE HYDROCHLORIDE 50 MG/ML
25 INJECTION INTRAMUSCULAR; INTRAVENOUS EVERY 6 HOURS PRN
Status: DISCONTINUED | OUTPATIENT
Start: 2024-09-27 | End: 2024-09-27

## 2024-09-27 RX ORDER — MEPERIDINE HYDROCHLORIDE 25 MG/ML
12.5 INJECTION INTRAMUSCULAR; INTRAVENOUS; SUBCUTANEOUS ONCE
Status: DISCONTINUED | OUTPATIENT
Start: 2024-09-27 | End: 2024-09-27

## 2024-09-27 RX ADMIN — LEVETIRACETAM 500 MG: 500 TABLET, FILM COATED ORAL at 09:09

## 2024-09-27 RX ADMIN — OXACILLIN 2 G: 2 INJECTION, POWDER, FOR SOLUTION INTRAMUSCULAR; INTRAVENOUS at 03:09

## 2024-09-27 RX ADMIN — MUPIROCIN: 20 OINTMENT TOPICAL at 08:09

## 2024-09-27 RX ADMIN — OXACILLIN 2 G: 2 INJECTION, POWDER, FOR SOLUTION INTRAMUSCULAR; INTRAVENOUS at 07:09

## 2024-09-27 RX ADMIN — LIDOCAINE HYDROCHLORIDE 40 MG: 20 INJECTION INTRAVENOUS at 12:09

## 2024-09-27 RX ADMIN — SODIUM CHLORIDE, SODIUM GLUCONATE, SODIUM ACETATE, POTASSIUM CHLORIDE AND MAGNESIUM CHLORIDE: 526; 502; 368; 37; 30 INJECTION, SOLUTION INTRAVENOUS at 12:09

## 2024-09-27 RX ADMIN — FENTANYL CITRATE 25 MCG: 50 INJECTION, SOLUTION INTRAMUSCULAR; INTRAVENOUS at 12:09

## 2024-09-27 RX ADMIN — ONDANSETRON 4 MG: 2 INJECTION INTRAMUSCULAR; INTRAVENOUS at 12:09

## 2024-09-27 RX ADMIN — FAMOTIDINE 20 MG: 10 INJECTION, SOLUTION INTRAVENOUS at 08:09

## 2024-09-27 RX ADMIN — LABETALOL HYDROCHLORIDE 10 MG: 5 INJECTION, SOLUTION INTRAVENOUS at 04:09

## 2024-09-27 RX ADMIN — OXACILLIN 2 G: 2 INJECTION, POWDER, FOR SOLUTION INTRAMUSCULAR; INTRAVENOUS at 08:09

## 2024-09-27 RX ADMIN — LEVETIRACETAM 500 MG: 500 TABLET, FILM COATED ORAL at 08:09

## 2024-09-27 RX ADMIN — QUETIAPINE FUMARATE 25 MG: 25 TABLET ORAL at 08:09

## 2024-09-27 RX ADMIN — DEXAMETHASONE SODIUM PHOSPHATE 4 MG: 4 INJECTION, SOLUTION INTRA-ARTICULAR; INTRALESIONAL; INTRAMUSCULAR; INTRAVENOUS; SOFT TISSUE at 12:09

## 2024-09-27 RX ADMIN — FENTANYL CITRATE 25 MCG: 50 INJECTION, SOLUTION INTRAMUSCULAR; INTRAVENOUS at 01:09

## 2024-09-27 RX ADMIN — OXACILLIN 2 G: 2 INJECTION, POWDER, FOR SOLUTION INTRAMUSCULAR; INTRAVENOUS at 11:09

## 2024-09-27 RX ADMIN — PROPOFOL 70 MG: 10 INJECTION, EMULSION INTRAVENOUS at 12:09

## 2024-09-27 RX ADMIN — LABETALOL HYDROCHLORIDE 10 MG: 5 INJECTION, SOLUTION INTRAVENOUS at 12:09

## 2024-09-27 RX ADMIN — CEFAZOLIN 2 G: 2 INJECTION, POWDER, FOR SOLUTION INTRAMUSCULAR; INTRAVENOUS at 12:09

## 2024-09-27 NOTE — PT/OT/SLP PROGRESS
SLP attempting speech, language, cognitive evaluation, however pt lethargic and difficult to arouse secondary to medication. SLP to re-attempt as schedule allows.

## 2024-09-27 NOTE — ANESTHESIA PROCEDURE NOTES
Intubation    Date/Time: 9/27/2024 12:32 PM    Performed by: Irena Eduardo CRNA  Authorized by: Irena Eduardo CRNA    Intubation:     Induction:  Intravenous    Intubated:  Postinduction    Mask Ventilation:  Not attempted    Attempts:  1    Attempted By:  CRNA    Difficult Airway Encountered?: No      Complications:  None    Airway Device:  Supraglottic airway/LMA    Airway Device Size:  3.0    Placement Verified By:  Capnometry    Complicating Factors:  None    Findings Post-Intubation:  BS equal bilateral and atraumatic/condition of teeth unchanged

## 2024-09-27 NOTE — ANESTHESIA PREPROCEDURE EVALUATION
09/27/2024  Orlando Black is a 73 y.o., female.    new left occipital intraparenchymal hemorrhage and mitral valve endocarditis in the setting of Staph bacteremia. Patient presented to outside hospital on 9/21/2024 for fever, arthralgia, and back pain. Admitted for sepsis work up. 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 Patient then developed altered mental status overnight on 9/23/2024 and underwent CT Head on 9/24/2024 which showed new left occipital intraparenchymal hematoma measuring 2.0 x 1.8 x 1.5 cm     9/25/2024: Cerebral angiogram with incidental finding of left cavernous ICA aneurysm, no mycotic aneurysm     24 Hour Interval History:  Patient continues to have episodes where she becomes less responsive. Speech is slurred a bit more today compared to yesterday. Only oriented to self now. CT Head with stable left occipital lobe hemorrhage. WBC 18 from 16, Na 146, AST/ALT 79/83 form 74/76.   Pre-op Assessment    I have reviewed the Patient Summary Reports.     I have reviewed the Nursing Notes. I have reviewed the NPO Status.   I have reviewed the Medications.     Review of Systems      Physical Exam  General: Well nourished, Cooperative, Alert and Oriented    Airway:  Mallampati: II   Mouth Opening: Normal  TM Distance: Normal  Tongue: Normal  Neck ROM: Normal ROM    Dental:  Intact        Anesthesia Plan  Type of Anesthesia, risks & benefits discussed:    Anesthesia Type: Gen Supraglottic Airway  Intra-op Monitoring Plan: Standard ASA Monitors  Post Op Pain Control Plan: multimodal analgesia  Induction:  IV  Airway Plan: Direct, Post-Induction  Informed Consent: Informed consent signed with the Patient representative and all parties  understand the risks and agree with anesthesia plan.  All questions answered. Patient consented to blood products? Yes  ASA Score: 4  Day of Surgery Review of History & Physical: H&P Update referred to the surgeon/provider.    Ready For Surgery From Anesthesia Perspective.     .

## 2024-09-27 NOTE — SUBJECTIVE & OBJECTIVE
Subjective:     Interval History: RN reported increased lethargy and confusion. Repeat CT head showed stable small left occipital lobe hematoma. Mobile thrombus on MV on ECHO yesterday.       Current Neurological Medications:     Current Facility-Administered Medications   Medication Dose Route Frequency Provider Last Rate Last Admin    acetaminophen tablet 650 mg  650 mg Oral Q6H PRN Divya Lewis MD        bisacodyL suppository 10 mg  10 mg Rectal Daily PRN Divya Lewis MD        camphor-methyl salicyl-menthoL 4-30-10 % Crea   Topical (Top) BID PRN Divya Lewis MD   Given at 09/25/24 1612    dextrose 10% bolus 125 mL 125 mL  12.5 g Intravenous PRN Rosemary Mcdonnell MD        dextrose 10% bolus 250 mL 250 mL  25 g Intravenous PRN Rosemary Mcdonnell MD        diphenhydrAMINE injection 25 mg  25 mg Intravenous Q6H PRN Rosemary Mcdonnell MD        famotidine (PF) injection 20 mg  20 mg Intravenous BID Divya Lewis MD   20 mg at 09/27/24 0827    glucagon (human recombinant) injection 1 mg  1 mg Intramuscular PRN Rosemary Mcdonnell MD        haloperidol lactate injection 0.5 mg  0.5 mg Intravenous Q10 Min PRN Chelo Reddy MD        HYDROmorphone (PF) injection 0.2 mg  0.2 mg Intravenous Q5 Min PRN Rosemary Mcdonnell MD        HYDROmorphone (PF) injection 0.4 mg  0.4 mg Intravenous Q5 Min PRN Chelo Reddy MD        HYDROmorphone (PF) injection 0.5 mg  0.5 mg Intravenous Q5 Min PRN Rosemary Mcdonnell MD        labetaloL injection 10 mg  10 mg Intravenous Q15 Min PRN Divya Lewis MD   10 mg at 09/27/24 0411    levETIRAcetam tablet 500 mg  500 mg Oral BID Radha Macedo MD        LIDOcaine 5 % patch 1 patch  1 patch Transdermal Q24H PRN Divya Lewis MD   1 patch at 09/25/24 1612    meperidine (PF) injection 12.5 mg  12.5 mg Intravenous Once Rosemary Mcdonnell MD        meperidine (PF) injection 6.25 mg  6.25 mg Intravenous Once PRN Chelo Reddy MD        morphine injection 2 mg  2 mg Intravenous Q6H PRN Joshua  MD Divya   2 mg at 09/26/24 1612    mupirocin 2 % ointment   Nasal BID Quinn Juarez MD   Given at 09/27/24 0828    ondansetron disintegrating tablet 8 mg  8 mg Oral Q8H PRN Zion Solis MD        ondansetron injection 4 mg  4 mg Intravenous Once Many, Rosemary BOOKER MD        oxacillin 2 g in 0.9% NaCl 100 mL IVPB (MB+)  2 g Intravenous Q4H Saúl Olivarez MD   Stopped at 09/27/24 0857    prochlorperazine injection Soln 5 mg  5 mg Intravenous Q30 Min PRN Chelo Reddy MD        QUEtiapine tablet 25 mg  25 mg Oral BID PRN Divya Lewis MD   25 mg at 09/27/24 0827    sodium chloride 0.9% flush 10 mL  10 mL Intravenous PRN Divya Lewis MD        sodium chloride 0.9% flush 10 mL  10 mL Intravenous PRN Divya Lewis MD           Review of Systems  Objective:     Vital Signs (Most Recent):  Temp: 96.8 °F (36 °C) (09/27/24 1332)  Pulse: 86 (09/27/24 1400)  Resp: 13 (09/27/24 1400)  BP: (!) 167/92 (09/27/24 1400)  SpO2: 99 % (09/27/24 1400) Vital Signs (24h Range):  Temp:  [96.8 °F (36 °C)-98.9 °F (37.2 °C)] 96.8 °F (36 °C)  Pulse:  [] 86  Resp:  [10-21] 13  SpO2:  [81 %-99 %] 99 %  BP: (138-168)/(64-92) 167/92     Weight: 52.7 kg (116 lb 2.9 oz)  Body mass index is 16.2 kg/m².     Physical Exam   Patient not following commands and unable to participate in PE.           Significant Labs: BMP:   Recent Labs   Lab 09/26/24  0323 09/27/24  0325   * 146*   K 3.8 2.9*   * 113*   CO2 23 21*   BUN 16.3 13.4   CREATININE 0.59 0.64   CALCIUM 7.6* 8.4   MG 2.20 2.20     CBC:   Recent Labs   Lab 09/26/24  0323 09/27/24  0325   WBC 16.66* 18.44*   HGB 10.3* 10.8*   HCT 30.5* 32.6*    320       Significant Imaging: I have reviewed all pertinent imaging results/findings within the past 24 hours.

## 2024-09-27 NOTE — NURSING
Nurses Note -- 4 Eyes      9/27/2024   5:52 PM      Skin assessed during: Daily Assessment      [x] No Altered Skin Integrity Present    [x]Prevention Measures Documented      [] Yes- Altered Skin Integrity Present or Discovered   [] LDA Added if Not in Epic (Describe Wound)   [] New Altered Skin Integrity was Present on Admit and Documented in LDA   [] Wound Image Taken    Wound Care Consulted? No    Attending Nurse:  GLYNN Camacho    Second RN/Staff Member:  GLYNN Castellanos

## 2024-09-27 NOTE — PROGRESS NOTES
[facility] - 7th Floor ICU  Pulmonary Critical Care Note    Patient Name: Orlando Black  MRN: [id number]  Admission Date: 9/24/2024  Hospital Length of Stay: 3 days  Code Status: Full Code  Attending Provider: Quinn Juarez MD  Primary Care Provider: Janeth, Primary Doctor     Subjective:     HPI:   Orlando Black is a 73 y.o. female with an no known pmh (last hospitalization in 1970s for childbirth) who presented to [facility] as a transfer from [place] on 9/24/2024 for Neurosurgery and CV Surgery services given new left occipital intraparenchymal hemorrhage and mitral valve endocarditis in the setting of Staph bacteremia. Patient presented to outside hospital on 9/21/2024 for fever, arthralgia, and back pain. Admitted for sepsis work up. 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 Patient then developed altered mental status overnight on 9/23/2024 and underwent CT Head on 9/24/2024 which showed new left occipital intraparenchymal hematoma measuring 2.0 x 1.8 x 1.5 cm. Transferred to [facility] for Neurosurgery and CV Surgery services.     Hospital Course/Significant events:  9/24/2024: Admitted to ICU with new left occipital IPH, transfer from [facility]  9/25/2024: Cerebral angiogram with incidental finding of left cavernous ICA aneurysm, no mycotic aneurysm    24 Hour Interval History:  Patient continues to have episodes where she becomes less responsive. Speech is slurred a bit more today compared to yesterday. Only oriented to self now. CT Head with stable left occipital lobe hemorrhage. WBC 18 from 16, Na 146, AST/ALT 79/83 form 74/76.     History reviewed. No pertinent past medical history.    History reviewed. No pertinent surgical history.    Social History     Socioeconomic History    Marital status: Single     Social Determinants  of Health     Financial Resource Strain: Patient Declined (9/26/2024)    Overall Financial Resource Strain (CARDIA)     Difficulty of Paying Living Expenses: Patient declined   Food Insecurity: Patient Declined (9/26/2024)    Hunger Vital Sign     Worried About Running Out of Food in the Last Year: Patient declined     Ran Out of Food in the Last Year: Patient declined   Transportation Needs: Patient Declined (9/26/2024)    TRANSPORTATION NEEDS     Transportation : Patient declined   Stress: Patient Declined (9/26/2024)    [employer] [place] - Occupational Stress Questionnaire     Feeling of Stress : Patient declined   Housing Stability: Patient Declined (9/26/2024)    Housing Stability Vital Sign     Unable to Pay for Housing in the Last Year: Patient declined     Homeless in the Last Year: Patient declined           No current outpatient medications    Current Inpatient Medications   famotidine (PF)  20 mg Intravenous BID    levETIRAcetam  500 mg Oral BID    mupirocin   Nasal BID    oxacillin IV (PEDS and ADULTS)  2 g Intravenous Q4H       Current Intravenous Infusions          Review of Systems   Respiratory:  Negative for shortness of breath.    Cardiovascular:  Negative for chest pain.   Musculoskeletal:  Positive for joint pain (Minimal right hip and right knee pain, improved).          Objective:       Intake/Output Summary (Last 24 hours) at 9/27/2024 0958  Last data filed at 9/26/2024 1600  Gross per 24 hour   Intake 0 ml   Output 750 ml   Net -750 ml         Vital Signs (Most Recent):  Temp: 98.7 °F (37.1 °C) (09/27/24 0800)  Pulse: 103 (09/27/24 0800)  Resp: 13 (09/27/24 0800)  BP: (!) 156/64 (09/27/24 0800)  SpO2: (!) 94 % (09/27/24 0800)  Body mass index is 16.2 kg/m².  Weight: 52.7 kg (116 lb 2.9 oz) Vital Signs (24h Range):  Temp:  [98 °F (36.7 °C)-98.9 °F (37.2 °C)] 98.7 °F (37.1 °C)  Pulse:  [] 103  Resp:  [11-21] 13  SpO2:  [81 %-99 %] 94 %  BP: (128-160)/(64-92) 156/64  "    Physical Exam  Constitutional:       General: She is not in acute distress.     Appearance: She is not diaphoretic.   HENT:      Head: Normocephalic and atraumatic.   Eyes:      Conjunctiva/sclera: Conjunctivae normal.      Pupils: Pupils are equal, round, and reactive to light.   Cardiovascular:      Rate and Rhythm: Normal rate.      Comments: Loud S1  Pulmonary:      Effort: No respiratory distress.      Breath sounds: Normal breath sounds. No wheezing.   Abdominal:      General: There is no distension.      Palpations: Abdomen is soft.      Tenderness: There is no abdominal tenderness.   Musculoskeletal:         General: Swelling (Right knee edema) and tenderness (Right hip tender to palpation) present.   Skin:     General: Skin is warm and dry.   Neurological:      Comments: Lethargic, oriented to self only, follows commands, slurred speech, BUE 5/5, BLE 3/5           Lines/Drains/Airways       Drain  Duration                  Urethral Catheter 09/24/24 2130 Silicone 16 Fr. 2 days              Peripheral Intravenous Line  Duration                  Peripheral IV - Single Lumen 09/25/24 0849 20 G Anterior;Right Upper Arm 2 days         Peripheral IV - Single Lumen 09/27/24 0815 22 G Anterior;Left Wrist <1 day         Peripheral IV - Single Lumen 09/27/24 0816 22 G Right Antecubital <1 day                    Significant Labs:    Lab Results   Component Value Date    WBC 18.44 (H) 09/27/2024    HGB 10.8 (L) 09/27/2024    HCT 32.6 (L) 09/27/2024    MCV 96.2 (H) 09/27/2024     09/27/2024           BMP  Lab Results   Component Value Date     (H) 09/27/2024    K 2.9 (L) 09/27/2024    CO2 21 (L) 09/27/2024    BUN 13.4 09/27/2024    CREATININE 0.64 09/27/2024    CALCIUM 8.4 09/27/2024    AGAP 12.0 09/27/2024         ABG  No results for input(s): "PH", "PO2", "PCO2", "HCO3", "POCBASEDEF" in the last 168 hours.    Mechanical Ventilation Support:         Micro:  Outside Hospital  Blood culture 9/22/2024: " Staph aureus (BCID mecA not applicable) so MSSA    Urine culture 9/22/2024: no growth in 24 hours    Body Fluid Culture 9/24/2024 (right knee effusion): Rare GPC, Many WBCs    Significant Imaging:  I have reviewed the pertinent imaging within the past 24 hours.    CT Head Without Contrast  Result Date: 9/27/2024  Stable small left occipital lobe hematoma. Electronically signed by: Petrona Nguyen Date:    09/27/2024 Time:    06:32    MRI Brain W WO Contrast  Result Date: 9/26/2024  Stable acute hemorrhage in the left occipital lobe Punctate areas of scattered restricted diffusion in the frontal lobes bilaterally as well as the right temporal region.  Findings are suggestive of possible embolic phenomena. Electronically signed by: Feliberto Cardona Date:    09/26/2024 Time:    14:56      X-Ray Knee Complete 4 Or More Views Right  Result Date: 9/26/2024  Degenerative changes. Electronically signed by: Ibrahima Valentin Date:    09/26/2024 Time:    07:46    X-Ray Hip 2 or 3 views Right with Pelvis when performed  Result Date: 9/26/2024  Degenerative changes. Electronically signed by: Ibrahima Valentin Date:    09/26/2024 Time:    07:35    CT Head Without Contrast  Result Date: 9/25/2024  1.  Stable left occipital lobe hemorrhage and mild surrounding edema. 2.  No new findings. 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 No convincing acute infection in the cervical spine. Multilevel DJD. Osteopenia about left C3-4 facet joint is favored to be degenerative although it is difficult to exclude osteomyelitis entirely. MRI with and without contrast suggested.  2. Suspected acute osteomyelitis/septic arthritis of the  sternomanubrial joint.  3. No definitive acute infection in the thoracic spine  4. No definitive acute infection in the lumbar spine  5. Incidental note of a left occipital parenchymal hematoma  6. Cholelithiasis     CT Head wo 9/24/2024 1004:  There is a 2 cm intraparenchymal hematoma within the left occipital lobe. Measures 2.0 x 1.8 x 1.5 cm. No intracranial mass. No mass effect.    US Abdomen 9/22/2024 0958: Cholelithiasis, fatty hepatomegaly, 1 cm hepatic hemangioma suspected in left lobe, non-emergent liver protocol CT recommended for further characterization     CT Head 9/21/2024: No acute intracranial abnormalities    CT Chest 9/21/2024: No acute or suspicious intrathoracic disease    CT Abdomen Pelvis 9/21/2024: No acute or suspicious abnormalities. Cholelithiasis.    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    Plan  Admitted to ICU for close monitoring  Continues to have episodes of altered mentation, MRI concerning for possible embolic phenomena   CT Head 9/27 stable  Hold antiplatelet and anticoag post cerebral angiogram  Consulted CV Surgery for MV endocarditis, rec 6 week abx with repeat ECHO  Repeat Blood cultures 9/26 remain positive  ID consulted, continue oxacillin (day 3), CV surgery consulted to consider intervention if persistent bacteremia  Ortho consulted for right hip and right knee, suspected osteonecrosis of femoral head chronic, additional recs pending  Patient  will follow up with Neuro outpatient for incidental cavernous ICA aneurysm  Neurosurgery and Neurology signing off, Downgrade pending CV Surgery final recs regarding intervention     DVT Prophylaxis: SCDs  GI Prophylaxis: H2B     32 minutes of critical care was time spent personally by me on the following activities: development of treatment plan with patient or surrogate and bedside caregivers, discussions with consultants, evaluation of patient's response to treatment, examination of patient, ordering and performing treatments and interventions, ordering and review of laboratory studies, ordering and review of radiographic studies, pulse oximetry, re-evaluation of patient's condition.  This critical care time did not overlap with that of any other provider or involve time for any procedures.     Divya Lewis MD  Pulmonary Critical Care Medicine  [facility] - 7th Floor ICU  DOS: 09/27/2024

## 2024-09-27 NOTE — PT/OT/SLP PROGRESS
Physical Therapy      Patient Name:  Orlando Black   MRN:  [id number]    Pt not seen today 2/2 to pending further neurosx recommendations and WB recs from ortho prior to mobility. PT will f/u as appropriate.

## 2024-09-27 NOTE — PROGRESS NOTES
"[facility] - 7th Floor ICU  Orthopedics  Progress Note    Patient Name: Orlando Black  MRN: [id number]  Admission Date: 9/24/2024  Hospital Length of Stay: 3 days  Attending Provider: Quinn Juarez MD  Primary Care Provider: Janeth Primary Doctor  Follow-up For: Procedure(s) (LRB):  ARTHROSCOPY, KNEE, WITH DEBRIDEMENT (Right)    Post-Operative Day:    Subjective:     Principal Problem: sepsis    Principal Orthopedic Problem: R septic knee    Interval History:   Resting in ICU. Less interactive today.     Review of patient's allergies indicates:  Not on File    Current Facility-Administered Medications   Medication    acetaminophen tablet 650 mg    bisacodyL suppository 10 mg    camphor-methyl salicyl-menthoL 4-30-10 % Crea    famotidine (PF) injection 20 mg    labetaloL injection 10 mg    levETIRAcetam tablet 500 mg    LIDOcaine 5 % patch 1 patch    morphine injection 2 mg    mupirocin 2 % ointment    ondansetron disintegrating tablet 8 mg    oxacillin 2 g in 0.9% NaCl 100 mL IVPB (MB+)    QUEtiapine tablet 25 mg    sodium chloride 0.9% flush 10 mL    sodium chloride 0.9% flush 10 mL     Objective:     Vital Signs (Most Recent):  Temp: 98.7 °F (37.1 °C) (09/27/24 0800)  Pulse: 103 (09/27/24 0800)  Resp: 13 (09/27/24 0800)  BP: (!) 156/64 (09/27/24 0800)  SpO2: (!) 94 % (09/27/24 0800) Vital Signs (24h Range):  Temp:  [98 °F (36.7 °C)-98.9 °F (37.2 °C)] 98.7 °F (37.1 °C)  Pulse:  [] 103  Resp:  [12-21] 13  SpO2:  [81 %-99 %] 94 %  BP: (131-160)/(64-92) 156/64     Weight: 52.7 kg (116 lb 2.9 oz)  Height: 5' 11" (180.3 cm)  Body mass index is 16.2 kg/m².      Intake/Output Summary (Last 24 hours) at 9/27/2024 1130  Last data filed at 9/26/2024 1600  Gross per 24 hour   Intake 0 ml   Output 550 ml   Net -550 ml     General the patient is awake     Constitutional: Vital signs are examined and stable.  Resp: No signs of labored breathing  CV: Well-perfused  Abdomen: Nondistended     RLE: -Skin: CDI, " No signs of new abrasions or lacerations, no scars.  Mild knee effusion.  Reduced and painful motion of the knee and hip.  Pain with log roll.           -christopher sensory/motor but moving toes            -Lymphatic: No signs of lymphadenopathy           -CV: Capillary refill is less than 2 seconds. DP/PT pulses  2/4. Compartments soft and compressible.     Significant Labs: BMP:   Recent Labs   Lab 09/27/24  0325   *   K 2.9*   *   CO2 21*   BUN 13.4   CREATININE 0.64   CALCIUM 8.4   MG 2.20     CBC:   Recent Labs   Lab 09/26/24 0323 09/27/24 0325   WBC 16.66* 18.44*   HGB 10.3* 10.8*   HCT 30.5* 32.6*    320     CMP:   Recent Labs   Lab 09/26/24 0323 09/27/24 0325   * 146*   K 3.8 2.9*   * 113*   CO2 23 21*   BUN 16.3 13.4   CREATININE 0.59 0.64   CALCIUM 7.6* 8.4   ALBUMIN 1.6* 1.7*   BILITOT 0.7 0.7   ALKPHOS 254* 331*   AST 74* 79*   ALT 76* 83*     All pertinent labs within the past 24 hours have been reviewed.    Significant Imaging: I have reviewed and interpreted all pertinent imaging results/findings.    CT hip with no effusion or fluid collection. Degenerative changes. 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 Right knee aspiration from the outside hospital with GPC.  Plan for irrigation and  debridement of the right knee today.  CT scan of her hip shows no effusion or fluid collection. Arthritic changes and osteonecrosis. No plans for surgical intervention to the hip. Continue IV abx. NPO. Pain control.  DVT prophylaxis as able.     We discussed operative and nonoperative options. The patient and  had an opportunity to ask questions. All questions were answered. The risks and benefits of surgery were discussed with the patient, including but not limited to bleeding, continued infection, damage to surrounding nerves and structures, need for further surgery, anesthesia risk, progression of arthritis, blood clots, and medical risks of surgery. The patient voiced understanding of the risks and benefits and provided written consent to the procedure. Plan for arthroscopic I&D R knee. OR today.      Omid Castro MD  Orthopedics  [facility] - 7th Floor ICU

## 2024-09-27 NOTE — ASSESSMENT & PLAN NOTE
Assessment/Plan  Status post 9/25/2024 Diagnostic angiogram of brain   OVERALL IMPRESSION:  1. No evidence of mycotic aneurysm in the left occipita region noted.   2. No evidence of AVM or AV fistula.   3. 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     DSA did not reveal any mycotic aneurysms in the region of the hemorrhage but showed an incidental left cavernous ICA aneurysm.      Suspect ICH secondary to hypertension. No evidence of mycotic aneurysm on DSA    9/25/2024 CTH stable Left occipital lobe parenchymal hemorrhage with mild surrounding edema. Chronic microvascular ischemic changes.  No drift. No slurred speech, oriented x 3. Pupils equal/brisk.    Plan:   -ok to downgrade to neuro checks every 4 hours.  - IV antibiotics for endocarditis per ID/hospital med/cardiology  - -140  - SCD for DVT prophylaxis.   - appreciate PT/OT/speech. She is very deconditioned and will need inpatient rehab.e  - follow up with Mariann Moser NP in 2 months to discuss management of the cavernous ICA aneurysm.     No further recommendations from a stroke stand point. Will sign off.      Further recommendations may follow per MD

## 2024-09-27 NOTE — PROGRESS NOTES
[facility] - Periop Services  Neurology  Progress Note    Patient Name: Orlando Black  MRN: [id number]  Admission Date: 9/24/2024  Hospital Length of Stay: 3 days  Code Status: Full Code   Attending Provider: Quinn Juarez MD  Primary Care Physician: No, Primary Doctor   Principal Problem:<principal problem not specified>    HPI:   Orlando Black is a 73 y.o. female no known past medical history (last hospitalization 1970s for childbirth) presented as a transfer patient from [facility] in [place] on 09/24 to [facility] for neurosurgery and CV services related to new left occipital intraparenchymal hemorrhage and mitral valve endocarditis in setting of staph bacteremia.  Presented on 9/21/2024 to outside hospital with fever, arthralgia, and back pain.  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  Patient developed altered mental status overnight on 9/23/2024 and CT head 9/24/2024 revealed new left occipital intraparenchymal hematoma measuring 2.02 x 1.8 x 1.5 cm.   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         Overview/Hospital Course:  No notes on file        Subjective:     Interval History: RN reported increased lethargy and confusion. Repeat CT head showed stable small left occipital lobe hematoma. Mobile thrombus on MV on ECHO yesterday.       Current Neurological Medications:     Current Facility-Administered Medications   Medication Dose Route Frequency Provider Last Rate Last Admin    acetaminophen tablet 650 mg  650 mg Oral Q6H PRN Divya Lewis MD        bisacodyL suppository 10 mg  10 mg Rectal Daily PRN Divya Lewis,  MD        camphor-methyl salicyl-menthoL 4-30-10 % Crea   Topical (Top) BID PRN Divya Lewis MD   Given at 09/25/24 1612    dextrose 10% bolus 125 mL 125 mL  12.5 g Intravenous PRN Rosemary Mcdonnell MD        dextrose 10% bolus 250 mL 250 mL  25 g Intravenous PRN Rosemary Mcdonnell MD        diphenhydrAMINE injection 25 mg  25 mg Intravenous Q6H PRN Rosemary Mcdonnell MD        famotidine (PF) injection 20 mg  20 mg Intravenous BID Divya Lewis MD   20 mg at 09/27/24 0827    glucagon (human recombinant) injection 1 mg  1 mg Intramuscular PRN Rosemary Mcdonnell MD        haloperidol lactate injection 0.5 mg  0.5 mg Intravenous Q10 Min PRN Chelo Reddy MD        HYDROmorphone (PF) injection 0.2 mg  0.2 mg Intravenous Q5 Min PRN Rosemary Mcdonnell MD        HYDROmorphone (PF) injection 0.4 mg  0.4 mg Intravenous Q5 Min PRN Chelo Reddy MD        HYDROmorphone (PF) injection 0.5 mg  0.5 mg Intravenous Q5 Min PRN Rosemary Mcdonnell MD        labetaloL injection 10 mg  10 mg Intravenous Q15 Min PRN Divya Lewis MD   10 mg at 09/27/24 0411    levETIRAcetam tablet 500 mg  500 mg Oral BID Radha Macedo MD        LIDOcaine 5 % patch 1 patch  1 patch Transdermal Q24H PRN Divya Lewis MD   1 patch at 09/25/24 1612    meperidine (PF) injection 12.5 mg  12.5 mg Intravenous Once Rosemary Mcdonnell MD        meperidine (PF) injection 6.25 mg  6.25 mg Intravenous Once PRN Chelo Reddy MD        morphine injection 2 mg  2 mg Intravenous Q6H PRN Divya Lewis MD   2 mg at 09/26/24 1612    mupirocin 2 % ointment   Nasal BID Quinn Juarez MD   Given at 09/27/24 0828    ondansetron disintegrating tablet 8 mg  8 mg Oral Q8H PRN Zion Solis MD        ondansetron injection 4 mg  4 mg Intravenous Once Rosemary Mcdonnell MD        oxacillin 2 g in 0.9% NaCl 100 mL IVPB (MB+)  2 g Intravenous Q4H Saúl Olivarez MD   Stopped at 09/27/24 0857    prochlorperazine injection Soln 5 mg  5 mg Intravenous Q30 Min PRN Chelo Reddy,  MD        QUEtiapine tablet 25 mg  25 mg Oral BID PRN Divya Lewis MD   25 mg at 09/27/24 0827    sodium chloride 0.9% flush 10 mL  10 mL Intravenous PRN Divya Lewis MD        sodium chloride 0.9% flush 10 mL  10 mL Intravenous PRN Divya Lewis MD           Review of Systems  Objective:     Vital Signs (Most Recent):  Temp: 96.8 °F (36 °C) (09/27/24 1332)  Pulse: 86 (09/27/24 1400)  Resp: 13 (09/27/24 1400)  BP: (!) 167/92 (09/27/24 1400)  SpO2: 99 % (09/27/24 1400) Vital Signs (24h Range):  Temp:  [96.8 °F (36 °C)-98.9 °F (37.2 °C)] 96.8 °F (36 °C)  Pulse:  [] 86  Resp:  [10-21] 13  SpO2:  [81 %-99 %] 99 %  BP: (138-168)/(64-92) 167/92     Weight: 52.7 kg (116 lb 2.9 oz)  Body mass index is 16.2 kg/m².     Physical Exam   Patient not following commands and unable to participate in PE.           Significant Labs: BMP:   Recent Labs   Lab 09/26/24  0323 09/27/24  0325   * 146*   K 3.8 2.9*   * 113*   CO2 23 21*   BUN 16.3 13.4   CREATININE 0.59 0.64   CALCIUM 7.6* 8.4   MG 2.20 2.20     CBC:   Recent Labs   Lab 09/26/24  0323 09/27/24  0325   WBC 16.66* 18.44*   HGB 10.3* 10.8*   HCT 30.5* 32.6*    320       Significant Imaging: I have reviewed all pertinent imaging results/findings within the past 24 hours.  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 Chronic microvascular ischemic changes.  - 9/25 CTA head and neck Impression:  1. No large vessel occlusion or flow-limiting stenosis.  2. 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  3. Partially visualized indeterminate retrosternal soft tissue.    Current NIH Stroke Score Values      Flowsheet Row Most Recent Value   Interval baseline   1a. Level of Consciousness 1-->Not alert, but arousable by minor stimulation to obey, answer, or respond   1b. LOC Questions 1-->Answers one question  correctly   1c. LOC Commands 0-->Performs both tasks correctly   2. Best Gaze 0-->Normal   3. Visual 0-->No visual loss   4. Facial Palsy 0-->Normal symmetrical movements   5a. Motor Arm, Left 0-->No drift, limb holds 90 (or 45) degrees for full 10 secs   5b. Motor Arm, Right 0-->No drift, limb holds 90 (or 45) degrees for full 10 secs   6a. Motor Leg, Left 0-->No drift, leg holds 30 degree position for full 5 secs   6b. Motor Leg, Right 3-->No effort against gravity, leg falls to bed immediately   7. Limb Ataxia 0-->Absent   8. Sensory 0-->Normal, no sensory loss   9. Best Language 0-->No aphasia, normal   10. Dysarthria 0-->Normal   11. 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   - follow up 2 months with interventional neurology, Dr. Solis's office with Mariann Antoine NP to discuss management of the cavernous ICA aneurysm.     Intraparenchymal hemorrhage of brain  Assessment/Plan  Status post 9/25/2024 Diagnostic angiogram of brain   OVERALL IMPRESSION:  1. No evidence of mycotic aneurysm in the left occipita region noted.   2. No evidence of AVM or AV fistula.   3. 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     DSA did not reveal any mycotic aneurysms in the region of the hemorrhage but showed an incidental left cavernous ICA aneurysm.      Suspect ICH secondary to hypertension. No evidence of mycotic aneurysm on DSA    9/25/2024 CTH stable Left occipital lobe parenchymal hemorrhage with mild surrounding edema. Chronic microvascular ischemic changes.  No drift. No slurred speech, oriented x 3. Pupils equal/brisk.    Plan:   -ok to downgrade to neuro checks every 4 hours.  - IV antibiotics for endocarditis per ID/hospital med/cardiology  - -140  - SCD for DVT prophylaxis.   -  appreciate PT/OT/speech. She is very deconditioned and will need inpatient rehab.e  - follow up with Mariann Moser NP in 2 months to discuss management of the cavernous ICA aneurysm.     No further recommendations from a stroke stand point. Will sign off.      Further recommendations may follow per MD        VTE Risk Mitigation (From admission, onward)           Ordered     IP VTE HIGH RISK PATIENT  Once         09/24/24 1938     Place sequential compression device  Until discontinued         09/24/24 1938     Reason for No Pharmacological VTE Prophylaxis  Once        Question:  Reasons:  Answer:  Physician Provided (leave comment)  Comment:  intraparenchymal hemorrhage    09/24/24 1938     Place sequential compression device  Until discontinued         09/24/24 1902                    Chaparrita Sullivan NP  Neurology  [facility]

## 2024-09-27 NOTE — PLAN OF CARE
Problem: Adult Inpatient Plan of Care  Goal: Plan of Care Review  Outcome: Adequate for Care Transition  Goal: Patient-Specific Goal (Individualized)  Outcome: Adequate for Care Transition  Goal: Absence of Hospital-Acquired Illness or Injury  Outcome: Adequate for Care Transition  Goal: Optimal Comfort and Wellbeing  Outcome: Adequate for Care Transition  Goal: Readiness for Transition of Care  Outcome: Adequate for Care Transition     Problem: Stroke, Subarachnoid Hemorrhage  Goal: Optimal Coping  Outcome: Adequate for Care Transition  Goal: Effective Bowel Elimination  Outcome: Adequate for Care Transition  Goal: Optimal Cerebral Tissue Perfusion  Outcome: Adequate for Care Transition  Goal: Optimal Cognitive Function  Outcome: Adequate for Care Transition  Goal: Effective Communication Skills  Outcome: Adequate for Care Transition  Goal: Optimal Functional Ability  Outcome: Adequate for Care Transition  Goal: Optimal Nutrition Intake  Outcome: Adequate for Care Transition  Goal: Optimal Pain Control and Function  Outcome: Adequate for Care Transition  Goal: Effective Oxygenation and Ventilation  Outcome: Adequate for Care Transition  Goal: Improved Sensorimotor Function  Outcome: Adequate for Care Transition  Goal: Safe and Effective Swallow  Outcome: Adequate for Care Transition  Goal: Effective Urinary Elimination  Outcome: Adequate for Care Transition     Problem: Skin Injury Risk Increased  Goal: Skin Health and Integrity  Outcome: Adequate for Care Transition     Problem: Wound  Goal: Optimal Coping  Outcome: Adequate for Care Transition  Goal: Optimal Functional Ability  Outcome: Adequate for Care Transition  Goal: Absence of Infection Signs and Symptoms  Outcome: Adequate for Care Transition  Goal: Improved Oral Intake  Outcome: Adequate for Care Transition  Goal: Optimal Pain Control and Function  Outcome: Adequate for Care Transition  Goal: Skin Health and Integrity  Outcome: Adequate for Care  Transition  Goal: Optimal Wound Healing  Outcome: Adequate for Care Transition     Problem: Infection  Goal: Absence of Infection Signs and Symptoms  Outcome: Adequate for Care Transition

## 2024-09-27 NOTE — TRANSFER OF CARE
"Anesthesia Transfer of Care Note    Patient: Orlando Black    Procedure(s) Performed: Procedure(s) (LRB):  ARTHROSCOPY, KNEE, WITH DEBRIDEMENT (Right)    Patient location: PACU    Anesthesia Type: general    Transport from OR: Transported from OR on room air with adequate spontaneous ventilation    Post pain: adequate analgesia    Post assessment: no apparent anesthetic complications    Post vital signs: stable    Level of consciousness: responds to stimulation    Nausea/Vomiting: no nausea/vomiting    Complications: none    Transfer of care protocol was followed      Last vitals: Visit Vitals  BP (!) 168/80 (BP Location: Left arm, Patient Position: Lying)   Pulse 94   Temp 37.1 °C (98.7 °F) (Rectal)   Resp 18   Ht 5' 11" (1.803 m)   Wt 52.7 kg (116 lb 2.9 oz)   SpO2 95%   BMI 16.20 kg/m²     "

## 2024-09-28 LAB
ALBUMIN SERPL-MCNC: 1.7 G/DL (ref 3.4–4.8)
ALBUMIN/GLOB SERPL: 0.5 RATIO (ref 1.1–2)
ALP SERPL-CCNC: 254 UNIT/L (ref 40–150)
ALT SERPL-CCNC: 56 UNIT/L (ref 0–55)
ANION GAP SERPL CALC-SCNC: 10 MEQ/L
AST SERPL-CCNC: 38 UNIT/L (ref 5–34)
BACTERIA BLD CULT: ABNORMAL
BACTERIA BLD CULT: ABNORMAL
BASOPHILS # BLD AUTO: 0.02 X10(3)/MCL
BASOPHILS NFR BLD AUTO: 0.2 %
BILIRUB SERPL-MCNC: 0.5 MG/DL
BUN SERPL-MCNC: 14.7 MG/DL (ref 9.8–20.1)
CALCIUM SERPL-MCNC: 7.7 MG/DL (ref 8.4–10.2)
CHLORIDE SERPL-SCNC: 117 MMOL/L (ref 98–107)
CO2 SERPL-SCNC: 23 MMOL/L (ref 23–31)
CREAT SERPL-MCNC: 0.62 MG/DL (ref 0.55–1.02)
CREAT/UREA NIT SERPL: 24
EOSINOPHIL # BLD AUTO: 0 X10(3)/MCL (ref 0–0.9)
EOSINOPHIL NFR BLD AUTO: 0 %
ERYTHROCYTE [DISTWIDTH] IN BLOOD BY AUTOMATED COUNT: 14.4 % (ref 11.5–17)
GFR SERPLBLD CREATININE-BSD FMLA CKD-EPI: >60 ML/MIN/1.73/M2
GLOBULIN SER-MCNC: 3.4 GM/DL (ref 2.4–3.5)
GLUCOSE SERPL-MCNC: 120 MG/DL (ref 82–115)
GRAM STN SPEC: ABNORMAL
HCT VFR BLD AUTO: 30.1 % (ref 37–47)
HGB BLD-MCNC: 9.9 G/DL (ref 12–16)
IMM GRANULOCYTES # BLD AUTO: 0.22 X10(3)/MCL (ref 0–0.04)
IMM GRANULOCYTES NFR BLD AUTO: 1.9 %
LYMPHOCYTES # BLD AUTO: 0.71 X10(3)/MCL (ref 0.6–4.6)
LYMPHOCYTES NFR BLD AUTO: 6.2 %
MAGNESIUM SERPL-MCNC: 2.3 MG/DL (ref 1.6–2.6)
MCH RBC QN AUTO: 31.8 PG (ref 27–31)
MCHC RBC AUTO-ENTMCNC: 32.9 G/DL (ref 33–36)
MCV RBC AUTO: 96.8 FL (ref 80–94)
MONOCYTES # BLD AUTO: 0.46 X10(3)/MCL (ref 0.1–1.3)
MONOCYTES NFR BLD AUTO: 4 %
NEUTROPHILS # BLD AUTO: 10 X10(3)/MCL (ref 2.1–9.2)
NEUTROPHILS NFR BLD AUTO: 87.7 %
NRBC BLD AUTO-RTO: 0 %
PHOSPHATE SERPL-MCNC: 4.1 MG/DL (ref 2.3–4.7)
PLATELET # BLD AUTO: 382 X10(3)/MCL (ref 130–400)
PMV BLD AUTO: 9.2 FL (ref 7.4–10.4)
POTASSIUM SERPL-SCNC: 3.6 MMOL/L (ref 3.5–5.1)
PROT SERPL-MCNC: 5.1 GM/DL (ref 5.8–7.6)
RBC # BLD AUTO: 3.11 X10(6)/MCL (ref 4.2–5.4)
SODIUM SERPL-SCNC: 150 MMOL/L (ref 136–145)
WBC # BLD AUTO: 11.41 X10(3)/MCL (ref 4.5–11.5)

## 2024-09-28 PROCEDURE — 20000000 HC ICU ROOM

## 2024-09-28 PROCEDURE — 97166 OT EVAL MOD COMPLEX 45 MIN: CPT

## 2024-09-28 PROCEDURE — 25000003 PHARM REV CODE 250: Performed by: NEUROLOGICAL SURGERY

## 2024-09-28 PROCEDURE — 63600175 PHARM REV CODE 636 W HCPCS: Performed by: GENERAL PRACTICE

## 2024-09-28 PROCEDURE — 25000003 PHARM REV CODE 250: Performed by: STUDENT IN AN ORGANIZED HEALTH CARE EDUCATION/TRAINING PROGRAM

## 2024-09-28 PROCEDURE — 80053 COMPREHEN METABOLIC PANEL: CPT | Performed by: STUDENT IN AN ORGANIZED HEALTH CARE EDUCATION/TRAINING PROGRAM

## 2024-09-28 PROCEDURE — 25000003 PHARM REV CODE 250: Performed by: GENERAL PRACTICE

## 2024-09-28 PROCEDURE — 97535 SELF CARE MNGMENT TRAINING: CPT

## 2024-09-28 PROCEDURE — 83735 ASSAY OF MAGNESIUM: CPT | Performed by: STUDENT IN AN ORGANIZED HEALTH CARE EDUCATION/TRAINING PROGRAM

## 2024-09-28 PROCEDURE — 36415 COLL VENOUS BLD VENIPUNCTURE: CPT | Performed by: STUDENT IN AN ORGANIZED HEALTH CARE EDUCATION/TRAINING PROGRAM

## 2024-09-28 PROCEDURE — 97530 THERAPEUTIC ACTIVITIES: CPT

## 2024-09-28 PROCEDURE — 99291 CRITICAL CARE FIRST HOUR: CPT | Mod: ,,, | Performed by: GENERAL PRACTICE

## 2024-09-28 PROCEDURE — 84100 ASSAY OF PHOSPHORUS: CPT | Performed by: STUDENT IN AN ORGANIZED HEALTH CARE EDUCATION/TRAINING PROGRAM

## 2024-09-28 PROCEDURE — 92523 SPEECH SOUND LANG COMPREHEN: CPT

## 2024-09-28 PROCEDURE — 63600175 PHARM REV CODE 636 W HCPCS: Performed by: STUDENT IN AN ORGANIZED HEALTH CARE EDUCATION/TRAINING PROGRAM

## 2024-09-28 PROCEDURE — 85025 COMPLETE CBC W/AUTO DIFF WBC: CPT | Performed by: STUDENT IN AN ORGANIZED HEALTH CARE EDUCATION/TRAINING PROGRAM

## 2024-09-28 PROCEDURE — 97162 PT EVAL MOD COMPLEX 30 MIN: CPT

## 2024-09-28 RX ADMIN — QUETIAPINE FUMARATE 25 MG: 25 TABLET ORAL at 08:09

## 2024-09-28 RX ADMIN — OXACILLIN 2 G: 2 INJECTION, POWDER, FOR SOLUTION INTRAMUSCULAR; INTRAVENOUS at 03:09

## 2024-09-28 RX ADMIN — OXACILLIN 2 G: 2 INJECTION, POWDER, FOR SOLUTION INTRAMUSCULAR; INTRAVENOUS at 09:09

## 2024-09-28 RX ADMIN — FAMOTIDINE 20 MG: 10 INJECTION, SOLUTION INTRAVENOUS at 08:09

## 2024-09-28 RX ADMIN — OXACILLIN 2 G: 2 INJECTION, POWDER, FOR SOLUTION INTRAMUSCULAR; INTRAVENOUS at 08:09

## 2024-09-28 RX ADMIN — MUPIROCIN: 20 OINTMENT TOPICAL at 08:09

## 2024-09-28 RX ADMIN — LEVETIRACETAM 500 MG: 500 TABLET, FILM COATED ORAL at 08:09

## 2024-09-28 RX ADMIN — OXACILLIN 2 G: 2 INJECTION, POWDER, FOR SOLUTION INTRAMUSCULAR; INTRAVENOUS at 11:09

## 2024-09-28 RX ADMIN — LABETALOL HYDROCHLORIDE 10 MG: 5 INJECTION, SOLUTION INTRAVENOUS at 10:09

## 2024-09-28 NOTE — PROGRESS NOTES
Neurosurgery Progress Note    I have personally reviewed and evaluated the following reports as well as radiographic studies:     CT head without contrast, 09/27/2024- when compared to a CT head without contrast on 09/25/2024, there have been no significant changes.  There is an acute intraparenchymal hemorrhage in the left occipital lobe measuring 1.1 x 2.2 cm.  There is minimal surrounding edema with no midline shift.       CTA head and neck, 09/25/2024- 4 mm saccular aneurysm involving the left cavernous internal carotid artery     MRI brain with and without gadolinium, 9/26/2024- stable, acute intraparenchymal hemorrhage in the left occipital lobe.  There are small areas of restricted diffusion in the bilateral frontal lobes and in the right temporal region, which are likely related to emboli. There is no abnormal enhancement     MRI cervical spine with and without gadolinium, 09/26/2024- normal alignment.  There is edema in enhancement from C3-6 with edema in the cervical discs, which is consistent with diskitis and osteomyelitis.  There is prevertebral enhancement as well as circumferential epidural enhancement around the spinal cord, especially anteriorly from C3-6.  There is no drainable epidural fluid.  At C2-3, the left facet has enhancement and edema.  At C3-4, there is left neuroforaminal narrowing.  At C4-5, C5-6 and C6-7, there is moderate stenosis with bilateral neuroforaminal narrowing.       Assessment and Plan:  I agree with the findings.     1.  Ms. Black continues to be monitored in the ICU with Q2 hr neuro checks.     2.  There are no indications for any type of acute neurosurgical intervention     3.  Continue Keppra 500 mg p.o. b.i.d. for seizure prophylaxis.     4.  Continue oxacillin per ID recommendations by Dr. Olivarez.     5.  The patient completed an I&D of her right knee earlier today by orthopedic surgeon Dr. Castro.     6.  Cardiothoracic surgeon Dr. Wise evaluated the patient for  mitral valve endocarditis with MSSA bacteremia.  Antibiotic management was recommended     7.  Neurovascular neurologist Dr. Solis will continue to follow the patient on an outpatient basis for a saccular aneurysm involving the left cavernous internal carotid artery.     8.  Neurosurgery will continue to follow this patient's progress as needed.  Dr. Conor pickering covering the Neurosurgery service weekend until I return on Monday, 9/30/2024. Please do not hesitate to contact Neurosurgery for any additional questions or concerns.

## 2024-09-28 NOTE — PROGRESS NOTES
Infectious Disease  Progress Note    Patient Name: Orlando Black   MRN: [id number]   Admission Date: 9/24/2024   Hospital Length of Stay: 4 days  Attending Physician: Quinn Juarez MD   Primary Care Provider: No, Primary Doctor     Isolation Status: No active isolations     Assessment/Plan:        73-year-old female patient without any known past medical history, presented to outside hospital on 09/21/2024 with fever, generalized myalgias, back pain, confusion, admitted for sepsis, had MSSA bacteremia, noted to have mitral valve endocarditis, concern for right hip infection as well as right knee effusion, GPCs noted in knee fluid cultures, also had 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  She had worsening mental status on 09/23/2024 had a CT of the head on 09/24/2024 showed new left intraparenchymal hematoma and was transferred to [facility] for Neurosurgery, CV surgery, Infectious Disease Services.  Of note, the patient's  reports that the patient has had some eczematous rash that has been ongoing for the past month or so, patient's  has been helping manage this with over-the-counter medication, has not seen a physician ID is consulted for assistance in management      Severe sepsis   MSSA bacteremia   Mitral valve endocarditis  Concern for C-spine infection   Concern for right knee septic arthritis   Right hip septic arthritis   Intracranial hemorrhage     -MRI of the C spine with epidural enhancement, Cervical osteomyelitis and concerns for fluid collections on the Thoracic spine as well   -underwent washout of the R knee 09/27    Recommendations:  -continue oxacillin 2 g IV q.4 hours   -repeat blood cultures   -Neurosurgery, CV surgery, orthopedic surgery consulted  -Discussed with CV surgeon who will review CT scan of the chest and consider intervention should family wish to pursue this and bacteremia persists   -Will likely need complete spine MRI  Thoracic and lumbar as well to assess involvement given results seen on the MRI of the c-spine  -TTE with mitral valve regurgitation, recommend obtaining a TAWANDA as well, this appears to have been done at OSH however, need to request records (images and official report)   -discussed with the patient, no family at bedside     Thank you for your consult. ID will continue following. Please contact us with any questions or concerns.     Antibiotics History:  Piperacillin/tazobactam 09/24 - 09/25  Vancomycin 09/24 - 09/25  Oxacillin 09/25 - Present     Portions of this note dictated using EMR integrated voice recognition software, and may be subject to voice recognition errors not corrected at proofreading. Please contact writer for clarification if needed.    35 minutes of critical care was time spent personally by me on the following activities: development of treatment plan with patient or surrogate and bedside caregivers, discussions with consultants and or primary team, evaluation of patient's response to treatment, examination of patient, ordering and performing treatments and interventions, ordering and review of laboratory studies, ordering and review of radiographic studies.  This critical care time did not overlap with that of any other provider or involve time for any procedures.      Subjective:     Principal Problem: <principal problem not specified>     Interval History:   Seen post operatively, still sedated but arousable.     Review of Systems   Review of Systems   All other systems reviewed and are negative.       Objective:     Vital Signs (Most Recent):  Temp: 98 °F (36.7 °C) (09/28/24 0400)  Pulse: 100 (09/28/24 0430)  Resp: 14 (09/28/24 0430)  BP: (!) 150/67 (09/28/24 0400)  SpO2: 99 % (09/28/24 0430)  Vital Signs (24h Range):  Temp:  [96.8 °F (36 °C)-98 °F (36.7 °C)] 98 °F (36.7 °C)  Pulse:  [] 100  Resp:  [10-28] 14  SpO2:  [94 %-100 %] 99 %  BP: (128-167)/(66-92) 150/67      Weight:   Wt  Readings from Last 1 Encounters:   09/24/24 52.7 kg (116 lb 2.9 oz)      Body mass index is Body mass index is 16.2 kg/m².     Estimated Creatinine Clearance: Estimated Creatinine Clearance: 66.2 mL/min (based on SCr of 0.62 mg/dL).     Lines/Drains/Airways       Drain  Duration                  Urethral Catheter 09/24/24 2130 Silicone 16 Fr. 3 days              Peripheral Intravenous Line  Duration                  Peripheral IV - Single Lumen 09/27/24 0816 22 G Right Antecubital 1 day         Peripheral IV - Single Lumen 09/28/24 0447 Anterior;Left Forearm <1 day         Peripheral IV - Single Lumen 09/28/24 0447 Distal;Posterior;Right Forearm <1 day                     Physical Exam  Physical Exam  Constitutional:       Appearance: She is ill-appearing.   HENT:      Head: Normocephalic and atraumatic.      Mouth/Throat:      Pharynx: No oropharyngeal exudate or posterior oropharyngeal erythema.   Eyes:      Extraocular Movements: Extraocular movements intact.      Pupils: Pupils are equal, round, and reactive to light.   Cardiovascular:      Rate and Rhythm: Normal rate and regular rhythm.      Heart sounds: Murmur heard.   Pulmonary:      Effort: No respiratory distress.      Breath sounds: Rhonchi present. No wheezing or rales.   Abdominal:      General: Bowel sounds are normal. There is no distension.      Palpations: Abdomen is soft.      Tenderness: There is no abdominal tenderness. There is no right CVA tenderness or left CVA tenderness.   Musculoskeletal:         General: Swelling (R knee) and tenderness (R knee and R hip, concern about R shoulder as well) present.      Cervical back: Neck supple. No rigidity or tenderness.      Comments: R knee in post op dressing   Lymphadenopathy:      Cervical: No cervical adenopathy.   Skin:     Findings: No lesion or rash.   Neurological:      Mental Status: She is alert.      Comments: Lethargic but responsive, post sedation from surgery   Psychiatric:         Mood  and Affect: Mood normal.         Behavior: Behavior normal.          Significant Labs: CBC:   Recent Labs   Lab 09/27/24  0325 09/28/24  0016   WBC 18.44* 11.41   HGB 10.8* 9.9*   HCT 32.6* 30.1*    382     CMP:   Recent Labs   Lab 09/27/24  0325 09/28/24  0016   * 150*   K 2.9* 3.6   * 117*   CO2 21* 23   BUN 13.4 14.7   CREATININE 0.64 0.62   CALCIUM 8.4 7.7*   ALBUMIN 1.7* 1.7*   BILITOT 0.7 0.5   ALKPHOS 331* 254*   AST 79* 38*   ALT 83* 56*       Microbiology Results (last 7 days)       Procedure Component Value Units Date/Time    Blood Culture [[id number]]  (Abnormal)  (Susceptibility) Collected: 09/26/24 0003    Order Status: Completed Specimen: Blood Updated: 09/28/24 0700     Blood Culture Methicillin Sensitive Staphylococcus aureus     GRAM STAIN Gram Positive Cocci, probable Staphylococcus      Seen in gram stain of broth only      2 of 2 bottles positive    Blood Culture [[id number]]  (Abnormal)  (Susceptibility) Collected: 09/26/24 0004    Order Status: Completed Specimen: Blood Updated: 09/28/24 0659     Blood Culture Methicillin Sensitive Staphylococcus aureus     GRAM STAIN Gram Positive Cocci, probable Staphylococcus      Seen in gram stain of broth only      1 of 2 Aerobic bottles positive    Body Fluid Culture [[id number]] Collected: 09/27/24 1303    Order Status: Completed Specimen: Body Fluid from Joint Fluid, Right Knee Updated: 09/28/24 0646     Body Fluid Culture No Growth At 24 Hours    Gram Stain [[id number]] Collected: 09/27/24 1303    Order Status: Completed Specimen: Body Fluid from Joint Fluid, Right Knee Updated: 09/27/24 1511     GRAM STAIN Many WBC observed      No bacteria seen    Anaerobic Culture [[id number]] Collected: 09/27/24 1303    Order Status: Sent Specimen: Body Fluid from Joint Fluid, Right Knee Updated: 09/27/24 1350    Fungal Culture [[id number]] Collected: 09/27/24 1303    Order Status: Sent Specimen: Body Fluid from Joint Fluid, Right Knee Updated:  09/27/24 1350    Blood Culture [[id number]]  (Abnormal)  (Susceptibility) Collected: 09/24/24 2034    Order Status: Completed Specimen: Blood Updated: 09/27/24 1133     Blood Culture Methicillin Sensitive Staphylococcus aureus     GRAM STAIN Gram Positive Cocci, probable Staphylococcus      Seen in gram stain of broth only      2 of 2 bottles positive    Blood Culture [[id number]]  (Abnormal)  (Susceptibility) Collected: 09/24/24 2034    Order Status: Completed Specimen: Blood Updated: 09/27/24 1133     Blood Culture Methicillin Sensitive Staphylococcus aureus     GRAM STAIN Gram Positive Cocci, probable Staphylococcus      Seen in gram stain of broth only      2 of 2 bottles positive    Urine culture [[id number]] Collected: 09/24/24 2153    Order Status: Completed Specimen: Urine, Catheterized Updated: 09/26/24 0827     Urine Culture No Growth    BCID2 Panel [[id number]]  (Abnormal) Collected: 09/24/24 2034    Order Status: Completed Specimen: Blood Updated: 09/25/24 1253     CTX-M (ESBL ) N/A     IMP (Cabapenemase ) N/A     KPC resistance gene (Carbapenemase ) N/A     mcr-1 N/A     mecA ID N/A     Comment: Note: Antimicrobial resistance can occur via multiple mechanisms. A Not Detected result for antimicrobial resistance gene(s) does not indicate antimicrobial susceptibility. Subculturing is required for species identification and susceptibility testing of   isolates.        mecA/C and MREJ (MRSA) gene Not Detected     NDM (Carbapenemase ) N/A     OXA-48-like (Carbapenemase ) N/A     Maria L/B (VRE gene) N/A     VIM (Carbapenemase ) N/A     Enterococcus faecalis Not Detected     Enterococcus faecium Not Detected     Listeria monocytogenes Not Detected     Staphylococcus spp. Detected     Staphylococcus aureus Detected     Staphylococcus epidermidis Not Detected     Staphylococcus lugdunensis Not Detected     Streptococcus spp. Not Detected     Streptococcus agalactiae  (Group B) Not Detected     Streptococcus pneumoniae Not Detected     Streptococcus pyogenes (Group A) Not Detected     Acinetobacter calcoaceticus/baumannii complex Not Detected     Bacteroides fragilis Not Detected     Enterobacterales Not Detected     Enterobacter cloacae complex Not Detected     Escherichia coli Not Detected     Klebsiella aerogenes Not Detected     Klebsiella oxytoca Not Detected     Klebsiella pneumoniae group Not Detected     Proteus spp. Not Detected     Salmonella spp. Not Detected     Serratia marcescens Not Detected     Haemophilus influenzae Not Detected     Neisseria meningitidis Not Detected     Pseudomonas aeruginosa Not Detected     Stenotrophomonas maltophilia Not Detected     Candida albicans Not Detected     Candida auris Not Detected     Candida glabrata Not Detected     Candida krusei Not Detected     Candida parapsilosis Not Detected     Candida tropicalis Not Detected     Cryptococcus neoformans/gattii Not Detected    Narrative:      The [employer] BCID2 Panel is a multiplexed nucleic acid test intended for the use with [employer]® 2.0 or [employer]® Cert[employer] Systems for the simultaneous qualitative detection and identification of multiple bacterial and yeast nucleic acids and select genetic determinants associated with antimicrobial resistance.  The [employer] BCID2 Panel test is performed directly on blood culture samples identified as positive by a continuous monitoring blood culture system.  Results are intended to be interpreted in conjunction with Gram stain results.             Significant Imaging: I have reviewed all pertinent imaging results/findings within the past 24 hours.      Saúl Olivarez MD  Infectious Disease  [facility]

## 2024-09-28 NOTE — PLAN OF CARE
Problem: Occupational Therapy  Goal: Occupational Therapy Goal  Description: LTG: Pt will perform basic ADLs and ADL transfers with Modified independence using LRAD by discharge.    STG: to be met by 10/28/24    Pt will complete grooming standing at sink with LRAD with SBA.  Pt will complete UB dressing with SBA.  Pt will complete LB dressing with SBA using LRAD.  Pt will complete toileting with SBA using LRAD.  Pt will complete functional mobility to/from toilet and toilet transfer with SBA using LRAD.    Outcome: Progressing

## 2024-09-28 NOTE — NURSING
Nurses Note -- 4 Eyes      9/27/2024   7:07 PM      Skin assessed during: Daily Assessment      [] No Altered Skin Integrity Present    []Prevention Measures Documented      [x] Yes- Altered Skin Integrity Present or Discovered   [] LDA Added if Not in Epic (Describe Wound)   [] New Altered Skin Integrity was Present on Admit and Documented in LDA   [] Wound Image Taken    Wound Care Consulted? No    Attending Nurse:  Mohamud Vega RN/Staff Member:  susie

## 2024-09-28 NOTE — PT/OT/SLP EVAL
[facility]  Speech Language Pathology Department  Cognitive-Communication Evaluation    Patient Name:  Orlando Black   MRN:  [id number]    Recommendations     General recommendations:  SLP intervention not indicated  Communication strategies:  none    Discharge therapy intensity: No Therapy Indicated  Barriers to safe discharge: acuity of illness    History     History reviewed. No pertinent past medical history.  History reviewed. No pertinent surgical history.    Previous level of Function  Education:college  Occupation: volunteer work  Lives: with spouse    Imaging   No results found for this or any previous visit.    Subjective     Patient awake, alert, oriented x4, and cooperative.  Spiritual/Cultural/Restorationist Beliefs/Practices that affect care: no    Pain/Comfort: Pain Rating 1: 0/10    Objective     ORAL MUSCULATURE  Dentition: own teeth  Facial Movement: WFL  Buccal Strength & Mobility: WFL  Mandibular Strength & Mobility: WFL  Oral Labial Strength & Mobility: WFL  Lingual Strength & Mobility: WFL    SPEECH PRODUCTION  Phoneme Production: adequate  Voice Quality: adequate  Voice Production: adequate  Speech Rate: appropriate  Loudness: acceptable  Respiration: WFL for speech  Resonance: adequate  Prosody: adequate  Speech Intelligibility  Known Context: Greater that 90%  Unknown Context: Greater that 90%    AUDITORY COMPREHENSION  Identification:  Body parts: Within Functional Limits  Following Directions:  1-Step: Within Functional Limits  2-Step: Within Functional Limits  3-Step: Within Functional Limits  Yes/No Questions:  Biographical: Within Functional Limits  Environmental: Within Functional Limits  Simple: Within Functional Limits  Complex: Within Functional Limits    VERBAL EXPRESSION  Automatic Speech:  Days of the week: Within Functional Limits  Months of the year: Within Functional Limits  Repetition:  Phrases: Within Functional Limits  Phrase Completion: Within  Functional Limits  Confrontation Naming  Objects: Within Functional Limits  Wh- Questions:  Object name: Within Functional Limits  Object function: Within Functional Limits  Complex: Within Functional Limits    COGNITION  Orientation:  Person: yes  Place: yes  Time: yes  Situation: yes   Attention:  Sustained: Within Functional Limits  Pragmatics:  Communicative Intent: Within Functional Limits  Memory:  Immediate: Within Functional Limits  Short Term: Within Functional Limits  Long Term: Within Functional Limits  Problem Solving  Functional simple: Within Functional Limits  Functional complex: Within Functional Limits  Organization:  Convergent thinking: Within Functional Limits  Divergent thinking: Within Functional Limits  Sequencing: Within Functional Limits  Executive Function:  Cognitive endurance: Within Functional Limits  Information processing: Within Functional Limits    Assessment     Patient presents with baseline speech, language, and cognitive communicative skills for PLOF. Patient previously passed Alberto swallow screen. No further SLP services warranted at this time, please re-consult as needed.     Patient Education     Patient provided with verbal education regarding POC/evaluation only.  Understanding was verbalized.    Plan     SLP Follow-Up:  No   Plan of Care reviewed with:  patient      Time Tracking     SLP Treatment Date:   09/28/24  Speech Start Time:  0845  Speech Stop Time:  0900     Speech Total Time (min):  15 min    Billable minutes:  Evaluation of Speech Sound Production with Comprehension and Expression, 15 minutes     09/28/2024

## 2024-09-28 NOTE — PROGRESS NOTES
"[facility] - 7th Floor ICU  Orthopedics  Progress Note    Patient Name: Orlando Black  MRN: [id number]  Admission Date: 9/24/2024  Hospital Length of Stay: 4 days  Attending Provider: Quinn Juarez MD  Primary Care Provider: Janeth, Primary Doctor    Subjective:     Principal Problem:<principal problem not specified>    Principal Orthopedic Problem: 1 Day Post-Op   I&D R knee    Interval History: Seen in ICU, no family at bedside. Not participatory on my exam but asking me for water.     Review of patient's allergies indicates:  Not on File    Current Facility-Administered Medications   Medication    acetaminophen tablet 650 mg    bisacodyL suppository 10 mg    camphor-methyl salicyl-menthoL 4-30-10 % Crea    dextrose 10% bolus 125 mL 125 mL    dextrose 10% bolus 250 mL 250 mL    famotidine (PF) injection 20 mg    labetaloL injection 10 mg    levETIRAcetam tablet 500 mg    LIDOcaine 5 % patch 1 patch    morphine injection 2 mg    mupirocin 2 % ointment    ondansetron disintegrating tablet 8 mg    oxacillin 2 g in 0.9% NaCl 100 mL IVPB (MB+)    QUEtiapine tablet 25 mg    sodium chloride 0.9% flush 10 mL    sodium chloride 0.9% flush 10 mL     Objective:     Vital Signs (Most Recent):  Temp: 98 °F (36.7 °C) (09/28/24 0400)  Pulse: 100 (09/28/24 0430)  Resp: 14 (09/28/24 0430)  BP: (!) 150/67 (09/28/24 0400)  SpO2: 99 % (09/28/24 0430) Vital Signs (24h Range):  Temp:  [97.3 °F (36.3 °C)-98 °F (36.7 °C)] 98 °F (36.7 °C)  Pulse:  [] 100  Resp:  [12-28] 14  SpO2:  [94 %-100 %] 99 %  BP: (128-160)/(66-82) 150/67     Weight: 52.7 kg (116 lb 2.9 oz)  Height: 5' 11" (180.3 cm)  Body mass index is 16.2 kg/m².      Intake/Output Summary (Last 24 hours) at 9/28/2024 1429  Last data filed at 9/28/2024 0509  Gross per 24 hour   Intake 1028.36 ml   Output 1201 ml   Net -172.64 ml       Physical Exam:   In the ICU                                RLE: -Skin: Dressing CDI           -MSK: spontaneous motor in " "tact           -CV: Capillary refill is less than 2 seconds. +DP. Compartments soft and compressible    Diagnostic Findings:     Significant Labs: Wound Culture: No results for input(s): "LABAERO" in the last 48 hours.  All pertinent labs within the past 24 hours have been reviewed.    Significant Imaging: I have reviewed all pertinent imaging results/findings.     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     The above findings, diagnostics, and treatment plan were discussed with Dr Castro who is in agreement with the plan of care except as stated in additional documentation.      KERI Canchola   Orthopedic Trauma Surgery  [facility]    "

## 2024-09-28 NOTE — PLAN OF CARE
Problem: Physical Therapy  Goal: Physical Therapy Goal  Description: Goals to be met by: 10/28/24     Patient will increase functional independence with mobility by performin. Supine to sit with Stand-by Assistance  2. Sit to supine with Stand-by Assistance  3. Sit to stand transfer with Stand-by Assistance  4. Bed to chair transfer with Stand-by Assistance using Rolling Walker  5. Gait  x 200 feet with Stand-by Assistance using Rolling Walker.     Outcome: Progressing

## 2024-09-28 NOTE — PT/OT/SLP EVAL
Physical Therapy Evaluation    Patient Name:  Orlando Black   MRN:  [id number]    Recommendations:     Discharge therapy intensity: Moderate Intensity Therapy   Discharge Equipment Recommendations: to be determined by next level of care   Barriers to discharge: Impaired mobility    Assessment:     Orlando Black is a 74 y.o. 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  She presents with the following impairments/functional limitations: weakness, gait instability, impaired balance, impaired endurance, decreased safety awareness, impaired functional mobility. The pt tolerated session well. She is able to perform mod A for sit to stand and bed mobility, and max A for a pivot tf. The pt demos impaired mentation, and is not oriented. At baseline, the pt is independent with  mobility. Pt would greatly benefit from MOD intensity upon dc.    Rehab Prognosis: Good; patient would benefit from acute skilled PT services to address these deficits and reach maximum level of function.    Recent Surgery: Procedure(s) (LRB):  ARTHROSCOPY, KNEE, WITH DEBRIDEMENT (Right) 1 Day Post-Op    Plan:     During this hospitalization, patient would benefit from acute PT services 5 x/week to address the identified rehab impairments via gait training, therapeutic activities, therapeutic exercises and progress toward the following goals:    Plan of Care Expires:  10/28/24    Subjective     Chief Complaint: none  Patient/Family Comments/goals: return to PLOF  Pain/Comfort:  Location 1: knee  Pain Addressed 1: Cessation of Activity, Distraction, Reposition    Patients  cultural, spiritual, [employer] conflicts given the current situation:      Living Environment:  Home with spouse,  home.   Prior to admission, patients level of function was independent.  Equipment used at home: none.  DME owned (not currently used): none.  Upon discharge, patient will have assistance from  spouse.    Objective:     Communicated with NSG prior to session.  Patient found supine with blood pressure cuff, peripheral IV, pulse ox (continuous), telemetry, peck catheter, SCD  upon PT entry to room.    General Precautions: Standard, fall (100-140)  Orthopedic Precautions:RLE weight bearing as tolerated   Braces: N/A  Respiratory Status: Room air  Blood Pressure: 151/72  HR: 95  O2 95%      Exams:  RLE ROM: limited at hip and knee  RLE Strength: grossly >3-/5  LLE ROM: limited at hip and knee actively  LLE Strength: grossly 4-/5  Skin integrity: Visible skin intact      Functional Mobility:  Bed Mobility:     Scooting: moderate assistance  Supine to Sit: moderate assistance  Sit to Supine: moderate assistance  Transfers:     Bed to Chair: maximal assistance with  no AD  using  Squat Pivot      Patient provided with verbal education education regarding PT role/goals/POC, safety awareness, and discharge/DME recommendations.  Understanding was verbalized.     Patient left u pin chair with posey vest, chair alarm, geomat, and hoyerpad, all lines intact, call button in reach, and NSG notified.    GOALS:   Multidisciplinary Problems       Physical Therapy Goals          Problem: Physical Therapy    Goal Priority Disciplines Outcome Interventions   Physical Therapy Goal     PT, PT/OT Progressing    Description: Goals to be met by: 10/28/24     Patient will increase functional independence with mobility by performin. Supine to sit with Stand-by Assistance  2. Sit to supine with Stand-by Assistance  3. Sit to stand transfer with Stand-by Assistance  4. Bed to chair transfer with Stand-by Assistance using  Rolling Walker  5. Gait  x 200 feet with Stand-by Assistance using Rolling Walker.                          History:     History reviewed. No pertinent past medical history.    History reviewed. No pertinent surgical history.    Time Tracking:     PT Received On: 09/28/24  PT Start Time: 0935     PT Stop Time: 1005  PT Total Time (min): 30 min     Billable Minutes: Evaluation 15 and Therapeutic Activity 15      09/28/2024

## 2024-09-28 NOTE — PROGRESS NOTES
[facility] - 7th Floor ICU  Pulmonary Critical Care Note    Patient Name: Orlando Black  MRN: [id number]  Admission Date: 9/24/2024  Hospital Length of Stay: 4 days  Code Status: Full Code  Attending Provider: Quinn Juarez MD  Primary Care Provider: Janeth, Primary Doctor     Subjective:     HPI:   Orlando Black is a 74 y.o. female with an no known pmh (last hospitalization in 1970s for childbirth) who presented to [facility] as a transfer from [facility] on 9/24/2024 for Neurosurgery and CV Surgery services given new left occipital intraparenchymal hemorrhage and mitral valve endocarditis in the setting of Staph bacteremia. Patient presented to outside hospital on 9/21/2024 for fever, arthralgia, and back pain. Admitted for sepsis work up. 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 Patient then developed altered mental status overnight on 9/23/2024 and underwent CT Head on 9/24/2024 which showed new left occipital intraparenchymal hematoma measuring 2.0 x 1.8 x 1.5 cm. Transferred to [facility] for Neurosurgery and CV Surgery services.     Hospital Course/Significant events:  9/24/2024: Admitted to ICU with new left occipital IPH, transfer from [facility]  9/25/2024: Cerebral angiogram with incidental finding of left cavernous ICA aneurysm, no mycotic aneurysm    24 Hour Interval History:  Patient awake and alert this morning.  Answers questions appropriately.  No hemodynamic issues overnight.  Breathing easily on nasal cannula.  Appreciate the input of all consultants.  Four of 4 blood culture bottles positive for MSSA.      History reviewed. No pertinent past medical history.    History reviewed. No pertinent surgical history.    Social History     Socioeconomic History    Marital status: Single     Social Drivers of Health     Financial  Resource Strain: Patient Declined (9/26/2024)    Overall Financial Resource Strain (CARDIA)     Difficulty of Paying Living Expenses: Patient declined   Food Insecurity: Patient Declined (9/26/2024)    Hunger Vital Sign     Worried About Running Out of Food in the Last Year: Patient declined     Ran Out of Food in the Last Year: Patient declined   Transportation Needs: Patient Declined (9/26/2024)    TRANSPORTATION NEEDS     Transportation : Patient declined   Stress: Patient Declined (9/26/2024)    [employer] - Occupational Stress Questionnaire     Feeling of Stress : Patient declined   Housing Stability: Patient Declined (9/26/2024)    Housing Stability Vital Sign     Unable to Pay for Housing in the Last Year: Patient declined     Homeless in the Last Year: Patient declined           No current outpatient medications    Current Inpatient Medications   famotidine (PF)  20 mg Intravenous BID    levETIRAcetam  500 mg Oral BID    mupirocin   Nasal BID    oxacillin IV (PEDS and ADULTS)  2 g Intravenous Q4H       Current Intravenous Infusions          Review of Systems   Respiratory:  Negative for shortness of breath.    Cardiovascular:  Negative for chest pain.   Musculoskeletal:  Positive for joint pain (Minimal right hip and right knee pain, improved).          Objective:       Intake/Output Summary (Last 24 hours) at 9/28/2024 0818  Last data filed at 9/28/2024 0509  Gross per 24 hour   Intake 1628.36 ml   Output 1351 ml   Net 277.36 ml         Vital Signs (Most Recent):  Temp: 98 °F (36.7 °C) (09/28/24 0400)  Pulse: 100 (09/28/24 0430)  Resp: 14 (09/28/24 0430)  BP: (!) 150/67 (09/28/24 0400)  SpO2: 99 % (09/28/24 0430)  Body mass index is 16.2 kg/m².  Weight: 52.7 kg (116 lb 2.9 oz) Vital Signs (24h Range):  Temp:  [96.8 °F (36 °C)-98 °F (36.7 °C)] 98 °F (36.7 °C)  Pulse:  [] 100  Resp:  [10-28] 14  SpO2:  [94 %-100 %] 99 %  BP: (128-168)/(66-99) 150/67     Physical  "Exam  Constitutional:       General: She is not in acute distress.     Appearance: She is not diaphoretic.   HENT:      Head: Normocephalic and atraumatic.   Eyes:      Conjunctiva/sclera: Conjunctivae normal.      Pupils: Pupils are equal, round, and reactive to light.   Cardiovascular:      Rate and Rhythm: Normal rate.      Comments: Loud S1  Pulmonary:      Effort: No respiratory distress.      Breath sounds: Normal breath sounds. No wheezing.   Abdominal:      General: There is no distension.      Palpations: Abdomen is soft.      Tenderness: There is no abdominal tenderness.   Musculoskeletal:         General: Swelling (Right knee edema) and tenderness (Right hip tender to palpation) present.   Skin:     General: Skin is warm and dry.   Neurological:      Comments: Lethargic, oriented to self only, follows commands, slurred speech, BUE 5/5, BLE 3/5           Lines/Drains/Airways       Drain  Duration                  Urethral Catheter 09/24/24 2130 Silicone 16 Fr. 3 days              Peripheral Intravenous Line  Duration                  Peripheral IV - Single Lumen 09/27/24 0816 22 G Right Antecubital 1 day         Peripheral IV - Single Lumen 09/28/24 0447 Anterior;Left Forearm <1 day         Peripheral IV - Single Lumen 09/28/24 0447 Distal;Posterior;Right Forearm <1 day                    Significant Labs:    Lab Results   Component Value Date    WBC 11.41 09/28/2024    HGB 9.9 (L) 09/28/2024    HCT 30.1 (L) 09/28/2024    MCV 96.8 (H) 09/28/2024     09/28/2024           BMP  Lab Results   Component Value Date     (H) 09/28/2024    K 3.6 09/28/2024    CO2 23 09/28/2024    BUN 14.7 09/28/2024    CREATININE 0.62 09/28/2024    CALCIUM 7.7 (L) 09/28/2024    AGAP 10.0 09/28/2024         ABG  No results for input(s): "PH", "PO2", "PCO2", "HCO3", "POCBASEDEF" in the last 168 hours.    Mechanical Ventilation Support:         Micro:  Outside Hospital  Blood culture 9/22/2024: Staph aureus (BCID mecA not " applicable) so MSSA    Urine culture 9/22/2024: no growth in 24 hours    Body Fluid Culture 9/24/2024 (right knee effusion): Rare GPC, Many WBCs    Significant Imaging:  I have reviewed the pertinent imaging within the past 24 hours.    CT Head Without Contrast  Result Date: 9/27/2024  Stable small left occipital lobe hematoma. Electronically signed by: Petrona Nguyen Date:    09/27/2024 Time:    06:32    MRI Brain W WO Contrast  Result Date: 9/26/2024  Stable acute hemorrhage in the left occipital lobe Punctate areas of scattered restricted diffusion in the frontal lobes bilaterally as well as the right temporal region.  Findings are suggestive of possible embolic phenomena. Electronically signed by: Feliberto Cardona Date:    09/26/2024 Time:    14:56      X-Ray Knee Complete 4 Or More Views Right  Result Date: 9/26/2024  Degenerative changes. Electronically signed by: Ibrahima Valentin Date:    09/26/2024 Time:    07:46    X-Ray Hip 2 or 3 views Right with Pelvis when performed  Result Date: 9/26/2024  Degenerative changes. Electronically signed by: Ibrahima Valentin Date:    09/26/2024 Time:    07:35    CT Head Without Contrast  Result Date: 9/25/2024  1.  Stable left occipital lobe hemorrhage and mild surrounding edema. 2.  No new findings. 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 No convincing acute infection in the cervical spine. Multilevel DJD. Osteopenia about left C3-4 facet joint is favored to be degenerative although it is difficult to exclude osteomyelitis entirely. MRI with and without contrast suggested.  2. Suspected acute osteomyelitis/septic arthritis of the sternomanubrial joint.  3. No  definitive acute infection in the thoracic spine  4. No definitive acute infection in the lumbar spine  5. Incidental note of a left occipital parenchymal hematoma  6. Cholelithiasis     CT Head wo 9/24/2024 1004:  There is a 2 cm intraparenchymal hematoma within the left occipital lobe. Measures 2.0 x 1.8 x 1.5 cm. No intracranial mass. No mass effect.    US Abdomen 9/22/2024 0958: Cholelithiasis, fatty hepatomegaly, 1 cm hepatic hemangioma suspected in left lobe, non-emergent liver protocol CT recommended for further characterization     CT Head 9/21/2024: No acute intracranial abnormalities    CT Chest 9/21/2024: No acute or suspicious intrathoracic disease    CT Abdomen Pelvis 9/21/2024: No acute or suspicious abnormalities. Cholelithiasis.    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  No plans for mitral valve replacement at this time.  Patient with evidence of widespread osteomyelitis and sepsis.  Continue ICU for now.    DVT Prophylaxis: SCDs  GI Prophylaxis: H2B     32 minutes of critical care was time spent personally by me on the following activities: development of treatment plan with patient or surrogate and bedside caregivers, discussions with consultants, evaluation of patient's response to treatment, examination of patient, ordering and performing treatments and interventions, ordering and review of laboratory studies, ordering and  review of radiographic studies, pulse oximetry, re-evaluation of patient's condition.  This critical care time did not overlap with that of any other provider or involve time for any procedures.     JAVIER Coley MD  Pulmonary Critical Care Medicine  [facility] - 7th Floor ICU  DOS: 09/28/2024

## 2024-09-28 NOTE — PT/OT/SLP EVAL
Occupational Therapy  Evaluation    Name: Orlando Black  MRN: [id number]    Recommendations:     Discharge therapy intensity: Moderate Intensity Therapy   Discharge Equipment Recommendations:  to be determined by next level of care  Barriers to discharge:   (ongoing medical needs, severity of deficits)    Assessment:     Orlando Black is a 74 y.o. 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 She presents with the following performance deficits affecting function: weakness, impaired endurance, impaired self care skills, impaired functional mobility, gait instability, impaired balance, impaired cognition, decreased lower extremity function, pain, decreased safety awareness, decreased upper extremity function, orthopedic precautions. Patient presents awake and alert however disoriented to birthday, age, place, and current year; able to follow ~50% of commands. Patient required mod-max A for all functional mobility and ADLs performed - limited by pain in RLE. Patient previously independent per chart review. Therefore recommending moderate intensity therapy upon d/c.    Rehab Prognosis: Good; patient would benefit from acute skilled OT services to address these deficits and reach maximum level of function.       Plan:     Patient to be seen 5 x/week to address the above listed problems via self-care/home management, therapeutic activities, therapeutic exercises  Plan of Care Expires: 10/28/24  Plan of Care Reviewed with: patient    Subjective     Chief Complaint: knee pain  Patient/Family Comments/goals: to  shower    Occupational Profile:  Per chart review, lives with  and daughter in a wheelchair accessible home. Independent prior with no equipment needs.    Pain/Comfort:  Pain Rating 1:  (not objectively stated)  Location - Side 1: Right  Location 1: knee  Pain Addressed 1: Cessation of Activity, Distraction, Reposition    Patients cultural, spiritual, Baptism conflicts given the current situation: no    Objective:     OT communicated with NSG prior to session.      Patient was found HOB elevated with blood pressure cuff, peripheral IV, pulse ox (continuous), telemetry, SCD, peck catheter upon OT entry to room.    General Precautions: Standard, fall (-140)  Orthopedic Precautions: RLE weight bearing as tolerated  Braces: N/A  Room air  Vital Signs: 151/72 (RN clearing for mobility) 95 bpm 95%    Bed Mobility:    Patient completed Supine to Sit with moderate assistance    Functional Mobility/Transfers:  Patient completed Sit <> Stand Transfer with moderate assistance  with  rolling walker   Patient completed Bed <> Chair Transfer using Squat Pivot technique with maximal assistance with hand-held assist  Functional Mobility: unable to perform lateral steps    Activities of Daily Living:  Lower Body Dressing: maximal assistance to don socks seated EOB    Functional Cognition:  Orientation: oriented to name  Initiation: Impaired.    Command Following: Follows simple one-step commands with 50% accuracy  Problem Solving: Impaired.    Safety Awareness: Impaired.    Insight into Deficits: Impaired.      Visual Perceptual Skills:  Appears WFL    Upper Extremity Function:  Bilateral Upper Extremities:   Range of Motion: WFL  Could not accurately assess MMT 2/2 difficulty with following commands    Therapeutic Positioning  Risk for acquired pressure injuries is increased due to relative decrease in mobility d/t hospitalization , impaired mobility, and inability to communicate toileting needs.    OT  interventions performed during the course of today's session:   Education was provided on benefits of and recommendations for therapeutic positioning  Therapeutic positioning was provided at the conclusion of session to offload all bony prominences for the prevention and/or reduction of pressure injuries    Skin assessment: all bony prominences were assessed    Findings: known area of altered skin integrity at surgical site    OT recommendations for therapeutic positioning throughout hospitalization:   Follow [facility] Pressure Injury Prevention Protocol  Geomat recommended for sacral protection while [facility]    Patient Education:  Patient provided with verbal education education regarding OT role/goals/POC, post op precautions, fall prevention, safety awareness, Discharge/DME recommendations, and pressure ulcer prevention.  Additional teaching is warranted.     Patient left up in chair with all lines intact, call button in reach, chair alarm on, nieves pad in place, NSG notified, and geomat/posey .    GOALS:   Multidisciplinary Problems       Occupational Therapy Goals          Problem: Occupational Therapy    Goal Priority Disciplines Outcome Interventions   Occupational Therapy Goal     OT, PT/OT Progressing    Description: LTG: Pt will perform basic ADLs and ADL transfers with Modified independence using LRAD by discharge.    STG: to be met by 10/28/24    Pt will complete grooming standing at sink with LRAD with SBA.  Pt will complete UB dressing with SBA.  Pt will complete LB dressing with SBA using LRAD.  Pt will complete toileting with SBA using LRAD.  Pt will complete functional mobility to/from toilet and toilet transfer with SBA using LRAD.                         History:     History reviewed. No pertinent past medical history.    History reviewed. No pertinent surgical history.    Time Tracking:     OT Date of Treatment:    OT Start Time: 0935  OT Stop Time: 1005  OT Total Time (min): 30 min    Billable  Minutes:Evaluation high  Self Care/Home Management 1    9/28/2024

## 2024-09-29 LAB
ALBUMIN SERPL-MCNC: 1.7 G/DL (ref 3.4–4.8)
ALBUMIN/GLOB SERPL: 0.5 RATIO (ref 1.1–2)
ALP SERPL-CCNC: 205 UNIT/L (ref 40–150)
ALT SERPL-CCNC: 38 UNIT/L (ref 0–55)
ANION GAP SERPL CALC-SCNC: 10 MEQ/L
AST SERPL-CCNC: 25 UNIT/L (ref 5–34)
BASOPHILS # BLD AUTO: 0.02 X10(3)/MCL
BASOPHILS NFR BLD AUTO: 0.2 %
BILIRUB SERPL-MCNC: 0.6 MG/DL
BUN SERPL-MCNC: 10.8 MG/DL (ref 9.8–20.1)
CALCIUM SERPL-MCNC: 7.9 MG/DL (ref 8.4–10.2)
CHLORIDE SERPL-SCNC: 112 MMOL/L (ref 98–107)
CO2 SERPL-SCNC: 24 MMOL/L (ref 23–31)
CREAT SERPL-MCNC: 0.55 MG/DL (ref 0.55–1.02)
CREAT/UREA NIT SERPL: 20
EOSINOPHIL # BLD AUTO: 0.09 X10(3)/MCL (ref 0–0.9)
EOSINOPHIL NFR BLD AUTO: 0.8 %
ERYTHROCYTE [DISTWIDTH] IN BLOOD BY AUTOMATED COUNT: 14.3 % (ref 11.5–17)
GFR SERPLBLD CREATININE-BSD FMLA CKD-EPI: >60 ML/MIN/1.73/M2
GLOBULIN SER-MCNC: 3.2 GM/DL (ref 2.4–3.5)
GLUCOSE SERPL-MCNC: 94 MG/DL (ref 82–115)
HCT VFR BLD AUTO: 30.8 % (ref 37–47)
HGB BLD-MCNC: 9.6 G/DL (ref 12–16)
IMM GRANULOCYTES # BLD AUTO: 0.15 X10(3)/MCL (ref 0–0.04)
IMM GRANULOCYTES NFR BLD AUTO: 1.3 %
LYMPHOCYTES # BLD AUTO: 1.14 X10(3)/MCL (ref 0.6–4.6)
LYMPHOCYTES NFR BLD AUTO: 9.8 %
MAGNESIUM SERPL-MCNC: 2 MG/DL (ref 1.6–2.6)
MCH RBC QN AUTO: 31.4 PG (ref 27–31)
MCHC RBC AUTO-ENTMCNC: 31.2 G/DL (ref 33–36)
MCV RBC AUTO: 100.7 FL (ref 80–94)
MONOCYTES # BLD AUTO: 0.91 X10(3)/MCL (ref 0.1–1.3)
MONOCYTES NFR BLD AUTO: 7.9 %
NEUTROPHILS # BLD AUTO: 9.27 X10(3)/MCL (ref 2.1–9.2)
NEUTROPHILS NFR BLD AUTO: 80 %
NRBC BLD AUTO-RTO: 0 %
PHOSPHATE SERPL-MCNC: 3.8 MG/DL (ref 2.3–4.7)
PLATELET # BLD AUTO: 430 X10(3)/MCL (ref 130–400)
PMV BLD AUTO: 9.2 FL (ref 7.4–10.4)
POTASSIUM SERPL-SCNC: 3.1 MMOL/L (ref 3.5–5.1)
PROT SERPL-MCNC: 4.9 GM/DL (ref 5.8–7.6)
RBC # BLD AUTO: 3.06 X10(6)/MCL (ref 4.2–5.4)
SODIUM SERPL-SCNC: 146 MMOL/L (ref 136–145)
WBC # BLD AUTO: 11.58 X10(3)/MCL (ref 4.5–11.5)

## 2024-09-29 PROCEDURE — 63600175 PHARM REV CODE 636 W HCPCS: Performed by: GENERAL PRACTICE

## 2024-09-29 PROCEDURE — 83735 ASSAY OF MAGNESIUM: CPT | Performed by: STUDENT IN AN ORGANIZED HEALTH CARE EDUCATION/TRAINING PROGRAM

## 2024-09-29 PROCEDURE — 63600175 PHARM REV CODE 636 W HCPCS: Performed by: STUDENT IN AN ORGANIZED HEALTH CARE EDUCATION/TRAINING PROGRAM

## 2024-09-29 PROCEDURE — 25000003 PHARM REV CODE 250: Performed by: GENERAL PRACTICE

## 2024-09-29 PROCEDURE — 20000000 HC ICU ROOM

## 2024-09-29 PROCEDURE — 87040 BLOOD CULTURE FOR BACTERIA: CPT | Performed by: GENERAL PRACTICE

## 2024-09-29 PROCEDURE — 80053 COMPREHEN METABOLIC PANEL: CPT | Performed by: STUDENT IN AN ORGANIZED HEALTH CARE EDUCATION/TRAINING PROGRAM

## 2024-09-29 PROCEDURE — 25000003 PHARM REV CODE 250: Performed by: INTERNAL MEDICINE

## 2024-09-29 PROCEDURE — 25000003 PHARM REV CODE 250: Performed by: STUDENT IN AN ORGANIZED HEALTH CARE EDUCATION/TRAINING PROGRAM

## 2024-09-29 PROCEDURE — 25000003 PHARM REV CODE 250

## 2024-09-29 PROCEDURE — 36415 COLL VENOUS BLD VENIPUNCTURE: CPT | Performed by: STUDENT IN AN ORGANIZED HEALTH CARE EDUCATION/TRAINING PROGRAM

## 2024-09-29 PROCEDURE — 36415 COLL VENOUS BLD VENIPUNCTURE: CPT | Performed by: GENERAL PRACTICE

## 2024-09-29 PROCEDURE — 25000003 PHARM REV CODE 250: Performed by: NEUROLOGICAL SURGERY

## 2024-09-29 PROCEDURE — 85025 COMPLETE CBC W/AUTO DIFF WBC: CPT | Performed by: STUDENT IN AN ORGANIZED HEALTH CARE EDUCATION/TRAINING PROGRAM

## 2024-09-29 PROCEDURE — 84100 ASSAY OF PHOSPHORUS: CPT | Performed by: STUDENT IN AN ORGANIZED HEALTH CARE EDUCATION/TRAINING PROGRAM

## 2024-09-29 RX ORDER — POTASSIUM CHLORIDE 20 MEQ/1
20 TABLET, EXTENDED RELEASE ORAL 2 TIMES DAILY
Status: COMPLETED | OUTPATIENT
Start: 2024-09-29 | End: 2024-09-29

## 2024-09-29 RX ORDER — AMLODIPINE BESYLATE 5 MG/1
5 TABLET ORAL DAILY
Status: DISCONTINUED | OUTPATIENT
Start: 2024-09-29 | End: 2024-10-04

## 2024-09-29 RX ADMIN — OXACILLIN 2 G: 2 INJECTION, POWDER, FOR SOLUTION INTRAMUSCULAR; INTRAVENOUS at 08:09

## 2024-09-29 RX ADMIN — LEVETIRACETAM 500 MG: 500 TABLET, FILM COATED ORAL at 08:09

## 2024-09-29 RX ADMIN — OXACILLIN 2 G: 2 INJECTION, POWDER, FOR SOLUTION INTRAMUSCULAR; INTRAVENOUS at 12:09

## 2024-09-29 RX ADMIN — MORPHINE SULFATE 2 MG: 4 INJECTION, SOLUTION INTRAMUSCULAR; INTRAVENOUS at 12:09

## 2024-09-29 RX ADMIN — AMLODIPINE BESYLATE 5 MG: 5 TABLET ORAL at 08:09

## 2024-09-29 RX ADMIN — LABETALOL HYDROCHLORIDE 10 MG: 5 INJECTION, SOLUTION INTRAVENOUS at 11:09

## 2024-09-29 RX ADMIN — POTASSIUM CHLORIDE 20 MEQ: 1500 TABLET, EXTENDED RELEASE ORAL at 08:09

## 2024-09-29 RX ADMIN — OXACILLIN 2 G: 2 INJECTION, POWDER, FOR SOLUTION INTRAMUSCULAR; INTRAVENOUS at 03:09

## 2024-09-29 RX ADMIN — MUPIROCIN: 20 OINTMENT TOPICAL at 08:09

## 2024-09-29 RX ADMIN — OXACILLIN 2 G: 2 INJECTION, POWDER, FOR SOLUTION INTRAMUSCULAR; INTRAVENOUS at 11:09

## 2024-09-29 RX ADMIN — QUETIAPINE FUMARATE 25 MG: 25 TABLET ORAL at 09:09

## 2024-09-29 RX ADMIN — LIDOCAINE 1 PATCH: 50 PATCH CUTANEOUS at 12:09

## 2024-09-29 RX ADMIN — OXACILLIN 2 G: 2 INJECTION, POWDER, FOR SOLUTION INTRAMUSCULAR; INTRAVENOUS at 02:09

## 2024-09-29 RX ADMIN — FAMOTIDINE 20 MG: 10 INJECTION, SOLUTION INTRAVENOUS at 08:09

## 2024-09-29 RX ADMIN — LABETALOL HYDROCHLORIDE 10 MG: 5 INJECTION, SOLUTION INTRAVENOUS at 02:09

## 2024-09-29 NOTE — PROGRESS NOTES
[facility] - 7th Floor ICU  Pulmonary Critical Care Note    Patient Name: Orlando Black  MRN: [id number]  Admission Date: 9/24/2024  Hospital Length of Stay: 5 days  Code Status: Full Code  Attending Provider: Quinn Juarez MD  Primary Care Provider: Janeth, Primary Doctor     Subjective:     HPI:   Orlando Black is a 74 y.o. female with an no known pmh (last hospitalization in 1970s for childbirth) who presented to [facility] as a transfer from [place] on 9/24/2024 for Neurosurgery and CV Surgery services given new left occipital intraparenchymal hemorrhage and mitral valve endocarditis in the setting of Staph bacteremia. Patient presented to outside hospital on 9/21/2024 for fever, arthralgia, and back pain. Admitted for sepsis work up. 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 Patient then developed altered mental status overnight on 9/23/2024 and underwent CT Head on 9/24/2024 which showed new left occipital intraparenchymal hematoma measuring 2.0 x 1.8 x 1.5 cm. Transferred to [facility] for Neurosurgery and CV Surgery services.     Hospital Course/Significant events:  9/24/2024: Admitted to ICU with new left occipital IPH, transfer from [place]  9/25/2024: Cerebral angiogram with incidental finding of left cavernous ICA aneurysm, no mycotic aneurysm    24 Hour Interval History:  Patient awake this morning but seems more lethargic.  No hemodynamic issues overnight.  Breathing easily on nasal cannula.    History reviewed. No pertinent past medical history.    History reviewed. No pertinent surgical history.    Social History     Socioeconomic History    Marital status: Single     Social Drivers of Health     Financial Resource Strain: Patient Declined (9/26/2024)    Overall Financial Resource Strain (CARDIA)     Difficulty of Paying  Living Expenses: Patient declined   Food Insecurity: Patient Declined (9/26/2024)    Hunger Vital Sign     Worried About Running Out of Food in the Last Year: Patient declined     Ran Out of Food in the Last Year: Patient declined   Transportation Needs: Patient Declined (9/26/2024)    TRANSPORTATION NEEDS     Transportation : Patient declined   Stress: Patient Declined (9/26/2024)    [employer] - Occupational Stress Questionnaire     Feeling of Stress : Patient declined   Housing Stability: Patient Declined (9/26/2024)    Housing Stability Vital Sign     Unable to Pay for Housing in the Last Year: Patient declined     Homeless in the Last Year: Patient declined           No current outpatient medications    Current Inpatient Medications   famotidine (PF)  20 mg Intravenous BID    levETIRAcetam  500 mg Oral BID    mupirocin   Nasal BID    oxacillin IV (PEDS and ADULTS)  2 g Intravenous Q4H       Current Intravenous Infusions          Review of Systems   Respiratory:  Negative for shortness of breath.    Cardiovascular:  Negative for chest pain.   Musculoskeletal:  Positive for joint pain (Minimal right hip and right knee pain, improved).          Objective:       Intake/Output Summary (Last 24 hours) at 9/29/2024 0807  Last data filed at 9/29/2024 0600  Gross per 24 hour   Intake 197.24 ml   Output 1950 ml   Net -1752.76 ml         Vital Signs (Most Recent):  Temp: 98.7 °F (37.1 °C) (09/29/24 0400)  Pulse: 93 (09/29/24 0600)  Resp: 14 (09/29/24 0600)  BP: 133/79 (09/29/24 0600)  SpO2: 98 % (09/29/24 0600)  Body mass index is 16.2 kg/m².  Weight: 52.7 kg (116 lb 2.9 oz) Vital Signs (24h Range):  Temp:  [98.3 °F (36.8 °C)-99.2 °F (37.3 °C)] 98.7 °F (37.1 °C)  Pulse:  [] 93  Resp:  [10-22] 14  SpO2:  [93 %-98 %] 98 %  BP: (116-161)/() 133/79     Physical Exam  Constitutional:       General: She is not in acute distress.     Appearance: She is not diaphoretic.   HENT:      Head:  "Normocephalic and atraumatic.   Eyes:      Conjunctiva/sclera: Conjunctivae normal.      Pupils: Pupils are equal, round, and reactive to light.   Cardiovascular:      Rate and Rhythm: Normal rate.      Comments: Loud S1  Pulmonary:      Effort: No respiratory distress.      Breath sounds: Normal breath sounds. No wheezing.   Abdominal:      General: There is no distension.      Palpations: Abdomen is soft.      Tenderness: There is no abdominal tenderness.   Musculoskeletal:         General: Swelling (Right knee edema) and tenderness (Right hip tender to palpation) present.   Skin:     General: Skin is warm and dry.   Neurological:      Comments: Lethargic, oriented to self only, follows commands, slurred speech, BUE 5/5, BLE 3/5           Lines/Drains/Airways       Drain  Duration                  Urethral Catheter 09/24/24 2130 Silicone 16 Fr. 4 days              Peripheral Intravenous Line  Duration                  Peripheral IV - Single Lumen 09/27/24 0816 22 G Right Antecubital 1 day         Peripheral IV - Single Lumen 09/28/24 0447 Anterior;Left Forearm 1 day         Peripheral IV - Single Lumen 09/28/24 0447 Distal;Posterior;Right Forearm 1 day                    Significant Labs:    Lab Results   Component Value Date    WBC 11.58 (H) 09/29/2024    HGB 9.6 (L) 09/29/2024    HCT 30.8 (L) 09/29/2024    .7 (H) 09/29/2024     (H) 09/29/2024           BMP  Lab Results   Component Value Date     (H) 09/29/2024    K 3.1 (L) 09/29/2024    CO2 24 09/29/2024    BUN 10.8 09/29/2024    CREATININE 0.55 09/29/2024    CALCIUM 7.9 (L) 09/29/2024    AGAP 10.0 09/29/2024         ABG  No results for input(s): "PH", "PO2", "PCO2", "HCO3", "POCBASEDEF" in the last 168 hours.    Mechanical Ventilation Support:         Micro:  Outside Hospital  Blood culture 9/22/2024: Staph aureus (BCID mecA not applicable) so MSSA    Urine culture 9/22/2024: no growth in 24 hours    Body Fluid Culture 9/24/2024 (right knee " effusion): Rare GPC, Many WBCs    Significant Imaging:  I have reviewed the pertinent imaging within the past 24 hours.    CT Head Without Contrast  Result Date: 9/27/2024  Stable small left occipital lobe hematoma. Electronically signed by: Petrona Nguyen Date:    09/27/2024 Time:    06:32    MRI Brain W WO Contrast  Result Date: 9/26/2024  Stable acute hemorrhage in the left occipital lobe Punctate areas of scattered restricted diffusion in the frontal lobes bilaterally as well as the right temporal region.  Findings are suggestive of possible embolic phenomena. Electronically signed by: Feliberto Cardona Date:    09/26/2024 Time:    14:56      X-Ray Knee Complete 4 Or More Views Right  Result Date: 9/26/2024  Degenerative changes. Electronically signed by: Ibrahima Valentin Date:    09/26/2024 Time:    07:46    X-Ray Hip 2 or 3 views Right with Pelvis when performed  Result Date: 9/26/2024  Degenerative changes. Electronically signed by: Ibrahima Valentin Date:    09/26/2024 Time:    07:35    CT Head Without Contrast  Result Date: 9/25/2024  1.  Stable left occipital lobe hemorrhage and mild surrounding edema. 2.  No new findings. 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 No convincing acute infection in the cervical spine. Multilevel DJD. Osteopenia about left C3-4 facet joint is favored to be degenerative although it is difficult to exclude osteomyelitis entirely. MRI with and without contrast suggested.  2. Suspected acute osteomyelitis/septic arthritis of the sternomanubrial joint.  3. No definitive acute infection in the thoracic spine  4. No definitive acute infection in the lumbar spine  5. Incidental  note of a left occipital parenchymal hematoma  6. Cholelithiasis     CT Head wo 9/24/2024 1004:  There is a 2 cm intraparenchymal hematoma within the left occipital lobe. Measures 2.0 x 1.8 x 1.5 cm. No intracranial mass. No mass effect.    US Abdomen 9/22/2024 0958: Cholelithiasis, fatty hepatomegaly, 1 cm hepatic hemangioma suspected in left lobe, non-emergent liver protocol CT recommended for further characterization     CT Head 9/21/2024: No acute intracranial abnormalities    CT Chest 9/21/2024: No acute or suspicious intrathoracic disease    CT Abdomen Pelvis 9/21/2024: No acute or suspicious abnormalities. Cholelithiasis.    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  No plans for mitral valve replacement at this time.  Patient with evidence of widespread osteomyelitis and sepsis.  Continue ICU for now.  We will plan to consult case management tomorrow for LTAC consideration.  Mobilize as tolerated.  Continue close monitoring.  Replace potassium.    DVT Prophylaxis: SCDs  GI Prophylaxis: H2B     32 minutes of critical care was time spent personally by me on the following activities: development of treatment plan with patient or surrogate and bedside caregivers, discussions with consultants, evaluation of patient's response to treatment, examination of patient, ordering and performing treatments and interventions, ordering and review of laboratory  studies, ordering and review of radiographic studies, pulse oximetry, re-evaluation of patient's condition.  This critical care time did not overlap with that of any other provider or involve time for any procedures.     JAVIER Coley MD  Pulmonary Critical Care Medicine  [facility] - 7th Floor ICU  DOS: 09/29/2024

## 2024-09-30 LAB
ALBUMIN SERPL-MCNC: 1.7 G/DL (ref 3.4–4.8)
ALBUMIN/GLOB SERPL: 0.5 RATIO (ref 1.1–2)
ALP SERPL-CCNC: 176 UNIT/L (ref 40–150)
ALT SERPL-CCNC: 30 UNIT/L (ref 0–55)
ANION GAP SERPL CALC-SCNC: 11 MEQ/L
AST SERPL-CCNC: 21 UNIT/L (ref 5–34)
BACTERIA SPEC ANAEROBE CULT: NORMAL
BASOPHILS # BLD AUTO: 0.03 X10(3)/MCL
BASOPHILS NFR BLD AUTO: 0.3 %
BILIRUB SERPL-MCNC: 0.6 MG/DL
BUN SERPL-MCNC: 8.3 MG/DL (ref 9.8–20.1)
CALCIUM SERPL-MCNC: 8 MG/DL (ref 8.4–10.2)
CHLORIDE SERPL-SCNC: 107 MMOL/L (ref 98–107)
CO2 SERPL-SCNC: 25 MMOL/L (ref 23–31)
CREAT SERPL-MCNC: 0.55 MG/DL (ref 0.55–1.02)
CREAT/UREA NIT SERPL: 15
EOSINOPHIL # BLD AUTO: 0.12 X10(3)/MCL (ref 0–0.9)
EOSINOPHIL NFR BLD AUTO: 1.1 %
ERYTHROCYTE [DISTWIDTH] IN BLOOD BY AUTOMATED COUNT: 13.8 % (ref 11.5–17)
GFR SERPLBLD CREATININE-BSD FMLA CKD-EPI: >60 ML/MIN/1.73/M2
GLOBULIN SER-MCNC: 3.5 GM/DL (ref 2.4–3.5)
GLUCOSE SERPL-MCNC: 95 MG/DL (ref 82–115)
HCT VFR BLD AUTO: 28.9 % (ref 37–47)
HGB BLD-MCNC: 9.5 G/DL (ref 12–16)
IMM GRANULOCYTES # BLD AUTO: 0.14 X10(3)/MCL (ref 0–0.04)
IMM GRANULOCYTES NFR BLD AUTO: 1.3 %
LYMPHOCYTES # BLD AUTO: 1.08 X10(3)/MCL (ref 0.6–4.6)
LYMPHOCYTES NFR BLD AUTO: 10 %
MAGNESIUM SERPL-MCNC: 1.8 MG/DL (ref 1.6–2.6)
MCH RBC QN AUTO: 31.6 PG (ref 27–31)
MCHC RBC AUTO-ENTMCNC: 32.9 G/DL (ref 33–36)
MCV RBC AUTO: 96 FL (ref 80–94)
MONOCYTES # BLD AUTO: 0.88 X10(3)/MCL (ref 0.1–1.3)
MONOCYTES NFR BLD AUTO: 8.1 %
NEUTROPHILS # BLD AUTO: 8.56 X10(3)/MCL (ref 2.1–9.2)
NEUTROPHILS NFR BLD AUTO: 79.2 %
NRBC BLD AUTO-RTO: 0 %
PHOSPHATE SERPL-MCNC: 2.8 MG/DL (ref 2.3–4.7)
PLATELET # BLD AUTO: 433 X10(3)/MCL (ref 130–400)
PMV BLD AUTO: 9.3 FL (ref 7.4–10.4)
POCT GLUCOSE: 86 MG/DL (ref 70–110)
POTASSIUM SERPL-SCNC: 2.6 MMOL/L (ref 3.5–5.1)
PROT SERPL-MCNC: 5.2 GM/DL (ref 5.8–7.6)
RBC # BLD AUTO: 3.01 X10(6)/MCL (ref 4.2–5.4)
SODIUM SERPL-SCNC: 143 MMOL/L (ref 136–145)
WBC # BLD AUTO: 10.81 X10(3)/MCL (ref 4.5–11.5)

## 2024-09-30 PROCEDURE — 80053 COMPREHEN METABOLIC PANEL: CPT | Performed by: STUDENT IN AN ORGANIZED HEALTH CARE EDUCATION/TRAINING PROGRAM

## 2024-09-30 PROCEDURE — 25000003 PHARM REV CODE 250

## 2024-09-30 PROCEDURE — 83735 ASSAY OF MAGNESIUM: CPT | Performed by: STUDENT IN AN ORGANIZED HEALTH CARE EDUCATION/TRAINING PROGRAM

## 2024-09-30 PROCEDURE — 63600175 PHARM REV CODE 636 W HCPCS: Performed by: GENERAL PRACTICE

## 2024-09-30 PROCEDURE — 63600175 PHARM REV CODE 636 W HCPCS

## 2024-09-30 PROCEDURE — 25000003 PHARM REV CODE 250: Performed by: NEUROLOGICAL SURGERY

## 2024-09-30 PROCEDURE — 84100 ASSAY OF PHOSPHORUS: CPT | Performed by: STUDENT IN AN ORGANIZED HEALTH CARE EDUCATION/TRAINING PROGRAM

## 2024-09-30 PROCEDURE — 63600175 PHARM REV CODE 636 W HCPCS: Performed by: STUDENT IN AN ORGANIZED HEALTH CARE EDUCATION/TRAINING PROGRAM

## 2024-09-30 PROCEDURE — 85025 COMPLETE CBC W/AUTO DIFF WBC: CPT | Performed by: STUDENT IN AN ORGANIZED HEALTH CARE EDUCATION/TRAINING PROGRAM

## 2024-09-30 PROCEDURE — 36415 COLL VENOUS BLD VENIPUNCTURE: CPT | Performed by: STUDENT IN AN ORGANIZED HEALTH CARE EDUCATION/TRAINING PROGRAM

## 2024-09-30 PROCEDURE — 20000000 HC ICU ROOM

## 2024-09-30 PROCEDURE — 25000003 PHARM REV CODE 250: Performed by: STUDENT IN AN ORGANIZED HEALTH CARE EDUCATION/TRAINING PROGRAM

## 2024-09-30 PROCEDURE — 25000003 PHARM REV CODE 250: Performed by: GENERAL PRACTICE

## 2024-09-30 RX ORDER — POTASSIUM CHLORIDE 14.9 MG/ML
20 INJECTION INTRAVENOUS
Status: DISCONTINUED | OUTPATIENT
Start: 2024-09-30 | End: 2024-09-30

## 2024-09-30 RX ORDER — QUETIAPINE FUMARATE 25 MG/1
50 TABLET, FILM COATED ORAL 2 TIMES DAILY PRN
Status: DISCONTINUED | OUTPATIENT
Start: 2024-09-30 | End: 2024-10-07

## 2024-09-30 RX ORDER — POTASSIUM CHLORIDE 14.9 MG/ML
20 INJECTION INTRAVENOUS
Status: COMPLETED | OUTPATIENT
Start: 2024-09-30 | End: 2024-09-30

## 2024-09-30 RX ADMIN — AMLODIPINE BESYLATE 5 MG: 5 TABLET ORAL at 08:09

## 2024-09-30 RX ADMIN — MUPIROCIN: 20 OINTMENT TOPICAL at 08:09

## 2024-09-30 RX ADMIN — POTASSIUM CHLORIDE 20 MEQ: 14.9 INJECTION, SOLUTION INTRAVENOUS at 10:09

## 2024-09-30 RX ADMIN — POTASSIUM CHLORIDE 20 MEQ: 14.9 INJECTION, SOLUTION INTRAVENOUS at 12:09

## 2024-09-30 RX ADMIN — OXACILLIN 2 G: 2 INJECTION, POWDER, FOR SOLUTION INTRAMUSCULAR; INTRAVENOUS at 07:09

## 2024-09-30 RX ADMIN — OXACILLIN 2 G: 2 INJECTION, POWDER, FOR SOLUTION INTRAMUSCULAR; INTRAVENOUS at 08:09

## 2024-09-30 RX ADMIN — POTASSIUM CHLORIDE 20 MEQ: 14.9 INJECTION, SOLUTION INTRAVENOUS at 02:09

## 2024-09-30 RX ADMIN — QUETIAPINE FUMARATE 25 MG: 25 TABLET ORAL at 05:09

## 2024-09-30 RX ADMIN — LABETALOL HYDROCHLORIDE 10 MG: 5 INJECTION, SOLUTION INTRAVENOUS at 02:09

## 2024-09-30 RX ADMIN — LABETALOL HYDROCHLORIDE 10 MG: 5 INJECTION, SOLUTION INTRAVENOUS at 04:09

## 2024-09-30 RX ADMIN — POTASSIUM CHLORIDE 20 MEQ: 14.9 INJECTION, SOLUTION INTRAVENOUS at 08:09

## 2024-09-30 RX ADMIN — LEVETIRACETAM 500 MG: 500 TABLET, FILM COATED ORAL at 08:09

## 2024-09-30 RX ADMIN — QUETIAPINE FUMARATE 50 MG: 25 TABLET ORAL at 08:09

## 2024-09-30 RX ADMIN — LABETALOL HYDROCHLORIDE 10 MG: 5 INJECTION, SOLUTION INTRAVENOUS at 10:09

## 2024-09-30 RX ADMIN — OXACILLIN 2 G: 2 INJECTION, POWDER, FOR SOLUTION INTRAMUSCULAR; INTRAVENOUS at 04:09

## 2024-09-30 RX ADMIN — FAMOTIDINE 20 MG: 10 INJECTION, SOLUTION INTRAVENOUS at 08:09

## 2024-09-30 RX ADMIN — OXACILLIN 2 G: 2 INJECTION, POWDER, FOR SOLUTION INTRAMUSCULAR; INTRAVENOUS at 12:09

## 2024-09-30 RX ADMIN — OXACILLIN 2 G: 2 INJECTION, POWDER, FOR SOLUTION INTRAMUSCULAR; INTRAVENOUS at 03:09

## 2024-09-30 RX ADMIN — OXACILLIN 2 G: 2 INJECTION, POWDER, FOR SOLUTION INTRAMUSCULAR; INTRAVENOUS at 11:09

## 2024-09-30 RX ADMIN — POTASSIUM CHLORIDE 20 MEQ: 14.9 INJECTION, SOLUTION INTRAVENOUS at 06:09

## 2024-09-30 RX ADMIN — LEVETIRACETAM 500 MG: 500 TABLET, FILM COATED ORAL at 09:09

## 2024-09-30 NOTE — PT/OT/SLP PROGRESS
Patient not seen today secondary to critical potassium. Spoke with nurse, replacing potassium today. Will follow up tomorrow.

## 2024-09-30 NOTE — PT/OT/SLP PROGRESS
Physical Therapy      Patient Name:  Orlando Black   MRN:  [id number]    Patient not seen today secondary to critical potassium. Spoke with nurse, replacing potassium today. Will follow up tomorrow.

## 2024-09-30 NOTE — NURSING
Nurses Note -- 4 Eyes      9/30/2024   5:44 AM      Skin assessed during: Q Shift Change      [x] No Altered Skin Integrity Present    [x]Prevention Measures Documented      [] Yes- Altered Skin Integrity Present or Discovered   [] LDA Added if Not in Epic (Describe Wound)   [] New Altered Skin Integrity was Present on Admit and Documented in LDA   [] Wound Image Taken    Wound Care Consulted? No    Attending Nurse:  Roxanna Vega RN/Staff Member:  GLYNN Hamilton

## 2024-09-30 NOTE — PROGRESS NOTES
[facility] - 7th Floor ICU  Pulmonary Critical Care Note    Patient Name: Orlando Black  MRN: [id number]  Admission Date: 9/24/2024  Hospital Length of Stay: 6 days  Code Status: Full Code  Attending Provider: Quinn Juarez MD  Primary Care Provider: Janeth, Primary Doctor     Subjective:     HPI:   Orlando Black is a 74 y.o. female with an no known pmh (last hospitalization in 1970s for childbirth) who presented to [facility] as a transfer from [place] on 9/24/2024 for Neurosurgery and CV Surgery services given new left occipital intraparenchymal hemorrhage and mitral valve endocarditis in the setting of Staph bacteremia. Patient presented to outside hospital on 9/21/2024 for fever, arthralgia, and back pain. Admitted for sepsis work up. 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 Patient then developed altered mental status overnight on 9/23/2024 and underwent CT Head on 9/24/2024 which showed new left occipital intraparenchymal hematoma measuring 2.0 x 1.8 x 1.5 cm. Transferred to [facility] for Neurosurgery and CV Surgery services.     Hospital Course/Significant events:  9/24/2024: Admitted to ICU with new left occipital IPH, transfer from [place]  9/25/2024: Cerebral angiogram with incidental finding of left cavernous ICA aneurysm, no mycotic aneurysm    24 Hour Interval History:  Patient is awake and alert this morning.  Answers questions appropriately.  Set up with PT yesterday.  Tolerating p.o. intake well.  Oxacillin day 6.    History reviewed. No pertinent past medical history.    History reviewed. No pertinent surgical history.    Social History     Socioeconomic History    Marital status: Single     Social Drivers of Health     Financial Resource Strain: Patient Declined (9/26/2024)    Overall Financial Resource Strain  (CARDIA)     Difficulty of Paying Living Expenses: Patient declined   Food Insecurity: Patient Declined (9/26/2024)    Hunger Vital Sign     Worried About Running Out of Food in the Last Year: Patient declined     Ran Out of Food in the Last Year: Patient declined   Transportation Needs: Patient Declined (9/26/2024)    TRANSPORTATION NEEDS     Transportation : Patient declined   Stress: Patient Declined (9/26/2024)    [employer] - Occupational Stress Questionnaire     Feeling of Stress : Patient declined   Housing Stability: Patient Declined (9/26/2024)    Housing Stability Vital Sign     Unable to Pay for Housing in the Last Year: Patient declined     Homeless in the Last Year: Patient declined           No current outpatient medications    Current Inpatient Medications   amLODIPine  5 mg Oral Daily    famotidine (PF)  20 mg Intravenous BID    levETIRAcetam  500 mg Oral BID    mupirocin   Nasal BID    oxacillin IV (PEDS and ADULTS)  2 g Intravenous Q4H    potassium chloride in water  20 mEq Intravenous Q2H       Current Intravenous Infusions          Review of Systems   Respiratory:  Negative for shortness of breath.    Cardiovascular:  Negative for chest pain.   Musculoskeletal:  Positive for joint pain (Minimal right hip and right knee pain, improved).          Objective:       Intake/Output Summary (Last 24 hours) at 9/30/2024 0755  Last data filed at 9/30/2024 0615  Gross per 24 hour   Intake --   Output 1400 ml   Net -1400 ml         Vital Signs (Most Recent):  Temp: 98.3 °F (36.8 °C) (09/30/24 0400)  Pulse: 86 (09/30/24 0600)  Resp: 17 (09/30/24 0600)  BP: (!) 141/61 (09/30/24 0600)  SpO2: 96 % (09/30/24 0600)  Body mass index is 16.2 kg/m².  Weight: 52.7 kg (116 lb 2.9 oz) Vital Signs (24h Range):  Temp:  [98.3 °F (36.8 °C)-99.1 °F (37.3 °C)] 98.3 °F (36.8 °C)  Pulse:  [] 86  Resp:  [11-20] 17  SpO2:  [94 %-98 %] 96 %  BP: (116-158)/() 141/61     Physical  "Exam  Constitutional:       General: She is not in acute distress.     Appearance: She is not diaphoretic.   HENT:      Head: Normocephalic and atraumatic.   Eyes:      Conjunctiva/sclera: Conjunctivae normal.      Pupils: Pupils are equal, round, and reactive to light.   Cardiovascular:      Rate and Rhythm: Normal rate.      Comments: Loud S1  Pulmonary:      Effort: No respiratory distress.      Breath sounds: Normal breath sounds. No wheezing.   Abdominal:      General: There is no distension.      Palpations: Abdomen is soft.      Tenderness: There is no abdominal tenderness.   Musculoskeletal:         General: Swelling (Right knee edema) and tenderness (Right hip tender to palpation) present.   Skin:     General: Skin is warm and dry.   Neurological:      Comments: Awake and alert this morning.  Improved overall.           Lines/Drains/Airways       Drain  Duration             Female External Urinary Catheter w/ Suction 09/29/24 1700 <1 day              Peripheral Intravenous Line  Duration                  Peripheral IV - Single Lumen 09/27/24 0816 22 G Right Antecubital 2 days         Peripheral IV - Single Lumen 09/28/24 0447 Anterior;Left Forearm 2 days         Peripheral IV - Single Lumen 09/28/24 0447 Distal;Posterior;Right Forearm 2 days         Peripheral IV - Single Lumen 09/30/24 0200 18 G Anterior;Right Upper Arm <1 day                    Significant Labs:    Lab Results   Component Value Date    WBC 10.81 09/30/2024    HGB 9.5 (L) 09/30/2024    HCT 28.9 (L) 09/30/2024    MCV 96.0 (H) 09/30/2024     (H) 09/30/2024           BMP  Lab Results   Component Value Date     09/30/2024    K 2.6 (LL) 09/30/2024    CO2 25 09/30/2024    BUN 8.3 (L) 09/30/2024    CREATININE 0.55 09/30/2024    CALCIUM 8.0 (L) 09/30/2024    AGAP 11.0 09/30/2024         ABG  No results for input(s): "PH", "PO2", "PCO2", "HCO3", "POCBASEDEF" in the last 168 hours.    Mechanical Ventilation Support:   "       Micro:  Outside Hospital  Blood culture 9/22/2024: Staph aureus (BCID mecA not applicable) so MSSA    Urine culture 9/22/2024: no growth in 24 hours    Body Fluid Culture 9/24/2024 (right knee effusion): Rare GPC, Many WBCs    Significant Imaging:  I have reviewed the pertinent imaging within the past 24 hours.    CT Head Without Contrast  Result Date: 9/27/2024  Stable small left occipital lobe hematoma. Electronically signed by: Petrona Nguyen Date:    09/27/2024 Time:    06:32    MRI Brain W WO Contrast  Result Date: 9/26/2024  Stable acute hemorrhage in the left occipital lobe Punctate areas of scattered restricted diffusion in the frontal lobes bilaterally as well as the right temporal region.  Findings are suggestive of possible embolic phenomena. Electronically signed by: Feliberto Cardona Date:    09/26/2024 Time:    14:56      X-Ray Knee Complete 4 Or More Views Right  Result Date: 9/26/2024  Degenerative changes. Electronically signed by: Ibrahima Valentin Date:    09/26/2024 Time:    07:46    X-Ray Hip 2 or 3 views Right with Pelvis when performed  Result Date: 9/26/2024  Degenerative changes. Electronically signed by: Ibrahima Valentin Date:    09/26/2024 Time:    07:35    CT Head Without Contrast  Result Date: 9/25/2024  1.  Stable left occipital lobe hemorrhage and mild surrounding edema. 2.  No new findings. 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 No convincing acute infection in the cervical spine. Multilevel DJD. Osteopenia about left C3-4 facet joint is favored to be degenerative although it is difficult to exclude osteomyelitis entirely. MRI with and without contrast suggested.  2.  Suspected acute osteomyelitis/septic arthritis of the sternomanubrial joint.  3. No definitive acute infection in the thoracic spine  4. No definitive acute infection in the lumbar spine  5. Incidental note of a left occipital parenchymal hematoma  6. Cholelithiasis     CT Head wo 9/24/2024 1004:  There is a 2 cm intraparenchymal hematoma within the left occipital lobe. Measures 2.0 x 1.8 x 1.5 cm. No intracranial mass. No mass effect.    US Abdomen 9/22/2024 0958: Cholelithiasis, fatty hepatomegaly, 1 cm hepatic hemangioma suspected in left lobe, non-emergent liver protocol CT recommended for further characterization     CT Head 9/21/2024: No acute intracranial abnormalities    CT Chest 9/21/2024: No acute or suspicious intrathoracic disease    CT Abdomen Pelvis 9/21/2024: No acute or suspicious abnormalities. Cholelithiasis.    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  No plans for mitral valve replacement at this time.  Patient with evidence of widespread osteomyelitis and sepsis.  Continue ICU for now.  We will plan to consult case management tomorrow for LTAC consideration.  Mobilize as tolerated.  Continue close monitoring.  Replace potassium.    DVT Prophylaxis: SCDs  GI Prophylaxis: H2B     32 minutes of critical care was time spent personally by me on the following activities: development of treatment plan with patient or surrogate and  bedside caregivers, discussions with consultants, evaluation of patient's response to treatment, examination of patient, ordering and performing treatments and interventions, ordering and review of laboratory studies, ordering and review of radiographic studies, pulse oximetry, re-evaluation of patient's condition.  This critical care time did not overlap with that of any other provider or involve time for any procedures.     JAVIER Coley MD  Pulmonary Critical Care Medicine  [facility] - 7th Floor ICU  DOS: 09/30/2024

## 2024-09-30 NOTE — PLAN OF CARE
Assessment and Plan:     Problem List Items Addressed This Visit     KALIA (obstructive sleep apnea)    Hyperuricemia    Essential hypertriglyceridemia    Essential hypertension - Primary    CKD (chronic kidney disease)    Benign prostatic hyperplasia with incomplete bladder emptying    Abnormal serum glucose level   Other Visit Diagnoses     Medicare annual wellness visit, subsequent        Questionnaire reviewed. Immunization and health screening history updated. Advance directive in place. BMI Counseling: There is no height or weight on file to calculate BMI. The BMI is above normal. Nutrition recommendations include decreasing portion sizes, encouraging healthy choices of fruits and vegetables, moderation in carbohydrate intake and increasing intake of lean protein. Exercise recommendations include exercising 3-5 times per week. No pharmacotherapy was ordered. Rationale for BMI follow-up plan is due to patient being overweight or obese. Depression Screening and Follow-up Plan: Patient was screened for depression during today's encounter. They screened negative with a PHQ-2 score of 0. Preventive health issues were discussed with patient, and age appropriate screening tests were ordered as noted in patient's After Visit Summary. Personalized health advice and appropriate referrals for health education or preventive services given if needed, as noted in patient's After Visit Summary.      History of Present Illness:     Patient presents for a Medicare Wellness Visit    HPI   Patient Care Team:  Richie Galeazzi, DO as PCP - General  Richie Galeazzi, DO as PCP - [address] [place] [address] (RTE)  Richie Galeazzi, DO as PCP - PCP-Estelle (RTE)  Tomi Sebastian MD as Faviola Avendano MD (Nephrology)     Review of Systems:     Review of Systems     Problem List:     Patient Active Problem List   Diagnosis   • Abnormal serum glucose level   • Essential hypertriglyceridemia   • Gout   • KALIA Received call from Ulises with [facility]. They have submitted to insurance for authorization.   (obstructive sleep apnea)   • Essential hypertension   • Vitamin D deficiency   • Knee pain   • Localized, primary osteoarthritis   • Osteoarthritis of knee   • Hyperuricemia   • Rectus diastasis   • Obesity, morbid (HCC)   • Calcification of liver   • CKD (chronic kidney disease)   • Acute hyperkalemia   • Microcytic anemia   • Hyperkalemia   • Benign prostatic hyperplasia with incomplete bladder emptying      Past Medical and Surgical History:     Past Medical History:   Diagnosis Date   • Acute renal failure superimposed on stage 3 chronic kidney disease ([address]) 2/25/2023   • MATILDE (acute kidney injury) ([address])    • Chronic kidney disease    • CPAP (continuous positive airway pressure) dependence    • Gout    • Hearing aid worn     sabrina   • Hernia, inguinal     RIH and umbilical hernia   • Hypertension    • Metabolic acidosis    • Morbidly obese ([address])    • Sleep apnea    • Wears glasses      Past Surgical History:   Procedure Laterality Date   • CARPAL TUNNEL RELEASE Bilateral    • COLONOSCOPY N/A 1/6/2017    Procedure: COLONOSCOPY;  Surgeon: Michelle Spangler MD;  Location: [facility];   Service:    • CYST REMOVAL     • FOOT SURGERY     • HERNIA REPAIR     • JOINT REPLACEMENT      knee left    • MT LAPAROSCOPY SURG RPR INITIAL INGUINAL HERNIA Right 3/16/2017    Procedure: REPAIR HERNIA INGUINAL, LAPAROSCOPIC WITH MESH;  Surgeon: Valda Duane, MD;  Location: [facility];  Service: General   • MT RPR UMBILICAL HRNA 5 YRS/> REDUCIBLE N/A 3/16/2017    Procedure: OPEN REPAIR HERNIA UMBILICAL;  Surgeon: Valda Duane, MD;  Location: [facility];  Service: General   • TONSILLECTOMY        Family History:     Family History   Problem Relation Age of Onset   • No Known Problems Mother    • Diabetes Father    • Diabetes Sister       Social History:     Social History     Socioeconomic History   • Marital status: /Civil Union     Spouse name: None   • Number of children: None   • Years of education: None   • Highest education level: None   Occupational History   • None   Tobacco Use   • Smoking status: Former     Types: Cigarettes     Quit date: 3/10/2003     Years since quittin.5   • Smokeless tobacco: Never   Vaping Use   • Vaping Use: Never used   Substance and Sexual Activity   • Alcohol use: No   • Drug use: No   • Sexual activity: None   Other Topics Concern   • None   Social History Narrative    Always uses seat belt    Feels safe at home     Social Determinants of Health     Financial Resource Strain: Low Risk  (2023)    Overall Financial Resource Strain (CARDIA)    • Difficulty of Paying Living Expenses: Not hard at all   Food Insecurity: Not on file   Transportation Needs: No Transportation Needs (2023)    PRAPARE - Transportation    • Lack of Transportation (Medical): No    • Lack of Transportation (Non-Medical): No   Physical Activity: Not on file   Stress: Not on file   Social Connections: Not on file   Intimate Partner Violence: Not on file   Housing Stability: Not on file      Medications and Allergies:     Current Outpatient Medications   Medication Sig Dispense Refill   • allopurinol (ZYLOPRIM) 300 mg tablet TAKE ONE TABLET BY MOUTH EVERY DAY 90 tablet 2   • aspirin 81 MG tablet Take 81 mg by mouth daily     • Cholecalciferol (VITAMIN D3) 5000 units TABS Take 1 tablet by mouth daily     • NIFEdipine (PROCARDIA XL) 60 mg 24 hr tablet Take 1 tablet (60 mg total) by mouth daily 90 tablet 2   • Omega-3 Fatty Acids (FISH OIL) 1200 MG CAPS Take 1 capsule by mouth 3 (three) times a day     • tamsulosin (FLOMAX) 0.4 mg TAKE 1 CAPSULE BY MOUTH EVERY DAY WITH DINNER (Patient not taking: Reported on 2023) 90 capsule 2     No current facility-administered medications for this visit.      No Known Allergies   Immunizations:     Immunization History   Administered Date(s) Administered   • COVID-19 MODERNA VACC 0.5 ML IM 2021, 2021   • INFLUENZA 2005, 2007, 10/17/2008, 2011, 2012, 11/15/2013, 11/01/2015, 11/01/2016, 11/16/2018   • Influenza Quadrivalent, 6-35 Months IM 11/01/2015   • Influenza Split High Dose Preservative Free IM 08/14/2017, 11/02/2019   • Influenza, high dose seasonal 0.7 mL 10/07/2020   • Influenza, seasonal, injectable 11/01/2016   • Pneumococcal Conjugate 13-Valent 07/29/2016   • Pneumococcal Polysaccharide PPV23 11/06/2009, 08/14/2017   • Td (adult), Unspecified 01/01/1993   • Zoster 02/08/2012, 07/29/2013      Health Maintenance:         Topic Date Due   • Hepatitis C Screening  Never done         Topic Date Due   • COVID-19 Vaccine (3 - Moderna series) 04/13/2021   • Influenza Vaccine (1) 09/01/2023      Medicare Screening Tests and Risk Assessments:     Pj Salazar is here for his Subsequent Wellness visit. Health Risk Assessment:   Patient rates overall health as very good. Patient feels that their physical health rating is same. Patient is satisfied with their life. Eyesight was rated as same. Hearing was rated as same. Patient feels that their emotional and mental health rating is slightly better. Patients states they are never, rarely angry. Patient states they are never, rarely unusually tired/fatigued. Pain experienced in the last 7 days has been none. Patient states that he has experienced no weight loss or gain in last 6 months. Depression Screening:   PHQ-2 Score: 0      Fall Risk Screening: In the past year, patient has experienced: no history of falling in past year      Home Safety:  Patient does not have trouble with stairs inside or outside of their home. Patient has working smoke alarms and has working carbon monoxide detector. Home safety hazards include: none. Nutrition:   Current diet is Regular. Medications:   Patient is currently taking over-the-counter supplements. OTC medications include: see medication list. Patient is able to manage medications.      Activities of Daily Living (ADLs)/Instrumental Activities of Daily Living (IADLs): Walk and transfer into and out of bed and chair?: Yes  Dress and groom yourself?: Yes    Bathe or shower yourself?: Yes    Feed yourself? Yes  Do your laundry/housekeeping?: Yes  Manage your money, pay your bills and track your expenses?: Yes  Make your own meals?: Yes    Do your own shopping?: Yes    Previous Hospitalizations:   Any hospitalizations or ED visits within the last 12 months?: Yes    How many hospitalizations have you had in the last year?: 1-2    Advance Care Planning:   Living will: Yes    Durable POA for healthcare: Yes    Advanced directive: Yes    Five wishes given: Yes      Cognitive Screening:   Provider or family/friend/caregiver concerned regarding cognition?: No    PREVENTIVE SCREENINGS      Cardiovascular Screening:    General: Screening Not Indicated and History Lipid Disorder      Diabetes Screening:     General: Screening Current      Colorectal Cancer Screening:     General: Screening Not Indicated      Prostate Cancer Screening:    General: Screening Not Indicated      Osteoporosis Screening:    General: Screening Not Indicated      Abdominal Aortic Aneurysm (AAA) Screening:    Risk factors include: tobacco use        General: Screening Not Indicated      Lung Cancer Screening:     General: Screening Not Indicated      Hepatitis C Screening:    General: Screening Not Indicated    Screening, Brief Intervention, and Referral to Treatment (SBIRT)    Screening  Typical number of drinks in a day: 0  Typical number of drinks in a week: 0  Interpretation: Low risk drinking behavior. Single Item Drug Screening:  How often have you used an illegal drug (including marijuana) or a prescription medication for non-medical reasons in the past year? never    Single Item Drug Screen Score: 0  Interpretation: Negative screen for possible drug use disorder    Brief Intervention  Alcohol & drug use screenings were reviewed. No concerns regarding substance use disorder identified.      Annual Depression Screening  Time spent screening and evaluating the patient for depression during today's encounter was 5 minutes. No results found.      Physical Exam:     /66 (BP Location: Left arm, Patient Position: Sitting, Cuff Size: Large)   Pulse 81   Temp 98.5 °F (36.9 °C)   Ht 5' 7.75" (1.721 m)   Wt 121 kg (266 lb 6.4 oz)   SpO2 96%   BMI 40.81 kg/m²     Physical Exam     Sophy DO Nicanor

## 2024-09-30 NOTE — PROGRESS NOTES
"[facility] - 7th Floor ICU  Orthopedics  Progress Note    Patient Name: Orlando Black  MRN: [id number]  Admission Date: 9/24/2024  Hospital Length of Stay: 6 days  Attending Provider: Quinn Juarez MD  Primary Care Provider: Janeth, Primary Doctor    Subjective:     Principal Problem:<principal problem not specified>    Principal Orthopedic Problem: 3 Days Post-Op   I&D R knee    Interval History: Seen in ICU, no family at bedside. Awake and conversational but confused this am. Dressing have been changed.     Review of patient's allergies indicates:  Not on File    Current Facility-Administered Medications   Medication    acetaminophen tablet 650 mg    amLODIPine tablet 5 mg    bisacodyL suppository 10 mg    camphor-methyl salicyl-menthoL 4-30-10 % Crea    dextrose 10% bolus 125 mL 125 mL    dextrose 10% bolus 250 mL 250 mL    famotidine (PF) injection 20 mg    labetaloL injection 10 mg    levETIRAcetam tablet 500 mg    LIDOcaine 5 % patch 1 patch    morphine injection 2 mg    mupirocin 2 % ointment    ondansetron disintegrating tablet 8 mg    oxacillin 2 g in 0.9% NaCl 100 mL IVPB (MB+)    potassium chloride 20 mEq in 100 mL IVPB (FOR CENTRAL LINE ADMINISTRATION ONLY)    QUEtiapine tablet 25 mg    sodium chloride 0.9% flush 10 mL    sodium chloride 0.9% flush 10 mL     Objective:     Vital Signs (Most Recent):  Temp: 98.3 °F (36.8 °C) (09/30/24 0400)  Pulse: 86 (09/30/24 0600)  Resp: 17 (09/30/24 0600)  BP: (!) 141/61 (09/30/24 0600)  SpO2: 96 % (09/30/24 0600) Vital Signs (24h Range):  Temp:  [98.3 °F (36.8 °C)-99.1 °F (37.3 °C)] 98.3 °F (36.8 °C)  Pulse:  [] 86  Resp:  [11-20] 17  SpO2:  [94 %-98 %] 96 %  BP: (116-158)/() 141/61     Weight: 52.7 kg (116 lb 2.9 oz)  Height: 5' 11" (180.3 cm)  Body mass index is 16.2 kg/m².      Intake/Output Summary (Last 24 hours) at 9/30/2024 0958  Last data filed at 9/30/2024 0615  Gross per 24 hour   Intake --   Output 1400 ml   Net -1400 ml " "      Physical Exam:   In the ICU                              RLE: -Skin: Island dressing, some effusion palpable, non-tender. No erythema.            -MSK: spontaneous motor in tact           -CV: Capillary refill is less than 2 seconds. +DP. Compartments soft and compressible    Diagnostic Findings:     Significant Labs: Wound Culture: No results for input(s): "LABAERO" in the last 48 hours.  All pertinent labs within the past 24 hours have been reviewed.    Significant Imaging: I have reviewed all pertinent imaging results/findings.     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     The above findings, diagnostics, and treatment plan were discussed with Dr Castro who is in agreement with the plan of care except as stated in additional documentation.      KERI Canchola   Orthopedic Trauma Surgery  [facility]    "

## 2024-09-30 NOTE — PROGRESS NOTES
INFECTIOUS DISEASE FOLLOW-UP NOTE       Patient Name: Orlando Black  Patient : 1950  Age/Sex: 74 y.o. female   Room/Bed: 703/703 A  Admission Date/Time: 2024  6:31 PM    Date: 2024  Time: 9:20 PM    Reason for follow-up:      Disseminated MSSA infection    Summary:     73-year-old female patient without any known past medical history, presented to outside hospital on 2024 with fever, generalized myalgias, back pain, confusion, admitted for sepsis, had MSSA bacteremia, noted to have mitral valve endocarditis, concern for right hip infection as well as right knee effusion, GPCs noted in knee fluid cultures, also had 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 She had worsening mental status on 2024 had a CT of the head on 2024 showed new left intraparenchymal hematoma and was transferred to [facility] for Neurosurgery, CV surgery, Infectious Disease Services.     Of note, the patient's  reports that the patient has had some eczematous rash that has been ongoing for the past month or so, patient's  has been helping manage this with over-the-counter medication, has not seen a physician     Antimicrobial history:      Zosyn  -   Vancomycin   Oxacillin -     hours events:      No acute events overnight  Remains hemodynamically stable    Subjective     Patient seen and examined, chart reviewed.     Review of Symptoms:     Patient reports no nausea, vomiting, diarrhea, cough, dyspnea, chest pain or palpitations    Objective     Vital signs  Vitals:    24 0530 24 0600 24 0800 24 1215   BP: (!) 160/75 133/79     BP Location:  Left arm     Patient Position:  Lying     Pulse: 91 93     Resp: 13 14     Temp:   99 °F (37.2 °C) 98.7 °F (37.1 °C)   TempSrc:       SpO2: 97% 98%     Weight:       Height:          Temp (24hrs), Av.9 °F (37.2 °C), Min:98.7 °F (37.1 °C), Max:99.2 °F (37.3 °C)      Physical  exam:  GEN: Awake, resting comfortably  EYES: EOMI, no scleral icterus  HENT: MMM. No oral lesions. Fair dentition.  NECK: Supple, no cervical lymphadenopathy or meningismus.   CARDIO: RRR, no murmur.  PULM/CHEST: CTAB. No increased work of breathing  ABD: Normal bowel sounds. Soft, not tender or distended. No HSM appreciated.  MSK: no obvious effusion, swelling, increased warmth, or erythema of major joints. No pedal edema.  SKIN: No rashes. No stigmata of endocarditis.  NEURO: AOx3. Speech fluent. Face symmetric.  PSYCH: Mood appropriate      Intake/Output Summary (Last 24 hours) at 9/29/2024 2120  Last data filed at 9/29/2024 1817  Gross per 24 hour   Intake --   Output 2100 ml   Net -2100 ml        Diagnostic Test     CBC, INR  Recent Labs   Lab 09/24/24 2034 09/25/24 0407 09/26/24 0323 09/27/24 0325 09/28/24  0016 09/29/24  0357   WBC 21.81* 18.87* 16.66* 18.44* 11.41 11.58*   HGB 11.4* 11.0* 10.3* 10.8* 9.9* 9.6*    236 251 320 382 430*   INR 1.1  --   --   --   --   --        BMP  Recent Labs   Lab 09/25/24 0407 09/26/24 0323 09/27/24 0325 09/28/24  0016 09/29/24  0357    146* 146* 150* 146*   K 3.1* 3.8 2.9* 3.6 3.1*    113* 113* 117* 112*   CO2 26 23 21* 23 24   BUN 21.2* 16.3 13.4 14.7 10.8   CREATININE 0.62 0.59 0.64 0.62 0.55   CALCIUM 8.0* 7.6* 8.4 7.7* 7.9*   MG 2.10 2.20 2.20 2.30 2.00   PHOS 3.5 3.7 3.4 4.1 3.8     Recent Labs   Lab 09/27/24 0325 09/28/24  0016 09/29/24  0357   WBC 18.44* 11.41 11.58*   HGB 10.8* 9.9* 9.6*   HCT 32.6* 30.1* 30.8*    382 430*     Estimated Creatinine Clearance: 74.7 mL/min (based on SCr of 0.55 mg/dL).    Lab Results   Component Value Date    CREATININE 0.55 09/29/2024    CREATININE 0.62 09/28/2024    CREATININE 0.64 09/27/2024    ALKPHOS 205 (H) 09/29/2024    ALKPHOS 254 (H) 09/28/2024    ALKPHOS 331 (H) 09/27/2024       Lab Results   Component Value Date    .50 (H) 09/25/2024      Medications   Inpatient  Scheduled Meds:    amLODIPine  5 mg Oral Daily    famotidine (PF)  20 mg Intravenous BID    levETIRAcetam  500 mg Oral BID    mupirocin   Nasal BID    oxacillin IV (PEDS and ADULTS)  2 g Intravenous Q4H     Continuous Infusions:  PRN Meds:.  Current Facility-Administered Medications:     acetaminophen, 650 mg, Oral, Q6H PRN    bisacodyL, 10 mg, Rectal, Daily PRN    camphor-methyl salicyl-menthoL, , Topical (Top), BID PRN    dextrose 10%, 12.5 g, Intravenous, PRN    dextrose 10%, 25 g, Intravenous, PRN    labetalol, 10 mg, Intravenous, Q15 Min PRN    LIDOcaine, 1 patch, Transdermal, Q24H PRN    morphine, 2 mg, Intravenous, Q6H PRN    ondansetron, 8 mg, Oral, Q8H PRN    QUEtiapine, 25 mg, Oral, BID PRN    sodium chloride 0.9%, 10 mL, Intravenous, PRN    sodium chloride 0.9%, 10 mL, Intravenous, PRN    Home Meds  No current outpatient medications    Imaging (personally reviewed):     CT Head Without Contrast   Final Result      Stable small left occipital lobe hematoma.         Electronically signed by: Petrona Nguyen   Date:    09/27/2024   Time:    06:32      CT Hip W W/O Contrast Right   Final Result      MRI Cervical Spine W WO Cont   Final Result      1. Findings consistent with discitis/osteomyelitis at C3-C6, involving the left C2-C3 facet joint, with epidural enhancement and small prevertebral abscess.   2. Moderate to severe canal narrowing at C5-C6, moderate at C4-C5 and C6-C7, mild at C3-C4.   3. Multilevel neural foraminal stenoses as described.   4. No definite cord signal abnormality.   5. Suspected areas of fluid in the posterior paraspinal soft tissues of the upper thoracic spine.         Electronically signed by: Petrona Nguyen   Date:    09/27/2024   Time:    10:32      MRI Brain W WO Contrast   Final Result      Stable acute hemorrhage in the left occipital lobe      Punctate areas of scattered restricted diffusion in the frontal lobes bilaterally as well as the right temporal region.  Findings are suggestive of  possible embolic phenomena.         Electronically signed by: Feliberto Cardona   Date:    09/26/2024   Time:    14:56      X-Ray Hip 2 or 3 views Right with Pelvis when performed   Final Result      Degenerative changes.         Electronically signed by: Ibrahima Valentin   Date:    09/26/2024   Time:    07:35      X-Ray Knee Complete 4 Or More Views Right   Final Result      Degenerative changes.         Electronically signed by: Ibrahima Valentin   Date:    09/26/2024   Time:    07:46      CT Head Without Contrast   Final Result      1.  Stable left occipital lobe hemorrhage and mild surrounding edema.      2.  No new findings.         Electronically signed by: Wade Marie   Date:    09/25/2024   Time:    20:33      IR Embolization (CNS) Intracranial   Final Result      Fluoroscopic support.  Please refer to procedure of this dictation.         Electronically signed by: Petrona Nguyen   Date:    09/25/2024   Time:    18:05      CT Head Without Contrast   Final Result      1. Left occipital lobe parenchymal hematoma with mild surrounding edema.   2. Chronic microvascular ischemic changes.         Electronically signed by: Petrona Nguyen   Date:    09/25/2024   Time:    10:17      CTA Head and Neck (xpd)   Final Result      1. No large vessel occlusion or flow-limiting stenosis.   2. 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   3. Partially visualized indeterminate retrosternal soft tissue.         Electronically signed by: Petrona Nguyen   Date:    09/25/2024   Time:    10:23      CT Previous   Final Result          Assessment:     Orlando Black is a 74 y.o. female with:  Severe sepsis 2/2 staph bacteremia  MSSA bacteremia: source unclear    Positive blood culture 9/24 and 25   Blood culture 9/29 is pending  Mitral valve endocarditis(TTE showed possible medium mobile mass on the posterior leaflet. There is moderate MR)  Concern for C-spine infection   Concern for right knee septic  arthritis   Right hip septic arthritis S/P washout of the R knee 09/27. Culture remain negative  Intracranial hemorrhage    Plan:     Continue IV oxacillin 2 g Q 4 hours. Can be given as continuous infusion 12 g  Pending final BCX from yesterday  If bcx remains positive, we will consider adding ertapenem for potential synergy  Anticipating long course of IV antibiotic in the form of PICC line   No PICC line until blood cultures negative times 48 hours    All questions and concerns addressed with patient and understands and agrees with plan.      Thank you very much for allowing me to participate in the care of this patient.      ID will continue to follow. Please page with questions.    Valerio Torrez MD   Attending, Infectious Disease     Speech recognition software was used in the preparation of this note, errors in transcription may be present. Please call/page if there are questions.      -CP free at this time.  -Hold Atorvastatin 2/2 Transaminitis.  Trend LFTs and resume once normalized.  -Not on ASA at this time, suspect likely 2/2 hx of Hemorrhoidal bleeding and currently on Eliquis. D/W Dr Levy about restarting.

## 2024-09-30 NOTE — PLAN OF CARE
Spoke to patient's  , Edil. He would like referral sent to [facility]. Sent referral over care port and called Radha with [facility].

## 2024-09-30 NOTE — PLAN OF CARE
09/30/24 0904   Discharge Reassessment   Assessment Type Discharge Planning Reassessment   Did the patient's condition or plan change since previous assessment? Yes   Discharge Plan discussed with: Patient   Communicated JALYN with patient/caregiver Date not available/Unable to determine   Discharge Plan A Long-term acute care facility (LTAC)   Discharge Plan B Other  (to be determined)   DME Needed Upon Discharge  other (see comments)  (to be determined)   Transition of Care Barriers None   Why the patient remains in the hospital Requires continued medical care   Post-Acute Status   Post-Acute Authorization Placement   Post-Acute Placement Status Patient List Provided   Patient choice form signed by patient/caregiver List with quality metrics by geographic area provided   Discharge Delays None known at this time     Spoke to patient about LTAC placement. She is not sure she wants to go to [facility] in [place] but does want to consider her family convenience. She would like me to talk to her , Edil about choices. I attempted to call Edil, but no answer. Did text him the choices via Care Port and will follow up.

## 2024-10-01 LAB
ALBUMIN SERPL-MCNC: 1.6 G/DL (ref 3.4–4.8)
ALBUMIN/GLOB SERPL: 0.6 RATIO (ref 1.1–2)
ALP SERPL-CCNC: 146 UNIT/L (ref 40–150)
ALT SERPL-CCNC: 24 UNIT/L (ref 0–55)
ANION GAP SERPL CALC-SCNC: 9 MEQ/L
AST SERPL-CCNC: 18 UNIT/L (ref 5–34)
BASOPHILS # BLD AUTO: 0.04 X10(3)/MCL
BASOPHILS NFR BLD AUTO: 0.4 %
BILIRUB SERPL-MCNC: 0.5 MG/DL
BUN SERPL-MCNC: 7.3 MG/DL (ref 9.8–20.1)
CALCIUM SERPL-MCNC: 7.6 MG/DL (ref 8.4–10.2)
CHLORIDE SERPL-SCNC: 106 MMOL/L (ref 98–107)
CO2 SERPL-SCNC: 25 MMOL/L (ref 23–31)
CREAT SERPL-MCNC: 0.55 MG/DL (ref 0.55–1.02)
CREAT/UREA NIT SERPL: 13
EOSINOPHIL # BLD AUTO: 0.16 X10(3)/MCL (ref 0–0.9)
EOSINOPHIL NFR BLD AUTO: 1.6 %
ERYTHROCYTE [DISTWIDTH] IN BLOOD BY AUTOMATED COUNT: 13.7 % (ref 11.5–17)
GFR SERPLBLD CREATININE-BSD FMLA CKD-EPI: >60 ML/MIN/1.73/M2
GLOBULIN SER-MCNC: 2.8 GM/DL (ref 2.4–3.5)
GLUCOSE SERPL-MCNC: 88 MG/DL (ref 82–115)
HCT VFR BLD AUTO: 26.9 % (ref 37–47)
HGB BLD-MCNC: 8.5 G/DL (ref 12–16)
IMM GRANULOCYTES # BLD AUTO: 0.13 X10(3)/MCL (ref 0–0.04)
IMM GRANULOCYTES NFR BLD AUTO: 1.3 %
LYMPHOCYTES # BLD AUTO: 1.22 X10(3)/MCL (ref 0.6–4.6)
LYMPHOCYTES NFR BLD AUTO: 11.9 %
MAGNESIUM SERPL-MCNC: 1.7 MG/DL (ref 1.6–2.6)
MCH RBC QN AUTO: 31.4 PG (ref 27–31)
MCHC RBC AUTO-ENTMCNC: 31.6 G/DL (ref 33–36)
MCV RBC AUTO: 99.3 FL (ref 80–94)
MONOCYTES # BLD AUTO: 0.89 X10(3)/MCL (ref 0.1–1.3)
MONOCYTES NFR BLD AUTO: 8.7 %
NEUTROPHILS # BLD AUTO: 7.82 X10(3)/MCL (ref 2.1–9.2)
NEUTROPHILS NFR BLD AUTO: 76.1 %
NRBC BLD AUTO-RTO: 0 %
PHOSPHATE SERPL-MCNC: 3.3 MG/DL (ref 2.3–4.7)
PLATELET # BLD AUTO: 471 X10(3)/MCL (ref 130–400)
PMV BLD AUTO: 9 FL (ref 7.4–10.4)
POCT GLUCOSE: 103 MG/DL (ref 70–110)
POCT GLUCOSE: 116 MG/DL (ref 70–110)
POTASSIUM SERPL-SCNC: 3.1 MMOL/L (ref 3.5–5.1)
PROT SERPL-MCNC: 4.4 GM/DL (ref 5.8–7.6)
RBC # BLD AUTO: 2.71 X10(6)/MCL (ref 4.2–5.4)
SODIUM SERPL-SCNC: 140 MMOL/L (ref 136–145)
WBC # BLD AUTO: 10.26 X10(3)/MCL (ref 4.5–11.5)

## 2024-10-01 PROCEDURE — 25000003 PHARM REV CODE 250: Performed by: INTERNAL MEDICINE

## 2024-10-01 PROCEDURE — 63600175 PHARM REV CODE 636 W HCPCS: Performed by: INTERNAL MEDICINE

## 2024-10-01 PROCEDURE — 83735 ASSAY OF MAGNESIUM: CPT | Performed by: STUDENT IN AN ORGANIZED HEALTH CARE EDUCATION/TRAINING PROGRAM

## 2024-10-01 PROCEDURE — 25000003 PHARM REV CODE 250: Performed by: NEUROLOGICAL SURGERY

## 2024-10-01 PROCEDURE — 63600175 PHARM REV CODE 636 W HCPCS: Performed by: STUDENT IN AN ORGANIZED HEALTH CARE EDUCATION/TRAINING PROGRAM

## 2024-10-01 PROCEDURE — 36415 COLL VENOUS BLD VENIPUNCTURE: CPT | Performed by: STUDENT IN AN ORGANIZED HEALTH CARE EDUCATION/TRAINING PROGRAM

## 2024-10-01 PROCEDURE — 85025 COMPLETE CBC W/AUTO DIFF WBC: CPT | Performed by: STUDENT IN AN ORGANIZED HEALTH CARE EDUCATION/TRAINING PROGRAM

## 2024-10-01 PROCEDURE — 84100 ASSAY OF PHOSPHORUS: CPT | Performed by: STUDENT IN AN ORGANIZED HEALTH CARE EDUCATION/TRAINING PROGRAM

## 2024-10-01 PROCEDURE — 97535 SELF CARE MNGMENT TRAINING: CPT

## 2024-10-01 PROCEDURE — 97530 THERAPEUTIC ACTIVITIES: CPT | Mod: CQ

## 2024-10-01 PROCEDURE — 63600175 PHARM REV CODE 636 W HCPCS: Performed by: GENERAL PRACTICE

## 2024-10-01 PROCEDURE — 25000003 PHARM REV CODE 250: Performed by: STUDENT IN AN ORGANIZED HEALTH CARE EDUCATION/TRAINING PROGRAM

## 2024-10-01 PROCEDURE — 25000003 PHARM REV CODE 250

## 2024-10-01 PROCEDURE — 80053 COMPREHEN METABOLIC PANEL: CPT | Performed by: STUDENT IN AN ORGANIZED HEALTH CARE EDUCATION/TRAINING PROGRAM

## 2024-10-01 PROCEDURE — 25000003 PHARM REV CODE 250: Performed by: GENERAL PRACTICE

## 2024-10-01 PROCEDURE — 21400001 HC TELEMETRY ROOM

## 2024-10-01 RX ORDER — CEFAZOLIN SODIUM 2 G/50ML
2 SOLUTION INTRAVENOUS
Status: DISCONTINUED | OUTPATIENT
Start: 2024-10-01 | End: 2024-10-06

## 2024-10-01 RX ADMIN — OXACILLIN 2 G: 2 INJECTION, POWDER, FOR SOLUTION INTRAMUSCULAR; INTRAVENOUS at 07:10

## 2024-10-01 RX ADMIN — CEFAZOLIN SODIUM 2 G: 2 SOLUTION INTRAVENOUS at 06:10

## 2024-10-01 RX ADMIN — FAMOTIDINE 20 MG: 10 INJECTION, SOLUTION INTRAVENOUS at 08:10

## 2024-10-01 RX ADMIN — ACETAMINOPHEN 650 MG: 325 TABLET ORAL at 01:10

## 2024-10-01 RX ADMIN — OXACILLIN 2 G: 2 INJECTION, POWDER, FOR SOLUTION INTRAMUSCULAR; INTRAVENOUS at 04:10

## 2024-10-01 RX ADMIN — QUETIAPINE FUMARATE 50 MG: 25 TABLET ORAL at 08:10

## 2024-10-01 RX ADMIN — LIDOCAINE 1 PATCH: 50 PATCH CUTANEOUS at 01:10

## 2024-10-01 RX ADMIN — POTASSIUM BICARBONATE 40 MEQ: 391 TABLET, EFFERVESCENT ORAL at 08:10

## 2024-10-01 RX ADMIN — LABETALOL HYDROCHLORIDE 10 MG: 5 INJECTION, SOLUTION INTRAVENOUS at 12:10

## 2024-10-01 RX ADMIN — AMLODIPINE BESYLATE 5 MG: 5 TABLET ORAL at 08:10

## 2024-10-01 RX ADMIN — LEVETIRACETAM 500 MG: 500 TABLET, FILM COATED ORAL at 08:10

## 2024-10-01 RX ADMIN — POTASSIUM BICARBONATE 50 MEQ: 977.5 TABLET, EFFERVESCENT ORAL at 08:10

## 2024-10-01 RX ADMIN — CEFAZOLIN SODIUM 2 G: 2 SOLUTION INTRAVENOUS at 10:10

## 2024-10-01 NOTE — PT/OT/SLP PROGRESS
Occupational Therapy   Treatment    Name: Orlando Black  MRN: [id number]    Recommendations:     Recommended therapy intensity at discharge: Moderate Intensity Therapy   Discharge Equipment Recommendations:  to be determined by next level of care  Barriers to discharge:  Other (Comment) (ongoing medical needs, severity of deficits)    Assessment:     Orlando Black is a 74 y.o. 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 Performance deficits affecting function are weakness, impaired endurance, impaired self care skills, impaired functional mobility, gait instability, impaired balance, decreased lower extremity function, decreased upper extremity function, impaired cognition, decreased safety awareness. She presents pleasantly confused throughout session and required verbal cues throughout for safety awareness. She required max A for bed mobility and mod-mod A x2 for functional t/fs within room using rolling walker. Continue to recommend moderate intensity therapy upon d/c.     Rehab Prognosis:  Good; patient would benefit from acute skilled OT services to address these deficits and reach maximum level of function.       Plan:     Patient to be seen 5 x/week to address the above listed problems via self-care/home management, therapeutic activities, therapeutic exercises  Plan of Care Expires: 10/28/24  Plan of Care Reviewed with: patient    Subjective     Pain/Comfort:  Pain Rating 1:  (reported R hip pain with mobility; unable to rate)  Pain Addressed 1: Reposition, Nurse notified    Objective:      Communicated with: RN prior to session.  Patient found HOB elevated with blood pressure cuff, pulse ox (continuous), telemetry, PureWick, peripheral IV upon OT entry to room.    General Precautions: Standard, fall (-140)    Orthopedic Precautions:RLE weight bearing as tolerated  Braces: N/A  Respiratory Status: Room air  Vital Signs: Blood Pressure: 156/83 , RN aware     Occupational Performance:     Bed Mobility:    Patient completed Supine to Sit with maximal assistance     Functional Mobility/Transfers:  Patient completed Sit <> Stand Transfer with moderate assistance  with  rolling walker   Patient completed Bed <> Chair Transfer using Step Transfer technique with moderate assistance and of x2 persons with rolling walker  Functional Mobility: pt with difficulty lifting LLE to advance and required frequent seated rest breaks after completing tasks while standing.     Activities of Daily Living:  Grooming: minimum assistance for oral care while standing at the sink.   Lower Body Dressing: maximal assistance to don socks.   Toileting: maximal assistance for perineal hygiene.     Therapeutic Positioning    OT interventions performed during the course of today's session in an effort to prevent and/or reduce acquired pressure injuries:   Therapeutic positioning was provided at the conclusion of session to offload all bony prominences for the prevention and/or reduction of pressure injuries    Skin assessment: all bony prominences were assessed    Findings:  Visible skin intct.     AMPA 6 Click ADL: 15    Patient Education:  Patient provided with verbal education education regarding OT role/goals/POC, fall prevention, safety awareness, and Discharge/DME recommendations.  Understanding was verbalized.      Patient left up in chair with all lines intact, call button in reach, chair alarm on, nieves pad in place, and RN notified.    GOALS:   Multidisciplinary Problems       Occupational Therapy Goals           Problem: Occupational Therapy    Goal Priority Disciplines Outcome Interventions   Occupational Therapy Goal     OT, PT/OT Progressing    Description: LTG: Pt will perform basic ADLs and ADL transfers with Modified independence using LRAD by discharge.    STG: to be met by 10/28/24    Pt will complete grooming standing at sink with LRAD with SBA.  Pt will complete UB dressing with SBA.  Pt will complete LB dressing with SBA using LRAD.  Pt will complete toileting with SBA using LRAD.  Pt will complete functional mobility to/from toilet and toilet transfer with SBA using LRAD.                         Time Tracking:     OT Date of Treatment: 10/01/24  OT Start Time: 0911  OT Stop Time: 0949  OT Total Time (min): 38 min    Billable Minutes:Self Care/Home Management 38 minutes.     OT/TY: OT     Number of TY visits since last OT visit: 1    10/1/2024

## 2024-10-01 NOTE — PROGRESS NOTES
[facility] - 7th Floor ICU  Pulmonary Critical Care Note    Patient Name: Orlando Black  MRN: [id number]  Admission Date: 9/24/2024  Hospital Length of Stay: 7 days  Code Status: Full Code  Attending Provider: Quinn Juarez MD  Primary Care Provider: Janeth, Primary Doctor     Subjective:     HPI:   Orlando Black is a 74 y.o. female with an no known pmh (last hospitalization in 1970s for childbirth) who presented to [facility] as a transfer from [facility] on 9/24/2024 for Neurosurgery and CV Surgery services given new left occipital intraparenchymal hemorrhage and mitral valve endocarditis in the setting of Staph bacteremia. Patient presented to outside hospital on 9/21/2024 for fever, arthralgia, and back pain. Admitted for sepsis work up. 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 Patient then developed altered mental status overnight on 9/23/2024 and underwent CT Head on 9/24/2024 which showed new left occipital intraparenchymal hematoma measuring 2.0 x 1.8 x 1.5 cm. Transferred to [facility] for Neurosurgery and CV Surgery services.     Hospital Course/Significant events:  9/24/2024: Admitted to ICU with new left occipital IPH, transfer from [facility]  9/25/2024: Cerebral angiogram with incidental finding of left cavernous ICA aneurysm, no mycotic aneurysm    24 Hour Interval History:  Patient is awake and alert this morning.  Nursing reports intermittent confusion.  Answers questions appropriately.  Sat up with PT yesterday.  Tolerating p.o. intake well.  Oxacillin day 7.  LTAC evaluation underway.    History reviewed. No pertinent past medical history.    Past Surgical History:   Procedure Laterality Date    KNEE ARTHROSCOPY W/ DEBRIDEMENT Right 9/27/2024    Procedure: ARTHROSCOPY, KNEE, WITH DEBRIDEMENT;  Surgeon: Omid Castro,  MD;  Location: [facility];  Service: Orthopedics;  Laterality: Right;       Social History     Socioeconomic History    Marital status:      Social Drivers of Health     Financial Resource Strain: Patient Declined (9/26/2024)    Overall Financial Resource Strain (CARDIA)     Difficulty of Paying Living Expenses: Patient declined   Food Insecurity: Patient Declined (9/26/2024)    Hunger Vital Sign     Worried About Running Out of Food in the Last Year: Patient declined     Ran Out of Food in the Last Year: Patient declined   Transportation Needs: Patient Declined (9/26/2024)    TRANSPORTATION NEEDS     Transportation : Patient declined   Stress: Patient Declined (9/26/2024)    [employer] - Occupational Stress Questionnaire     Feeling of Stress : Patient declined   Housing Stability: Patient Declined (9/26/2024)    Housing Stability Vital Sign     Unable to Pay for Housing in the Last Year: Patient declined     Homeless in the Last Year: Patient declined           No current outpatient medications    Current Inpatient Medications   amLODIPine  5 mg Oral Daily    famotidine (PF)  20 mg Intravenous BID    levETIRAcetam  500 mg Oral BID    oxacillin IV (PEDS and ADULTS)  2 g Intravenous Q4H    potassium bicarbonate  40 mEq Oral Once       Current Intravenous Infusions          Review of Systems   Respiratory:  Negative for shortness of breath.    Cardiovascular:  Negative for chest pain.   Musculoskeletal:  Positive for joint pain (Minimal right hip and right knee pain, improved).          Objective:       Intake/Output Summary (Last 24 hours) at 10/1/2024 0827  Last data filed at 9/30/2024 1609  Gross per 24 hour   Intake 1018.21 ml   Output 1100 ml   Net -81.79 ml         Vital Signs (Most Recent):  Temp: 98.1 °F (36.7 °C) (10/01/24 0400)  Pulse: 81 (10/01/24 0700)  Resp: 11 (10/01/24 0700)  BP: 103/62 (10/01/24 0700)  SpO2: (!) 94 % (10/01/24 0700)  Body mass index is 16.2  kg/m².  Weight: 52.7 kg (116 lb 2.9 oz) Vital Signs (24h Range):  Temp:  [98.1 °F (36.7 °C)-98.7 °F (37.1 °C)] 98.1 °F (36.7 °C)  Pulse:  [] 81  Resp:  [10-30] 11  SpO2:  [94 %-97 %] 94 %  BP: (103-150)/(61-85) 103/62     Physical Exam  Constitutional:       General: She is not in acute distress.     Appearance: She is not diaphoretic.   HENT:      Head: Normocephalic and atraumatic.   Eyes:      Conjunctiva/sclera: Conjunctivae normal.      Pupils: Pupils are equal, round, and reactive to light.   Cardiovascular:      Rate and Rhythm: Normal rate.      Comments: Loud S1  Pulmonary:      Effort: No respiratory distress.      Breath sounds: Normal breath sounds. No wheezing.   Abdominal:      General: There is no distension.      Palpations: Abdomen is soft.      Tenderness: There is no abdominal tenderness.   Musculoskeletal:         General: Swelling (Right knee edema) and tenderness (Right hip tender to palpation) present.   Skin:     General: Skin is warm and dry.   Neurological:      Comments: Awake and alert this morning.  Improved overall.           Lines/Drains/Airways       Drain  Duration             Female External Urinary Catheter w/ Suction 09/29/24 1700 1 day              Peripheral Intravenous Line  Duration                  Peripheral IV - Single Lumen 09/28/24 0447 Anterior;Left Forearm 3 days         Peripheral IV - Single Lumen 09/28/24 0447 Distal;Posterior;Right Forearm 3 days         Peripheral IV - Single Lumen 09/30/24 1220 Anterior;Left Upper Arm <1 day                    Significant Labs:    Lab Results   Component Value Date    WBC 10.26 10/01/2024    HGB 8.5 (L) 10/01/2024    HCT 26.9 (L) 10/01/2024    MCV 99.3 (H) 10/01/2024     (H) 10/01/2024           BMP  Lab Results   Component Value Date     10/01/2024    K 3.1 (L) 10/01/2024    CO2 25 10/01/2024    BUN 7.3 (L) 10/01/2024    CREATININE 0.55 10/01/2024    CALCIUM 7.6 (L) 10/01/2024    AGAP 9.0 10/01/2024  "        ABG  No results for input(s): "PH", "PO2", "PCO2", "HCO3", "POCBASEDEF" in the last 168 hours.    Mechanical Ventilation Support:         Micro:  Outside Hospital  Blood culture 9/22/2024: Staph aureus (BCID mecA not applicable) so MSSA    Urine culture 9/22/2024: no growth in 24 hours    Body Fluid Culture 9/24/2024 (right knee effusion): Rare GPC, Many WBCs    Significant Imaging:  I have reviewed the pertinent imaging within the past 24 hours.    CT Head Without Contrast  Result Date: 9/27/2024  Stable small left occipital lobe hematoma. Electronically signed by: Petrona Nguyen Date:    09/27/2024 Time:    06:32    MRI Brain W WO Contrast  Result Date: 9/26/2024  Stable acute hemorrhage in the left occipital lobe Punctate areas of scattered restricted diffusion in the frontal lobes bilaterally as well as the right temporal region.  Findings are suggestive of possible embolic phenomena. Electronically signed by: Feliberto Cardona Date:    09/26/2024 Time:    14:56      X-Ray Knee Complete 4 Or More Views Right  Result Date: 9/26/2024  Degenerative changes. Electronically signed by: Ibrahima Valentin Date:    09/26/2024 Time:    07:46    X-Ray Hip 2 or 3 views Right with Pelvis when performed  Result Date: 9/26/2024  Degenerative changes. Electronically signed by: Ibrahima Valentin Date:    09/26/2024 Time:    07:35    CT Head Without Contrast  Result Date: 9/25/2024  1.  Stable left occipital lobe hemorrhage and mild surrounding edema. 2.  No new findings. 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 No convincing acute infection in the cervical spine. Multilevel DJD. Osteopenia about left C3-4 facet joint " is favored to be degenerative although it is difficult to exclude osteomyelitis entirely. MRI with and without contrast suggested.  2. Suspected acute osteomyelitis/septic arthritis of the sternomanubrial joint.  3. No definitive acute infection in the thoracic spine  4. No definitive acute infection in the lumbar spine  5. Incidental note of a left occipital parenchymal hematoma  6. Cholelithiasis     CT Head wo 9/24/2024 1004:  There is a 2 cm intraparenchymal hematoma within the left occipital lobe. Measures 2.0 x 1.8 x 1.5 cm. No intracranial mass. No mass effect.    US Abdomen 9/22/2024 0958: Cholelithiasis, fatty hepatomegaly, 1 cm hepatic hemangioma suspected in left lobe, non-emergent liver protocol CT recommended for further characterization     CT Head 9/21/2024: No acute intracranial abnormalities    CT Chest 9/21/2024: No acute or suspicious intrathoracic disease    CT Abdomen Pelvis 9/21/2024: No acute or suspicious abnormalities. Cholelithiasis.    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  No plans for mitral valve replacement at this time.  Patient with evidence of widespread osteomyelitis and sepsis.  Continue ICU for now.  Will transition to the floor today while awaiting LTAC evaluation and acceptance.  Mobilize as tolerated.  Continue close monitoring.  Replace potassium.    DVT Prophylaxis: SCDs  GI Prophylaxis: H2B     32 minutes  of critical care was time spent personally by me on the following activities: development of treatment plan with patient or surrogate and bedside caregivers, discussions with consultants, evaluation of patient's response to treatment, examination of patient, ordering and performing treatments and interventions, ordering and review of laboratory studies, ordering and review of radiographic studies, pulse oximetry, re-evaluation of patient's condition.  This critical care time did not overlap with that of any other provider or involve time for any procedures.     JAVIER Coley MD  Pulmonary Critical Care Medicine  [facility] - 7th Floor ICU  DOS: 10/01/2024

## 2024-10-01 NOTE — PROGRESS NOTES
INFECTIOUS DISEASE FOLLOW-UP NOTE       Patient Name: Orlando Black  Patient : 1950  Age/Sex: 74 y.o. female   Room/Bed: 703/703 A  Admission Date/Time: 2024  6:31 PM    Date: 10/1/2024  Time: 9:20 PM    Reason for follow-up:      Disseminated MSSA infection    Summary:     73-year-old female patient without any known past medical history, presented to outside hospital on 2024 with fever, generalized myalgias, back pain, confusion, admitted for sepsis, had MSSA bacteremia, noted to have mitral valve endocarditis, concern for right hip infection as well as right knee effusion, GPCs noted in knee fluid cultures, also had 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 She had worsening mental status on 2024 had a CT of the head on 2024 showed new left intraparenchymal hematoma and was transferred to [facility] for Neurosurgery, CV surgery, Infectious Disease Services.     Of note, the patient's  reports that the patient has had some eczematous rash that has been ongoing for the past month or so, patient's  has been helping manage this with over-the-counter medication, has not seen a physician     Blood culture from  remained negative.    Antimicrobial history:      Zosyn  -   Vancomycin   Oxacillin -     hours events:      No acute events overnight  Remains hemodynamically stable    Subjective     Patient seen and examined, chart reviewed.  Patient reports feeling okay.  She reports no specific complaints.  She was tearful when I told her that her most recent blood culture has been negative.  She told me that she wants to go home.    Review of Symptoms:     Patient reports no nausea, vomiting, diarrhea, cough, dyspnea, chest pain or palpitations    Objective     Vital signs  Vitals:    10/01/24 0200 10/01/24 0300 10/01/24 0400 10/01/24 0600   BP: (!) 147/61   (!) 144/73   BP Location: Right arm   Right forearm    Patient Position: Lying   Lying   Pulse: 93 87 80 83   Resp: 13 13 12 14   Temp:   98.1 °F (36.7 °C)    TempSrc:   Oral    SpO2: 95% 95% 96% 95%   Weight:       Height:          Temp (24hrs), Av.4 °F (36.9 °C), Min:98.1 °F (36.7 °C), Max:98.7 °F (37.1 °C)      Physical exam:  GEN: Awake, resting comfortably  EYES: EOMI, no scleral icterus  HENT: MMM. No oral lesions. Fair dentition.  NECK: Supple, no cervical lymphadenopathy or meningismus.   CARDIO: RRR, no murmur.  PULM/CHEST: CTAB. No increased work of breathing  ABD: Normal bowel sounds. Soft, not tender or distended. No HSM appreciated.  MSK: no obvious effusion, swelling, increased warmth, or erythema of major joints. No pedal edema.  SKIN: No rashes. No stigmata of endocarditis.  NEURO: AOx3. Speech fluent. Face symmetric.  PSYCH: Mood appropriate      Intake/Output Summary (Last 24 hours) at 10/1/2024 0642  Last data filed at 2024 1609  Gross per 24 hour   Intake 1018.21 ml   Output 1100 ml   Net -81.79 ml        Diagnostic Test     CBC, INR  Recent Labs   Lab 245 24  0016 24  0357 24  0337 10/01/24  0431   WBC 21.81*   < > 18.44* 11.41 11.58* 10.81 10.26   HGB 11.4*   < > 10.8* 9.9* 9.6* 9.5* 8.5*      < > 320 382 430* 433* 471*   INR 1.1  --   --   --   --   --   --     < > = values in this interval not displayed.       BMP  Recent Labs   Lab 24  0016 24  0357 24  0337 10/01/24  0431   * 150* 146* 143 140   K 2.9* 3.6 3.1* 2.6* 3.1*   * 117* 112* 107 106   CO2 21* 23 24 25 25   BUN 13.4 14.7 10.8 8.3* 7.3*   CREATININE 0.64 0.62 0.55 0.55 0.55   CALCIUM 8.4 7.7* 7.9* 8.0* 7.6*   MG 2.20 2.30 2.00 1.80 1.70   PHOS 3.4 4.1 3.8 2.8 3.3     Recent Labs   Lab 24  0357 24  0337 10/01/24  0431   WBC 11.58* 10.81 10.26   HGB 9.6* 9.5* 8.5*   HCT 30.8* 28.9* 26.9*   * 433* 471*     Estimated Creatinine Clearance: 74.7 mL/min  (based on SCr of 0.55 mg/dL).    Lab Results   Component Value Date    CREATININE 0.55 10/01/2024    CREATININE 0.55 09/30/2024    CREATININE 0.55 09/29/2024    ALKPHOS 146 10/01/2024    ALKPHOS 176 (H) 09/30/2024    ALKPHOS 205 (H) 09/29/2024       Lab Results   Component Value Date    .50 (H) 09/25/2024      Medications   Inpatient  Scheduled Meds:   amLODIPine  5 mg Oral Daily    famotidine (PF)  20 mg Intravenous BID    levETIRAcetam  500 mg Oral BID    oxacillin IV (PEDS and ADULTS)  2 g Intravenous Q4H     Continuous Infusions:  PRN Meds:.  Current Facility-Administered Medications:     acetaminophen, 650 mg, Oral, Q6H PRN    bisacodyL, 10 mg, Rectal, Daily PRN    camphor-methyl salicyl-menthoL, , Topical (Top), BID PRN    dextrose 10%, 12.5 g, Intravenous, PRN    dextrose 10%, 25 g, Intravenous, PRN    labetalol, 10 mg, Intravenous, Q15 Min PRN    LIDOcaine, 1 patch, Transdermal, Q24H PRN    morphine, 2 mg, Intravenous, Q6H PRN    ondansetron, 8 mg, Oral, Q8H PRN    QUEtiapine, 50 mg, Oral, BID PRN    sodium chloride 0.9%, 10 mL, Intravenous, PRN    sodium chloride 0.9%, 10 mL, Intravenous, PRN    Home Meds  No current outpatient medications    Imaging (personally reviewed):     CT Head Without Contrast   Final Result      Stable small left occipital lobe hematoma.         Electronically signed by: Petrona Nguyen   Date:    09/27/2024   Time:    06:32      CT Hip W W/O Contrast Right   Final Result      MRI Cervical Spine W WO Cont   Final Result      1. Findings consistent with discitis/osteomyelitis at C3-C6, involving the left C2-C3 facet joint, with epidural enhancement and small prevertebral abscess.   2. Moderate to severe canal narrowing at C5-C6, moderate at C4-C5 and C6-C7, mild at C3-C4.   3. Multilevel neural foraminal stenoses as described.   4. No definite cord signal abnormality.   5. Suspected areas of fluid in the posterior paraspinal soft tissues of the upper thoracic spine.          Electronically signed by: Petrona Nguyen   Date:    09/27/2024   Time:    10:32      MRI Brain W WO Contrast   Final Result      Stable acute hemorrhage in the left occipital lobe      Punctate areas of scattered restricted diffusion in the frontal lobes bilaterally as well as the right temporal region.  Findings are suggestive of possible embolic phenomena.         Electronically signed by: Feliberto Cardona   Date:    09/26/2024   Time:    14:56      X-Ray Hip 2 or 3 views Right with Pelvis when performed   Final Result      Degenerative changes.         Electronically signed by: Ibrahima Valentin   Date:    09/26/2024   Time:    07:35      X-Ray Knee Complete 4 Or More Views Right   Final Result      Degenerative changes.         Electronically signed by: Ibrahima Valentin   Date:    09/26/2024   Time:    07:46      CT Head Without Contrast   Final Result      1.  Stable left occipital lobe hemorrhage and mild surrounding edema.      2.  No new findings.         Electronically signed by: Wade Marie   Date:    09/25/2024   Time:    20:33      IR Embolization (CNS) Intracranial   Final Result      Fluoroscopic support.  Please refer to procedure of this dictation.         Electronically signed by: Petrona Nguyen   Date:    09/25/2024   Time:    18:05      CT Head Without Contrast   Final Result      1. Left occipital lobe parenchymal hematoma with mild surrounding edema.   2. Chronic microvascular ischemic changes.         Electronically signed by: Petrona Nguyen   Date:    09/25/2024   Time:    10:17      CTA Head and Neck (xpd)   Final Result      1. No large vessel occlusion or flow-limiting stenosis.   2. 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   3. Partially visualized indeterminate retrosternal soft tissue.         Electronically signed by: Petrona Nguyen   Date:    09/25/2024   Time:    10:23      CT Previous   Final Result          Assessment:     Orlando Black is a  74 y.o. female with:  Severe sepsis 2/2 staph bacteremia  MSSA bacteremia: source unclear    Positive blood culture 9/24 and 25   Blood culture 9/29 NGTD  Mitral valve endocarditis(TTE showed possible medium mobile mass on the posterior leaflet. There is moderate MR)  Concern for C-spine infection   Concern for right knee septic arthritis   Right hip septic arthritis S/P washout of the R knee 09/27. Culture remain negative  Intracranial hemorrhage    Plan:     We will transition oxacillin cefazolin 2 g every 8 hours for OPAT convenience  Pending final BCX from 9/29, NGTD  If blood culture remains negative at 48 hours, may place PICC line  Tentative antibiotic days 9/29-11/13  We will need to follow CBC, BMP weekly while on IV antibiotic  We will make follow-up appointment in the ID clinic    All questions and concerns addressed with patient and understands and agrees with plan.      Thank you very much for allowing me to participate in the care of this patient.      ID will continue to follow. Please page with questions.    Valerio Torrez MD   Attending, Infectious Disease     Speech recognition software was used in the preparation of this note, errors in transcription may be present. Please call/page if there are questions.

## 2024-10-01 NOTE — PLAN OF CARE
Problem: Adult Inpatient Plan of Care  Goal: Plan of Care Review  Outcome: Progressing  Goal: Patient-Specific Goal (Individualized)  Outcome: Progressing  Goal: Absence of Hospital-Acquired Illness or Injury  Outcome: Progressing  Goal: Optimal Comfort and Wellbeing  Outcome: Progressing  Goal: Readiness for Transition of Care  Outcome: Progressing     Problem: Stroke, Subarachnoid Hemorrhage  Goal: Optimal Coping  Outcome: Progressing  Goal: Effective Bowel Elimination  Outcome: Progressing  Goal: Optimal Cerebral Tissue Perfusion  Outcome: Progressing  Goal: Optimal Cognitive Function  Outcome: Progressing  Goal: Effective Communication Skills  Outcome: Progressing  Goal: Optimal Functional Ability  Outcome: Progressing  Goal: Optimal Nutrition Intake  Outcome: Progressing  Goal: Optimal Pain Control and Function  Outcome: Progressing  Goal: Effective Oxygenation and Ventilation  Outcome: Progressing  Goal: Improved Sensorimotor Function  Outcome: Progressing  Goal: Safe and Effective Swallow  Outcome: Progressing  Goal: Effective Urinary Elimination  Outcome: Progressing     Problem: Skin Injury Risk Increased  Goal: Skin Health and Integrity  Outcome: Progressing     Problem: Wound  Goal: Optimal Coping  Outcome: Progressing  Goal: Optimal Functional Ability  Outcome: Progressing  Goal: Absence of Infection Signs and Symptoms  Outcome: Progressing  Goal: Improved Oral Intake  Outcome: Progressing  Goal: Optimal Pain Control and Function  Outcome: Progressing  Goal: Skin Health and Integrity  Outcome: Progressing  Goal: Optimal Wound Healing  Outcome: Progressing     Problem: Infection  Goal: Absence of Infection Signs and Symptoms  Outcome: Progressing     Problem: Fall Injury Risk  Goal: Absence of Fall and Fall-Related Injury  Outcome: Progressing

## 2024-10-01 NOTE — PLAN OF CARE
Dr. Galdamez willing to do P2P today. Gave Ulises with [facility] the MD number and availability today between 3 and 4.

## 2024-10-01 NOTE — H&P
[facility] Hospital Medicine History & Physical Examination       Patient Name: Orlando Black  MRN: [id number]  Patient Class: IP- Inpatient   Admission Date: 10/01/2024   Admitting Service: Hospital Medicine   Length of Stay: 7  Attending Physician: Gladis Galdamez MD   Primary Care Provider: Janeth Primary Doctor  Face-to-Face encounter date: 10/01/2024  Code Status: Full code       Present at Bedside: None   Patient information was obtained from patient, patient's family, past medical records and ER records.      HISTORY OF PRESENT ILLNESS:   Orlando Black is a 74 y.o. female without significant past medical history who presented to [facility] on 9/24/2024 transferred from [place] for neurosurgery and CV surgery services.  Patient originally presented to outside hospital on 09/21/2024 for fever and body aches.  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  Patient developed altered mental status overnight on 09/23/2024 and underwent CT head on 09/24/2024 which showed new left occipital intraparenchymal hematoma measuring 2 x 1.8 x 1.5 cm.  Patient was transferred to [facility] ICU for higher level care.  Neurosurgery, interventional neurology, and CV surgery services were consulted.  Patient was started on broad-spectrum antibiotics Vancomycin and Zosyn.  CV surgery recommended 6 weeks of antibiotics with repeat echocardiogram and evaluation. Patient underwent DSA on 09/25/2024 which revealed no evidence of mycotic aneurysm in the left occipital region, no evidence of AVM or AV fistula, and incidental left cavernous internal carotid artery aneurysm measuring 5.5 x 4.6 cm.  Neurosurgery recommended q.2 hours neurochecks, BP parameters below 140/90, and Keppra b.i.d..   Infectious Disease and orthopedic services were consulted.  Patient was started on Oxacillin 2 g q.4 hours.  MRI cervical spine findings consistent with diskitis/osteomyelitis at C3-C6 involving the left C2-C3 facet joint with epidural enhancement and saw mall prevertebral abscess, moderate to severe canal narrowing at C5-C6, moderate at C4-C5, and C6-C7; mild canal narrowing at C3-C4, multilevel neuroforaminal stenosis, and suspected areas of fluid in the posterior paraspinal soft tissues of the upper thoracic spine.  Patient underwent washout of right knee on 09/27 with Dr. Castro.  Therapy services were consulted.  Patient is currently awaiting LTAC evaluation and acceptance.  Patient was cleared for downgrade out of ICU on 10/01/2024 and admitted to hospital medicine service.     PAST MEDICAL HISTORY:   Reviewed and negative     PAST SURGICAL HISTORY:     Past Surgical History:   Procedure Laterality Date    KNEE ARTHROSCOPY W/ DEBRIDEMENT Right 9/27/2024    Procedure: ARTHROSCOPY, KNEE, WITH DEBRIDEMENT;  Surgeon: Omid Castro MD;  Location: [facility];  Service: Orthopedics;  Laterality: Right;       FAMILY HISTORY:   Reviewed and negative    SOCIAL HISTORY:   Denied alcohol, tobacco or illicit drug use.     Screening for Social Drivers for health:  Patient screened for food insecurity, housing instability, transportation needs, utility difficulties, and interpersonal safety (select all that apply as identified as concern)  []Housing or Food  []Transportation Needs  []Utility Difficulties  []Interpersonal safety  [x]None    ALLERGIES:   Patient has no allergy information on record.    HOME MEDICATIONS:     Prior to Admission medications    Not on File     ________________________________________________________________________  INPATIENT LIST OF MEDICATIONS     Current Facility-Administered Medications:     acetaminophen tablet 650 mg, 650 mg, Oral, Q6H PRN, Divya Lewis MD, 650 mg at 10/01/24 0127     amLODIPine tablet 5 mg, 5 mg, Oral, Daily, Irena Schaffer DO, 5 mg at 10/01/24 0852    bisacodyL suppository 10 mg, 10 mg, Rectal, Daily PRN, Divya Lewis MD    camphor-methyl salicyl-menthoL 4-30-10 % Crea, , Topical (Top), BID PRN, Divya Lewis MD, Given at 09/25/24 1612    cefazolin (ANCEF) 2 gram in dextrose 5% 50 mL IVPB (premix), 2 g, Intravenous, Q8H, Valerio Torrez MD, Last Rate: 100 mL/hr at 10/01/24 1040, 2 g at 10/01/24 1040    dextrose 10% bolus 125 mL 125 mL, 12.5 g, Intravenous, PRN, Rosemary Mcdonnell MD    dextrose 10% bolus 250 mL 250 mL, 25 g, Intravenous, PRN, Rosemary Mcdonnell MD    famotidine (PF) injection 20 mg, 20 mg, Intravenous, BID, Divya Lewis MD, 20 mg at 10/01/24 0852    labetaloL injection 10 mg, 10 mg, Intravenous, Q15 Min PRN, Divya Lewis MD, 10 mg at 09/30/24 1659    levETIRAcetam tablet 500 mg, 500 mg, Oral, BID, Radha Macedo MD, 500 mg at 10/01/24 0852    LIDOcaine 5 % patch 1 patch, 1 patch, Transdermal, Q24H PRN, Divya Lewis MD, 1 patch at 10/01/24 0127    morphine injection 2 mg, 2 mg, Intravenous, Q6H PRN, Divya Lewis MD, 2 mg at 09/29/24 0021    ondansetron disintegrating tablet 8 mg, 8 mg, Oral, Q8H PRN, Zion Solis MD    QUEtiapine tablet 50 mg, 50 mg, Oral, BID PRN, Felicia Bey MD, 50 mg at 09/30/24 2037    sodium chloride 0.9% flush 10 mL, 10 mL, Intravenous, PRN, Divay Lewis MD    sodium chloride 0.9% flush 10 mL, 10 mL, Intravenous, PRN, Divya Lewis MD    Scheduled Meds:   amLODIPine  5 mg Oral Daily    ceFAZolin (Ancef) IV (PEDS and ADULTS)  2 g Intravenous Q8H    famotidine (PF)  20 mg Intravenous BID    levETIRAcetam  500 mg Oral BID     Continuous Infusions:  PRN Meds:.  Current Facility-Administered Medications:     acetaminophen, 650 mg, Oral, Q6H PRN    bisacodyL, 10 mg, Rectal, Daily PRN    camphor-methyl salicyl-menthoL, , Topical (Top), BID PRN    dextrose 10%, 12.5 g, Intravenous, PRN    dextrose 10%, 25 g, Intravenous, PRN     labetalol, 10 mg, Intravenous, Q15 Min PRN    LIDOcaine, 1 patch, Transdermal, Q24H PRN    morphine, 2 mg, Intravenous, Q6H PRN    ondansetron, 8 mg, Oral, Q8H PRN    QUEtiapine, 50 mg, Oral, BID PRN    sodium chloride 0.9%, 10 mL, Intravenous, PRN    sodium chloride 0.9%, 10 mL, Intravenous, PRN      REVIEW OF SYSTEMS:   Except as documented, all other systems reviewed and negative.    PHYSICAL EXAM:     VITAL SIGNS: 24 HRS MIN & MAX LAST   Temp  Min: 97.5 °F (36.4 °C)  Max: 98.7 °F (37.1 °C) 97.5 °F (36.4 °C)   BP  Min: 103/62  Max: 151/76 (!) 145/71   Pulse  Min: 80  Max: 109  88   Resp  Min: 10  Max: 30 15   SpO2  Min: 94 %  Max: 98 % 96 %       General appearance: Elderly female in no apparent distress. Sitting up in chair   HENT: Atraumatic head.   Eyes: Normal extraocular movements.   Neck: Supple.   Lungs: Clear to auscultation bilaterally.   Heart: Regular rate and rhythm.  Abdomen: Soft, non-distended, non-tender.  Neuro: Awake, alert, and oriented. Psych/mental status: Appropriate mood and affect. Responds appropriately to questions.     LABS AND IMAGING:     Recent Labs   Lab 09/29/24  0357 09/30/24  0337 10/01/24  0431   WBC 11.58* 10.81 10.26   RBC 3.06* 3.01* 2.71*   HGB 9.6* 9.5* 8.5*   HCT 30.8* 28.9* 26.9*   .7* 96.0* 99.3*   MCH 31.4* 31.6* 31.4*   MCHC 31.2* 32.9* 31.6*   RDW 14.3 13.8 13.7   * 433* 471*   MPV 9.2 9.3 9.0       Recent Labs   Lab 09/29/24  0357 09/30/24  0337 10/01/24  0431   * 143 140   K 3.1* 2.6* 3.1*   * 107 106   CO2 24 25 25   BUN 10.8 8.3* 7.3*   CREATININE 0.55 0.55 0.55   CALCIUM 7.9* 8.0* 7.6*   MG 2.00 1.80 1.70   ALBUMIN 1.7* 1.7* 1.6*   ALKPHOS 205* 176* 146   ALT 38 30 24   AST 25 21 18   BILITOT 0.6 0.6 0.5       Microbiology Results (last 7 days)       Procedure Component Value Units Date/Time    Body Fluid Culture [[id number]] Collected: 09/27/24 1303    Order Status: Completed Specimen: Body Fluid from Joint Fluid, Right Knee Updated:  10/01/24 0839     Body Fluid Culture No growth at 4 days    Blood Culture [[id number]]  (Normal) Collected: 09/29/24 1229    Order Status: Completed Specimen: Blood Updated: 09/30/24 1301     Blood Culture No Growth At 24 Hours    Blood Culture [[id number]]  (Normal) Collected: 09/29/24 1229    Order Status: Completed Specimen: Blood Updated: 09/30/24 1301     Blood Culture No Growth At 24 Hours    Anaerobic Culture [[id number]] Collected: 09/27/24 1303    Order Status: Completed Specimen: Body Fluid from Joint Fluid, Right Knee Updated: 09/30/24 0812     Anaerobe Culture No Anaerobes Isolated    Blood Culture [[id number]]  (Abnormal)  (Susceptibility) Collected: 09/26/24 0003    Order Status: Completed Specimen: Blood Updated: 09/28/24 0700     Blood Culture Methicillin Sensitive Staphylococcus aureus     GRAM STAIN Gram Positive Cocci, probable Staphylococcus      Seen in gram stain of broth only      2 of 2 bottles positive    Blood Culture [[id number]]  (Abnormal)  (Susceptibility) Collected: 09/26/24 0004    Order Status: Completed Specimen: Blood Updated: 09/28/24 0659     Blood Culture Methicillin Sensitive Staphylococcus aureus     GRAM STAIN Gram Positive Cocci, probable Staphylococcus      Seen in gram stain of broth only      1 of 2 Aerobic bottles positive    Gram Stain [[id number]] Collected: 09/27/24 1303    Order Status: Completed Specimen: Body Fluid from Joint Fluid, Right Knee Updated: 09/27/24 1511     GRAM STAIN Many WBC observed      No bacteria seen    Fungal Culture [[id number]] Collected: 09/27/24 1303    Order Status: Sent Specimen: Body Fluid from Joint Fluid, Right Knee Updated: 09/27/24 1350    Blood Culture [[id number]]  (Abnormal)  (Susceptibility) Collected: 09/24/24 2034    Order Status: Completed Specimen: Blood Updated: 09/27/24 1133     Blood Culture Methicillin Sensitive Staphylococcus aureus     GRAM STAIN Gram Positive Cocci, probable Staphylococcus      Seen in gram stain of  broth only      2 of 2 bottles positive    Blood Culture [[id number]]  (Abnormal)  (Susceptibility) Collected: 09/24/24 2034    Order Status: Completed Specimen: Blood Updated: 09/27/24 1133     Blood Culture Methicillin Sensitive Staphylococcus aureus     GRAM STAIN Gram Positive Cocci, probable Staphylococcus      Seen in gram stain of broth only      2 of 2 bottles positive    Urine culture [[id number]] Collected: 09/24/24 2153    Order Status: Completed Specimen: Urine, Catheterized Updated: 09/26/24 0827     Urine Culture No Growth    BCID2 Panel [[id number]]  (Abnormal) Collected: 09/24/24 2034    Order Status: Completed Specimen: Blood Updated: 09/25/24 1253     CTX-M (ESBL ) N/A     IMP (Cabapenemase ) N/A     KPC resistance gene (Carbapenemase ) N/A     mcr-1 N/A     mecA ID N/A     Comment: Note: Antimicrobial resistance can occur via multiple mechanisms. A Not Detected result for antimicrobial resistance gene(s) does not indicate antimicrobial susceptibility. Subculturing is required for species identification and susceptibility testing of   isolates.        mecA/C and MREJ (MRSA) gene Not Detected     NDM (Carbapenemase ) N/A     OXA-48-like (Carbapenemase ) N/A     Maria L/B (VRE gene) N/A     VIM (Carbapenemase ) N/A     Enterococcus faecalis Not Detected     Enterococcus faecium Not Detected     Listeria monocytogenes Not Detected     Staphylococcus spp. Detected     Staphylococcus aureus Detected     Staphylococcus epidermidis Not Detected     Staphylococcus lugdunensis Not Detected     Streptococcus spp. Not Detected     Streptococcus agalactiae (Group B) Not Detected     Streptococcus pneumoniae Not Detected     Streptococcus pyogenes (Group A) Not Detected     Acinetobacter calcoaceticus/baumannii complex Not Detected     Bacteroides fragilis Not Detected     Enterobacterales Not Detected     Enterobacter cloacae complex Not Detected     Escherichia  coli Not Detected     Klebsiella aerogenes Not Detected     Klebsiella oxytoca Not Detected     Klebsiella pneumoniae group Not Detected     Proteus spp. Not Detected     Salmonella spp. Not Detected     Serratia marcescens Not Detected     Haemophilus influenzae Not Detected     Neisseria meningitidis Not Detected     Pseudomonas aeruginosa Not Detected     Stenotrophomonas maltophilia Not Detected     Candida albicans Not Detected     Candida auris Not Detected     Candida glabrata Not Detected     Candida krusei Not Detected     Candida parapsilosis Not Detected     Candida tropicalis Not Detected     Cryptococcus neoformans/gattii Not Detected    Narrative:      The [employer] BCID2 Panel is a multiplexed nucleic acid test intended for the use with CENX® 2.0 or CENX® T2 [employer] Systems for the simultaneous qualitative detection and identification of multiple bacterial and yeast nucleic acids and select genetic determinants associated with antimicrobial resistance.  The [employer] BCID2 Panel test is performed directly on blood culture samples identified as positive by a continuous monitoring blood culture system.  Results are intended to be interpreted in conjunction with Gram stain results.             MRI Cervical Spine W WO Cont  Narrative: EXAMINATION:  MRI CERVICAL SPINE W WO CONTRAST    CLINICAL HISTORY:  Osteomyelitis;    TECHNIQUE:  Multiplanar, multisequence MR imaging of the cervical spine with and without contrast.    COMPARISON:  CTA head neck dated 09/25/2024    FINDINGS:  There is reversal of normal cervical lordosis.  There is bone marrow edema and enhancement at C3 through C6, with associated disc edema.  There is edema and enhancement in the left C2-C3 facets.  The vertebral body heights are maintained.    There is no cord signal abnormality.  Epidural enhancement is noted C4 through C6 measuring up to 5 mm in thickness.  There is no definite epidural fluid collection.    There  is prevertebral enhancement at C3 through C6 measure up to 10 mm in thickness, with small fluid collection at the level of C4-C5 measuring up to 7 mm in thickness (series 20, image 14).  There is enhancement surrounding the left C2-C3 facet.  There is suspected small areas of fluid in the posterior paraspinal soft tissues of the upper thoracic spine, partially imaged (series 19, images 4 and 8).    Disc level analysis as follows:    C2-C3: No disc herniation.  No significant spinal canal or neural foraminal stenosis.    C3-C4: Post disc osteophyte complex and thickening of the ligamentum flavum with mild narrowing of the spinal canal.  Mild right and moderate to severe left neural foraminal stenosis secondary to uncovertebral and facet hypertrophy.    C4-C5: Posterior disc osteophyte complex with thickening of the ligamentum flavum, epidural enhancement and moderate narrowing of the spinal canal.  Severe bilateral foraminal stenosis secondary to uncovertebral and facet hypertrophy.    C5-C6: Disc osteophyte complex with thickening of the ligamentum flavum and epidural enhancement with moderate to severe narrowing of the spinal canal.  Severe bilateral neural foraminal stenosis secondary to uncovertebral and facet hypertrophy.    C6-C7: Posterior disc osteophyte complex and thickening of the ligamentum flavum and moderate narrowing of the spinal canal.  Severe bilateral foraminal stenosis secondary to uncovertebral and facet hypertrophy.    C7-T1: No disc herniation.  No significant spinal canal or neural foraminal stenosis.  Impression: 1. Findings consistent with discitis/osteomyelitis at C3-C6, involving the left C2-C3 facet joint, with epidural enhancement and small prevertebral abscess.  2. Moderate to severe canal narrowing at C5-C6, moderate at C4-C5 and C6-C7, mild at C3-C4.  3. Multilevel neural foraminal stenoses as described.  4. No definite cord signal abnormality.  5. Suspected areas of fluid in the  posterior paraspinal soft tissues of the upper thoracic spine.    Electronically signed by: Petrona Nguyen  Date:    09/27/2024  Time:    10:32  CT Head Without Contrast  Narrative: EXAMINATION:  CT HEAD WITHOUT CONTRAST    CLINICAL HISTORY:  Stroke, follow up;left occipital lobe hemorrhage fu;    TECHNIQUE:  Axial scans were obtained from skull base to the vertex.    Coronal and sagittal reconstructions obtained from the axial data.    Automatic exposure control was utilized to limit radiation dose.    Contrast: None    Radiation Dose:    Total DLP: 950 mGy*cm    COMPARISON:  MRI brain dated 09/26/2024    FINDINGS:  There is stable appearance of the small left occipital lobe parenchymal hemorrhage with surrounding edema.  The brain parenchyma is unchanged with patchy hypodensities in the subcortical and periventricular white matter.    There is no significant mass effect or midline shift.  The basal cisterns are patent.  The ventricles are stable in size.  The calvarium and skull base are intact.  There is mild scattered paranasal sinus mucosal thickening.  Impression: Stable small left occipital lobe hematoma.    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, involving the left C2-C3 facet joint, with epidural enhancement and small prevertebral abscess  Suspected acute osteomyelitis/septic arthritis of the sternomanubrial joint  Possible osteomyelitis of left C3-4 facet joint vs degenerative   Concern for right knee septic arthritis s/p washout 9/27  Right hip arthritis, 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      Plan:  Neurology signed off-recommended outpatient follow-up in 2 months   No neurosurgical intervention per neurosurgery,  signed off   Oxacillin transitioned to Cefazolin 2 g TID per ID recommendations   CV surgery signed off-recommended 6 weeks of antibiotic therapy followed by repeat echo  Orthopedics swallowing, appreciate recommendations   PT/OT   Case management on board-currently awaiting evaluation and acceptance to LTAC  Resume home medications as deemed appropriate once medication reconciliation is updated  Labs in AM    VTE Prophylaxis: SCDs    Discharge Planning and Disposition: TBD    I, Reza Damon PA-C have reviewed and discussed the case with Gladis Cornejo MD   Please see the attending MD's addendum for further assessment and plan.    Reza Damon PA-C  Department of Hospital Medicine   [facility]   10/01/2024    This note was created with the assistance of [employer] voice recognition software. There may be transcription errors as a result of using this technology, however minimal. Effort has been made to assure accuracy of transcription, but any obvious errors or omissions should be clarified with the author of the document.    _______________________________________________________________________________  MD Addendum:  I, Dr. Gladis ocrnejo MD   assumed care of this patient today For the patient encounter, I performed the substantive portion of the visit, I reviewed the NP/PA documentation, treatment plan, and medical decision making.  I had face to face time with this patient   74-year-old female who was admitted to an outside hospital for fever workup found to have MSSA bacteremia, mitral valve endocarditis, concern for right hip infection as well as right knee effusion, also concern about  sternal manubrial  osteomyelitis and C-spine osteomyelitis apparently patient had a worsening mental status on September 23rd had CT of the head done that showed left intraparenchymal hematoma so patient was transferred to [facility] for Neurosurgery, CT surgery and Infectious Diseases.   Neurosurgery was consulted no indication for any type of acute neurosurgical intervention patient was started on Keppra patient was continued on oxacillin per ID.  Interventional Neurology was consulted patient had cerebral angiogram done that did not reveal any mycotic aneurysm but did show left incidental cavernous ICA aneurysm they suspected ICH secondary to hypertension but could not completely rule out mycotic aneurysm despite and negative DSA CT surgery was consulted to for mitral valve endocarditis and they recommended 6 weeks of IV antibiotics with repeat echocardiogram and evaluation orthopedics was consulted for right and surgical cultures have been negative but patient has been on antibiotics before the I and D.  Blood cultures have been negative from September 29th id has transitioned from oxacillin to cefazolin 2 g every 8 hours if blood cultures remain negative for 48 hours id is okay with PICC line tentative antibiotic end date will be November 13th.  Patient has been downgraded to hospitalist service for further management and care   General alert awake oriented   Chest clear to auscultate   Abdomen soft nontender     Continue with IV cefazolin with end date of November 13  CT surgery's recommending 6 weeks of IV antibiotics and then follow up with them with the echo   Neurosurgery is recommending continuing Keppra for seizure prophylaxis no surgical recommendations   Status post I&D of the right knee cultures remain negative  Case management consulted for LTAC placement   Patient has slight hypokalemia we will give p.o. potassium   Continue with amlodipine  Continue with Keppra for seizure prophylaxis    Prophylaxis: SCD   No chemical prophylaxis because of brain bleed      :      All diagnosis and differential diagnosis have been reviewed; assessment and plan has been documented; I have personally reviewed the labs and test results that are presently available; I have reviewed the patients  medication list; I have reviewed the consulting providers response and recommendations. I have reviewed or attempted to review medical records based upon their availability.    All of the patient and family questions have been addressed and answered. Patient's is agreeable to the above stated plan. I will continue to monitor closely and make adjustments to medical management as needed.      10/01/2024

## 2024-10-01 NOTE — PT/OT/SLP PROGRESS
"Physical Therapy Treatment    Patient Name:  Orlando Black   MRN:  [id number]    Recommendations:     Discharge therapy intensity: Moderate Intensity Therapy   Discharge Equipment Recommendations: to be determined by next level of care  Barriers to discharge: Impaired mobility, Ongoing medical needs, and poor insight into deficits    Assessment:     Orlando Black is a 74 y.o. female admitted with a medical diagnosis of  left occipital intraparenchymal hematoma, cerebral angiogram 9/25, left cavernous ICA aneurysm, staph bacteremia, MV endocarditis with mitral valve regurgitation, acute osteomyelitis/septic arthritis of the sternomanubrial joint, possible osteomyelitis of left C3-4 facet joint vs degenerative changes, right hip arthritis, 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.  She presents with the following impairments/functional limitations: weakness, gait instability, impaired endurance, impaired balance, decreased lower extremity function, decreased ROM, decreased safety awareness, impaired self care skills, impaired functional mobility.    Pt pleasantly confused t/o session. Frequent urination limiting activity today. Recommending moderate intensity at d/c to improve functional mobility and decrease risk of falls.     Rehab Prognosis: Fair; patient would benefit from acute skilled PT services to address these deficits and reach maximum level of function.    Recent Surgery: Procedure(s) (LRB):  ARTHROSCOPY, KNEE, WITH DEBRIDEMENT (Right) 4 Days Post-Op    Plan:     During this hospitalization, patient would benefit from acute PT services 5 x/week to address the identified rehab impairments via gait training, therapeutic activities, therapeutic exercises and progress toward the following goals:    Plan of Care Expires:  10/28/24    Subjective     Chief Complaint: "my hair is dirty"   Patient/Family Comments/goals: wants to shower  Pain/Comfort:  Pain Rating 1: other (see comments) (reported R hip " "pain with mobility; unable to rate)      Objective:     Communicated with RN prior to session.  Patient found HOB elevated with blood pressure cuff, pulse ox (continuous), telemetry, PureWick, peripheral IV upon PT entry to room.     General Precautions: Standard, fall, other (see comments) (-140)  Orthopedic Precautions: RLE weight bearing as tolerated  Braces: N/A  Respiratory Status: Room air  Blood Pressure: 156/83, HR 94, Spo2 98%  Skin Integrity: Visible skin intact      Functional Mobility:  Bed Mobility:    Supine to sit with max assist  Transfers:    Sit<->stand with mod assist  Stand step/pivot transfer bed to recliner with mod assist x 2 progressing towards max assist. Pt reporting need to "sit" when prison between bed and chair requiring increased assistance to complete pivot; pt reporting need to sit 2/2 need to void. Poor ability to lift LLE but was able to pivot L foot.   Balance:   Decreased standing tolerance due to need to void again. Min assist for balance.     Therapeutic Activities/Exercises:      Education:  Patient provided with verbal education education regarding PT role/goals/POC, fall prevention, and safety awareness.  Additional teaching is warranted.     Patient left up in chair with all lines intact, call button in reach, chair alarm on, nieves pad in place, nurse notified, and posey vest applied.    GOALS:   Multidisciplinary Problems       Physical Therapy Goals          Problem: Physical Therapy    Goal Priority Disciplines Outcome Interventions   Physical Therapy Goal     PT, PT/OT Progressing    Description: Goals to be met by: 10/28/24     Patient will increase functional independence with mobility by performin. Supine to sit with Stand-by Assistance  2. Sit to supine with Stand-by Assistance  3. Sit to stand transfer with Stand-by Assistance  4. Bed to chair transfer with Stand-by Assistance using Rolling Walker  5. Gait  x 200 feet with Stand-by Assistance using " Lonny Walker.                          Time Tracking:     PT Received On: 10/01/24  PT Start Time: 0911     PT Stop Time: 0949  PT Total Time (min): 38 min     Billable Minutes: Therapeutic Activity 3 units    Treatment Type: Treatment  PT/PTA: PTA     Number of PTA visits since last PT visit: 1     10/01/2024

## 2024-10-01 NOTE — PLAN OF CARE
Received call from Ulises at [facility]. Insurance is requesting a P2P today with MD. Amee hospitalist, Dr. Galdamez with request. Also requesting at least 3 denials from SNF to show they cannot administer the IV antibiotics. Called and informed , Sent SNF choices to him via [facility].

## 2024-10-01 NOTE — PLAN OF CARE
Referrals for SNF placement sent to [facility], [place],[facility]. Made facility aware of 6 weeks of IV abx. Awaiting response at this time.     Spoke to Juana at [facility] who stated that they would only be able to accommodate iv abx q12 or q24 hours. They do not have the staff available to accommodate every 8 hours.     Spoke to Karlene at [facility] who stated that their facility is not able to accommodate iv abx at all.     Spoke to Zuri at [facility] who stated that she would have to review the information and get with her insurance to make sure it is something they can accommodate. She will get back with me when she has an answer.    Zuri at [facility] stated that the patient's insurance only covers 70% of the patient's stay, so in order for them to accept the patient would be liable for 30% of their stay, including the cost of the iv med, as long as she has a picc line of a midline placement. Will make VANGIE Keita aware of this response and discuss how to proceed with this placement.    Spoke to patient's  Edil and let him know that they would be responsible for 30% of the patient's stay in order to go to [facility], which would amount to roughly $225 a day. He stated they would not be able to afford that portion.    Spoke to Fahad at [facility] - they do not have a bed available.     Spoke to Mya at [place] [facility] - They are unable to accommodate iv abx q8 hours. They would only be able to accommodate every 12 hours.

## 2024-10-02 LAB
ALBUMIN SERPL-MCNC: 1.8 G/DL (ref 3.4–4.8)
ALBUMIN/GLOB SERPL: 0.6 RATIO (ref 1.1–2)
ALP SERPL-CCNC: 148 UNIT/L (ref 40–150)
ALT SERPL-CCNC: 10 UNIT/L (ref 0–55)
ANION GAP SERPL CALC-SCNC: 9 MEQ/L
AST SERPL-CCNC: 18 UNIT/L (ref 5–34)
BACTERIA FLD CULT: NORMAL
BASOPHILS # BLD AUTO: 0.03 X10(3)/MCL
BASOPHILS NFR BLD AUTO: 0.3 %
BILIRUB SERPL-MCNC: 0.5 MG/DL
BUN SERPL-MCNC: 7 MG/DL (ref 9.8–20.1)
CALCIUM SERPL-MCNC: 7.9 MG/DL (ref 8.4–10.2)
CHLORIDE SERPL-SCNC: 105 MMOL/L (ref 98–107)
CO2 SERPL-SCNC: 27 MMOL/L (ref 23–31)
CREAT SERPL-MCNC: 0.57 MG/DL (ref 0.55–1.02)
CREAT/UREA NIT SERPL: 12
EOSINOPHIL # BLD AUTO: 0.13 X10(3)/MCL (ref 0–0.9)
EOSINOPHIL NFR BLD AUTO: 1.2 %
ERYTHROCYTE [DISTWIDTH] IN BLOOD BY AUTOMATED COUNT: 13.7 % (ref 11.5–17)
GFR SERPLBLD CREATININE-BSD FMLA CKD-EPI: >60 ML/MIN/1.73/M2
GLOBULIN SER-MCNC: 3.1 GM/DL (ref 2.4–3.5)
GLUCOSE SERPL-MCNC: 85 MG/DL (ref 82–115)
HCT VFR BLD AUTO: 27.4 % (ref 37–47)
HGB BLD-MCNC: 9.1 G/DL (ref 12–16)
IMM GRANULOCYTES # BLD AUTO: 0.11 X10(3)/MCL (ref 0–0.04)
IMM GRANULOCYTES NFR BLD AUTO: 1 %
LYMPHOCYTES # BLD AUTO: 1.23 X10(3)/MCL (ref 0.6–4.6)
LYMPHOCYTES NFR BLD AUTO: 11.5 %
MAGNESIUM SERPL-MCNC: 1.6 MG/DL (ref 1.6–2.6)
MCH RBC QN AUTO: 31.8 PG (ref 27–31)
MCHC RBC AUTO-ENTMCNC: 33.2 G/DL (ref 33–36)
MCV RBC AUTO: 95.8 FL (ref 80–94)
MONOCYTES # BLD AUTO: 0.97 X10(3)/MCL (ref 0.1–1.3)
MONOCYTES NFR BLD AUTO: 9.1 %
NEUTROPHILS # BLD AUTO: 8.22 X10(3)/MCL (ref 2.1–9.2)
NEUTROPHILS NFR BLD AUTO: 76.9 %
NRBC BLD AUTO-RTO: 0.2 %
PHOSPHATE SERPL-MCNC: 3.1 MG/DL (ref 2.3–4.7)
PLATELET # BLD AUTO: 484 X10(3)/MCL (ref 130–400)
PMV BLD AUTO: 9.1 FL (ref 7.4–10.4)
POCT GLUCOSE: 87 MG/DL (ref 70–110)
POTASSIUM SERPL-SCNC: 3.6 MMOL/L (ref 3.5–5.1)
PROT SERPL-MCNC: 4.9 GM/DL (ref 5.8–7.6)
RBC # BLD AUTO: 2.86 X10(6)/MCL (ref 4.2–5.4)
SODIUM SERPL-SCNC: 141 MMOL/L (ref 136–145)
WBC # BLD AUTO: 10.69 X10(3)/MCL (ref 4.5–11.5)

## 2024-10-02 PROCEDURE — 63600175 PHARM REV CODE 636 W HCPCS: Performed by: INTERNAL MEDICINE

## 2024-10-02 PROCEDURE — 97530 THERAPEUTIC ACTIVITIES: CPT | Mod: CQ

## 2024-10-02 PROCEDURE — 36415 COLL VENOUS BLD VENIPUNCTURE: CPT | Performed by: STUDENT IN AN ORGANIZED HEALTH CARE EDUCATION/TRAINING PROGRAM

## 2024-10-02 PROCEDURE — 80053 COMPREHEN METABOLIC PANEL: CPT | Performed by: STUDENT IN AN ORGANIZED HEALTH CARE EDUCATION/TRAINING PROGRAM

## 2024-10-02 PROCEDURE — 21400001 HC TELEMETRY ROOM

## 2024-10-02 PROCEDURE — 25000003 PHARM REV CODE 250

## 2024-10-02 PROCEDURE — 25000003 PHARM REV CODE 250: Performed by: INTERNAL MEDICINE

## 2024-10-02 PROCEDURE — 97535 SELF CARE MNGMENT TRAINING: CPT

## 2024-10-02 PROCEDURE — 85025 COMPLETE CBC W/AUTO DIFF WBC: CPT | Performed by: STUDENT IN AN ORGANIZED HEALTH CARE EDUCATION/TRAINING PROGRAM

## 2024-10-02 PROCEDURE — 84100 ASSAY OF PHOSPHORUS: CPT | Performed by: STUDENT IN AN ORGANIZED HEALTH CARE EDUCATION/TRAINING PROGRAM

## 2024-10-02 PROCEDURE — 25000003 PHARM REV CODE 250: Performed by: NEUROLOGICAL SURGERY

## 2024-10-02 PROCEDURE — 83735 ASSAY OF MAGNESIUM: CPT | Performed by: STUDENT IN AN ORGANIZED HEALTH CARE EDUCATION/TRAINING PROGRAM

## 2024-10-02 PROCEDURE — 63600175 PHARM REV CODE 636 W HCPCS: Performed by: STUDENT IN AN ORGANIZED HEALTH CARE EDUCATION/TRAINING PROGRAM

## 2024-10-02 RX ORDER — FAMOTIDINE 20 MG/1
20 TABLET, FILM COATED ORAL 2 TIMES DAILY
Status: DISCONTINUED | OUTPATIENT
Start: 2024-10-02 | End: 2024-10-21 | Stop reason: HOSPADM

## 2024-10-02 RX ORDER — MAGNESIUM SULFATE HEPTAHYDRATE 40 MG/ML
2 INJECTION, SOLUTION INTRAVENOUS ONCE
Status: COMPLETED | OUTPATIENT
Start: 2024-10-02 | End: 2024-10-03

## 2024-10-02 RX ADMIN — FAMOTIDINE 20 MG: 20 TABLET, FILM COATED ORAL at 09:10

## 2024-10-02 RX ADMIN — MAGNESIUM SULFATE HEPTAHYDRATE 2 G: 40 INJECTION, SOLUTION INTRAVENOUS at 11:10

## 2024-10-02 RX ADMIN — LABETALOL HYDROCHLORIDE 10 MG: 5 INJECTION, SOLUTION INTRAVENOUS at 11:10

## 2024-10-02 RX ADMIN — CEFAZOLIN SODIUM 2 G: 2 SOLUTION INTRAVENOUS at 06:10

## 2024-10-02 RX ADMIN — LEVETIRACETAM 500 MG: 500 TABLET, FILM COATED ORAL at 09:10

## 2024-10-02 RX ADMIN — CEFAZOLIN SODIUM 2 G: 2 SOLUTION INTRAVENOUS at 03:10

## 2024-10-02 RX ADMIN — AMLODIPINE BESYLATE 5 MG: 5 TABLET ORAL at 09:10

## 2024-10-02 RX ADMIN — CEFAZOLIN SODIUM 2 G: 2 SOLUTION INTRAVENOUS at 10:10

## 2024-10-02 RX ADMIN — LABETALOL HYDROCHLORIDE 10 MG: 5 INJECTION, SOLUTION INTRAVENOUS at 09:10

## 2024-10-02 NOTE — PLAN OF CARE
Spoke to Ulises at [facility]. They received the SNF denials for the IV abx and have submitted that along with the ID progress note including the abx end date to the patient's insurance provider. They are waiting at this time to receive peer to peer information from the insurance. I informed her that Dr Galdamez is no longer available, however, the current hospitalist, Dr Shepherd is willwing to do the peer to peer. Ulises will let me know when the information for the peer to peer has been received.

## 2024-10-02 NOTE — PLAN OF CARE
Problem: Adult Inpatient Plan of Care  Goal: Absence of Hospital-Acquired Illness or Injury  Outcome: Progressing  Goal: Optimal Comfort and Wellbeing  Outcome: Progressing     Problem: Skin Injury Risk Increased  Goal: Skin Health and Integrity  Outcome: Progressing     Problem: Wound  Goal: Optimal Coping  Outcome: Progressing  Goal: Optimal Functional Ability  Outcome: Progressing  Goal: Absence of Infection Signs and Symptoms  Outcome: Progressing  Goal: Optimal Pain Control and Function  Outcome: Progressing  Goal: Skin Health and Integrity  Outcome: Progressing     Problem: Infection  Goal: Absence of Infection Signs and Symptoms  Outcome: Progressing     Problem: Fall Injury Risk  Goal: Absence of Fall and Fall-Related Injury  Outcome: Progressing

## 2024-10-02 NOTE — PT/OT/SLP PROGRESS
Occupational Therapy   Treatment    Name: Orlando Black  MRN: [id number]  Admitting Diagnosis:  <principal problem not specified>  5 Days Post-Op    Recommendations:     Recommended therapy intensity at discharge: Moderate Intensity Therapy   Discharge Equipment Recommendations:  to be determined by next level of care  Barriers to discharge:   Impaired mobility, Ongoing medical needs, and placement.     Assessment:     Orlando Black is a 74 y.o. 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 Performance deficits affecting function are weakness, impaired endurance, impaired self care skills, impaired functional mobility, gait instability, impaired balance, decreased lower extremity function, decreased upper extremity function, impaired cognition, decreased safety awareness.     Rehab Prognosis:  Good; patient would benefit from acute skilled OT services to address these deficits and reach maximum level of function.       Plan:     Patient to be seen 5 x/week to address the above listed problems via self-care/home management, therapeutic activities, therapeutic exercises  Plan of Care Expires: 10/28/24  Plan of Care Reviewed with: patient    Subjective     Pain/Comfort:  Pain Rating 1: 0/10    Objective:     Communicated with: RN prior to session.  Patient found HOB elevated with peripheral IV, PureWick, pulse ox (continuous), bed alarm () upon OT entry to room.    General Precautions: Standard, fall (-140)    Orthopedic Precautions:RLE weight bearing as tolerated  Braces: N/A  Respiratory Status: Room air  Vital Signs: Blood Pressure:  153/80; per RN, pt just received meds prior to session and cleared to work with therapy   With Activity: 150/81     Occupational Performance:     Bed Mobility:    Patient completed Supine to Sit with maximal assistance and 2 persons     Functional Mobility/Transfers:  Patient completed Sit <> Stand Transfer with moderate assistance  with  rolling walker   Patient completed Bed <> Chair Transfer using Step Transfer technique with minimal assistance and of 2 persons with rolling walker and increased time    Activities of Daily Living:  Lower Body Dressing: maximal assistance doff/donned new brief bed level.   Toileting: maximal assistance pt c +BM and req'd brief change prior to t/f.       Patient left up in chair with all lines intact, call button in reach, chair alarm on, geomat cushion, and posey vest, and RN notified.    GOALS:   Multidisciplinary Problems       Occupational Therapy Goals          Problem: Occupational Therapy    Goal Priority Disciplines Outcome Interventions   Occupational Therapy Goal     OT, PT/OT Progressing    Description: LTG: Pt will perform basic ADLs and ADL transfers with Modified independence using LRAD by discharge.    STG: to be met by 10/28/24    Pt will complete grooming standing at sink with LRAD with SBA.  Pt will complete UB dressing with SBA.  Pt will complete LB dressing with SBA using LRAD.  Pt will complete toileting with SBA using LRAD.  Pt will complete functional mobility to/from toilet and toilet transfer with SBA using LRAD.                         Time Tracking:     OT Date of Treatment: 10/02/24  OT Start Time: 1039  OT Stop Time: 1117  OT Total Time (min): 38 min    Billable Minutes:Self Care/Home Management 3    OT/TY: OT     Number of TY visits since last OT visit: 2    10/2/2024

## 2024-10-02 NOTE — PT/OT/SLP PROGRESS
"Physical Therapy Treatment    Patient Name:  Orlando Black   MRN:  [id number]    Recommendations:     Discharge therapy intensity: Moderate Intensity Therapy   Discharge Equipment Recommendations: to be determined by next level of care  Barriers to discharge: Impaired mobility, Ongoing medical needs, and placement.    Assessment:     Orlando Black is a 74 y.o. 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  She presents with the following impairments/functional limitations: weakness, gait instability, impaired endurance, impaired balance, decreased lower extremity function, decreased ROM, decreased safety awareness, impaired self care skills, impaired functional mobility.    Rehab Prognosis: Good; patient would benefit from acute skilled PT services to address these deficits and reach maximum level of function.    Recent Surgery: Procedure(s) (LRB):  ARTHROSCOPY, KNEE, WITH DEBRIDEMENT (Right) 5 Days Post-Op    Plan:     During this hospitalization, patient would benefit from acute PT services 5 x/week to address the identified rehab impairments via gait training, therapeutic activities, therapeutic exercises and progress toward the following goals:    Plan of Care Expires:  10/28/24    Subjective     Chief Complaint: "I think I had a BM."  Patient/Family Comments/goals:  Pain/Comfort:         Objective:     Communicated with RN prior to session.  Patient found HOB elevated with blood pressure cuff, pulse ox (continuous), telemetry, PureWick, peripheral IV upon PT entry to room.     General Precautions: Standard, fall, other (see comments) (SBP " 100-140)  Orthopedic Precautions: RLE weight bearing as tolerated  Braces: N/A  Respiratory Status: Room air  Blood Pressure: 172/93 HOB elevated, 153/80 RN ok'd tx session 2/2 just receiving medication, 150/81 post tx session.  Skin Integrity: Visible skin intact      Functional Mobility:  Bed Mobility:     Rolling Left:  minimum assistance  Rolling Right: minimum assistance  Supine to Sit: maximal assistance 2/2 pain in low back and L knee  Transfers:     Sit to Stand:  moderate assistance with rolling walker  Bed to Chair: minimum assistance and of 2 persons with  rolling walker  using  Step Transfer.  Balance: CGA-SBA static seated balance    Therapeutic Activities/Exercises:  Rollig R<>L Keysha after +BM; increased time for pericare totA  STS modA w/RW; two trials; first trial unsuccessfully 2/2 L knee    Education:  Patient provided with verbal education education regarding PT role/goals/POC, fall prevention, and safety awareness.  Understanding was verbalized.     Patient left up in chair with all lines intact, call button in reach, chair alarm on, nieves pad in place, RN notified, posey vest donned, and  in room.    GOALS:   Multidisciplinary Problems       Physical Therapy Goals          Problem: Physical Therapy    Goal Priority Disciplines Outcome Interventions   Physical Therapy Goal     PT, PT/OT Progressing    Description: Goals to be met by: 10/28/24     Patient will increase functional independence with mobility by performin. Supine to sit with Stand-by Assistance  2. Sit to supine with Stand-by Assistance  3. Sit to stand transfer with Stand-by Assistance  4. Bed to chair transfer with Stand-by Assistance using Rolling Walker  5. Gait  x 200 feet with Stand-by Assistance using Rolling Walker.                          Time Tracking:     PT Received On: 10/02/24  PT Start Time: 1037     PT Stop Time: 1116  PT Total Time (min): 39 min     Billable Minutes: Therapeutic Activity 3    Treatment  Type: Treatment  PT/PTA: PTA     Number of PTA visits since last PT visit: 2     10/02/2024

## 2024-10-02 NOTE — PROGRESS NOTES
[facility]  Hospital Medicine Progress Note        Chief Complaint: Inpatient Follow-up for multiple issues    HPI per admitting team: Orlando Black is a 74 y.o. female without significant past medical history who presented to [facility] on 9/24/2024 transferred from [place] for neurosurgery and CV surgery services.  Patient originally presented to outside hospital on 09/21/2024 for fever and body aches.  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  Patient developed altered mental status overnight on 09/23/2024 and underwent CT head on 09/24/2024 which showed new left occipital intraparenchymal hematoma measuring 2 x 1.8 x 1.5 cm.  Patient was transferred to [facility] ICU for higher level care.  Neurosurgery, interventional neurology, and CV surgery services were consulted.  Patient was started on broad-spectrum antibiotics Vancomycin and Zosyn.  CV surgery recommended 6 weeks of antibiotics with repeat echocardiogram and evaluation. Patient underwent DSA on 09/25/2024 which revealed no evidence of mycotic aneurysm in the left occipital region, no evidence of AVM or AV fistula, and incidental left cavernous internal carotid artery aneurysm measuring 5.5 x 4.6 cm.  Neurosurgery recommended q.2 hours neurochecks, BP parameters below 140/90, and Keppra b.i.d..  Infectious Disease and orthopedic services were consulted.  Patient was started on Oxacillin 2 g q.4 hours.  MRI cervical spine findings consistent with diskitis/osteomyelitis at C3-C6 involving the left C2-C3 facet joint with epidural enhancement and saw mall prevertebral abscess, moderate to severe canal narrowing at C5-C6, moderate at C4-C5, and C6-C7; mild canal narrowing at C3-C4, multilevel neuroforaminal  stenosis, and suspected areas of fluid in the posterior paraspinal soft tissues of the upper thoracic spine.  Patient underwent washout of right knee on 09/27 with Dr. Castro.  Therapy services were consulted.  Patient is currently awaiting LTAC evaluation and acceptance.  Patient was cleared for downgrade out of ICU on 10/01/2024 and admitted to hospital medicine service.     Interval Hx:   Laying in bed, very spacey, responded few of my questions appropriately, rest she was unable to tell me where she is and which year this is  No family is at bedside  Case was discussed with patient's nurse and  on the floor.    Objective/physical exam:  General: In no acute distress, afebrile, very spacey, slow to respond  Chest: Clear to auscultation bilaterally anteriorly, on room air.  Swelling over the sternomanubrial joint  Heart: RRR, +S1, S2, 2/6 systolic murmur heard  Abdomen: Soft, nontender, BS +  MSK: Warm, no lower extremity edema, no clubbing or cyanosis  Neurologic:  Awake and alert, unable to assess orientation, Cranial nerve II-XII intact, able to move all 4 extremities    VITAL SIGNS: 24 HRS MIN & MAX LAST   Temp  Min: 97.9 °F (36.6 °C)  Max: 98.6 °F (37 °C) 97.9 °F (36.6 °C)   BP  Min: 129/74  Max: 172/78 (!) 158/80   Pulse  Min: 92  Max: 107  107   Resp  Min: 18  Max: 18 18   SpO2  Min: 93 %  Max: 99 % (!) 93 %     I reviewed the labs below:  Recent Labs   Lab 09/30/24  0337 10/01/24  0431 10/02/24  0320   WBC 10.81 10.26 10.69   RBC 3.01* 2.71* 2.86*   HGB 9.5* 8.5* 9.1*   HCT 28.9* 26.9* 27.4*   MCV 96.0* 99.3* 95.8*   MCH 31.6* 31.4* 31.8*   MCHC 32.9* 31.6* 33.2   RDW 13.8 13.7 13.7   * 471* 484*   MPV 9.3 9.0 9.1     Recent Labs   Lab 09/30/24  0337 10/01/24  0431 10/02/24  0320    140 141   K 2.6* 3.1* 3.6    106 105   CO2 25 25 27   BUN 8.3* 7.3* 7.0*   CREATININE 0.55 0.55 0.57   CALCIUM 8.0* 7.6* 7.9*   MG 1.80 1.70 1.60   ALBUMIN 1.7* 1.6* 1.8*   ALKPHOS 176* 146 148    ALT 30 24 10   AST 21 18 18   BILITOT 0.6 0.5 0.5     Microbiology Results (last 7 days)       Procedure Component Value Units Date/Time    Blood Culture [[id number]]  (Normal) Collected: 09/29/24 1229    Order Status: Completed Specimen: Blood Updated: 10/02/24 1300     Blood Culture No Growth At 72 Hours    Blood Culture [[id number]]  (Normal) Collected: 09/29/24 1229    Order Status: Completed Specimen: Blood Updated: 10/02/24 1300     Blood Culture No Growth At 72 Hours    Body Fluid Culture [[id number]] Collected: 09/27/24 1303    Order Status: Completed Specimen: Body Fluid from Joint Fluid, Right Knee Updated: 10/02/24 1148     Body Fluid Culture Final Report: At 5 days. No growth    Anaerobic Culture [[id number]] Collected: 09/27/24 1303    Order Status: Completed Specimen: Body Fluid from Joint Fluid, Right Knee Updated: 09/30/24 0812     Anaerobe Culture No Anaerobes Isolated    Blood Culture [[id number]]  (Abnormal)  (Susceptibility) Collected: 09/26/24 0003    Order Status: Completed Specimen: Blood Updated: 09/28/24 0700     Blood Culture Methicillin Sensitive Staphylococcus aureus     GRAM STAIN Gram Positive Cocci, probable Staphylococcus      Seen in gram stain of broth only      2 of 2 bottles positive    Blood Culture [[id number]]  (Abnormal)  (Susceptibility) Collected: 09/26/24 0004    Order Status: Completed Specimen: Blood Updated: 09/28/24 0659     Blood Culture Methicillin Sensitive Staphylococcus aureus     GRAM STAIN Gram Positive Cocci, probable Staphylococcus      Seen in gram stain of broth only      1 of 2 Aerobic bottles positive    Gram Stain [[id number]] Collected: 09/27/24 1303    Order Status: Completed Specimen: Body Fluid from Joint Fluid, Right Knee Updated: 09/27/24 1511     GRAM STAIN Many WBC observed      No bacteria seen    Fungal Culture [[id number]] Collected: 09/27/24 1303    Order Status: Sent Specimen: Body Fluid from Joint Fluid, Right Knee Updated: 09/27/24 1350     Blood Culture [[id number]]  (Abnormal)  (Susceptibility) Collected: 09/24/24 2034    Order Status: Completed Specimen: Blood Updated: 09/27/24 1133     Blood Culture Methicillin Sensitive Staphylococcus aureus     GRAM STAIN Gram Positive Cocci, probable Staphylococcus      Seen in gram stain of broth only      2 of 2 bottles positive    Blood Culture [[id number]]  (Abnormal)  (Susceptibility) Collected: 09/24/24 2034    Order Status: Completed Specimen: Blood Updated: 09/27/24 1133     Blood Culture Methicillin Sensitive Staphylococcus aureus     GRAM STAIN Gram Positive Cocci, probable Staphylococcus      Seen in gram stain of broth only      2 of 2 bottles positive    Urine culture [[id number]] Collected: 09/24/24 2153    Order Status: Completed Specimen: Urine, Catheterized Updated: 09/26/24 0827     Urine Culture No Growth           See below for Radiology    Intake and Output:    Intake/Output Summary (Last 24 hours) at 10/2/2024 2025  Last data filed at 10/2/2024 0340  Gross per 24 hour   Intake --   Output 800 ml   Net -800 ml       Assessment/Plan:  Left occipital intraparenchymal hemorrhage  Incidental left cavernous ICA aneurysm  MSSA bacteremia  Mitral valve endocarditis with mitral valve regurgitation  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, involving the left C2-C3 facet joint, with epidural enhancement and small prevertebral abscess  Suspected acute osteomyelitis/septic arthritis of the sternomanubrial joint  Possible osteomyelitis of left C3-4 facet joint vs degenerative   Concern for right knee septic arthritis s/p washout 9/27  Right hip arthritis, 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 Olivarez'S office  ID has signed off  CV surgery signed off- recommended 6 weeks of antibiotic therapy followed by repeat echo, and further recommendations after that  Orthopedics following, appreciate recommendations   PT/OT recommended SNF  Case management on [facility] in [place] is ready to accept the patient if insurance approves.  10/2- I had peer to peer with Dr. Baker with [employer], she reported her condition does not qualify for LTAC per their criteria.  Understands that patient has been denied by 3 SNF but she is recommending rehab initially and then SNF to complete her 6 weeks of IV antibiotic treatment.  Discussed with Dr. Baker that therapy is not recommending rehab so no rehab will be accepting her with this frequent IV antibiotics.  She informed me that she will reach out to her insurance nurses to expand the search for SNF/inpatient rehab.  I did informed Dr. Baker that by not approving her LTAC, she has increasing the length of stay on this patient but she continued to decline LTAC placement though rehab and accepted facility  I conveyed the above information to our / and patient's nurse  No home medication on chart, it was documented in the chart that patient last went to the hospital for her childbirth...  Mag 1.6, ordered Mag sulfate 2 g IV x1  Morning CBC, CMP, Mag ordered    VTE prophylaxis:  SCDs                       Patient condition:  Stable    Anticipated discharge and Disposition:  Rehab versus SNF the patient's insurance vs LTAC if all facilities denies patient per Dr. Baker with [employer]      All diagnosis and differential diagnosis have been reviewed; assessment and plan has been documented; I have personally reviewed the labs and test results that are presently available; I have reviewed the patients medication list; I have reviewed the consulting providers  response and recommendations. I have reviewed or attempted to review medical records based upon their availability    All of the patient's questions have been  addressed and answered. Patient's is agreeable to the above stated plan. I will continue to monitor closely and make adjustments to medical management as needed.    Portions of this note dictated using EMR integrated voice recognition software, and may be subject to voice recognition errors not corrected at proofreading. Please contact writer for clarification if needed.   _____________________________________________________________________    Nutrition Status:  No RD assessment  A minimum of two characteristics is recommended for diagnosis of either severe or non-severe malnutrition.     Current Med:   amLODIPine  5 mg Oral Daily    ceFAZolin (Ancef) IV (PEDS and ADULTS)  2 g Intravenous Q8H    famotidine  20 mg Oral BID    levETIRAcetam  500 mg Oral BID    magnesium sulfate IVPB  2 g Intravenous Once      PRN meds:  Current Facility-Administered Medications:     acetaminophen, 650 mg, Oral, Q6H PRN    bisacodyL, 10 mg, Rectal, Daily PRN    camphor-methyl salicyl-menthoL, , Topical (Top), BID PRN    dextrose 10%, 12.5 g, Intravenous, PRN    dextrose 10%, 25 g, Intravenous, PRN    labetalol, 10 mg, Intravenous, Q15 Min PRN    LIDOcaine, 1 patch, Transdermal, Q24H PRN    morphine, 2 mg, Intravenous, Q6H PRN    ondansetron, 8 mg, Oral, Q8H PRN    QUEtiapine, 50 mg, Oral, BID PRN    sodium chloride 0.9%, 10 mL, Intravenous, PRN    sodium chloride 0.9%, 10 mL, Intravenous, PRN    Continuous infusion:       Radiology:   I have personally reviewed the images and agree with radiologist report  MRI Cervical Spine W WO Cont  Narrative: EXAMINATION:  MRI CERVICAL SPINE W WO CONTRAST    CLINICAL HISTORY:  Osteomyelitis;    TECHNIQUE:  Multiplanar, multisequence MR imaging of the cervical spine with and without contrast.    COMPARISON:  CTA head neck dated  09/25/2024    FINDINGS:  There is reversal of normal cervical lordosis.  There is bone marrow edema and enhancement at C3 through C6, with associated disc edema.  There is edema and enhancement in the left C2-C3 facets.  The vertebral body heights are maintained.    There is no cord signal abnormality.  Epidural enhancement is noted C4 through C6 measuring up to 5 mm in thickness.  There is no definite epidural fluid collection.    There is prevertebral enhancement at C3 through C6 measure up to 10 mm in thickness, with small fluid collection at the level of C4-C5 measuring up to 7 mm in thickness (series 20, image 14).  There is enhancement surrounding the left C2-C3 facet.  There is suspected small areas of fluid in the posterior paraspinal soft tissues of the upper thoracic spine, partially imaged (series 19, images 4 and 8).    Disc level analysis as follows:    C2-C3: No disc herniation.  No significant spinal canal or neural foraminal stenosis.    C3-C4: Post disc osteophyte complex and thickening of the ligamentum flavum with mild narrowing of the spinal canal.  Mild right and moderate to severe left neural foraminal stenosis secondary to uncovertebral and facet hypertrophy.    C4-C5: Posterior disc osteophyte complex with thickening of the ligamentum flavum, epidural enhancement and moderate narrowing of the spinal canal.  Severe bilateral foraminal stenosis secondary to uncovertebral and facet hypertrophy.    C5-C6: Disc osteophyte complex with thickening of the ligamentum flavum and epidural enhancement with moderate to severe narrowing of the spinal canal.  Severe bilateral neural foraminal stenosis secondary to uncovertebral and facet hypertrophy.    C6-C7: Posterior disc osteophyte complex and thickening of the ligamentum flavum and moderate narrowing of the spinal canal.  Severe bilateral foraminal stenosis secondary to uncovertebral and facet hypertrophy.    C7-T1: No disc herniation.  No significant  spinal canal or neural foraminal stenosis.  Impression: 1. Findings consistent with discitis/osteomyelitis at C3-C6, involving the left C2-C3 facet joint, with epidural enhancement and small prevertebral abscess.  2. Moderate to severe canal narrowing at C5-C6, moderate at C4-C5 and C6-C7, mild at C3-C4.  3. Multilevel neural foraminal stenoses as described.  4. No definite cord signal abnormality.  5. Suspected areas of fluid in the posterior paraspinal soft tissues of the upper thoracic spine.    Electronically signed by: Petrona Nguyen  Date:    09/27/2024  Time:    10:32  CT Head Without Contrast  Narrative: EXAMINATION:  CT HEAD WITHOUT CONTRAST    CLINICAL HISTORY:  Stroke, follow up;left occipital lobe hemorrhage fu;    TECHNIQUE:  Axial scans were obtained from skull base to the vertex.    Coronal and sagittal reconstructions obtained from the axial data.    Automatic exposure control was utilized to limit radiation dose.    Contrast: None    Radiation Dose:    Total DLP: 950 mGy*cm    COMPARISON:  MRI brain dated 09/26/2024    FINDINGS:  There is stable appearance of the small left occipital lobe parenchymal hemorrhage with surrounding edema.  The brain parenchyma is unchanged with patchy hypodensities in the subcortical and periventricular white matter.    There is no significant mass effect or midline shift.  The basal cisterns are patent.  The ventricles are stable in size.  The calvarium and skull base are intact.  There is mild scattered paranasal sinus mucosal thickening.  Impression: Stable small left occipital lobe hematoma.    Electronically signed by: Petrona Nguyen  Date:    09/27/2024  Time:    06:32        Raul Shepherd MD  Department of Hospital Medicine   [facility]   10/02/2024

## 2024-10-02 NOTE — PROGRESS NOTES
Brief ID attending note:    Most recent blood culture remains negative at forty hours so okay to place a PICC line from ID standpoint.    cefazolin 2 g every 8 hours for OPAT convenience  Antibiotic days 9/29-11/13  Need to follow CBC, BMP weekly  Follow up appointment ID clinic was made  ID team will sign off.  Please page at the    Valerio Guerrero MD  Attending, Infectious Disease

## 2024-10-02 NOTE — PROGRESS NOTES
Inpatient Nutrition Evaluation    Admit Date: 9/24/2024   Total duration of encounter: 8 days   Patient Age: 74 y.o.    Nutrition Recommendation/Prescription     Continue Diet Heart Healthy as ordered.   Encouraged increased oral intake, small and frequent meals.     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    Relevant Medical History:   None on file    Scheduled Medications:  amLODIPine, 5 mg, Daily  ceFAZolin (Ancef) IV (PEDS and ADULTS), 2 g, Q8H  famotidine, 20 mg, BID  levETIRAcetam, 500 mg, BID    Continuous Infusions:   PRN Medications:   Current Facility-Administered Medications:     acetaminophen, 650 mg, Oral, Q6H PRN    bisacodyL, 10 mg, Rectal, Daily PRN    camphor-methyl salicyl-menthoL, , Topical (Top), BID PRN    dextrose 10%, 12.5 g, Intravenous, PRN    dextrose 10%, 25 g, Intravenous, PRN    labetalol, 10 mg, Intravenous, Q15 Min PRN    LIDOcaine, 1 patch, Transdermal, Q24H PRN    morphine, 2 mg, Intravenous, Q6H PRN    ondansetron, 8 mg, Oral, Q8H PRN    QUEtiapine, 50 mg, Oral, BID PRN    sodium chloride 0.9%, 10 mL, Intravenous, PRN    sodium chloride 0.9%, 10 mL, Intravenous, PRN    Recent Labs   Lab 09/26/24  0323 09/27/24  0325 09/28/24  0016 09/29/24  0357 09/30/24  0337 10/01/24  0431 10/02/24  0320   * 146* 150* 146* 143 140 141   K 3.8 2.9* 3.6 3.1* 2.6* 3.1* 3.6   CALCIUM 7.6* 8.4 7.7* 7.9* 8.0* 7.6* 7.9*   PHOS 3.7  "3.4 4.1 3.8 2.8 3.3 3.1   MG 2.20 2.20 2.30 2.00 1.80 1.70 1.60   * 113* 117* 112* 107 106 105   CO2 23 21* 23 24 25 25 27   BUN 16.3 13.4 14.7 10.8 8.3* 7.3* 7.0*   CREATININE 0.59 0.64 0.62 0.55 0.55 0.55 0.57   EGFRNORACEVR >60 >60 >60 >60 >60 >60 >60   GLUCOSE 117* 92 120* 94 95 88 85   BILITOT 0.7 0.7 0.5 0.6 0.6 0.5 0.5   ALKPHOS 254* 331* 254* 205* 176* 146 148   ALT 76* 83* 56* 38 30 24 10   AST 74* 79* 38* 25 21 18 18   ALBUMIN 1.6* 1.7* 1.7* 1.7* 1.7* 1.6* 1.8*   WBC 16.66* 18.44* 11.41 11.58* 10.81 10.26 10.69   HGB 10.3* 10.8* 9.9* 9.6* 9.5* 8.5* 9.1*   HCT 30.5* 32.6* 30.1* 30.8* 28.9* 26.9* 27.4*     Nutrition Orders:  Diet Heart Healthy  Dietary nutrition supplements Daily; Boost Plus Nutritional Drink - Any flavor    Appetite/Oral Intake: NPO/NPO  Factors Affecting Nutritional Intake: NPO  Food/Mormonism/Cultural Preferences:  Breakfast: peach or blueberry yogurt with coffee  Food Allergies: no known food allergies  Last Bowel Movement: 09/30/24  Wound(s):  No partial or full thickness pressure injuries documented    Comments    9/26/24: Pt currently NPO. Pt denies any decreased appetite PTA, unsure of UBW but states she suspects it has been stable. ONS added for extra nourishment once diet has advanced.   10/2/24: Reports decreased appetite since admit s/t food preferences. Typically eats yogurt (blueberry or peach yo plait only) at home with regular coffee. Dinner is her "best" meal. Mentioned for family/visitors to bring meals from home if she wanted to increase po intake. She does not like oral supplements, discontinued order.     Anthropometrics    Height: 5' 11" (180.3 cm), Height Method: Estimated  Last Weight: 65.3 kg (143 lb 14.4 oz) (10/02/24 0600), Weight Method: Bed Scale  BMI (Calculated): 20.1  BMI Classification: underweight (BMI less than 22 if >65 years of age)     Ideal Body Weight (IBW), Female: 155 lb     % Ideal Body Weight, Female (lb): 74.95 %                           "   Usual Weight Provided By: patient denies unintentional weight loss    Wt Readings from Last 5 Encounters:   10/02/24 65.3 kg (143 lb 14.4 oz)     Weight Change(s) Since Admission: +12.6kg, unsure accuracy of 9/24/24 weight   Wt Readings from Last 1 Encounters:   10/02/24 0600 65.3 kg (143 lb 14.4 oz)   09/24/24 1835 52.7 kg (116 lb 2.9 oz)   Admit Weight: 52.7 kg (116 lb 2.9 oz) (09/24/24 1835), Weight Method: Bed Scale    Patient Education     Not applicable.    Nutrition Goals & Monitoring     Dietitian will monitor: energy intake and weight    Nutrition Risk/Follow-Up: low (follow-up in 5-7 days)  Patients assigned 'low nutrition risk' status do not qualify for a full nutritional assessment but will be monitored and re-evaluated in a 5-7 day time period. Please consult if re-evaluation needed sooner.

## 2024-10-03 LAB
ALBUMIN SERPL-MCNC: 2 G/DL (ref 3.4–4.8)
ALBUMIN/GLOB SERPL: 0.6 RATIO (ref 1.1–2)
ALP SERPL-CCNC: 153 UNIT/L (ref 40–150)
ALT SERPL-CCNC: <5 UNIT/L (ref 0–55)
ANION GAP SERPL CALC-SCNC: 13 MEQ/L
AST SERPL-CCNC: 18 UNIT/L (ref 5–34)
BASOPHILS # BLD AUTO: 0.04 X10(3)/MCL
BASOPHILS NFR BLD AUTO: 0.3 %
BILIRUB SERPL-MCNC: 0.5 MG/DL
BUN SERPL-MCNC: 7.1 MG/DL (ref 9.8–20.1)
CALCIUM SERPL-MCNC: 8.1 MG/DL (ref 8.4–10.2)
CHLORIDE SERPL-SCNC: 103 MMOL/L (ref 98–107)
CO2 SERPL-SCNC: 23 MMOL/L (ref 23–31)
CREAT SERPL-MCNC: 0.56 MG/DL (ref 0.55–1.02)
CREAT/UREA NIT SERPL: 13
EOSINOPHIL # BLD AUTO: 0.07 X10(3)/MCL (ref 0–0.9)
EOSINOPHIL NFR BLD AUTO: 0.6 %
ERYTHROCYTE [DISTWIDTH] IN BLOOD BY AUTOMATED COUNT: 13.5 % (ref 11.5–17)
GFR SERPLBLD CREATININE-BSD FMLA CKD-EPI: >60 ML/MIN/1.73/M2
GLOBULIN SER-MCNC: 3.6 GM/DL (ref 2.4–3.5)
GLUCOSE SERPL-MCNC: 80 MG/DL (ref 82–115)
HCT VFR BLD AUTO: 31.3 % (ref 37–47)
HGB BLD-MCNC: 9.8 G/DL (ref 12–16)
IMM GRANULOCYTES # BLD AUTO: 0.09 X10(3)/MCL (ref 0–0.04)
IMM GRANULOCYTES NFR BLD AUTO: 0.8 %
LYMPHOCYTES # BLD AUTO: 1.34 X10(3)/MCL (ref 0.6–4.6)
LYMPHOCYTES NFR BLD AUTO: 11.5 %
MAGNESIUM SERPL-MCNC: 2.3 MG/DL (ref 1.6–2.6)
MCH RBC QN AUTO: 31.1 PG (ref 27–31)
MCHC RBC AUTO-ENTMCNC: 31.3 G/DL (ref 33–36)
MCV RBC AUTO: 99.4 FL (ref 80–94)
MONOCYTES # BLD AUTO: 1 X10(3)/MCL (ref 0.1–1.3)
MONOCYTES NFR BLD AUTO: 8.6 %
NEUTROPHILS # BLD AUTO: 9.15 X10(3)/MCL (ref 2.1–9.2)
NEUTROPHILS NFR BLD AUTO: 78.2 %
NRBC BLD AUTO-RTO: 0 %
PLATELET # BLD AUTO: 521 X10(3)/MCL (ref 130–400)
PMV BLD AUTO: 9.1 FL (ref 7.4–10.4)
POCT GLUCOSE: 124 MG/DL (ref 70–110)
POCT GLUCOSE: 85 MG/DL (ref 70–110)
POCT GLUCOSE: 96 MG/DL (ref 70–110)
POTASSIUM SERPL-SCNC: 3.2 MMOL/L (ref 3.5–5.1)
PROT SERPL-MCNC: 5.6 GM/DL (ref 5.8–7.6)
RBC # BLD AUTO: 3.15 X10(6)/MCL (ref 4.2–5.4)
SODIUM SERPL-SCNC: 139 MMOL/L (ref 136–145)
WBC # BLD AUTO: 11.69 X10(3)/MCL (ref 4.5–11.5)

## 2024-10-03 PROCEDURE — 25000003 PHARM REV CODE 250: Performed by: INTERNAL MEDICINE

## 2024-10-03 PROCEDURE — 36415 COLL VENOUS BLD VENIPUNCTURE: CPT | Performed by: INTERNAL MEDICINE

## 2024-10-03 PROCEDURE — 83735 ASSAY OF MAGNESIUM: CPT | Performed by: INTERNAL MEDICINE

## 2024-10-03 PROCEDURE — 85025 COMPLETE CBC W/AUTO DIFF WBC: CPT | Performed by: INTERNAL MEDICINE

## 2024-10-03 PROCEDURE — 63600175 PHARM REV CODE 636 W HCPCS: Performed by: INTERNAL MEDICINE

## 2024-10-03 PROCEDURE — 80053 COMPREHEN METABOLIC PANEL: CPT | Performed by: INTERNAL MEDICINE

## 2024-10-03 PROCEDURE — 21400001 HC TELEMETRY ROOM

## 2024-10-03 PROCEDURE — 25000003 PHARM REV CODE 250

## 2024-10-03 PROCEDURE — 63600175 PHARM REV CODE 636 W HCPCS: Performed by: STUDENT IN AN ORGANIZED HEALTH CARE EDUCATION/TRAINING PROGRAM

## 2024-10-03 PROCEDURE — 25000003 PHARM REV CODE 250: Performed by: NEUROLOGICAL SURGERY

## 2024-10-03 RX ORDER — POTASSIUM CHLORIDE 20 MEQ/1
40 TABLET, EXTENDED RELEASE ORAL ONCE
Status: COMPLETED | OUTPATIENT
Start: 2024-10-03 | End: 2024-10-03

## 2024-10-03 RX ADMIN — FAMOTIDINE 20 MG: 20 TABLET, FILM COATED ORAL at 08:10

## 2024-10-03 RX ADMIN — CEFAZOLIN SODIUM 2 G: 2 SOLUTION INTRAVENOUS at 01:10

## 2024-10-03 RX ADMIN — POTASSIUM CHLORIDE 40 MEQ: 1500 TABLET, EXTENDED RELEASE ORAL at 10:10

## 2024-10-03 RX ADMIN — CEFAZOLIN SODIUM 2 G: 2 SOLUTION INTRAVENOUS at 04:10

## 2024-10-03 RX ADMIN — AMLODIPINE BESYLATE 5 MG: 5 TABLET ORAL at 08:10

## 2024-10-03 RX ADMIN — LEVETIRACETAM 500 MG: 500 TABLET, FILM COATED ORAL at 08:10

## 2024-10-03 RX ADMIN — LABETALOL HYDROCHLORIDE 10 MG: 5 INJECTION, SOLUTION INTRAVENOUS at 02:10

## 2024-10-03 RX ADMIN — LABETALOL HYDROCHLORIDE 10 MG: 5 INJECTION, SOLUTION INTRAVENOUS at 04:10

## 2024-10-03 RX ADMIN — CEFAZOLIN SODIUM 2 G: 2 SOLUTION INTRAVENOUS at 08:10

## 2024-10-03 NOTE — PLAN OF CARE
Problem: Stroke, Subarachnoid Hemorrhage  Goal: Optimal Coping  Outcome: Progressing  Goal: Optimal Nutrition Intake  Outcome: Progressing     Problem: Skin Injury Risk Increased  Goal: Skin Health and Integrity  Outcome: Progressing     Problem: Infection  Goal: Absence of Infection Signs and Symptoms  Outcome: Progressing

## 2024-10-03 NOTE — PT/OT/SLP PROGRESS
Occupational Therapy      Patient Name:  Orlando Black   MRN:  [id number]    Patient not seen today secondary to BP out of parameters. Will follow-up as schedule allows.    10/3/2024

## 2024-10-03 NOTE — PLAN OF CARE
Peer to Peer with Dr Shepherd for LTAC placement unsuccessful. Dr Downey's with [employer] insisted that this patient did not meet LTAC criteria for placement and they are recommending Rehab to SNF or just SNF placement for this patient, despite or multiple denials due to nursing homes not being able to accommodate this patient's need to IV ancef every 8 hours. Made VANGIE Keita aware, as well as Ulises at [facility]. Ulsies let me know there was still on appeal option if the family and new hospitalist would be interested in pursuing that avenue. Passed this information along to VANGIE Keita and patient's currently hospitalist, Dr. Merchant.     Also sent two other SNF referrals to [facility] who stated that they are able to accommodate iv abx and would be willing to review to see if they can accommodate this patient's needs. Awaiting response at this time.     Received appeal form from [facility] for LTAC placement. Form was completed by Dr Merchant and emailed back to Ulises at [facility]. Appeal decision expected to take 24-72 hours.

## 2024-10-03 NOTE — PLAN OF CARE
CM spoke to patient's spouse, Manuel Black , informed him that [employer] denied LTAC placement. Informed Spouse the family can file an appeal.Spouse (Manuel) told CM yes he does want to do a family appeal for LTAC placement. VANGIE will let GRACIE High know to reach out to [facility].

## 2024-10-03 NOTE — PT/OT/SLP PROGRESS
Physical Therapy      Patient Name:  Orlando Black   MRN:  [id number]    Patient not seen today secondary to BP out of parameters 150/71; RN notified. Will follow-up tomorrow.

## 2024-10-04 LAB
ANION GAP SERPL CALC-SCNC: 12 MEQ/L
BACTERIA BLD CULT: NORMAL
BACTERIA BLD CULT: NORMAL
BASOPHILS # BLD AUTO: 0.04 X10(3)/MCL
BASOPHILS NFR BLD AUTO: 0.4 %
BUN SERPL-MCNC: 7.6 MG/DL (ref 9.8–20.1)
CALCIUM SERPL-MCNC: 8.3 MG/DL (ref 8.4–10.2)
CHLORIDE SERPL-SCNC: 102 MMOL/L (ref 98–107)
CO2 SERPL-SCNC: 28 MMOL/L (ref 23–31)
CREAT SERPL-MCNC: 0.58 MG/DL (ref 0.55–1.02)
CREAT/UREA NIT SERPL: 13
EOSINOPHIL # BLD AUTO: 0.05 X10(3)/MCL (ref 0–0.9)
EOSINOPHIL NFR BLD AUTO: 0.4 %
ERYTHROCYTE [DISTWIDTH] IN BLOOD BY AUTOMATED COUNT: 13.5 % (ref 11.5–17)
FERRITIN SERPL-MCNC: 922.51 NG/ML (ref 4.63–204)
GFR SERPLBLD CREATININE-BSD FMLA CKD-EPI: >60 ML/MIN/1.73/M2
GLUCOSE SERPL-MCNC: 120 MG/DL (ref 82–115)
HCT VFR BLD AUTO: 30.2 % (ref 37–47)
HGB BLD-MCNC: 9.6 G/DL (ref 12–16)
IMM GRANULOCYTES # BLD AUTO: 0.08 X10(3)/MCL (ref 0–0.04)
IMM GRANULOCYTES NFR BLD AUTO: 0.7 %
IRON SATN MFR SERPL: 12 % (ref 20–50)
IRON SERPL-MCNC: 18 UG/DL (ref 50–170)
LYMPHOCYTES # BLD AUTO: 1.03 X10(3)/MCL (ref 0.6–4.6)
LYMPHOCYTES NFR BLD AUTO: 9.1 %
MAGNESIUM SERPL-MCNC: 1.9 MG/DL (ref 1.6–2.6)
MCH RBC QN AUTO: 31.3 PG (ref 27–31)
MCHC RBC AUTO-ENTMCNC: 31.8 G/DL (ref 33–36)
MCV RBC AUTO: 98.4 FL (ref 80–94)
MONOCYTES # BLD AUTO: 1.14 X10(3)/MCL (ref 0.1–1.3)
MONOCYTES NFR BLD AUTO: 10.1 %
NEUTROPHILS # BLD AUTO: 9 X10(3)/MCL (ref 2.1–9.2)
NEUTROPHILS NFR BLD AUTO: 79.3 %
NRBC BLD AUTO-RTO: 0 %
PHOSPHATE SERPL-MCNC: 2.8 MG/DL (ref 2.3–4.7)
PLATELET # BLD AUTO: 509 X10(3)/MCL (ref 130–400)
PMV BLD AUTO: 8.7 FL (ref 7.4–10.4)
POCT GLUCOSE: 107 MG/DL (ref 70–110)
POCT GLUCOSE: 124 MG/DL (ref 70–110)
POTASSIUM SERPL-SCNC: 2.9 MMOL/L (ref 3.5–5.1)
RBC # BLD AUTO: 3.07 X10(6)/MCL (ref 4.2–5.4)
SODIUM SERPL-SCNC: 142 MMOL/L (ref 136–145)
TIBC SERPL-MCNC: 129 UG/DL (ref 70–310)
TIBC SERPL-MCNC: 147 UG/DL (ref 250–450)
TRANSFERRIN SERPL-MCNC: 134 MG/DL (ref 173–360)
VIT B12 SERPL-MCNC: 1689 PG/ML (ref 213–816)
WBC # BLD AUTO: 11.34 X10(3)/MCL (ref 4.5–11.5)

## 2024-10-04 PROCEDURE — 36415 COLL VENOUS BLD VENIPUNCTURE: CPT | Performed by: INTERNAL MEDICINE

## 2024-10-04 PROCEDURE — 87040 BLOOD CULTURE FOR BACTERIA: CPT | Performed by: INTERNAL MEDICINE

## 2024-10-04 PROCEDURE — 63600175 PHARM REV CODE 636 W HCPCS: Performed by: INTERNAL MEDICINE

## 2024-10-04 PROCEDURE — 83550 IRON BINDING TEST: CPT | Performed by: INTERNAL MEDICINE

## 2024-10-04 PROCEDURE — 80048 BASIC METABOLIC PNL TOTAL CA: CPT | Performed by: INTERNAL MEDICINE

## 2024-10-04 PROCEDURE — 21400001 HC TELEMETRY ROOM

## 2024-10-04 PROCEDURE — 85025 COMPLETE CBC W/AUTO DIFF WBC: CPT | Performed by: INTERNAL MEDICINE

## 2024-10-04 PROCEDURE — 82607 VITAMIN B-12: CPT | Performed by: INTERNAL MEDICINE

## 2024-10-04 PROCEDURE — 83540 ASSAY OF IRON: CPT | Performed by: INTERNAL MEDICINE

## 2024-10-04 PROCEDURE — 97530 THERAPEUTIC ACTIVITIES: CPT | Mod: CQ

## 2024-10-04 PROCEDURE — 97535 SELF CARE MNGMENT TRAINING: CPT

## 2024-10-04 PROCEDURE — 25000003 PHARM REV CODE 250: Performed by: INTERNAL MEDICINE

## 2024-10-04 PROCEDURE — 84100 ASSAY OF PHOSPHORUS: CPT | Performed by: INTERNAL MEDICINE

## 2024-10-04 PROCEDURE — 25000003 PHARM REV CODE 250: Performed by: NEUROLOGICAL SURGERY

## 2024-10-04 PROCEDURE — 83735 ASSAY OF MAGNESIUM: CPT | Performed by: INTERNAL MEDICINE

## 2024-10-04 PROCEDURE — 82728 ASSAY OF FERRITIN: CPT | Performed by: INTERNAL MEDICINE

## 2024-10-04 RX ORDER — AMLODIPINE BESYLATE 5 MG/1
10 TABLET ORAL DAILY
Status: DISCONTINUED | OUTPATIENT
Start: 2024-10-04 | End: 2024-10-05

## 2024-10-04 RX ORDER — POTASSIUM CHLORIDE 20 MEQ/1
40 TABLET, EXTENDED RELEASE ORAL EVERY 4 HOURS
Status: COMPLETED | OUTPATIENT
Start: 2024-10-04 | End: 2024-10-04

## 2024-10-04 RX ADMIN — LEVETIRACETAM 500 MG: 500 TABLET, FILM COATED ORAL at 09:10

## 2024-10-04 RX ADMIN — FAMOTIDINE 20 MG: 20 TABLET, FILM COATED ORAL at 09:10

## 2024-10-04 RX ADMIN — POTASSIUM CHLORIDE 40 MEQ: 1500 TABLET, EXTENDED RELEASE ORAL at 09:10

## 2024-10-04 RX ADMIN — CEFAZOLIN SODIUM 2 G: 2 SOLUTION INTRAVENOUS at 09:10

## 2024-10-04 RX ADMIN — POTASSIUM CHLORIDE 40 MEQ: 1500 TABLET, EXTENDED RELEASE ORAL at 02:10

## 2024-10-04 RX ADMIN — CEFAZOLIN SODIUM 2 G: 2 SOLUTION INTRAVENOUS at 02:10

## 2024-10-04 RX ADMIN — CEFAZOLIN SODIUM 2 G: 2 SOLUTION INTRAVENOUS at 04:10

## 2024-10-04 RX ADMIN — POTASSIUM CHLORIDE 40 MEQ: 1500 TABLET, EXTENDED RELEASE ORAL at 05:10

## 2024-10-04 RX ADMIN — AMLODIPINE BESYLATE 10 MG: 5 TABLET ORAL at 09:10

## 2024-10-04 NOTE — PLAN OF CARE
Problem: Stroke, Subarachnoid Hemorrhage  Goal: Safe and Effective Swallow  Outcome: Progressing  Goal: Effective Urinary Elimination  Outcome: Progressing     Problem: Skin Injury Risk Increased  Goal: Skin Health and Integrity  Outcome: Progressing     Problem: Wound  Goal: Optimal Coping  Outcome: Progressing

## 2024-10-04 NOTE — PLAN OF CARE
Received a request for clinical updates from [facility] regarding the LTAC placement appeal. Faxed to provided number. Currently awaiting response.

## 2024-10-04 NOTE — PROGRESS NOTES
[facility]  Hospital Medicine Progress Note        Chief Complaint: Inpatient Follow-up     HPI:    Orlando Black is a 74 y.o. female without significant past medical history who presented to [facility] on 9/24/2024 transferred from [place] for neurosurgery and CV surgery services.  Patient originally presented to outside hospital on 09/21/2024 for fever and body aches.  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  Patient developed altered mental status overnight on 09/23/2024 and underwent CT head on 09/24/2024 which showed new left occipital intraparenchymal hematoma measuring 2 x 1.8 x 1.5 cm.  Patient was transferred to [facility] ICU for higher level care.  Neurosurgery, interventional neurology, and CV surgery services were consulted.  Patient was started on broad-spectrum antibiotics Vancomycin and Zosyn.  CV surgery recommended 6 weeks of antibiotics with repeat echocardiogram and evaluation. Patient underwent DSA on 09/25/2024 which revealed no evidence of mycotic aneurysm in the left occipital region, no evidence of AVM or AV fistula, and incidental left cavernous internal carotid artery aneurysm measuring 5.5 x 4.6 cm.  Neurosurgery recommended q.2 hours neurochecks, BP parameters below 140/90, and Keppra b.i.d..  Infectious Disease and orthopedic services were consulted.  Patient was started on Oxacillin 2 g q.4 hours.  MRI cervical spine findings consistent with diskitis/osteomyelitis at C3-C6 involving the left C2-C3 facet joint with epidural enhancement and saw mall prevertebral abscess, moderate to severe canal narrowing at C5-C6, moderate at C4-C5, and C6-C7; mild canal narrowing at C3-C4, multilevel neuroforaminal stenosis, and suspected areas of fluid  in the posterior paraspinal soft tissues of the upper thoracic spine.  Patient underwent washout of right knee on 09/27 with Dr. Castro.  Therapy services were consulted.  Patient is currently awaiting LTAC evaluation and acceptance.  Patient was cleared for downgrade out of ICU on 10/01/2024 and admitted to hospital medicine service.       Interval Hx:   Pt was seen at bedside. Awake , alert, oriented x 3. Denies c/o.   Afebrile. Hemodynamics are stable. On RA  Labs today with WBC 11.6, Hgb 9.8, Cr 0.5    Case was discussed with patient's nurse and  on the floor.    Objective/physical exam:  General: In no acute distress, afebrile  Chest: Clear to auscultation bilaterally  Heart: RRR, +S1, S2, (+) murmur  Abdomen: Soft, nontender, BS +  MSK: Warm, trace or no  lower extremity edema, no clubbing or cyanosis  Neurologic: Alert and oriented x3, move all ext spontaneously     VITAL SIGNS: 24 HRS MIN & MAX LAST   Temp  Min: 97.9 °F (36.6 °C)  Max: 100.9 °F (38.3 °C) 99.4 °F (37.4 °C)   BP  Min: 128/77  Max: 162/78 (!) 148/69   Pulse  Min: 85  Max: 93  91   Resp  Min: 17  Max: 17 17   SpO2  Min: 96 %  Max: 98 % 98 %     I have reviewed the following labs:  Recent Labs   Lab 10/01/24  0431 10/02/24  0320 10/03/24  0250   WBC 10.26 10.69 11.69*   RBC 2.71* 2.86* 3.15*   HGB 8.5* 9.1* 9.8*   HCT 26.9* 27.4* 31.3*   MCV 99.3* 95.8* 99.4*   MCH 31.4* 31.8* 31.1*   MCHC 31.6* 33.2 31.3*   RDW 13.7 13.7 13.5   * 484* 521*   MPV 9.0 9.1 9.1     Recent Labs   Lab 10/01/24  0431 10/02/24  0320 10/03/24  0250    141 139   K 3.1* 3.6 3.2*    105 103   CO2 25 27 23   BUN 7.3* 7.0* 7.1*   CREATININE 0.55 0.57 0.56   CALCIUM 7.6* 7.9* 8.1*   MG 1.70 1.60 2.30   ALBUMIN 1.6* 1.8* 2.0*   ALKPHOS 146 148 153*   ALT 24 10 <5   AST 18 18 18   BILITOT 0.5 0.5 0.5     Microbiology Results (last 7 days)       Procedure Component Value Units Date/Time    Blood Culture [[id number]]  (Normal) Collected: 09/29/24  1229    Order Status: Completed Specimen: Blood Updated: 10/03/24 1300     Blood Culture No Growth At 96 Hours    Blood Culture [[id number]]  (Normal) Collected: 09/29/24 1229    Order Status: Completed Specimen: Blood Updated: 10/03/24 1300     Blood Culture No Growth At 96 Hours    Body Fluid Culture [[id number]] Collected: 09/27/24 1303    Order Status: Completed Specimen: Body Fluid from Joint Fluid, Right Knee Updated: 10/02/24 1148     Body Fluid Culture Final Report: At 5 days. No growth    Anaerobic Culture [[id number]] Collected: 09/27/24 1303    Order Status: Completed Specimen: Body Fluid from Joint Fluid, Right Knee Updated: 09/30/24 0812     Anaerobe Culture No Anaerobes Isolated    Blood Culture [[id number]]  (Abnormal)  (Susceptibility) Collected: 09/26/24 0003    Order Status: Completed Specimen: Blood Updated: 09/28/24 0700     Blood Culture Methicillin Sensitive Staphylococcus aureus     GRAM STAIN Gram Positive Cocci, probable Staphylococcus      Seen in gram stain of broth only      2 of 2 bottles positive    Blood Culture [[id number]]  (Abnormal)  (Susceptibility) Collected: 09/26/24 0004    Order Status: Completed Specimen: Blood Updated: 09/28/24 0659     Blood Culture Methicillin Sensitive Staphylococcus aureus     GRAM STAIN Gram Positive Cocci, probable Staphylococcus      Seen in gram stain of broth only      1 of 2 Aerobic bottles positive    Gram Stain [[id number]] Collected: 09/27/24 1303    Order Status: Completed Specimen: Body Fluid from Joint Fluid, Right Knee Updated: 09/27/24 1511     GRAM STAIN Many WBC observed      No bacteria seen    Fungal Culture [[id number]] Collected: 09/27/24 1303    Order Status: Sent Specimen: Body Fluid from Joint Fluid, Right Knee Updated: 09/27/24 1350    Blood Culture [[id number]]  (Abnormal)  (Susceptibility) Collected: 09/24/24 2034    Order Status: Completed Specimen: Blood Updated: 09/27/24 1133     Blood Culture Methicillin Sensitive  Staphylococcus aureus     GRAM STAIN Gram Positive Cocci, probable Staphylococcus      Seen in gram stain of broth only      2 of 2 bottles positive    Blood Culture [[id number]]  (Abnormal)  (Susceptibility) Collected: 09/24/24 2034    Order Status: Completed Specimen: Blood Updated: 09/27/24 1133     Blood Culture Methicillin Sensitive Staphylococcus aureus     GRAM STAIN Gram Positive Cocci, probable Staphylococcus      Seen in gram stain of broth only      2 of 2 bottles positive             See below for Radiology    Assessment/Plan:  Left occipital intraparenchymal hemorrhage  Incidental left cavernous ICA aneurysm  MSSA bacteremia, POA  Mitral valve endocarditis with mitral valve regurgitation  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, involving the left C2-C3 facet joint, with epidural enhancement and small prevertebral abscess  Suspected acute osteomyelitis/septic arthritis of the sternomanubrial joint  Possible osteomyelitis of left C3-4 facet joint vs degenerative   Concern for right knee septic arthritis s/p washout 9/27  Right hip arthritis, 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 Olivarez'S office  ID has signed off  CV surgery signed off- recommended 6 weeks of antibiotic therapy followed by repeat echo, and further recommendations after that  Orthopedics following, appreciate recommendations   PT/OT recommended SNF  Case management on [facility] in [place] is ready to accept the patient if insurance approves.      VTE prophylaxis: SCDs    Patient condition:  Fair    Anticipated discharge  and Disposition:     TBD     All diagnosis and differential diagnosis have been reviewed; assessment and plan has been documented; I have personally reviewed the labs and test results that are presently available; I have reviewed the patients medication list; I have reviewed the consulting providers response and recommendations. I have reviewed or attempted to review medical records based upon their availability    All of the patient's questions have been  addressed and answered. Patient's is agreeable to the above stated plan. I will continue to monitor closely and make adjustments to medical management as needed.    Portions of this note dictated using EMR integrated voice recognition software, and may be subject to voice recognition errors not corrected at proofreading. Please contact writer for clarification if needed.   _____________________________________________________________________    Malnutrition Status:    Scheduled Med:   amLODIPine  5 mg Oral Daily    ceFAZolin (Ancef) IV (PEDS and ADULTS)  2 g Intravenous Q8H    famotidine  20 mg Oral BID    levETIRAcetam  500 mg Oral BID      Continuous Infusions:     PRN Meds:    Current Facility-Administered Medications:     acetaminophen, 650 mg, Oral, Q6H PRN    bisacodyL, 10 mg, Rectal, Daily PRN    camphor-methyl salicyl-menthoL, , Topical (Top), BID PRN    dextrose 10%, 12.5 g, Intravenous, PRN    dextrose 10%, 25 g, Intravenous, PRN    labetalol, 10 mg, Intravenous, Q15 Min PRN    LIDOcaine, 1 patch, Transdermal, Q24H PRN    morphine, 2 mg, Intravenous, Q6H PRN    ondansetron, 8 mg, Oral, Q8H PRN    QUEtiapine, 50 mg, Oral, BID PRN    sodium chloride 0.9%, 10 mL, Intravenous, PRN    sodium chloride 0.9%, 10 mL, Intravenous, PRN              Fercho Merchant MD  Department of Hospital Medicine   [facility]   10/03/2024

## 2024-10-04 NOTE — PLAN OF CARE
Clinical updates sent to [facility] and [facility] for review. Spoke to Juana at [facility] who stated that this patient's referral has been submitted for review to see if her IV abx can be accommodated. She did state concerns regarding the ancef needing to be administered every 8 hours.     Awaiting response from Ankit at [facility] regarding this referral. Will continue to reach out regarding a decision.    Expedited appeal for LTAC placement submit via [facility] by Physician as well as patient's family. Decision regarding this appeal is expected to be reached within 24-72 hours.

## 2024-10-04 NOTE — PT/OT/SLP PROGRESS
"Physical Therapy Treatment    Patient Name:  Orlando Black   MRN:  [id number]    Recommendations:     Discharge therapy intensity: Moderate Intensity Therapy   Discharge Equipment Recommendations: to be determined by next level of care  Barriers to discharge: Impaired mobility, Ongoing medical needs, and placement    Assessment:     Orlando Black is a 74 y.o. 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  She presents with the following impairments/functional limitations: weakness, gait instability, impaired endurance, impaired balance, decreased lower extremity function, decreased ROM, decreased safety awareness, impaired self care skills, impaired functional mobility.    Rehab Prognosis: Good; patient would benefit from acute skilled PT services to address these deficits and reach maximum level of function.    Recent Surgery: Procedure(s) (LRB):  ARTHROSCOPY, KNEE, WITH DEBRIDEMENT (Right) 7 Days Post-Op    Plan:     During this hospitalization, patient would benefit from acute PT services 5 x/week to address the identified rehab impairments via gait training, therapeutic activities, therapeutic exercises and progress toward the following goals:    Plan of Care Expires:  10/28/24    Subjective     Chief Complaint: "I'm ready to get back to bed."  Patient/Family Comments/goals:  Pain/Comfort:         Objective:     Communicated with RN prior to session.  Patient found up in chair with blood pressure cuff, pulse ox (continuous), telemetry, PureWick, peripheral IV upon PT entry to room.     General Precautions: Standard, fall, other (see comments) " (-140)  Orthopedic Precautions: RLE weight bearing as tolerated  Braces: N/A  Respiratory Status: Room air  Blood Pressure: 136/75  Skin Integrity: Visible skin intact      Functional Mobility:  Bed Mobility:     Rolling Left:  minimum assistance  Rolling Right: minimum assistance  Sit to Supine: moderate assistance  Transfers:     Sit to Stand:  minimum assistance with rolling walker  Bed to Chair: moderate assistance with  rolling walker  using  Step Transfer    Therapeutic Activities/Exercises:  Rolling R<>L after +BM and void in brief; increased time for pericare totA  STS x 2 modA w/RW; vc for hand placement    Education:  Patient provided with verbal education education regarding PT role/goals/POC, fall prevention, and safety awareness.  Understanding was verbalized.     Patient left HOB elevated with all lines intact, call button in reach, bed alarm on, and RN notified    GOALS:   Multidisciplinary Problems       Physical Therapy Goals          Problem: Physical Therapy    Goal Priority Disciplines Outcome Interventions   Physical Therapy Goal     PT, PT/OT Progressing    Description: Goals to be met by: 10/28/24     Patient will increase functional independence with mobility by performin. Supine to sit with Stand-by Assistance  2. Sit to supine with Stand-by Assistance  3. Sit to stand transfer with Stand-by Assistance  4. Bed to chair transfer with Stand-by Assistance using Rolling Walker  5. Gait  x 200 feet with Stand-by Assistance using Rolling Walker.                          Time Tracking:     PT Received On: 10/04/24  PT Start Time: 1347     PT Stop Time: 1413  PT Total Time (min): 26 min     Billable Minutes: Therapeutic Activity 2    Treatment Type: Treatment  PT/PTA: PTA     Number of PTA visits since last PT visit: 3     10/04/2024

## 2024-10-04 NOTE — PT/OT/SLP PROGRESS
Occupational Therapy   Treatment    Name: Orlando Black  MRN: [id number]    Recommendations:     Recommended therapy intensity at discharge: Moderate Intensity Therapy   Discharge Equipment Recommendations:  to be determined by next level of care  Barriers to discharge:   (ongoing medical needs, severity of deficits)    Assessment:     Orlando Black is a 74 y.o. 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 Performance deficits affecting function are weakness, impaired endurance, impaired self care skills, impaired functional mobility, gait instability, impaired balance, decreased lower extremity function, decreased upper extremity function, impaired cognition, decreased safety awareness. Patient overall required mod A for functional mobility, max A for toileting. Patient soiled upon OT entry - redness noted on anterior and posterior perineal region.    Rehab Prognosis:  Good; patient would benefit from acute skilled OT services to address these deficits and reach maximum level of function.       Plan:     Patient to be seen 5 x/week to address the above listed problems via self-care/home management, therapeutic activities, therapeutic exercises  Plan of Care Expires: 10/28/24  Plan of Care Reviewed with: patient    Subjective     Pain/Comfort:  Pain Rating 1: 0/10    Objective:     Communicated with: NSNAA prior to session.  Patient found HOB elevated with peripheral IV, pulse ox (continuous), telemetry, upon OT entry to room.    General Precautions: Standard, fall  Orthopedic Precautions:RLE weight bearing  as tolerated  Braces: N/A  Respiratory Status: Room air  Vital Signs: 146/78     Occupational Performance:     Bed Mobility:    Patient completed Rolling/Turning to Left with  minimum assistance  Patient completed Rolling/Turning to Right with minimum assistance  Patient completed Supine to Sit with moderate assistance     Functional Mobility/Transfers:  Patient completed Sit <> Stand Transfer with moderate assistance  with  rolling walker   Patient completed Bed <> Chair Transfer using Step Transfer technique with moderate assistance with rolling walker  Functional Mobility: mod A to take 4 steps to chair using RW    Activities of Daily Living:  Toileting: maximal assistance for posterior/anterior pericare and clothing mgmt; min A for bed mobility required    Therapeutic Positioning    OT interventions performed during the course of today's session in an effort to prevent and/or reduce acquired pressure injuries:   Education was provided on benefits of and recommendations for therapeutic positioning  Therapeutic positioning was provided at the conclusion of session to offload all bony prominences for the prevention and/or reduction of pressure injuries    Skin assessment: full body skin assessment was performed    Findings: new area of altered skin integrity discovered at anterior/posterior perineal area    Patient Education:  Patient provided with verbal education education regarding OT role/goals/POC, post op precautions, fall prevention, safety awareness, Discharge/DME recommendations, and pressure ulcer prevention.  Understanding was verbalized, however additional teaching warranted.    Patient left up in chair with all lines intact, call button in reach, chair alarm on, nieves pad in place, NSG notified, and posey vest .    GOALS:   Multidisciplinary Problems       Occupational Therapy Goals          Problem: Occupational Therapy    Goal Priority Disciplines Outcome Interventions   Occupational Therapy Goal      OT, PT/OT Progressing    Description: LTG: Pt will perform basic ADLs and ADL transfers with Modified independence using LRAD by discharge.    STG: to be met by 10/28/24    Pt will complete grooming standing at sink with LRAD with SBA.  Pt will complete UB dressing with SBA.  Pt will complete LB dressing with SBA using LRAD.  Pt will complete toileting with SBA using LRAD.  Pt will complete functional mobility to/from toilet and toilet transfer with SBA using LRAD.                         Time Tracking:     OT Date of Treatment:    OT Start Time: 1021  OT Stop Time: 1048  OT Total Time (min): 27 min    Billable Minutes:Self Care/Home Management 2    OT/TY: OT     Number of TY visits since last OT visit: 3    10/4/2024

## 2024-10-05 LAB
ANION GAP SERPL CALC-SCNC: 9 MEQ/L
BUN SERPL-MCNC: 10.8 MG/DL (ref 9.8–20.1)
CALCIUM SERPL-MCNC: 8.4 MG/DL (ref 8.4–10.2)
CHLORIDE SERPL-SCNC: 107 MMOL/L (ref 98–107)
CO2 SERPL-SCNC: 25 MMOL/L (ref 23–31)
CREAT SERPL-MCNC: 0.65 MG/DL (ref 0.55–1.02)
CREAT/UREA NIT SERPL: 17
GFR SERPLBLD CREATININE-BSD FMLA CKD-EPI: >60 ML/MIN/1.73/M2
GLUCOSE SERPL-MCNC: 108 MG/DL (ref 82–115)
POTASSIUM SERPL-SCNC: 3.9 MMOL/L (ref 3.5–5.1)
SODIUM SERPL-SCNC: 141 MMOL/L (ref 136–145)

## 2024-10-05 PROCEDURE — 21400001 HC TELEMETRY ROOM

## 2024-10-05 PROCEDURE — 25000003 PHARM REV CODE 250: Performed by: INTERNAL MEDICINE

## 2024-10-05 PROCEDURE — 25000003 PHARM REV CODE 250: Performed by: NEUROLOGICAL SURGERY

## 2024-10-05 PROCEDURE — 25000003 PHARM REV CODE 250

## 2024-10-05 PROCEDURE — 36415 COLL VENOUS BLD VENIPUNCTURE: CPT | Performed by: INTERNAL MEDICINE

## 2024-10-05 PROCEDURE — 63600175 PHARM REV CODE 636 W HCPCS: Performed by: INTERNAL MEDICINE

## 2024-10-05 PROCEDURE — 25000003 PHARM REV CODE 250: Performed by: STUDENT IN AN ORGANIZED HEALTH CARE EDUCATION/TRAINING PROGRAM

## 2024-10-05 PROCEDURE — 80048 BASIC METABOLIC PNL TOTAL CA: CPT | Performed by: INTERNAL MEDICINE

## 2024-10-05 RX ORDER — AMLODIPINE BESYLATE 5 MG/1
5 TABLET ORAL DAILY
Status: DISCONTINUED | OUTPATIENT
Start: 2024-10-05 | End: 2024-10-21 | Stop reason: HOSPADM

## 2024-10-05 RX ADMIN — CEFAZOLIN SODIUM 2 G: 2 SOLUTION INTRAVENOUS at 01:10

## 2024-10-05 RX ADMIN — CEFAZOLIN SODIUM 2 G: 2 SOLUTION INTRAVENOUS at 05:10

## 2024-10-05 RX ADMIN — LEVETIRACETAM 500 MG: 500 TABLET, FILM COATED ORAL at 08:10

## 2024-10-05 RX ADMIN — FAMOTIDINE 20 MG: 20 TABLET, FILM COATED ORAL at 08:10

## 2024-10-05 RX ADMIN — QUETIAPINE FUMARATE 50 MG: 25 TABLET ORAL at 01:10

## 2024-10-05 RX ADMIN — AMLODIPINE BESYLATE 5 MG: 5 TABLET ORAL at 09:10

## 2024-10-05 RX ADMIN — CEFAZOLIN SODIUM 2 G: 2 SOLUTION INTRAVENOUS at 08:10

## 2024-10-05 RX ADMIN — METOPROLOL SUCCINATE 12.5 MG: 25 TABLET, EXTENDED RELEASE ORAL at 09:10

## 2024-10-05 RX ADMIN — FAMOTIDINE 20 MG: 20 TABLET, FILM COATED ORAL at 09:10

## 2024-10-05 RX ADMIN — ACETAMINOPHEN 650 MG: 325 TABLET ORAL at 08:10

## 2024-10-05 NOTE — PROGRESS NOTES
[facility]  Hospital Medicine Progress Note        Chief Complaint: Inpatient Follow-up       HPI:     The Pt is a 73 yo female without significant past medical history on file presented to [facility] on 9/24/2024 transferred from [place] for neurosurgery and CV surgery services.  Patient originally presented to outside hospital on 09/21/2024 for fever and body aches.  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  Patient developed altered mental status overnight on 09/23/2024 and underwent CT head on 09/24/2024 which showed new left occipital intraparenchymal hematoma measuring 2 x 1.8 x 1.5 cm.  Patient was transferred to [facility] ICU for higher level care.  Neurosurgery, interventional neurology, and CV surgery services were consulted.  Patient was started on broad-spectrum antibiotics Vancomycin and Zosyn.  CV surgery recommended 6 weeks of antibiotics treatment per ID guidance and repeat echocardiogram after treatment.  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  Vascular Neurology suggested no antiplatelet or anticoagulation for now and follow up in the OP clinic for ICA aneurysm.   Neurosurgery recommended q.2 hours neurochecks, BP parameters below 140/90, and Keppra 500 mg bid. Infectious Disease and orthopedic services were consulted.  Patient was started on Oxacillin 2 g q.4 hours after blood cultures confirmed MSSA.  MRI cervical spine findings consistent with diskitis/osteomyelitis at C3-C6, left C2-C3 facet joint with  epidural enhancement and small prevertebral abscess, moderate to severe canal narrowing at C5-C6, suspected areas of fluid in the posterior paraspinal soft tissues of the upper thoracic spine.  Patient underwent washout of right knee effusion  on 09/27 with Dr. Castro.  Rt knee aspiration neg for bacterial or fungal growth. Repeat blood cultures x 2 from 9/29/24 remained neg. On 10/1/24 oxacillin transitioned to cefazolin 2 g every 8 hours for OPAT convenience with estimated end date 11/13/2024. Patient was cleared for downgrade out of ICU on 10/01/2024 and admitted to hospital medicine service      Therapy services were consulted.  Patient is currently awaiting LTAC evaluation and acceptance.  Venous U/S delvis lower ext performed on 10/5/24 due to some swelling of RLE related to known Baker's cyst was neg for DVT.        Interval Hx:   No acute events reported overnight.  Pt is awake, alert, oriented to self but some intermittent confusion persist.   No agitation or aggressive behavior observed.     No further fever reported after one spike of 100.9 on 10/3/24.   Hemodynamics are stable, on RA  Labs today with normal K, Cr 0.6      Case was discussed with patient's nurse and  on the floor.    Objective/physical exam:  General: In no acute distress, afebrile  Chest: Raised manubrium, Clear to auscultation bilaterally  Heart: RRR, +S1, S2, (+) murmur  Abdomen: Soft, nontender, BS +  MSK: Warm, trace  lower extremity edema RLE, no clubbing or cyanosis  Neurologic:Awake , alert, intermittent confusion,  move all ext spontaneously        VITAL SIGNS: 24 HRS MIN & MAX LAST   Temp  Min: 97.8 °F (36.6 °C)  Max: 98.6 °F (37 °C) 97.8 °F (36.6 °C)   BP  Min: 104/68  Max: 147/78 120/69   Pulse  Min: 83  Max: 107  93   Resp  Min: 17  Max: 19 17   SpO2  Min: 96 %  Max: 99 % 96 %     I have reviewed the following labs:  Recent Labs   Lab 10/02/24  0320 10/03/24  0250 10/04/24  0248   WBC 10.69 11.69* 11.34   RBC 2.86*  3.15* 3.07*   HGB 9.1* 9.8* 9.6*   HCT 27.4* 31.3* 30.2*   MCV 95.8* 99.4* 98.4*   MCH 31.8* 31.1* 31.3*   MCHC 33.2 31.3* 31.8*   RDW 13.7 13.5 13.5   * 521* 509*   MPV 9.1 9.1 8.7     Recent Labs   Lab 10/01/24  0431 10/02/24  0320 10/03/24  0250 10/04/24  0248 10/05/24  0435    141 139 142 141   K 3.1* 3.6 3.2* 2.9* 3.9    105 103 102 107   CO2 25 27 23 28 25   BUN 7.3* 7.0* 7.1* 7.6* 10.8   CREATININE 0.55 0.57 0.56 0.58 0.65   CALCIUM 7.6* 7.9* 8.1* 8.3* 8.4   MG 1.70 1.60 2.30 1.90  --    ALBUMIN 1.6* 1.8* 2.0*  --   --    ALKPHOS 146 148 153*  --   --    ALT 24 10 <5  --   --    AST 18 18 18  --   --    BILITOT 0.5 0.5 0.5  --   --      Microbiology Results (last 7 days)       Procedure Component Value Units Date/Time    Blood Culture [[id number]]  (Normal) Collected: 10/04/24 0842    Order Status: Completed Specimen: Blood from Arm, Right Updated: 10/05/24 1101     Blood Culture No Growth At 24 Hours    Blood Culture [[id number]]  (Normal) Collected: 10/04/24 0842    Order Status: Completed Specimen: Blood from Arm, Left Updated: 10/05/24 1101     Blood Culture No Growth At 24 Hours    Blood Culture [[id number]]  (Normal) Collected: 09/29/24 1229    Order Status: Completed Specimen: Blood Updated: 10/04/24 1300     Blood Culture No Growth at 5 days    Blood Culture [[id number]]  (Normal) Collected: 09/29/24 1229    Order Status: Completed Specimen: Blood Updated: 10/04/24 1300     Blood Culture No Growth at 5 days    Body Fluid Culture [[id number]] Collected: 09/27/24 1303    Order Status: Completed Specimen: Body Fluid from Joint Fluid, Right Knee Updated: 10/02/24 1148     Body Fluid Culture Final Report: At 5 days. 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  Blood cultures x 2 repeated on 10/4/24   given isolated low grade temp spike and no growth 24h  Precaution for delirium prevention   Correct and replete electrolytes as indicated   Antihypertensive include Amlodipine and Metoprolol initiated and continued   Continue PT/OT service   CM consulted to assist with DC planning         VTE prophylaxis: SCDs     Patient condition:  Fair     Anticipated discharge and Disposition:     TBD      All diagnosis and differential diagnosis have been reviewed; assessment and plan has been documented; I have personally reviewed the labs and test results that are presently available; I have reviewed the patients medication list; I have reviewed the consulting providers response and recommendations. I have reviewed or attempted to review medical records based upon their availability    All of the patient's questions have been  addressed and answered. Patient's is agreeable to the above stated plan. I will continue to monitor closely and make adjustments to medical management as needed.    Portions of this note dictated using EMR integrated voice recognition software, and may be subject to voice recognition errors not corrected at proofreading. Please contact writer for clarification if  needed.   _____________________________________________________________________    Malnutrition Status:    Scheduled Med:   amLODIPine  5 mg Oral Daily    ceFAZolin (Ancef) IV (PEDS and ADULTS)  2 g Intravenous Q8H    famotidine  20 mg Oral BID    levETIRAcetam  500 mg Oral BID    metoprolol succinate  12.5 mg Oral Daily      Continuous Infusions:     PRN Meds:    Current Facility-Administered Medications:     acetaminophen, 650 mg, Oral, Q6H PRN    bisacodyL, 10 mg, Rectal, Daily PRN    camphor-methyl salicyl-menthoL, , Topical (Top), BID PRN    dextrose 10%, 12.5 g, Intravenous, PRN    dextrose 10%, 25 g, Intravenous, PRN    labetalol, 10 mg, Intravenous, Q15 Min PRN    LIDOcaine, 1 patch, Transdermal, Q24H PRN    morphine, 2 mg, Intravenous, Q6H PRN    ondansetron, 8 mg, Oral, Q8H PRN    QUEtiapine, 50 mg, Oral, BID PRN    sodium chloride 0.9%, 10 mL, Intravenous, PRN    sodium chloride 0.9%, 10 mL, Intravenous, PRN         Fercho Merchant MD  Department of Hospital Medicine   [facility]   10/05/2024

## 2024-10-05 NOTE — PROGRESS NOTES
[facility]  Hospital Medicine Progress Note        Chief Complaint: Inpatient Follow-up     HPI:   The Pt is a 73 yo female without significant past medical history on file presented to [facility] on 9/24/2024 transferred from [place] for neurosurgery and CV surgery services.  Patient originally presented to outside hospital on 09/21/2024 for fever and body aches.  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  Patient developed altered mental status overnight on 09/23/2024 and underwent CT head on 09/24/2024 which showed new left occipital intraparenchymal hematoma measuring 2 x 1.8 x 1.5 cm.  Patient was transferred to [facility] ICU for higher level care.  Neurosurgery, interventional neurology, and CV surgery services were consulted.  Patient was started on broad-spectrum antibiotics Vancomycin and Zosyn.  CV surgery recommended 6 weeks of antibiotics treatment per ID guidance and repeat echocardiogram after treatment.  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  Vascular Neurology suggested no antiplatelet or anticoagulation for now and follow up in the OP clinic for ICA aneurysm.   Neurosurgery recommended q.2 hours neurochecks, BP parameters below 140/90, and Keppra 500 mg bid. Infectious Disease and orthopedic services were consulted.  Patient was started on Oxacillin 2 g q.4 hours.  MRI cervical spine findings consistent with diskitis/osteomyelitis at C3-C6, involving the left C2-C3 facet joint with epidural enhancement and  small prevertebral abscess, moderate to severe canal narrowing at C5-C6, suspected areas of fluid in the posterior paraspinal soft tissues of the upper thoracic spine.  Patient underwent washout of right knee on 09/27 with Dr. Castro.  Rt knee aspiration neg for bacterial or fungal growth. Repeat blood cultures x 2 from 9/29/24 remained neg. On 10/1/24 oxacillin transitioned to cefazolin 2 g every 8 hours for OPAT convenience with estimated end date 11/13/2024. Patient was cleared for downgrade out of ICU on 10/01/2024 and admitted to hospital medicine service     Therapy services were consulted.  Patient is currently awaiting LTAC evaluation and acceptance.        Interval Hx:   One isolated temp of 100.9 last night. No further fever reported since.   Pt is awake , alert with some intermittent confusion this morning. No distress noted.   Hemodynamics are stable, on RA  Labs this morning with normal WBC, Hgb 9.6, Plt 509, K 2.9- replacement ordered, Mag 1.9      Case was discussed with patient's nurse and  on the floor.    Objective/physical exam:  General: In no acute distress, afebrile  Chest: Raised manubrium, Clear to auscultation bilaterally  Heart: RRR, +S1, S2, (+) murmur  Abdomen: Soft, nontender, BS +  MSK: Warm, trace  lower extremity edema, no clubbing or cyanosis  Neurologic:Awake , alert, intermittent confusion,  move all ext spontaneously       VITAL SIGNS: 24 HRS MIN & MAX LAST   Temp  Min: 97.7 °F (36.5 °C)  Max: 99.4 °F (37.4 °C) 98.6 °F (37 °C)   BP  Min: 138/67  Max: 149/80 138/67   Pulse  Min: 87  Max: 99  87   Resp  Min: 18  Max: 18 18   SpO2  Min: 93 %  Max: 100 % 99 %     I have reviewed the following labs:  Recent Labs   Lab 10/02/24  0320 10/03/24  0250 10/04/24  0248   WBC 10.69 11.69* 11.34   RBC 2.86* 3.15* 3.07*   HGB 9.1* 9.8* 9.6*   HCT 27.4* 31.3* 30.2*   MCV 95.8* 99.4* 98.4*   MCH 31.8* 31.1* 31.3*   MCHC 33.2 31.3* 31.8*   RDW 13.7 13.5 13.5   * 521* 509*   MPV  9.1 9.1 8.7     Recent Labs   Lab 10/01/24  0431 10/02/24  0320 10/03/24  0250 10/04/24  0248    141 139 142   K 3.1* 3.6 3.2* 2.9*    105 103 102   CO2 25 27 23 28   BUN 7.3* 7.0* 7.1* 7.6*   CREATININE 0.55 0.57 0.56 0.58   CALCIUM 7.6* 7.9* 8.1* 8.3*   MG 1.70 1.60 2.30 1.90   ALBUMIN 1.6* 1.8* 2.0*  --    ALKPHOS 146 148 153*  --    ALT 24 10 <5  --    AST 18 18 18  --    BILITOT 0.5 0.5 0.5  --      Microbiology Results (last 7 days)       Procedure Component Value Units Date/Time    Blood Culture [[id number]]  (Normal) Collected: 09/29/24 1229    Order Status: Completed Specimen: Blood Updated: 10/04/24 1300     Blood Culture No Growth at 5 days    Blood Culture [[id number]]  (Normal) Collected: 09/29/24 1229    Order Status: Completed Specimen: Blood Updated: 10/04/24 1300     Blood Culture No Growth at 5 days    Blood Culture [[id number]] Collected: 10/04/24 0842    Order Status: Resulted Specimen: Blood from Arm, Right Updated: 10/04/24 1011    Blood Culture [[id number]] Collected: 10/04/24 0842    Order Status: Resulted Specimen: Blood from Arm, Left Updated: 10/04/24 0914    Body Fluid Culture [[id number]] Collected: 09/27/24 1303    Order Status: Completed Specimen: Body Fluid from Joint Fluid, Right Knee Updated: 10/02/24 1148     Body Fluid Culture Final Report: At 5 days. No growth    Anaerobic Culture [[id number]] Collected: 09/27/24 1303    Order Status: Completed Specimen: Body Fluid from Joint Fluid, Right Knee Updated: 09/30/24 0812     Anaerobe Culture No Anaerobes Isolated    Blood Culture [[id number]]  (Abnormal)  (Susceptibility) Collected: 09/26/24 0003    Order Status: Completed Specimen: Blood Updated: 09/28/24 0700     Blood Culture Methicillin Sensitive Staphylococcus aureus     GRAM STAIN Gram Positive Cocci, probable Staphylococcus      Seen in gram stain of broth only      2 of 2 bottles positive    Blood Culture [[id number]]  (Abnormal)  (Susceptibility) Collected:  09/26/24 0004    Order Status: Completed Specimen: Blood Updated: 09/28/24 0659     Blood Culture Methicillin Sensitive Staphylococcus aureus     GRAM STAIN Gram Positive Cocci, probable Staphylococcus      Seen in gram stain of broth only      1 of 2 Aerobic bottles positive             See below for Radiology    Assessment/Plan:  Disseminated MSSA infection with MSSA bacteremia   Native mitral valve endocarditis with mitral regurgitation   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  Blood cultures x 2 repeated today given isolated low grade temp spike.  Precaution for delirium prevention   Correct and replete electrolytes as indicated   Antihypertensive include Amlodipine and Metoprolol initiated and continued   Continue PT/OT service   CM consulted to assist with DC planning       VTE prophylaxis: SCDs    Patient condition:  Fair    Anticipated discharge and Disposition:     TBD    All diagnosis and differential diagnosis have been reviewed; assessment and plan has been documented; I have personally reviewed the labs and test results that are presently available; I have reviewed the patients medication list; I have reviewed the consulting providers response and recommendations. I have reviewed or attempted to review medical records based upon their availability    All of the patient's questions have been  addressed and answered. Patient's is agreeable to the above stated plan. I will continue to monitor closely and make  adjustments to medical management as needed.    Portions of this note dictated using EMR integrated voice recognition software, and may be subject to voice recognition errors not corrected at proofreading. Please contact writer for clarification if needed.   _____________________________________________________________________    Malnutrition Status:    Scheduled Med:   amLODIPine  10 mg Oral Daily    ceFAZolin (Ancef) IV (PEDS and ADULTS)  2 g Intravenous Q8H    famotidine  20 mg Oral BID    levETIRAcetam  500 mg Oral BID    metoprolol succinate  12.5 mg Oral Daily      Continuous Infusions:     PRN Meds:    Current Facility-Administered Medications:     acetaminophen, 650 mg, Oral, Q6H PRN    bisacodyL, 10 mg, Rectal, Daily PRN    camphor-methyl salicyl-menthoL, , Topical (Top), BID PRN    dextrose 10%, 12.5 g, Intravenous, PRN    dextrose 10%, 25 g, Intravenous, PRN    labetalol, 10 mg, Intravenous, Q15 Min PRN    LIDOcaine, 1 patch, Transdermal, Q24H PRN    morphine, 2 mg, Intravenous, Q6H PRN    ondansetron, 8 mg, Oral, Q8H PRN    QUEtiapine, 50 mg, Oral, BID PRN    sodium chloride 0.9%, 10 mL, Intravenous, PRN    sodium chloride 0.9%, 10 mL, Intravenous, PRN     Radiology:  I have personally reviewed the following imaging and agree with the radiologist.         Fercho Merchant MD  Department of Hospital Medicine   [facility]   10/04/2024

## 2024-10-06 LAB
APICAL FOUR CHAMBER EJECTION FRACTION: 64 %
APICAL TWO CHAMBER EJECTION FRACTION: 63 %
AV INDEX (PROSTH): 0.64
AV MEAN GRADIENT: 5 MMHG
AV PEAK GRADIENT: 10.2 MMHG
AV VALVE AREA BY VELOCITY RATIO: 2 CM²
AV VALVE AREA: 2 CM²
AV VELOCITY RATIO: 0.63
BSA FOR ECHO PROCEDURE: 1.62 M2
CV ECHO LV RWT: 0.56 CM
DOP CALC AO PEAK VEL: 1.6 M/S
DOP CALC AO VTI: 25.4 CM
DOP CALC LVOT AREA: 3.1 CM2
DOP CALC LVOT DIAMETER: 2 CM
DOP CALC LVOT PEAK VEL: 1 M/S
DOP CALC LVOT STROKE VOLUME: 50.9 CM3
DOP CALC MV VTI: 32.8 CM
DOP CALCLVOT PEAK VEL VTI: 16.2 CM
E WAVE DECELERATION TIME: 195 MSEC
E/A RATIO: 0.8
E/E' RATIO: 10.3 M/S
ECHO LV POSTERIOR WALL: 1.4 CM (ref 0.6–1.1)
FRACTIONAL SHORTENING: 28 % (ref 28–44)
HR MV ECHO: 92 BPM
INTERVENTRICULAR SEPTUM: 1.2 CM (ref 0.6–1.1)
LEFT ATRIUM AREA SYSTOLIC (APICAL 2 CHAMBER): 20.5 CM2
LEFT ATRIUM AREA SYSTOLIC (APICAL 4 CHAMBER): 19.9 CM2
LEFT ATRIUM SIZE: 3.8 CM
LEFT ATRIUM VOLUME INDEX MOD: 31.5 ML/M2
LEFT ATRIUM VOLUME MOD: 57 ML
LEFT INTERNAL DIMENSION IN SYSTOLE: 3.6 CM (ref 2.1–4)
LEFT VENTRICLE DIASTOLIC VOLUME INDEX: 65.19 ML/M2
LEFT VENTRICLE DIASTOLIC VOLUME: 118 ML
LEFT VENTRICLE END DIASTOLIC VOLUME APICAL 2 CHAMBER: 48.4 ML
LEFT VENTRICLE END DIASTOLIC VOLUME APICAL 4 CHAMBER: 85.9 ML
LEFT VENTRICLE END SYSTOLIC VOLUME APICAL 2 CHAMBER: 57.7 ML
LEFT VENTRICLE END SYSTOLIC VOLUME APICAL 4 CHAMBER: 53.7 ML
LEFT VENTRICLE MASS INDEX: 144.7 G/M2
LEFT VENTRICLE SYSTOLIC VOLUME INDEX: 30.1 ML/M2
LEFT VENTRICLE SYSTOLIC VOLUME: 54.4 ML
LEFT VENTRICULAR INTERNAL DIMENSION IN DIASTOLE: 5 CM (ref 3.5–6)
LEFT VENTRICULAR MASS: 261.8 G
LV LATERAL E/E' RATIO: 8.58 M/S
LV SEPTAL E/E' RATIO: 12.88 M/S
LVED V (TEICH): 118 ML
LVES V (TEICH): 54.4 ML
LVOT MG: 2 MMHG
LVOT MV: 0.63 CM/S
MV MEAN GRADIENT: 4 MMHG
MV PEAK A VEL: 1.29 M/S
MV PEAK E VEL: 1.03 M/S
MV PEAK GRADIENT: 6 MMHG
MV STENOSIS PRESSURE HALF TIME: 104 MS
MV VALVE AREA BY CONTINUITY EQUATION: 1.55 CM2
MV VALVE AREA P 1/2 METHOD: 2.12 CM2
OHS LV EJECTION FRACTION SIMPSONS BIPLANE MOD: 63 %
PISA TR MAX VEL: 2.6 M/S
POCT GLUCOSE: 85 MG/DL (ref 70–110)
PV PEAK GRADIENT: 5 MMHG
PV PEAK VELOCITY: 1.12 M/S
RA PRESSURE ESTIMATED: 8 MMHG
RV TB RVSP: 11 MMHG
TDI LATERAL: 0.12 M/S
TDI SEPTAL: 0.08 M/S
TDI: 0.1 M/S
TR MAX PG: 27 MMHG
TRICUSPID ANNULAR PLANE SYSTOLIC EXCURSION: 2.52 CM
TV REST PULMONARY ARTERY PRESSURE: 35 MMHG
Z-SCORE OF LEFT VENTRICULAR DIMENSION IN END DIASTOLE: -0.02
Z-SCORE OF LEFT VENTRICULAR DIMENSION IN END SYSTOLE: 1.2

## 2024-10-06 PROCEDURE — 25000003 PHARM REV CODE 250: Performed by: STUDENT IN AN ORGANIZED HEALTH CARE EDUCATION/TRAINING PROGRAM

## 2024-10-06 PROCEDURE — 25000003 PHARM REV CODE 250: Performed by: INTERNAL MEDICINE

## 2024-10-06 PROCEDURE — 63600175 PHARM REV CODE 636 W HCPCS: Performed by: INTERNAL MEDICINE

## 2024-10-06 PROCEDURE — 87040 BLOOD CULTURE FOR BACTERIA: CPT | Performed by: INTERNAL MEDICINE

## 2024-10-06 PROCEDURE — 36415 COLL VENOUS BLD VENIPUNCTURE: CPT | Performed by: INTERNAL MEDICINE

## 2024-10-06 PROCEDURE — 97530 THERAPEUTIC ACTIVITIES: CPT

## 2024-10-06 PROCEDURE — 25000003 PHARM REV CODE 250

## 2024-10-06 PROCEDURE — 97535 SELF CARE MNGMENT TRAINING: CPT

## 2024-10-06 PROCEDURE — 25000003 PHARM REV CODE 250: Performed by: NEUROLOGICAL SURGERY

## 2024-10-06 PROCEDURE — 21400001 HC TELEMETRY ROOM

## 2024-10-06 RX ADMIN — FAMOTIDINE 20 MG: 20 TABLET, FILM COATED ORAL at 08:10

## 2024-10-06 RX ADMIN — LEVETIRACETAM 500 MG: 500 TABLET, FILM COATED ORAL at 08:10

## 2024-10-06 RX ADMIN — Medication: at 06:10

## 2024-10-06 RX ADMIN — METOPROLOL SUCCINATE 12.5 MG: 25 TABLET, EXTENDED RELEASE ORAL at 09:10

## 2024-10-06 RX ADMIN — CEFAZOLIN SODIUM 2 G: 2 SOLUTION INTRAVENOUS at 04:10

## 2024-10-06 RX ADMIN — FAMOTIDINE 20 MG: 20 TABLET, FILM COATED ORAL at 09:10

## 2024-10-06 RX ADMIN — QUETIAPINE FUMARATE 50 MG: 25 TABLET ORAL at 08:10

## 2024-10-06 RX ADMIN — OXACILLIN 12 G: 2 INJECTION, POWDER, FOR SOLUTION INTRAMUSCULAR; INTRAVENOUS at 06:10

## 2024-10-06 RX ADMIN — AMLODIPINE BESYLATE 5 MG: 5 TABLET ORAL at 09:10

## 2024-10-06 RX ADMIN — ACETAMINOPHEN 650 MG: 325 TABLET ORAL at 06:10

## 2024-10-06 NOTE — PLAN OF CARE
Problem: Adult Inpatient Plan of Care  Goal: Optimal Comfort and Wellbeing  Outcome: Progressing     Problem: Stroke, Subarachnoid Hemorrhage  Goal: Optimal Coping  Outcome: Progressing     Problem: Stroke, Subarachnoid Hemorrhage  Goal: Optimal Cerebral Tissue Perfusion  Outcome: Progressing     Problem: Stroke, Subarachnoid Hemorrhage  Goal: Optimal Cognitive Function  Outcome: Progressing     Problem: Stroke, Subarachnoid Hemorrhage  Goal: Optimal Functional Ability  Outcome: Progressing     Problem: Stroke, Subarachnoid Hemorrhage  Goal: Safe and Effective Swallow  Outcome: Progressing

## 2024-10-06 NOTE — PT/OT/SLP PROGRESS
Occupational Therapy   Treatment    Name: Orlando Black  MRN: [id number]    Recommendations:     Recommended therapy intensity at discharge: Moderate Intensity Therapy   Discharge Equipment Recommendations:  to be determined by next level of care  Barriers to discharge:   (ongoing medical needs, severity of deficits)    Assessment:     Orlando Black is a 74 y.o. 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 Performance deficits affecting function are weakness, impaired endurance, impaired self care skills, impaired functional mobility, gait instability, impaired balance, decreased lower extremity function, decreased upper extremity function, impaired cognition, decreased safety awareness. Patient hallucinating upon OT entry, confusing dietician as her grandson; patient also appears anxious.    Rehab Prognosis:  Good; patient would benefit from acute skilled OT services to address these deficits and reach maximum level of function.       Plan:     Patient to be seen 5 x/week to address the above listed problems via self-care/home management, therapeutic activities, therapeutic exercises  Plan of Care Expires: 10/28/24  Plan of Care Reviewed with: patient    Subjective     Pain/Comfort:  Pain Rating 1: 0/10    Objective:     Communicated with: NSNAA prior to session.  Patient found left sidelying with peripheral IV, pulse ox (continuous), telemetry, Purewick upon OT entry to room.    General Precautions: Standard, fall  Orthopedic Precautions:RLE weight bearing as tolerated  Braces: N/A  Respiratory Status:  Room air     Occupational Performance:     Bed Mobility:    Patient completed Rolling/Turning to Left with  minimum assistance  Patient completed Rolling/Turning to Right with minimum assistance  Patient completed Supine to Sit with moderate assistance      Functional Mobility/Transfers:  Patient completed Sit <> Stand Transfer with moderate assistance  with  rolling walker   Patient completed Bed <> Chair Transfer using Step Transfer technique with minimal assistance with rolling walker  Functional Mobility: mod A to take 5 steps to chair using RW; required mod A for RW mgmt and initiating LLE advancement    Activities of Daily Living:  Lower body dressing: unable to perform figure 4 in order to don socks EOB; max A  Grooming: OT left room to retrieve other supplies however patient with memory impairments and completed oral hygiene sitting in bedside chair - using the same water to rinse/spit    Therapeutic Positioning    OT interventions performed during the course of today's session in an effort to prevent and/or reduce acquired pressure injuries:   Education was provided on benefits of and recommendations for therapeutic positioning  Therapeutic positioning was provided at the conclusion of session to offload all bony prominences for the prevention and/or reduction of pressure injuries    Patient Education:  Patient provided with verbal education education regarding OT role/goals/POC, fall prevention, safety awareness, Discharge/DME recommendations, and pressure ulcer prevention.  Understanding was verbalized, however additional teaching warranted.      Patient left up in chair with all lines intact, call button in reach, chair alarm on, nieves pad in place, and NSG notified. Elba vest in place.    GOALS:   Multidisciplinary Problems       Occupational Therapy Goals          Problem: Occupational Therapy    Goal Priority Disciplines Outcome Interventions   Occupational Therapy Goal     OT, PT/OT Progressing     Description: LTG: Pt will perform basic ADLs and ADL transfers with Modified independence using LRAD by discharge.    STG: to be met by 10/28/24    Pt will complete grooming standing at sink with LRAD with SBA.  Pt will complete UB dressing with SBA.  Pt will complete LB dressing with SBA using LRAD.  Pt will complete toileting with SBA using LRAD.  Pt will complete functional mobility to/from toilet and toilet transfer with SBA using LRAD.                         Time Tracking:     OT Date of Treatment:    OT Start Time: 1007  OT Stop Time: 1033  OT Total Time (min): 26 min    Billable Minutes:Self Care/Home Management 2    OT/TY: OT     Number of TY visits since last OT visit: 4    10/6/2024

## 2024-10-06 NOTE — PT/OT/SLP PROGRESS
"Physical Therapy Treatment    Patient Name:  Orlando Black   MRN:  [id number]    Recommendations:     Discharge therapy intensity: Moderate Intensity Therapy   Discharge Equipment Recommendations: to be determined by next level of care  Barriers to discharge: Impaired mobility and Ongoing medical needs    Assessment:     Orlando Black is a 74 y.o. 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  She presents with the following impairments/functional limitations: weakness, gait instability, impaired endurance, impaired balance, decreased lower extremity function, decreased ROM, decreased safety awareness, impaired self care skills, impaired functional mobility . Pt confused and fearful with mobility this date; with encouragement able to stand with minAx2 but declined ambulation.    Rehab Prognosis: Good; patient would benefit from acute skilled PT services to address these deficits and reach maximum level of function.    Recent Surgery: Procedure(s) (LRB):  ARTHROSCOPY, KNEE, WITH DEBRIDEMENT (Right) 9 Days Post-Op    Plan:     During this hospitalization, patient would benefit from acute PT services 5 x/week to address the identified rehab impairments via gait training, therapeutic activities, therapeutic exercises, neuromuscular re-education and progress toward the following goals:    Plan of Care Expires:  10/28/24    Subjective     Chief Complaint: tearful, "I'm scared"  Patient/Family Comments/goals: PLOF  Pain/Comfort:  Pain Rating 1:  (pain in neck and hips, no rating given)      Objective:     Communicated with RN prior to session.  " Patient found up in chair with peripheral IV, PureWick, pulse ox (continuous), telemetry, chair check upon PT entry to room.     General Precautions: Standard, fall (-140)  Orthopedic Precautions: RLE weight bearing as tolerated  Braces: N/A  Respiratory Status: Room air  Skin Integrity:  incisions R knee intact      Functional Mobility:  Transfers:     Sit to Stand:  minimum assistance and of 2 persons with rolling walker  Balance: Static standing balance = CGA with BUE support    Therapeutic Activities/Exercises:  2 STS trials from recliner with max cues for hand placement and anterior leaning  Attempted to take forward steps but pt c/o fearfulness with mobility and requesting to sit after standing ~5 seconds    Education:  Patient provided with verbal education education regarding PT role/goals/POC, fall prevention, safety awareness, and discharge/DME recommendations.  Understanding was verbalized, however additional teaching warranted.     Patient left up in chair with all lines intact, call button in reach, chair alarm on, nieves pad in place, and RN notified    GOALS:   Multidisciplinary Problems       Physical Therapy Goals          Problem: Physical Therapy    Goal Priority Disciplines Outcome Interventions   Physical Therapy Goal     PT, PT/OT Progressing    Description: Goals to be met by: 10/28/24     Patient will increase functional independence with mobility by performin. Supine to sit with Stand-by Assistance  2. Sit to supine with Stand-by Assistance  3. Sit to stand transfer with Stand-by Assistance  4. Bed to chair transfer with Stand-by Assistance using Rolling Walker  5. Gait  x 200 feet with Stand-by Assistance using Rolling Walker.                          Time Tracking:     PT Received On: 10/06/24  PT Start Time: 1101     PT Stop Time: 1115  PT Total Time (min): 14 min     Billable Minutes: Therapeutic Activity 14 min    Treatment Type: Treatment  PT/PTA: PT     Number of PTA  visits since last PT visit: 4     10/06/2024

## 2024-10-06 NOTE — PROGRESS NOTES
[facility]  Hospital Medicine Progress Note        Chief Complaint: Inpatient Follow-up     HPI:   The Pt is a 75 yo female without significant past medical history on file presented to [facility] on 9/24/2024 transferred from [place] for neurosurgery and CV surgery services.  Patient originally presented to outside hospital on 09/21/2024 for fever and body aches.  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  Patient developed altered mental status overnight on 09/23/2024 and underwent CT head on 09/24/2024 which showed new left occipital intraparenchymal hematoma measuring 2 x 1.8 x 1.5 cm.  Patient was transferred to [facility] ICU for higher level care.  Neurosurgery, interventional neurology, and CV surgery services were consulted.  Patient was started on broad-spectrum antibiotics Vancomycin and Zosyn.  CV surgery recommended 6 weeks of antibiotics treatment per ID guidance and repeat echocardiogram after treatment.  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  Vascular Neurology suggested no antiplatelet or anticoagulation for now and follow up in the OP clinic for ICA aneurysm.   Neurosurgery recommended q.2 hours neurochecks, BP parameters below 140/90, and Keppra 500 mg bid. Infectious Disease and orthopedic services were consulted.  Patient was started on Oxacillin 2 g q.4 hours after blood cultures confirmed MSSA.  MRI cervical spine findings consistent with diskitis/osteomyelitis at C3-C6, left C2-C3 facet joint with  epidural enhancement and small prevertebral abscess, moderate to severe canal narrowing at C5-C6, suspected areas of fluid in the posterior paraspinal soft tissues of the upper thoracic spine.  Patient underwent washout of right knee effusion  on 09/27 with Dr. Castro.  Rt knee aspiration neg for bacterial or fungal growth. Repeat blood cultures x 2 from 9/29/24 remained neg. On 10/1/24 oxacillin transitioned to cefazolin 2 g every 8 hours for OPAT convenience with estimated end date 11/13/2024. Patient was cleared for downgrade out of ICU on 10/01/2024 and admitted to hospital medicine service      Therapy services were consulted.  Patient is currently awaiting LTAC evaluation and acceptance.  Venous U/S delvis lower ext performed on 10/5/24 due to some swelling of RLE related to known Baker's cyst was neg for DVT.     Pt noted to have intermittent low grade fever. 100.9 on 10/3/24 again  100.7 on 10/5/24. Blood cultures x 2 repeated on 10/4/24 and again on 10/6/24. Reached out to ID for further evaluation. Pt was placed back on Oxacillin 12 g continuous infusion for the possibility of drug fever secondary to cefazolin. Limited TTE ordered to re evaluate MV.        Interval Hx:   Fever of 100.7 noted last night.  Will re consult ID due to intermittent low grade fever .  Other vitals are stable.     Case was discussed with patient's nurse and  on the floor.    Objective/physical exam:  General: In no acute distress, afebrile  Chest: Raised manubrium, Clear to auscultation bilaterally  Heart: RRR, +S1, S2, (+) murmur  Abdomen: Soft, nontender, BS +  MSK: Warm, trace  lower extremity edema RLE, no clubbing or cyanosis  Neurologic:Awake , alert, intermittent confusion,  move all ext spontaneously     VITAL SIGNS: 24 HRS MIN & MAX LAST   Temp  Min: 97.4 °F (36.3 °C)  Max: 100.7 °F (38.2 °C) 98.4 °F (36.9 °C)   BP  Min: 115/77  Max: 139/76 132/67   Pulse  Min: 86  Max: 100  96   Resp  Min: 18  Max: 18 18    SpO2  Min: 95 %  Max: 98 % 97 %     I have reviewed the following labs:  Recent Labs   Lab 10/02/24  0320 10/03/24  0250 10/04/24  0248   WBC 10.69 11.69* 11.34   RBC 2.86* 3.15* 3.07*   HGB 9.1* 9.8* 9.6*   HCT 27.4* 31.3* 30.2*   MCV 95.8* 99.4* 98.4*   MCH 31.8* 31.1* 31.3*   MCHC 33.2 31.3* 31.8*   RDW 13.7 13.5 13.5   * 521* 509*   MPV 9.1 9.1 8.7     Recent Labs   Lab 10/01/24  0431 10/02/24  0320 10/03/24  0250 10/04/24  0248 10/05/24  0435    141 139 142 141   K 3.1* 3.6 3.2* 2.9* 3.9    105 103 102 107   CO2 25 27 23 28 25   BUN 7.3* 7.0* 7.1* 7.6* 10.8   CREATININE 0.55 0.57 0.56 0.58 0.65   CALCIUM 7.6* 7.9* 8.1* 8.3* 8.4   MG 1.70 1.60 2.30 1.90  --    ALBUMIN 1.6* 1.8* 2.0*  --   --    ALKPHOS 146 148 153*  --   --    ALT 24 10 <5  --   --    AST 18 18 18  --   --    BILITOT 0.5 0.5 0.5  --   --      Microbiology Results (last 7 days)       Procedure Component Value Units Date/Time    Blood Culture [[id number]]  (Normal) Collected: 10/04/24 0842    Order Status: Completed Specimen: Blood from Arm, Right Updated: 10/06/24 1101     Blood Culture No Growth At 48 Hours    Blood Culture [[id number]]  (Normal) Collected: 10/04/24 0842    Order Status: Completed Specimen: Blood from Arm, Left Updated: 10/06/24 1101     Blood Culture No Growth At 48 Hours    Blood Culture [[id number]] Collected: 10/06/24 0823    Order Status: Resulted Specimen: Blood from Hand, Left Updated: 10/06/24 0834    Blood Culture [[id number]] Collected: 10/06/24 0823    Order Status: Resulted Specimen: Blood from Arm, Right Updated: 10/06/24 0834    Blood Culture [[id number]]  (Normal) Collected: 09/29/24 1229    Order Status: Completed Specimen: Blood Updated: 10/04/24 1300     Blood Culture No Growth at 5 days    Blood Culture [[id number]]  (Normal) Collected: 09/29/24 1229    Order Status: Completed Specimen: Blood Updated: 10/04/24 1300     Blood Culture No Growth at 5 days    Body Fluid Culture [[id number]]  Collected: 09/27/24 1303    Order Status: Completed Specimen: Body Fluid from Joint Fluid, Right Knee Updated: 10/02/24 1148     Body Fluid Culture Final Report: At 5 days. 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   Pt is placed back on Oxacillin 12 g continuous infusion today for the possibility of drug fever due to cefazolin.   Limited TTE ordered to re evaluate MV.    Blood cultures x 2 repeated on 10/4/24  and again on 10/6/24 given isolated low grade temp spike.  Follow up blood cultures   Precaution for delirium prevention   Correct and replete electrolytes as indicated   Antihypertensive include Amlodipine and Metoprolol initiated and continued   Continue PT/OT service   CM consulted to assist with DC planning         VTE prophylaxis: SCDs     Patient condition:  Fair     Anticipated discharge and Disposition:     TBD    All diagnosis and differential diagnosis have been reviewed; assessment and plan has been documented; I have personally reviewed the labs and test results that are presently available; I have reviewed the patients  medication list; I have reviewed the consulting providers response and recommendations. I have reviewed or attempted to review medical records based upon their availability    All of the patient's questions have been  addressed and answered. Patient's is agreeable to the above stated plan. I will continue to monitor closely and make adjustments to medical management as needed.    Portions of this note dictated using EMR integrated voice recognition software, and may be subject to voice recognition errors not corrected at proofreading. Please contact writer for clarification if needed.   _____________________________________________________________________    Malnutrition Status:    Scheduled Med:   amLODIPine  5 mg Oral Daily    famotidine  20 mg Oral BID    levETIRAcetam  500 mg Oral BID    metoprolol succinate  12.5 mg Oral Daily    oxacillin 12 g in  mL CONTINUOUS INFUSION  12 g Intravenous Q24H      Continuous Infusions:     PRN Meds:    Current Facility-Administered Medications:     acetaminophen, 650 mg, Oral, Q6H PRN    bisacodyL, 10 mg, Rectal, Daily PRN    camphor-methyl salicyl-menthoL, , Topical (Top), BID PRN    dextrose 10%, 12.5 g, Intravenous, PRN    dextrose 10%, 25 g, Intravenous, PRN    labetalol, 10 mg, Intravenous, Q15 Min PRN    LIDOcaine, 1 patch, Transdermal, Q24H PRN    morphine, 2 mg, Intravenous, Q6H PRN    ondansetron, 8 mg, Oral, Q8H PRN    QUEtiapine, 50 mg, Oral, BID PRN    sodium chloride 0.9%, 10 mL, Intravenous, PRN    sodium chloride 0.9%, 10 mL, Intravenous, PRN         Fercho Merchant MD  Department of Hospital Medicine   [facility]   10/06/2024

## 2024-10-06 NOTE — PROGRESS NOTES
INFECTIOUS DISEASE FOLLOW-UP NOTE       Patient Name: Orlando Black  Patient : 1950  Age/Sex: 74 y.o. female   Room/Bed: 786/786 A  Admission Date/Time: 2024  6:31 PM    Date: 10/6/2024  Time: 9:20 PM    Reason for follow-up:      Disseminated MSSA infection.  ID re consulted as the patient continues to have some intermittent fever    Summary:     73-year-old female patient without any known past medical history, presented to outside hospital on 2024 with fever, generalized myalgias, back pain, confusion, admitted for sepsis, had MSSA bacteremia, noted to have mitral valve endocarditis, concern for right hip infection as well as right knee effusion, GPCs noted in knee fluid cultures, also had 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 She had worsening mental status on 2024 had a CT of the head on 2024 showed new left intraparenchymal hematoma and was transferred to [facility] for Neurosurgery, CV surgery, Infectious Disease Services.     Of note, the patient's  reports that the patient has had some eczematous rash that has been ongoing for the past month or so, patient's  has been helping manage this with over-the-counter medication, has not seen a physician     Blood culture from  remained negative.    Patient was switched to IV cefazolin on 10/02/2024 for OPAT convenience.    She had repeated blood culture 10/4 which remained negative    Since she was switched from oxacillin to cefazolin, patient had 2 episodes of low-grade fever as high as 100.9 F on 10/03 and 10/5.    Antimicrobial history:      Zosyn  -   Vancomycin   Oxacillin - 10/1    Cefazolin 10/1-    24 hours events:      No acute events overnight  Remains hemodynamically stable    Subjective     Patient seen and examined, chart reviewed.  Patient reports feeling okay.  She reports no specific complaints.  No diarrhea.  No cough or shortness of  breath    Review of Symptoms:     Patient reports no nausea, vomiting, diarrhea, cough, dyspnea, chest pain or palpitations    Objective     Vital signs  Vitals:    10/06/24 0030 10/06/24 0303 10/06/24 0500 10/06/24 0734   BP: 132/75 138/70  132/67   Pulse: 95 86  96   Resp:    18   Temp: 97.4 °F (36.3 °C) 99.3 °F (37.4 °C)  98.4 °F (36.9 °C)   TempSrc: Oral Oral  Oral   SpO2: 97% 95%  97%   Weight:   63.1 kg (139 lb 1.6 oz)    Height:          Temp (24hrs), Av.9 °F (37.2 °C), Min:97.4 °F (36.3 °C), Max:100.7 °F (38.2 °C)      Physical exam:  GEN: Awake, resting comfortably  EYES: EOMI, no scleral icterus  HENT: MMM. No oral lesions. Fair dentition.  NECK: Supple, no cervical lymphadenopathy or meningismus.   CARDIO: RRR, no murmur.  PULM/CHEST: CTAB. No increased work of breathing  ABD: Normal bowel sounds. Soft, not tender or distended. No HSM appreciated.  MSK: no obvious effusion, swelling, increased warmth, or erythema of major joints. No pedal edema.  SKIN: No rashes. No stigmata of endocarditis.  NEURO: AOx3. Speech fluent. Face symmetric.  PSYCH: Mood appropriate      Intake/Output Summary (Last 24 hours) at 10/6/2024 0841  Last data filed at 10/6/2024 0609  Gross per 24 hour   Intake --   Output 600 ml   Net -600 ml        Diagnostic Test     CBC, INR  Recent Labs   Lab 24  0337 10/01/24  0431 10/02/24  0320 10/03/24  0250 10/04/24  0248   WBC 10.81 10.26 10.69 11.69* 11.34   HGB 9.5* 8.5* 9.1* 9.8* 9.6*   * 471* 484* 521* 509*       BMP  Recent Labs   Lab 24  0337 10/01/24  0431 10/02/24  0320 10/03/24  0250 10/04/24  0248 10/05/24  0435    140 141 139 142 141   K 2.6* 3.1* 3.6 3.2* 2.9* 3.9    106 105 103 102 107   CO2 25 25 27 23 28 25   BUN 8.3* 7.3* 7.0* 7.1* 7.6* 10.8   CREATININE 0.55 0.55 0.57 0.56 0.58 0.65   CALCIUM 8.0* 7.6* 7.9* 8.1* 8.3* 8.4   MG 1.80 1.70 1.60 2.30 1.90  --    PHOS 2.8 3.3 3.1  --  2.8  --      Recent Labs   Lab 10/02/24  0320 10/03/24  025  10/04/24  0248   WBC 10.69 11.69* 11.34   HGB 9.1* 9.8* 9.6*   HCT 27.4* 31.3* 30.2*   * 521* 509*     Estimated Creatinine Clearance: 75.6 mL/min (based on SCr of 0.65 mg/dL).    Lab Results   Component Value Date    CREATININE 0.65 10/05/2024    CREATININE 0.58 10/04/2024    CREATININE 0.56 10/03/2024    ALKPHOS 153 (H) 10/03/2024    ALKPHOS 148 10/02/2024    ALKPHOS 146 10/01/2024       Lab Results   Component Value Date    .50 (H) 09/25/2024      Medications   Inpatient  Scheduled Meds:   amLODIPine  5 mg Oral Daily    ceFAZolin (Ancef) IV (PEDS and ADULTS)  2 g Intravenous Q8H    famotidine  20 mg Oral BID    levETIRAcetam  500 mg Oral BID    metoprolol succinate  12.5 mg Oral Daily     Continuous Infusions:  PRN Meds:.  Current Facility-Administered Medications:     acetaminophen, 650 mg, Oral, Q6H PRN    bisacodyL, 10 mg, Rectal, Daily PRN    camphor-methyl salicyl-menthoL, , Topical (Top), BID PRN    dextrose 10%, 12.5 g, Intravenous, PRN    dextrose 10%, 25 g, Intravenous, PRN    labetalol, 10 mg, Intravenous, Q15 Min PRN    LIDOcaine, 1 patch, Transdermal, Q24H PRN    morphine, 2 mg, Intravenous, Q6H PRN    ondansetron, 8 mg, Oral, Q8H PRN    QUEtiapine, 50 mg, Oral, BID PRN    sodium chloride 0.9%, 10 mL, Intravenous, PRN    sodium chloride 0.9%, 10 mL, Intravenous, PRN    Home Meds  No current outpatient medications    Imaging (personally reviewed):     CT Head Without Contrast   Final Result      Stable small left occipital lobe hematoma.         Electronically signed by: Petrona Nguyen   Date:    09/27/2024   Time:    06:32      CT Hip W W/O Contrast Right   Final Result      MRI Cervical Spine W WO Cont   Final Result      1. Findings consistent with discitis/osteomyelitis at C3-C6, involving the left C2-C3 facet joint, with epidural enhancement and small prevertebral abscess.   2. Moderate to severe canal narrowing at C5-C6, moderate at C4-C5 and C6-C7, mild at C3-C4.   3. Multilevel  neural foraminal stenoses as described.   4. No definite cord signal abnormality.   5. Suspected areas of fluid in the posterior paraspinal soft tissues of the upper thoracic spine.         Electronically signed by: Petrona Nguyen   Date:    09/27/2024   Time:    10:32      MRI Brain W WO Contrast   Final Result      Stable acute hemorrhage in the left occipital lobe      Punctate areas of scattered restricted diffusion in the frontal lobes bilaterally as well as the right temporal region.  Findings are suggestive of possible embolic phenomena.         Electronically signed by: Feliberto Cardona   Date:    09/26/2024   Time:    14:56      X-Ray Hip 2 or 3 views Right with Pelvis when performed   Final Result      Degenerative changes.         Electronically signed by: Ibrahima Valentin   Date:    09/26/2024   Time:    07:35      X-Ray Knee Complete 4 Or More Views Right   Final Result      Degenerative changes.         Electronically signed by: Ibrahima Valentin   Date:    09/26/2024   Time:    07:46      CT Head Without Contrast   Final Result      1.  Stable left occipital lobe hemorrhage and mild surrounding edema.      2.  No new findings.         Electronically signed by: Wade Marie   Date:    09/25/2024   Time:    20:33      IR Embolization (CNS) Intracranial   Final Result      Fluoroscopic support.  Please refer to procedure of this dictation.         Electronically signed by: Petrona Nguyen   Date:    09/25/2024   Time:    18:05      CT Head Without Contrast   Final Result      1. Left occipital lobe parenchymal hematoma with mild surrounding edema.   2. Chronic microvascular ischemic changes.         Electronically signed by: Petrona Nguyen   Date:    09/25/2024   Time:    10:17      CTA Head and Neck (xpd)   Final Result      1. No large vessel occlusion or flow-limiting stenosis.   2. 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   3. Partially visualized indeterminate  retrosternal soft tissue.         Electronically signed by: Petrona Nguyen   Date:    09/25/2024   Time:    10:23      CT Previous   Final Result          Assessment:     Orlando Black is a 74 y.o. female with:  Intermittent fever:  Not sure about the etiology, drug related(cefazolin?), hospital-acquired infection(less likely, no cough suspect HAP), C diff(no diarrhea), recurrent bacteremia(blood culture 10/4 negative)  Severe sepsis 2/2 staph bacteremia  MSSA bacteremia: source unclear    Positive blood culture 9/24 and 25   Blood culture 9/29 NGTD  Mitral valve endocarditis(TTE showed possible medium mobile mass on the posterior leaflet. There is moderate MR)  Concern for C-spine infection   Concern for right knee septic arthritis   Right hip septic arthritis S/P washout of the R knee 09/27. Culture remain negative  Intracranial hemorrhage    Plan:     Pending repeated blood culture today  We will put the patient back on oxacillin 12 g continuous infusion for the possibility of drug fever secondary to cefazolin  We will obtain limited TTE to re-evaluate mitral lesion given ongoing fever  Tentative antibiotic days 9/29-11/13  We will need to follow CBC, BMP weekly while on IV antibiotic  Patient already has follow up appointment in the ID clinic.    All questions and concerns addressed with patient and understands and agrees with plan.      Thank you very much for allowing me to participate in the care of this patient.      ID will continue to follow. Please page with questions.    Valerio Torrez MD   Attending, Infectious Disease     Speech recognition software was used in the preparation of this note, errors in transcription may be present. Please call/page if there are questions.       .mo

## 2024-10-07 LAB
ANION GAP SERPL CALC-SCNC: 11 MEQ/L
BACTERIA #/AREA URNS AUTO: ABNORMAL /HPF
BASOPHILS # BLD AUTO: 0.05 X10(3)/MCL
BASOPHILS NFR BLD AUTO: 0.6 %
BILIRUB UR QL STRIP.AUTO: NEGATIVE
BUN SERPL-MCNC: 13.6 MG/DL (ref 9.8–20.1)
CALCIUM SERPL-MCNC: 8.3 MG/DL (ref 8.4–10.2)
CHLORIDE SERPL-SCNC: 104 MMOL/L (ref 98–107)
CLARITY UR: ABNORMAL
CO2 SERPL-SCNC: 25 MMOL/L (ref 23–31)
COLOR UR AUTO: YELLOW
CREAT SERPL-MCNC: 0.68 MG/DL (ref 0.55–1.02)
CREAT/UREA NIT SERPL: 20
CRP SERPL-MCNC: 166.2 MG/L
EOSINOPHIL # BLD AUTO: 0.12 X10(3)/MCL (ref 0–0.9)
EOSINOPHIL NFR BLD AUTO: 1.4 %
ERYTHROCYTE [DISTWIDTH] IN BLOOD BY AUTOMATED COUNT: 13.3 % (ref 11.5–17)
GFR SERPLBLD CREATININE-BSD FMLA CKD-EPI: >60 ML/MIN/1.73/M2
GLUCOSE SERPL-MCNC: 80 MG/DL (ref 82–115)
GLUCOSE UR QL STRIP: NEGATIVE
HCT VFR BLD AUTO: 28.9 % (ref 37–47)
HGB BLD-MCNC: 9 G/DL (ref 12–16)
HGB UR QL STRIP: ABNORMAL
IMM GRANULOCYTES # BLD AUTO: 0.04 X10(3)/MCL (ref 0–0.04)
IMM GRANULOCYTES NFR BLD AUTO: 0.5 %
KETONES UR QL STRIP: ABNORMAL
LEUKOCYTE ESTERASE UR QL STRIP: ABNORMAL
LYMPHOCYTES # BLD AUTO: 1.19 X10(3)/MCL (ref 0.6–4.6)
LYMPHOCYTES NFR BLD AUTO: 14.3 %
MAGNESIUM SERPL-MCNC: 1.7 MG/DL (ref 1.6–2.6)
MCH RBC QN AUTO: 30.7 PG (ref 27–31)
MCHC RBC AUTO-ENTMCNC: 31.1 G/DL (ref 33–36)
MCV RBC AUTO: 98.6 FL (ref 80–94)
MONOCYTES # BLD AUTO: 0.78 X10(3)/MCL (ref 0.1–1.3)
MONOCYTES NFR BLD AUTO: 9.4 %
MUCOUS THREADS URNS QL MICRO: ABNORMAL /LPF
NEUTROPHILS # BLD AUTO: 6.15 X10(3)/MCL (ref 2.1–9.2)
NEUTROPHILS NFR BLD AUTO: 73.8 %
NITRITE UR QL STRIP: NEGATIVE
NRBC BLD AUTO-RTO: 0 %
PH UR STRIP: 6 [PH]
PHOSPHATE SERPL-MCNC: 3.6 MG/DL (ref 2.3–4.7)
PLATELET # BLD AUTO: 460 X10(3)/MCL (ref 130–400)
PMV BLD AUTO: 9.1 FL (ref 7.4–10.4)
POTASSIUM SERPL-SCNC: 2.7 MMOL/L (ref 3.5–5.1)
PROT UR QL STRIP: ABNORMAL
RBC # BLD AUTO: 2.93 X10(6)/MCL (ref 4.2–5.4)
RBC #/AREA URNS AUTO: ABNORMAL /HPF
SODIUM SERPL-SCNC: 140 MMOL/L (ref 136–145)
SP GR UR STRIP.AUTO: >=1.03 (ref 1–1.03)
SQUAMOUS #/AREA URNS LPF: ABNORMAL /HPF
UROBILINOGEN UR STRIP-ACNC: 0.2
WBC # BLD AUTO: 8.33 X10(3)/MCL (ref 4.5–11.5)
WBC #/AREA URNS AUTO: >100 /HPF

## 2024-10-07 PROCEDURE — 25000003 PHARM REV CODE 250: Performed by: NEUROLOGICAL SURGERY

## 2024-10-07 PROCEDURE — 97535 SELF CARE MNGMENT TRAINING: CPT

## 2024-10-07 PROCEDURE — 87086 URINE CULTURE/COLONY COUNT: CPT | Performed by: INTERNAL MEDICINE

## 2024-10-07 PROCEDURE — 80048 BASIC METABOLIC PNL TOTAL CA: CPT | Performed by: INTERNAL MEDICINE

## 2024-10-07 PROCEDURE — 36415 COLL VENOUS BLD VENIPUNCTURE: CPT | Performed by: INTERNAL MEDICINE

## 2024-10-07 PROCEDURE — 25000003 PHARM REV CODE 250: Performed by: INTERNAL MEDICINE

## 2024-10-07 PROCEDURE — 84100 ASSAY OF PHOSPHORUS: CPT | Performed by: INTERNAL MEDICINE

## 2024-10-07 PROCEDURE — 85025 COMPLETE CBC W/AUTO DIFF WBC: CPT | Performed by: INTERNAL MEDICINE

## 2024-10-07 PROCEDURE — 87186 SC STD MICRODIL/AGAR DIL: CPT | Performed by: INTERNAL MEDICINE

## 2024-10-07 PROCEDURE — 63600175 PHARM REV CODE 636 W HCPCS: Performed by: INTERNAL MEDICINE

## 2024-10-07 PROCEDURE — 21400001 HC TELEMETRY ROOM

## 2024-10-07 PROCEDURE — 87077 CULTURE AEROBIC IDENTIFY: CPT | Performed by: INTERNAL MEDICINE

## 2024-10-07 PROCEDURE — 97116 GAIT TRAINING THERAPY: CPT | Mod: CQ

## 2024-10-07 PROCEDURE — 83735 ASSAY OF MAGNESIUM: CPT | Performed by: INTERNAL MEDICINE

## 2024-10-07 PROCEDURE — 86140 C-REACTIVE PROTEIN: CPT | Performed by: INTERNAL MEDICINE

## 2024-10-07 PROCEDURE — 63600175 PHARM REV CODE 636 W HCPCS: Performed by: NURSE PRACTITIONER

## 2024-10-07 PROCEDURE — 81015 MICROSCOPIC EXAM OF URINE: CPT | Performed by: INTERNAL MEDICINE

## 2024-10-07 PROCEDURE — 81001 URINALYSIS AUTO W/SCOPE: CPT | Performed by: INTERNAL MEDICINE

## 2024-10-07 PROCEDURE — 25000003 PHARM REV CODE 250: Performed by: NURSE PRACTITIONER

## 2024-10-07 RX ORDER — POTASSIUM CHLORIDE 14.9 MG/ML
40 INJECTION INTRAVENOUS ONCE
Status: DISCONTINUED | OUTPATIENT
Start: 2024-10-07 | End: 2024-10-07

## 2024-10-07 RX ORDER — QUETIAPINE FUMARATE 25 MG/1
25 TABLET, FILM COATED ORAL
Status: DISCONTINUED | OUTPATIENT
Start: 2024-10-07 | End: 2024-10-12

## 2024-10-07 RX ORDER — POTASSIUM CHLORIDE 14.9 MG/ML
20 INJECTION INTRAVENOUS
Status: DISPENSED | OUTPATIENT
Start: 2024-10-07 | End: 2024-10-07

## 2024-10-07 RX ORDER — POTASSIUM CHLORIDE 20 MEQ/1
40 TABLET, EXTENDED RELEASE ORAL ONCE
Status: DISCONTINUED | OUTPATIENT
Start: 2024-10-07 | End: 2024-10-07

## 2024-10-07 RX ORDER — POTASSIUM CHLORIDE 20 MEQ/1
40 TABLET, EXTENDED RELEASE ORAL ONCE
Status: COMPLETED | OUTPATIENT
Start: 2024-10-07 | End: 2024-10-07

## 2024-10-07 RX ADMIN — FAMOTIDINE 20 MG: 20 TABLET, FILM COATED ORAL at 08:10

## 2024-10-07 RX ADMIN — OXACILLIN 12 G: 2 INJECTION, POWDER, FOR SOLUTION INTRAMUSCULAR; INTRAVENOUS at 08:10

## 2024-10-07 RX ADMIN — POTASSIUM CHLORIDE 40 MEQ: 1500 TABLET, EXTENDED RELEASE ORAL at 05:10

## 2024-10-07 RX ADMIN — LEVETIRACETAM 500 MG: 500 TABLET, FILM COATED ORAL at 08:10

## 2024-10-07 RX ADMIN — QUETIAPINE FUMARATE 25 MG: 25 TABLET ORAL at 05:10

## 2024-10-07 RX ADMIN — POTASSIUM CHLORIDE 20 MEQ: 14.9 INJECTION, SOLUTION INTRAVENOUS at 05:10

## 2024-10-07 RX ADMIN — AMLODIPINE BESYLATE 5 MG: 5 TABLET ORAL at 09:10

## 2024-10-07 RX ADMIN — METOPROLOL SUCCINATE 12.5 MG: 25 TABLET, EXTENDED RELEASE ORAL at 09:10

## 2024-10-07 RX ADMIN — FAMOTIDINE 20 MG: 20 TABLET, FILM COATED ORAL at 09:10

## 2024-10-07 NOTE — PT/OT/SLP PROGRESS
Physical Therapy Treatment    Patient Name:  Orlando Black   MRN:  [id number]    Recommendations:     Discharge therapy intensity: Moderate Intensity Therapy   Discharge Equipment Recommendations: to be determined by next level of care  Barriers to discharge: Decreased caregiver support, Impaired mobility, and Ongoing medical needs    Assessment:     Orlando Black is a 74 y.o. 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  She presents with the following impairments/functional limitations: weakness, gait instability, impaired endurance, impaired balance, decreased lower extremity function, decreased ROM, decreased safety awareness, impaired self care skills, impaired functional mobility . Pt confused and fearful with mobility this date; with encouragement able to stand with minAx2 but declined ambulation.     Rehab Prognosis: Good; patient would benefit from acute skilled PT services to address these deficits and reach maximum level of function.    Recent Surgery: Procedure(s) (LRB):  ARTHROSCOPY, KNEE, WITH DEBRIDEMENT (Right) 10 Days Post-Op    Plan:     During this hospitalization, patient would benefit from acute PT services 5 x/week to address the identified rehab impairments via gait training, therapeutic activities, therapeutic exercises, neuromuscular re-education and progress toward the following goals:    Plan of Care Expires:  10/28/24    Subjective     Chief Complaint: discomfort in abdomin.     Objective:     Communicated with nurse prior to session.  Patient found supine with peripheral IV, PureWick, pulse ox (continuous),  telemetry, chair check upon PT entry to room.     General Precautions: Standard, fall (-140)  Orthopedic Precautions: RLE weight bearing as tolerated  Braces: N/A  Respiratory Status: Room air  Blood Pressure: 118/66  Skin Integrity: Visible skin intact      Functional Mobility:  Mod assist to get EOB and same for STS  Gait 25 ft and 20 ft RW min assist. Cues for walker proximity and to keep feet .   Improve gait distance today.     Education Provided:  Role and goals of PT, transfer training, bed mobility, gait training, balance training, safety awareness, assistive device, strengthening exercises, and importance of participating in PT to return to PLOF.     Patient left up in chair with all lines intact, call button in reach, and chair alarm on    GOALS:   Multidisciplinary Problems       Physical Therapy Goals          Problem: Physical Therapy    Goal Priority Disciplines Outcome Interventions   Physical Therapy Goal     PT, PT/OT Progressing    Description: Goals to be met by: 10/28/24     Patient will increase functional independence with mobility by performin. Supine to sit with Stand-by Assistance  2. Sit to supine with Stand-by Assistance  3. Sit to stand transfer with Stand-by Assistance  4. Bed to chair transfer with Stand-by Assistance using Rolling Walker  5. Gait  x 200 feet with Stand-by Assistance using Rolling Walker.                          Time Tracking:       Billable Minutes: Gait Training 25    Treatment Type: Treatment  PT/PTA: PTA     Number of PTA visits since last PT visit: 5     10/07/2024

## 2024-10-07 NOTE — ANESTHESIA POSTPROCEDURE EVALUATION
Anesthesia Post Evaluation    Patient: Orlando Black    Procedure(s) Performed: Procedure(s) (LRB):  ARTHROSCOPY, KNEE, WITH DEBRIDEMENT (Right)    Final Anesthesia Type: general      Patient location during evaluation: PACU  Patient participation: Yes- Able to Participate  Level of consciousness: awake and alert  Post-procedure vital signs: reviewed and stable  Pain management: adequate  Airway patency: patent      Anesthetic complications: no      Cardiovascular status: hemodynamically stable  Respiratory status: unassisted  Hydration status: euvolemic  Follow-up not needed.              Vitals Value Taken Time   /67 10/07/24 0550   Temp 36.4 °C (97.5 °F) 10/07/24 0550   Pulse 85 10/07/24 0550   Resp 20 10/06/24 2000   SpO2 98 % 10/07/24 0550         Event Time   Out of Recovery 09/27/2024 14:15:10         Pain/Britta Score: Pain Rating Prior to Med Admin: 3 (10/6/2024  6:36 AM)          
normal/well-groomed/no distress

## 2024-10-07 NOTE — PLAN OF CARE
Problem: Adult Inpatient Plan of Care  Goal: Plan of Care Review  Outcome: Progressing  Goal: Patient-Specific Goal (Individualized)  Outcome: Progressing  Goal: Absence of Hospital-Acquired Illness or Injury  Outcome: Progressing  Goal: Optimal Comfort and Wellbeing  Outcome: Progressing  Goal: Readiness for Transition of Care  Outcome: Progressing     Problem: Stroke, Subarachnoid Hemorrhage  Goal: Optimal Coping  Outcome: Progressing  Goal: Effective Bowel Elimination  Outcome: Progressing  Goal: Optimal Cerebral Tissue Perfusion  Outcome: Progressing  Goal: Optimal Cognitive Function  Outcome: Progressing  Goal: Effective Communication Skills  Outcome: Progressing  Goal: Optimal Functional Ability  Outcome: Progressing  Goal: Optimal Nutrition Intake  Outcome: Progressing  Goal: Optimal Pain Control and Function  Outcome: Progressing  Goal: Effective Oxygenation and Ventilation  Outcome: Progressing  Goal: Improved Sensorimotor Function  Outcome: Progressing  Goal: Safe and Effective Swallow  Outcome: Progressing  Goal: Effective Urinary Elimination  Outcome: Progressing     Problem: Skin Injury Risk Increased  Goal: Skin Health and Integrity  Outcome: Progressing     Problem: Wound  Goal: Optimal Coping  Outcome: Progressing  Goal: Optimal Functional Ability  Outcome: Progressing  Goal: Absence of Infection Signs and Symptoms  Outcome: Progressing  Goal: Improved Oral Intake  Outcome: Progressing  Goal: Optimal Pain Control and Function  Outcome: Progressing  Goal: Skin Health and Integrity  Outcome: Progressing  Goal: Optimal Wound Healing  Outcome: Progressing

## 2024-10-07 NOTE — PT/OT/SLP PROGRESS
Occupational Therapy   Treatment    Name: Orlando Black  MRN: [id number]  Admitting Diagnosis:    10 Days Post-Op    Recommendations:     Recommended therapy intensity at discharge: Moderate Intensity Therapy   Discharge Equipment Recommendations:  to be determined by next level of care  Barriers to discharge:       Assessment:     Orlando Black is a 74 y.o. 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  She presents with the following performance deficits affecting function are weakness, impaired cognition, impaired endurance, impaired self care skills, pain, gait instability, impaired balance, decreased safety awareness, impaired functional mobility, decreased lower extremity function.     Pt with good participation, initially agreeable to mobility to bathroom. Once EOB pt declining further mobility 2/2 pain. Pt agreeable to sit in bedside chair for lunch, able to transfer with Mod A using RW. Pt set up with lunch tray, however then refusing to eat at this time.     Rehab Prognosis:  Good; patient would benefit from acute skilled OT services to address these deficits and reach maximum level of function.       Plan:     Patient to be seen 5 x/week to address the above listed problems via self-care/home management, therapeutic activities, therapeutic exercises  Plan of Care Expires: 10/28/24  Plan of Care Reviewed with: patient    Subjective     Pain/Comfort:  Pain Rating 1: other (see comments) (reports pain in R lower back, did not rate)  Pain Addressed 1: Reposition, Distraction, Cessation of  Activity    Objective:     Communicated with: RN prior to session.  Patient found supine with pulse ox (continuous), telemetry, peripheral IV, PureWick upon OT entry to room.    General Precautions: Standard, fall, other (see comments) (-140)    Orthopedic Precautions:RLE weight bearing as tolerated  Braces: N/A  Respiratory Status: Room air  Vital Signs: Blood Pressure: 110/67  HR: 92     Occupational Performance:     Bed Mobility:    Patient completed Rolling/Turning to Left with  moderate assistance  Patient completed Rolling/Turning to Right with moderate assistance  Patient completed Supine to Sit with moderate assistance x2    Functional Mobility/Transfers:  Patient completed Sit <> Stand Transfer with minimum assistance x2 with rolling walker   Patient completed Bed <> Chair Transfer using Step Transfer technique with moderate assistance with rolling walker  Functional Mobility: pt took steps to bedside chair with Mod A, quick to sit without warning requiring assist to safety descend to bedside chair.     Activities of Daily Living:  Upper Body Dressing: moderate assistance donning gown as robe  Lower Body Dressing: maximal assistance donning brief in prep for mobility    Therapeutic Positioning    OT interventions performed during the course of today's session in an effort to prevent and/or reduce acquired pressure injuries:   Education was provided on benefits of and recommendations for therapeutic positioning    Patient Education:  Patient provided with verbal education education regarding OT role/goals/POC, fall prevention, and safety awareness.  Additional teaching is warranted.      Patient left up in chair with all lines intact, call button in reach, chair alarm on, and posey vest donned .    GOALS:   Multidisciplinary Problems       Occupational Therapy Goals          Problem: Occupational Therapy    Goal Priority Disciplines Outcome Interventions   Occupational Therapy Goal     OT, PT/OT  Progressing    Description: LTG: Pt will perform basic ADLs and ADL transfers with Modified independence using LRAD by discharge.    STG: to be met by 10/28/24    Pt will complete grooming standing at sink with LRAD with SBA.  Pt will complete UB dressing with SBA.  Pt will complete LB dressing with SBA using LRAD.  Pt will complete toileting with SBA using LRAD.  Pt will complete functional mobility to/from toilet and toilet transfer with SBA using LRAD.                         Time Tracking:     OT Date of Treatment: 10/07/24  OT Start Time: 1319  OT Stop Time: 1342  OT Total Time (min): 23 min    Billable Minutes:Self Care/Home Management 23 mins    OT/TY: OT     Number of TY visits since last OT visit: 5    10/7/2024

## 2024-10-07 NOTE — PROGRESS NOTES
[facility]  Hospital Medicine Progress Note        Chief Complaint: Inpatient Follow-up     HPI:   The Pt is a 75 yo female without significant past medical history on file presented to [facility] on 9/24/2024 transferred from [place] for neurosurgery and CV surgery services.  Patient originally presented to outside hospital on 09/21/2024 for fever and body aches.  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  Patient developed altered mental status overnight on 09/23/2024 and underwent CT head on 09/24/2024 which showed new left occipital intraparenchymal hematoma measuring 2 x 1.8 x 1.5 cm.  Patient was transferred to [facility] ICU for higher level care.  Neurosurgery, interventional neurology, and CV surgery services were consulted.  Patient was started on broad-spectrum antibiotics Vancomycin and Zosyn.  CV surgery recommended 6 weeks of antibiotics treatment per ID guidance and repeat echocardiogram after treatment.  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  Vascular Neurology suggested no antiplatelet or anticoagulation for now and follow up in the OP clinic for ICA aneurysm.   Neurosurgery recommended q.2 hours neurochecks, BP parameters below 140/90, and Keppra 500 mg bid. Infectious Disease and orthopedic services were consulted.  Patient was started on Oxacillin 2 g q.4 hours after blood cultures confirmed MSSA.  MRI cervical spine findings consistent with diskitis/osteomyelitis at C3-C6, left C2-C3 facet joint with  epidural enhancement and small prevertebral abscess, moderate to severe canal narrowing at C5-C6, suspected areas of fluid in the posterior paraspinal soft tissues of the upper thoracic spine.  Patient underwent washout of right knee effusion  on 09/27 with Dr. Castro.  Rt knee aspiration neg for bacterial or fungal growth. Repeat blood cultures x 2 from 9/29/24 remained neg. On 10/1/24 oxacillin transitioned to cefazolin 2 g every 8 hours for OPAT convenience with estimated end date 11/13/2024. Patient was cleared for downgrade out of ICU on 10/01/2024 and admitted to hospital medicine service      Therapy services were consulted.  Patient is currently awaiting LTAC evaluation and acceptance.  Venous U/S delvis lower ext performed on 10/5/24 due to some swelling of RLE related to known Baker's cyst was neg for DVT.      Pt noted to have intermittent low grade fever. 100.9 on 10/3/24 again  100.7 on 10/5/24. Blood cultures x 2 repeated on 10/4/24 and again on 10/6/24. Reached out to ID for further evaluation. Pt was placed back on Oxacillin 12 g continuous infusion for the possibility of drug fever secondary to cefazolin. Limited TTE ordered to re evaluate MV.       Interval Hx:   Afebrile x 24h after spikes of 100.7 on 10/5/24  Intermittent confusion persisted   CT head repeated today showed improved area of left occipital hemorrhage with some surrounding cytotoxic edema.   Will get UA today   Labs with normal WBC, Hgb 9.0, Plt 460, Na 140, K 2.7- replacement ordered. Cr 0.6,   Limited echo with persistent MV vegetation       Case was discussed with patient's nurse and  on the floor.    Objective/physical exam:  General: In no acute distress, afebrile  Chest: Raised manubrium, Clear to auscultation bilaterally  Heart: RRR, +S1, S2, (+) murmur  Abdomen: Soft, nontender, BS +  MSK: Warm, trace  lower extremity edema RLE, no clubbing or cyanosis  Neurologic:Awake , alert, intermittent confusion,   move all ext spontaneously       VITAL SIGNS: 24 HRS MIN & MAX LAST   Temp  Min: 97.5 °F (36.4 °C)  Max: 98.5 °F (36.9 °C) 97.9 °F (36.6 °C)   BP  Min: 111/67  Max: 130/75 120/73   Pulse  Min: 85  Max: 100  95   Resp  Min: 18  Max: 20 18   SpO2  Min: 96 %  Max: 98 % 98 %     I have reviewed the following labs:  Recent Labs   Lab 10/03/24  0250 10/04/24  0248 10/07/24  0228   WBC 11.69* 11.34 8.33   RBC 3.15* 3.07* 2.93*   HGB 9.8* 9.6* 9.0*   HCT 31.3* 30.2* 28.9*   MCV 99.4* 98.4* 98.6*   MCH 31.1* 31.3* 30.7   MCHC 31.3* 31.8* 31.1*   RDW 13.5 13.5 13.3   * 509* 460*   MPV 9.1 8.7 9.1     Recent Labs   Lab 10/01/24  0431 10/02/24  0320 10/03/24  0250 10/04/24  0248 10/05/24  0435 10/07/24  0228    141 139 142 141 140   K 3.1* 3.6 3.2* 2.9* 3.9 2.7*    105 103 102 107 104   CO2 25 27 23 28 25 25   BUN 7.3* 7.0* 7.1* 7.6* 10.8 13.6   CREATININE 0.55 0.57 0.56 0.58 0.65 0.68   CALCIUM 7.6* 7.9* 8.1* 8.3* 8.4 8.3*   MG 1.70 1.60 2.30 1.90  --  1.70   ALBUMIN 1.6* 1.8* 2.0*  --   --   --    ALKPHOS 146 148 153*  --   --   --    ALT 24 10 <5  --   --   --    AST 18 18 18  --   --   --    BILITOT 0.5 0.5 0.5  --   --   --      Microbiology Results (last 7 days)       Procedure Component Value Units Date/Time    Blood Culture [[id number]]  (Normal) Collected: 10/04/24 0842    Order Status: Completed Specimen: Blood from Arm, Right Updated: 10/07/24 1101     Blood Culture No Growth At 72 Hours    Blood Culture [[id number]]  (Normal) Collected: 10/04/24 0842    Order Status: Completed Specimen: Blood from Arm, Left Updated: 10/07/24 1101     Blood Culture No Growth At 72 Hours    Blood Culture [[id number]]  (Normal) Collected: 10/06/24 0823    Order Status: Completed Specimen: Blood from Hand, Left Updated: 10/07/24 0901     Blood Culture No Growth At 24 Hours    Blood Culture [[id number]]  (Normal) Collected: 10/06/24 0823    Order Status: Completed Specimen: Blood from Arm, Right Updated: 10/07/24 0901      Blood Culture No Growth At 24 Hours    Blood Culture [[id number]]  (Normal) Collected: 09/29/24 1229    Order Status: Completed Specimen: Blood Updated: 10/04/24 1300     Blood Culture No Growth at 5 days    Blood Culture [[id number]]  (Normal) Collected: 09/29/24 1229    Order Status: Completed Specimen: Blood Updated: 10/04/24 1300     Blood Culture No Growth at 5 days    Body Fluid Culture [[id number]] Collected: 09/27/24 1303    Order Status: Completed Specimen: Body Fluid from Joint Fluid, Right Knee Updated: 10/02/24 1148     Body Fluid Culture Final Report: At 5 days. 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 Pt is placed back on Oxacillin 12 g continuous infusion from 10/6/24  for the possibility of drug fever due to cefazolin.   Limited TTE on 10/6/24 with persistent MV vegetation.    Blood cultures x 2 repeated on 10/4/24  and again on 10/6/24 given isolated low grade temp spike- remains neg.   Precaution for delirium prevention   Low dose Seroquel added to mitigate delirium   Correct and replete electrolytes as indicated   Antihypertensive include Amlodipine and Metoprolol initiated and continued   Continue PT/OT service   CM consulted to assist with DC planning   Will check with  Vascular Neurology when to start prophylactic Lovenox     Critical care note:  Critical care diagnosis: severe hypokalemia required IV replacement.   Critical care interventions: Hands-on evaluation, review of labs/radiographs/records and discussion with patient and family if present  Critical care time spent: 35 minutes        VTE prophylaxis: SCDs     Patient condition:  Fair     Anticipated discharge and Disposition:     TBD       All diagnosis and differential diagnosis have been reviewed; assessment and plan has been documented; I have personally reviewed the labs and test results that are presently available; I have reviewed the patients medication list; I have reviewed the consulting providers response and recommendations. I have reviewed or attempted to review medical records based upon their availability    All of the patient's questions have been  addressed and answered. Patient's is agreeable to the above stated plan. I will continue to monitor closely and make adjustments to medical management as needed.    Portions of this note dictated using EMR integrated voice recognition software, and may be subject to voice recognition errors not corrected at proofreading. Please contact writer for clarification if needed.   _____________________________________________________________________    Malnutrition Status:    Scheduled Med:   amLODIPine  5 mg Oral Daily    famotidine  20 mg Oral BID    levETIRAcetam  500 mg Oral BID    metoprolol succinate  12.5 mg Oral Daily    oxacillin 12 g in  mL CONTINUOUS INFUSION  12 g Intravenous Q24H      Continuous Infusions:     PRN Meds:    Current Facility-Administered Medications:     acetaminophen, 650 mg, Oral, Q6H PRN    bisacodyL, 10 mg, Rectal, Daily PRN    camphor-methyl salicyl-menthoL, , Topical (Top), BID PRN    dextrose 10%, 12.5 g, Intravenous, PRN    dextrose 10%, 25 g, Intravenous, PRN    labetalol, 10 mg, Intravenous, Q15 Min PRN    LIDOcaine, 1 patch,  Transdermal, Q24H PRN    morphine, 2 mg, Intravenous, Q6H PRN    ondansetron, 8 mg, Oral, Q8H PRN    QUEtiapine, 50 mg, Oral, BID PRN    sodium chloride 0.9%, 10 mL, Intravenous, PRN    sodium chloride 0.9%, 10 mL, Intravenous, PRN         Fercho Merchant MD  Department of Hospital Medicine   [facility]   10/07/2024

## 2024-10-08 LAB
ANION GAP SERPL CALC-SCNC: 15 MEQ/L
BUN SERPL-MCNC: 14.6 MG/DL (ref 9.8–20.1)
CALCIUM SERPL-MCNC: 8.5 MG/DL (ref 8.4–10.2)
CHLORIDE SERPL-SCNC: 104 MMOL/L (ref 98–107)
CO2 SERPL-SCNC: 21 MMOL/L (ref 23–31)
CREAT SERPL-MCNC: 0.63 MG/DL (ref 0.55–1.02)
CREAT/UREA NIT SERPL: 23
GFR SERPLBLD CREATININE-BSD FMLA CKD-EPI: >60 ML/MIN/1.73/M2
GLUCOSE SERPL-MCNC: 68 MG/DL (ref 82–115)
MAGNESIUM SERPL-MCNC: 1.7 MG/DL (ref 1.6–2.6)
PHOSPHATE SERPL-MCNC: 3.3 MG/DL (ref 2.3–4.7)
POCT GLUCOSE: 77 MG/DL (ref 70–110)
POTASSIUM SERPL-SCNC: 3.5 MMOL/L (ref 3.5–5.1)
SODIUM SERPL-SCNC: 140 MMOL/L (ref 136–145)

## 2024-10-08 PROCEDURE — 63600175 PHARM REV CODE 636 W HCPCS: Performed by: INTERNAL MEDICINE

## 2024-10-08 PROCEDURE — 36415 COLL VENOUS BLD VENIPUNCTURE: CPT | Performed by: INTERNAL MEDICINE

## 2024-10-08 PROCEDURE — 25000003 PHARM REV CODE 250: Performed by: NEUROLOGICAL SURGERY

## 2024-10-08 PROCEDURE — 97168 OT RE-EVAL EST PLAN CARE: CPT

## 2024-10-08 PROCEDURE — 21400001 HC TELEMETRY ROOM

## 2024-10-08 PROCEDURE — 25000003 PHARM REV CODE 250: Performed by: INTERNAL MEDICINE

## 2024-10-08 PROCEDURE — 97530 THERAPEUTIC ACTIVITIES: CPT

## 2024-10-08 PROCEDURE — 99233 SBSQ HOSP IP/OBS HIGH 50: CPT | Mod: ,,, | Performed by: GENERAL PRACTICE

## 2024-10-08 PROCEDURE — 97164 PT RE-EVAL EST PLAN CARE: CPT

## 2024-10-08 PROCEDURE — 80048 BASIC METABOLIC PNL TOTAL CA: CPT | Performed by: INTERNAL MEDICINE

## 2024-10-08 PROCEDURE — 97535 SELF CARE MNGMENT TRAINING: CPT

## 2024-10-08 PROCEDURE — 83735 ASSAY OF MAGNESIUM: CPT | Performed by: INTERNAL MEDICINE

## 2024-10-08 PROCEDURE — 84100 ASSAY OF PHOSPHORUS: CPT | Performed by: INTERNAL MEDICINE

## 2024-10-08 PROCEDURE — 25000003 PHARM REV CODE 250: Performed by: STUDENT IN AN ORGANIZED HEALTH CARE EDUCATION/TRAINING PROGRAM

## 2024-10-08 RX ORDER — ENOXAPARIN SODIUM 100 MG/ML
40 INJECTION SUBCUTANEOUS EVERY 24 HOURS
Status: DISCONTINUED | OUTPATIENT
Start: 2024-10-08 | End: 2024-10-21 | Stop reason: HOSPADM

## 2024-10-08 RX ADMIN — FAMOTIDINE 20 MG: 20 TABLET, FILM COATED ORAL at 08:10

## 2024-10-08 RX ADMIN — LEVETIRACETAM 500 MG: 500 TABLET, FILM COATED ORAL at 09:10

## 2024-10-08 RX ADMIN — METOPROLOL SUCCINATE 12.5 MG: 25 TABLET, EXTENDED RELEASE ORAL at 10:10

## 2024-10-08 RX ADMIN — AMLODIPINE BESYLATE 5 MG: 5 TABLET ORAL at 10:10

## 2024-10-08 RX ADMIN — LEVOFLOXACIN 750 MG: 500 TABLET, FILM COATED ORAL at 10:10

## 2024-10-08 RX ADMIN — OXACILLIN 12 G: 2 INJECTION, POWDER, FOR SOLUTION INTRAMUSCULAR; INTRAVENOUS at 08:10

## 2024-10-08 RX ADMIN — QUETIAPINE FUMARATE 25 MG: 25 TABLET ORAL at 05:10

## 2024-10-08 RX ADMIN — LEVETIRACETAM 500 MG: 500 TABLET, FILM COATED ORAL at 08:10

## 2024-10-08 RX ADMIN — ACETAMINOPHEN 650 MG: 325 TABLET ORAL at 01:10

## 2024-10-08 RX ADMIN — FAMOTIDINE 20 MG: 20 TABLET, FILM COATED ORAL at 09:10

## 2024-10-08 RX ADMIN — ENOXAPARIN SODIUM 40 MG: 40 INJECTION SUBCUTANEOUS at 05:10

## 2024-10-08 NOTE — PLAN OF CARE
[facility] denied LTAC placement. CM spoke with patient's  spouse Edil, informed him of this.CM reviewed choices for SNF placement for patient. GRACIE High informed to send SNF referral to [facility] and [facility].

## 2024-10-08 NOTE — PROGRESS NOTES
Infectious Disease  Progress Note    Patient Name: Orlando Black   MRN: [id number]   Admission Date: 9/24/2024   Hospital Length of Stay: 14 days  Attending Physician: Fercho Merchant MD   Primary Care Provider: No, Primary Doctor     Isolation Status: No active isolations     Assessment/Plan:        73-year-old female patient without any known past medical history, presented to outside hospital on 09/21/2024 with fever, generalized myalgias, back pain, confusion, admitted for sepsis, had MSSA bacteremia, noted to have mitral valve endocarditis, concern for right hip infection as well as right knee effusion, GPCs noted in knee fluid cultures, also had 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 She had worsening mental status on 09/23/2024 had a CT of the head on 09/24/2024 showed new left intraparenchymal hematoma and was transferred to [facility] for Neurosurgery, CV surgery, Infectious Disease Services.      Of note, the patient's  reports that the patient has had some eczematous rash that has been ongoing for the past month or so, patient's  has been helping manage this with over-the-counter medication, has not seen a physician      Blood culture from 09/29 remained negative.     Patient was switched to IV cefazolin on 10/02/2024 for OPAT convenience.     She had repeated blood culture 10/4 which remained negative     Since she was switched from oxacillin to cefazolin, patient had 2 episodes of low-grade fever as high as 100.9 F on 10/03 and 10/5.    Severe sepsis   MSSA bacteremia   Mitral valve endocarditis  Concern for C-spine infection   Concern for right knee septic arthritis   Right hip septic arthritis   Intracranial hemorrhage    -patient remains with some confusion, UA obtained which was positive and consistent with infection, Gram-negative rods in the urine   -started on levofloxacin for coverage of the urine   -fevers have resolved since switching back  to oxacillin    Recommendations:  -continue levofloxacin for now, adjust according to urine culture data  -Continue oxacillin 12 g continuous infusion over 24 hours  -would like to continue course for about 8 weeks given ongoing vegetation on the mitral valve, multiple embolizations and initial severe presentation  -anticipate end date 11/24/2024  -Please monitor weekly CBC, CMP, ESR, CRP Fax results to my office at [phone]  -follow-up is already scheduled with us in the office      ID will sign off. Please contact us with any questions or concerns.    Antibiotics History:  Zosyn 9/24 - 9/25  Vancomycin 9/24  Oxacillin 9/25- 10/1, 10/06 - present  Cefazolin 10/1 - 10/06    Portions of this note dictated using EMR integrated voice recognition software, and may be subject to voice recognition errors not corrected at proofreading. Please contact writer for clarification if needed.    Subjective:     Principal Problem: <principal problem not specified>     Interval History:   Pleasantly confused, she denies however any significant symptoms, no headaches, no fevers, no back pain, no knee pains, overall improved    Review of Systems   Review of Systems   All other systems reviewed and are negative.       Objective:     Vital Signs (Most Recent):  Temp: 98 °F (36.7 °C) (10/08/24 0819)  Pulse: 94 (10/08/24 0819)  Resp: 18 (10/07/24 1930)  BP: 122/73 (10/08/24 0819)  SpO2: 98 % (10/08/24 0819)  Vital Signs (24h Range):  Temp:  [97.7 °F (36.5 °C)-98.5 °F (36.9 °C)] 98 °F (36.7 °C)  Pulse:  [92-96] 94  Resp:  [18] 18  SpO2:  [97 %-98 %] 98 %  BP: (110-122)/(63-73) 122/73      Weight:   Wt Readings from Last 1 Encounters:   10/06/24 63.1 kg (139 lb 1.6 oz)      Body mass index is Body mass index is 19.4 kg/m².     Estimated Creatinine Clearance: Estimated Creatinine Clearance: 78 mL/min (based on SCr of 0.63 mg/dL).     Lines/Drains/Airways       Drain  Duration             Female External Urinary Catheter w/ Suction  "09/29/24 1700 8 days              Peripheral Intravenous Line  Duration                  Peripheral IV - Single Lumen 09/28/24 0447 Anterior;Left Forearm 10 days         Peripheral IV - Single Lumen 09/30/24 1220 Anterior;Left Upper Arm 8 days                     Physical Exam  Physical Exam  Constitutional:       Appearance: Normal appearance.   HENT:      Head: Normocephalic and atraumatic.      Mouth/Throat:      Pharynx: No oropharyngeal exudate or posterior oropharyngeal erythema.   Eyes:      Extraocular Movements: Extraocular movements intact.      Pupils: Pupils are equal, round, and reactive to light.   Cardiovascular:      Rate and Rhythm: Normal rate and regular rhythm.      Heart sounds: Murmur heard.   Pulmonary:      Effort: No respiratory distress.      Breath sounds: No wheezing, rhonchi or rales.   Abdominal:      General: Bowel sounds are normal. There is no distension.      Palpations: Abdomen is soft.      Tenderness: There is no abdominal tenderness.   Musculoskeletal:         General: No swelling or tenderness.      Cervical back: Neck supple. No rigidity or tenderness.      Comments: R knee with some swelling but improved and non tender   Lymphadenopathy:      Cervical: No cervical adenopathy.   Skin:     Findings: No lesion or rash.   Neurological:      General: No focal deficit present.      Mental Status: She is alert. Mental status is at baseline.      Cranial Nerves: No cranial nerve deficit.      Motor: No weakness.      Comments: Some confusion   Psychiatric:         Mood and Affect: Mood normal.         Behavior: Behavior normal.          Significant Labs: CBC: No results for input(s): "WBC", "HGB", "HCT", "PLT" in the last 48 hours.  CMP:   Recent Labs   Lab 10/08/24  0342      K 3.5      CO2 21*   BUN 14.6   CREATININE 0.63   CALCIUM 8.5       Microbiology Results (last 7 days)       Procedure Component Value Units Date/Time    Blood Culture [[id number]]  (Normal) " Collected: 10/04/24 0842    Order Status: Completed Specimen: Blood from Arm, Right Updated: 10/08/24 1102     Blood Culture No Growth At 96 Hours    Blood Culture [[id number]]  (Normal) Collected: 10/04/24 0842    Order Status: Completed Specimen: Blood from Arm, Left Updated: 10/08/24 1102     Blood Culture No Growth At 96 Hours    Blood Culture [[id number]]  (Normal) Collected: 10/06/24 0823    Order Status: Completed Specimen: Blood from Hand, Left Updated: 10/08/24 0900     Blood Culture No Growth At 48 Hours    Blood Culture [[id number]]  (Normal) Collected: 10/06/24 0823    Order Status: Completed Specimen: Blood from Arm, Right Updated: 10/08/24 0900     Blood Culture No Growth At 48 Hours    Urine culture [[id number]]  (Abnormal) Collected: 10/07/24 1611    Order Status: Completed Specimen: Urine Updated: 10/08/24 0645     Urine Culture >/= 100,000 colonies/ml Gram-negative Rods    Blood Culture [[id number]]  (Normal) Collected: 09/29/24 1229    Order Status: Completed Specimen: Blood Updated: 10/04/24 1300     Blood Culture No Growth at 5 days    Blood Culture [[id number]]  (Normal) Collected: 09/29/24 1229    Order Status: Completed Specimen: Blood Updated: 10/04/24 1300     Blood Culture No Growth at 5 days    Body Fluid Culture [[id number]] Collected: 09/27/24 1303    Order Status: Completed Specimen: Body Fluid from Joint Fluid, Right Knee Updated: 10/02/24 1148     Body Fluid Culture Final Report: At 5 days. No growth             Significant Imaging: I have reviewed all pertinent imaging results/findings within the past 24 hours.      Saúl Olivarez MD  Infectious Disease  [facility]

## 2024-10-08 NOTE — PROGRESS NOTES
"[facility] - 7th Floor ICU  Orthopedics  Progress Note    Patient Name: Orlando Black  MRN: [id number]  Admission Date: 9/24/2024  Hospital Length of Stay: 14 days  Attending Provider: Fercho Merchant MD  Primary Care Provider: Janeth, Primary Doctor    Subjective:     Principal Problem:<principal problem not specified>    Principal Orthopedic Problem: 11 Days Post-Op   I&D R knee    Interval History: Seen this am. no family at bedside. In chair at bedside. Confused but very pleasant and following commands.  Sutures removed from knee.     Review of patient's allergies indicates:  Not on File    Current Facility-Administered Medications   Medication    acetaminophen tablet 650 mg    amLODIPine tablet 5 mg    bisacodyL suppository 10 mg    camphor-methyl salicyl-menthoL 4-30-10 % Crea    dextrose 10% bolus 125 mL 125 mL    dextrose 10% bolus 250 mL 250 mL    famotidine tablet 20 mg    labetaloL injection 10 mg    levETIRAcetam tablet 500 mg    levoFLOXacin tablet 750 mg    LIDOcaine 5 % patch 1 patch    metoprolol succinate (TOPROL-XL) 24 hr split tablet 12.5 mg    morphine injection 2 mg    ondansetron disintegrating tablet 8 mg    oxacillin 12 g in  mL CONTINUOUS INFUSION    QUEtiapine tablet 25 mg    sodium chloride 0.9% flush 10 mL    sodium chloride 0.9% flush 10 mL     Objective:     Vital Signs (Most Recent):  Temp: 98 °F (36.7 °C) (10/08/24 0819)  Pulse: 94 (10/08/24 0819)  Resp: 18 (10/07/24 1930)  BP: 122/73 (10/08/24 0819)  SpO2: 98 % (10/08/24 0819) Vital Signs (24h Range):  Temp:  [97.7 °F (36.5 °C)-98.5 °F (36.9 °C)] 98 °F (36.7 °C)  Pulse:  [92-96] 94  Resp:  [18] 18  SpO2:  [97 %-98 %] 98 %  BP: (110-122)/(63-73) 122/73     Weight: 63.1 kg (139 lb 1.6 oz)  Height: 5' 11" (180.3 cm)  Body mass index is 19.4 kg/m².      Intake/Output Summary (Last 24 hours) at 10/8/2024 1303  Last data filed at 10/7/2024 1806  Gross per 24 hour   Intake 720 ml   Output 500 ml   Net 220 ml       Physical " "Exam:   In the ICU                              RLE: -Skin: skin well healed. Knee without erythema.            -MSK: spontaneous motor in tact           -CV: Capillary refill is less than 2 seconds. +DP. Compartments soft and compressible    Diagnostic Findings:     Significant Labs: Wound Culture: No results for input(s): "LABAERO" in the last 48 hours.  All pertinent labs within the past 24 hours have been reviewed.    Significant Imaging: I have reviewed all pertinent imaging results/findings.     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] 09/25/2024 Yes    Intracranial hemorrhage following injury, no loss of consciousness [S06.300A] 09/25/2024 Yes    MSSA bacteremia [R78.81, B95.61] 09/24/2024 Yes    Endocarditis of mitral valve [I05.9] 09/24/2024 Yes    Intraparenchymal hemorrhage of brain [I61.9] 09/24/2024 Yes      Problems Resolved During this Admission:   73 YO 9/27/24 I&D R knee    Sutures removed  ABX: per ID; oxacillin, levofloxacillin  Knee cultures remained negative  PT/OT: mobilize adlib  Activity: WBAT RLE  DVT PPX ok from ortho standpoint  Follow up as needed  Ortho to sign off- please call with questions/concerns    The above findings, diagnostics, and treatment plan were discussed with Dr Castro who is in agreement with the plan of care except as stated in additional documentation.      KERI Canchola   Orthopedic Trauma Surgery  [facility]    "

## 2024-10-08 NOTE — PROGRESS NOTES
[facility]  Hospital Medicine Progress Note        Chief Complaint: Inpatient Follow-up Disseminated MSSA infection     HPI:   The Pt is a 75 yo female without significant past medical history on file presented to [facility] on 9/24/2024 transferred from [place] for neurosurgery and CV surgery services.  Patient originally presented to outside hospital on 09/21/2024 for fever and body aches.  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  Patient developed altered mental status overnight on 09/23/2024 and underwent CT head on 09/24/2024 which showed new left occipital intraparenchymal hematoma measuring 2 x 1.8 x 1.5 cm.  Patient was transferred to [facility] ICU for higher level care.  Neurosurgery, interventional neurology, and CV surgery services were consulted.  Patient was started on broad-spectrum antibiotics Vancomycin and Zosyn.  CV surgery recommended 6 weeks of antibiotics treatment per ID guidance and repeat echocardiogram after treatment.  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  Vascular Neurology suggested no antiplatelet or anticoagulation for now and follow up in the OP clinic for ICA aneurysm.   Neurosurgery recommended q.2 hours neurochecks, BP parameters below 140/90, and Keppra 500 mg bid. Infectious Disease and orthopedic services were consulted.  Patient was started on Oxacillin 2 g q.4 hours after blood cultures confirmed MSSA.  MRI cervical spine findings consistent with diskitis/osteomyelitis at C3-C6, left  C2-C3 facet joint with epidural enhancement and small prevertebral abscess, moderate to severe canal narrowing at C5-C6, suspected areas of fluid in the posterior paraspinal soft tissues of the upper thoracic spine.  Patient underwent washout of right knee effusion  on 09/27 with Dr. Castro.  Rt knee aspiration neg for bacterial or fungal growth. Repeat blood cultures x 2 from 9/29/24 remained neg. On 10/1/24 oxacillin transitioned to cefazolin 2 g every 8 hours for OPAT convenience with estimated end date 11/13/2024. Patient was cleared for downgrade out of ICU on 10/01/2024 and admitted to hospital medicine service      Therapy services were consulted.  Patient is currently awaiting LTAC evaluation and acceptance.  Venous U/S delvis lower ext performed on 10/5/24 due to some swelling of RLE related to known Baker's cyst was neg for DVT.      Pt noted to have intermittent low grade fever. 100.9 on 10/3/24 again  100.7 on 10/5/24. Blood cultures x 2 repeated on 10/4/24 and again on 10/6/24. Reached out to ID for further evaluation. Pt was placed back on Oxacillin 12 g continuous infusion for the possibility of drug fever secondary to cefazolin. Limited TTE ordered to re evaluate MV.  Stable vegetation with mod MR again noted. Reached out Vascular Neurology and cleared Pt to start VTE prophylaxis with Lovenox as of 10/8/24.        Interval Hx:   No fever for  more than 48h since being switched to Oxacillin continuous.   Hemodynamics are stable, on RA  Urine culture growing GNR.   Oral Levofloxacin added   Intermittent confusion persisted.     Case was discussed with patient's nurse and  on the floor.    Objective/physical exam:  General: In no acute distress, afebrile  Chest: Raised manubrium, Clear to auscultation bilaterally  Heart: RRR, +S1, S2, (+) murmur  Abdomen: Soft, nontender, BS +  MSK: Warm, trace  lower extremity edema RLE, no clubbing or cyanosis  Neurologic:Awake , alert, intermittent  confusion,  move all ext spontaneously         VITAL SIGNS: 24 HRS MIN & MAX LAST   Temp  Min: 97.7 °F (36.5 °C)  Max: 98.5 °F (36.9 °C) 98 °F (36.7 °C)   BP  Min: 110/63  Max: 122/73 122/73   Pulse  Min: 92  Max: 96  94   Resp  Min: 18  Max: 18 18   SpO2  Min: 97 %  Max: 98 % 98 %     I have reviewed the following labs:  Recent Labs   Lab 10/03/24  0250 10/04/24  0248 10/07/24  0228   WBC 11.69* 11.34 8.33   RBC 3.15* 3.07* 2.93*   HGB 9.8* 9.6* 9.0*   HCT 31.3* 30.2* 28.9*   MCV 99.4* 98.4* 98.6*   MCH 31.1* 31.3* 30.7   MCHC 31.3* 31.8* 31.1*   RDW 13.5 13.5 13.3   * 509* 460*   MPV 9.1 8.7 9.1     Recent Labs   Lab 10/02/24  0320 10/03/24  0250 10/04/24  0248 10/05/24  0435 10/07/24  0228 10/08/24  0342    139 142 141 140 140   K 3.6 3.2* 2.9* 3.9 2.7* 3.5    103 102 107 104 104   CO2 27 23 28 25 25 21*   BUN 7.0* 7.1* 7.6* 10.8 13.6 14.6   CREATININE 0.57 0.56 0.58 0.65 0.68 0.63   CALCIUM 7.9* 8.1* 8.3* 8.4 8.3* 8.5   MG 1.60 2.30 1.90  --  1.70 1.70   ALBUMIN 1.8* 2.0*  --   --   --   --    ALKPHOS 148 153*  --   --   --   --    ALT 10 <5  --   --   --   --    AST 18 18  --   --   --   --    BILITOT 0.5 0.5  --   --   --   --      Microbiology Results (last 7 days)       Procedure Component Value Units Date/Time    Blood Culture [[id number]]  (Normal) Collected: 10/04/24 0842    Order Status: Completed Specimen: Blood from Arm, Right Updated: 10/08/24 1102     Blood Culture No Growth At 96 Hours    Blood Culture [[id number]]  (Normal) Collected: 10/04/24 0842    Order Status: Completed Specimen: Blood from Arm, Left Updated: 10/08/24 1102     Blood Culture No Growth At 96 Hours    Blood Culture [[id number]]  (Normal) Collected: 10/06/24 0823    Order Status: Completed Specimen: Blood from Hand, Left Updated: 10/08/24 0900     Blood Culture No Growth At 48 Hours    Blood Culture [[id number]]  (Normal) Collected: 10/06/24 0823    Order Status: Completed Specimen: Blood from Arm, Right  Updated: 10/08/24 0900     Blood Culture No Growth At 48 Hours    Urine culture [[id number]]  (Abnormal) Collected: 10/07/24 1611    Order Status: Completed Specimen: Urine Updated: 10/08/24 0645     Urine Culture >/= 100,000 colonies/ml Gram-negative Rods    Blood Culture [[id number]]  (Normal) Collected: 09/29/24 1229    Order Status: Completed Specimen: Blood Updated: 10/04/24 1300     Blood Culture No Growth at 5 days    Blood Culture [[id number]]  (Normal) Collected: 09/29/24 1229    Order Status: Completed Specimen: Blood Updated: 10/04/24 1300     Blood Culture No Growth at 5 days    Body Fluid Culture [[id number]] Collected: 09/27/24 1303    Order Status: Completed Specimen: Body Fluid from Joint Fluid, Right Knee Updated: 10/02/24 1148     Body Fluid Culture Final Report: At 5 days. 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 Pt is placed back on Oxacillin 12 g continuous infusion from 10/6/24  for the possibility of drug fever due to cefazolin.   Limited TTE on 10/6/24 with persistent MV vegetation.    Blood cultures x 2 repeated on 10/4/24  and again on 10/6/24 given isolated low grade temp spike- remains neg.   Precaution for delirium prevention   Low dose Seroquel added to mitigate  delirium   Correct and replete electrolytes as indicated   Antihypertensive include Amlodipine and Metoprolol initiated and continued   Continue PT/OT service   CM consulted to assist with DC planning      VTE prophylaxis:  Lovenox     Patient condition:  Fair     Anticipated discharge and Disposition:     TBD      All diagnosis and differential diagnosis have been reviewed; assessment and plan has been documented; I have personally reviewed the labs and test results that are presently available; I have reviewed the patients medication list; I have reviewed the consulting providers response and recommendations. I have reviewed or attempted to review medical records based upon their availability    All of the patient's questions have been  addressed and answered. Patient's is agreeable to the above stated plan. I will continue to monitor closely and make adjustments to medical management as needed.    Portions of this note dictated using EMR integrated voice recognition software, and may be subject to voice recognition errors not corrected at proofreading. Please contact writer for clarification if needed.   _____________________________________________________________________    Malnutrition Status:    Scheduled Med:   amLODIPine  5 mg Oral Daily    enoxparin  40 mg Subcutaneous Q24H (prophylaxis, 1700)    famotidine  20 mg Oral BID    levETIRAcetam  500 mg Oral BID    levoFLOXacin  750 mg Oral Daily    metoprolol succinate  12.5 mg Oral Daily    oxacillin 12 g in  mL CONTINUOUS INFUSION  12 g Intravenous Q24H    QUEtiapine  25 mg Oral After dinner      Continuous Infusions:     PRN Meds:    Current Facility-Administered Medications:     acetaminophen, 650 mg, Oral, Q6H PRN    bisacodyL, 10 mg, Rectal, Daily PRN    camphor-methyl salicyl-menthoL, , Topical (Top), BID PRN    dextrose 10%, 12.5 g, Intravenous, PRN    dextrose 10%, 25 g, Intravenous, PRN    labetalol, 10 mg, Intravenous, Q15 Min PRN    LIDOcaine,  1 patch, Transdermal, Q24H PRN    morphine, 2 mg, Intravenous, Q6H PRN    ondansetron, 8 mg, Oral, Q8H PRN    sodium chloride 0.9%, 10 mL, Intravenous, PRN    sodium chloride 0.9%, 10 mL, Intravenous, PRN         Fercho Merchant MD  Department of Hospital Medicine   [facility]   10/08/2024

## 2024-10-08 NOTE — PT/OT/SLP RE-EVAL
Occupational Therapy   Re-evaluation    Name: Orlando Black  MRN: [id number]  Admitting Diagnosis: fall  Recent Surgery: Procedure(s) (LRB):  ARTHROSCOPY, KNEE, WITH DEBRIDEMENT (Right) 11 Days Post-Op    Recommendations:     Discharge therapy intensity: Moderate Intensity Therapy   Discharge Equipment Recommendations:  to be determined by next level of care  Barriers to discharge:       Assessment:     Orlando Black is a 74 y.o. 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  She presents with the following performance deficits affecting function: weakness, impaired cognition, impaired endurance, impaired self care skills, impaired functional mobility, gait instability, impaired balance, decreased safety awareness, decreased lower extremity function.      Pt tolerated OT evaluation well, overall remains intermittently confused but able to follow all commands and oriented x4 with increased time for responses and 1 cue for correct year. Pt has made good progress toward OT goals since initial evaluation, overall requires Mod-max A for dressing ADLs and min-mod A for functional transfers using RW. Pt remains with impaired safety awareness and remains appropriate for moderate intensity therapy upon discharge.     Rehab Prognosis: Good; patient would benefit from acute skilled OT services to address these deficits and reach maximum level of function.       Plan:     Patient to be seen 5 x/week to address the above listed problems via self-care/home management, therapeutic activities, therapeutic exercises  Plan  of Care Expires: 10/22/24  Plan of Care Reviewed with: patient    Subjective     Chief Complaint: none  Patient/Family Comments/goals: to go home    Pain/Comfort:  Pain Rating 1: 0/10    Patients cultural, spiritual, [employer]alism conflicts given the current situation: no    Objective:     OT communicated with RN prior to session.      Patient was found HOB elevated with telemetry, peripheral IV, PureWick upon OT entry to room.    General Precautions: Standard, fall, other (see comments) (-140)  Orthopedic Precautions: RLE weight bearing as tolerated  Braces: N/A    Vital Signs: Blood Pressure: 124/67  HR: 95    Bed Mobility:    Patient completed Supine to Sit with minimum assistance    Functional Mobility/Transfers:  Patient completed Sit <> Stand Transfer with minimum assistance  with  rolling walker from EOB, Mod A from bedside chair  Patient completed Bed <> Chair Transfer using Step Transfer technique with minimum assistance with rolling walker  Patient completed Toilet Transfer Step Transfer technique with moderate assistance with  rolling walker  Functional Mobility: min A for mobility ambulating to bathroom commode, ~8 ft + 8 ft (seated rest between) using RW    Activities of Daily Living:  Lower Body Dressing: maximal assistance donning brief as underwear in prep for sitting in chair  Toileting: stand by assistance for anterior hygiene while seated on commode following void    Functional Cognition:  Orientation: oriented to Person, Place, Time (at first stated 2029 then 2024 when prompted to repeat), and Situation  Command Following: Intact  Safety Awareness: Impaired.     Visual Perceptual Skills:  Pursuits: WFL    Upper Extremity Function:  Right Upper Extremity:   Strength: WFL    Left Upper Extremity:  Strength: WFL    Balance:   Static sitting balance: CGA  Dynamic sitting balance:CGA  Static standing balance:CGA at RW  Dynamic standing balance:min A    Therapeutic Positioning  Risk for acquired  pressure injuries is decreased due to ability to get to BSC/toilet with assist.    OT interventions performed during the course of today's session in an effort to prevent and/or reduce acquired pressure injuries:   Education was provided on benefits of and recommendations for therapeutic positioning  Therapeutic positioning was provided at the conclusion of session to offload all bony prominences for the prevention and/or reduction of pressure injuries    Skin assessment: visible skin intact    OT recommendations for therapeutic positioning throughout hospitalization:   Follow [facility] Pressure Injury Prevention Protocol    Additional Treatment:  ADL Trainin mins    Patient Education:  Patient provided with verbal education education regarding OT role/goals/POC, fall prevention, and safety awareness.  Understanding was verbalized, however additional teaching warranted.     Patient left up in chair with all lines intact, call button in reach, chair alarm on, geomat cushion, RN notified, and posey vest donned, telesitter present    GOALS:   Multidisciplinary Problems       Occupational Therapy Goals          Problem: Occupational Therapy    Goal Priority Disciplines Outcome Interventions   Occupational Therapy Goal     OT, PT/OT Progressing    Description: LTG: Pt will perform basic ADLs and ADL transfers with Modified independence using LRAD by discharge.    STG: to be met by 24    Pt will complete grooming standing at sink with LRAD with SBA.  Pt will complete UB dressing with SBA.  Pt will complete LB dressing with SBA using LRAD.  Pt will complete toileting with SBA using LRAD.  Pt will complete functional mobility to/from toilet and toilet transfer with SBA using LRAD.                         History:     History reviewed. No pertinent past medical history.      Past Surgical History:   Procedure Laterality Date    KNEE ARTHROSCOPY W/ DEBRIDEMENT Right 2024    Procedure: ARTHROSCOPY, KNEE, WITH  DEBRIDEMENT;  Surgeon: Omid Castro MD;  Location: [facility];  Service: Orthopedics;  Laterality: Right;       Time Tracking:     OT Date of Treatment: 10/08/24  OT Start Time: 1001  OT Stop Time: 1042  OT Total Time (min): 41 min    Billable Minutes:Re-eval 17 mins  Self Care/Home Management 24 mins    10/8/2024

## 2024-10-08 NOTE — PLAN OF CARE
Problem: Occupational Therapy  Goal: Occupational Therapy Goal  Description: LTG: Pt will perform basic ADLs and ADL transfers with Modified independence using LRAD by discharge.    STG: to be met by 11/8/24    Pt will complete grooming standing at sink with LRAD with SBA.  Pt will complete UB dressing with SBA.  Pt will complete LB dressing with SBA using LRAD.  Pt will complete toileting with SBA using LRAD.  Pt will complete functional mobility to/from toilet and toilet transfer with SBA using LRAD.    Outcome: Progressing

## 2024-10-08 NOTE — PT/OT/SLP RE-EVAL
Physical Therapy Re-Evaluation    Patient Name:  Orlando Black   MRN:  [id number]    Recommendations:     Discharge therapy intensity: Moderate Intensity Therapy   Discharge Equipment Recommendations: to be determined by next level of care   Barriers to discharge: Impaired mobility and Ongoing medical needs    Assessment:     Orlando Black is a 74 y.o. 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  Upon Re-eval, She presents with the following impairments/functional limitations: weakness, gait instability, impaired endurance, impaired balance, impaired functional mobility, decreased lower extremity function, orthopedic precautions. Pt performed bed mobility, transfers, and ambulation with Min-Mod A. Pt needing increased assistance to stand transfer from bed side chair. Pt demo step to pattern and decreased gait speed with ambulation. Continuing to recommend Moderate intensity therapy upon discharge.     Rehab Prognosis: Good; patient would benefit from acute skilled PT services to address these deficits and reach maximum level of function.    Recent Surgery: Procedure(s) (LRB):  ARTHROSCOPY, KNEE, WITH DEBRIDEMENT (Right) 11 Days Post-Op    Plan:     During this hospitalization, patient would benefit from acute PT services 5 x/week to address the identified rehab impairments via gait training, therapeutic activities, therapeutic exercises and progress toward the following goals:    Plan of Care Expires:  11/08/24    PT/PTA conference to discuss PT POC and patient's progression towards goals held  with  Alvino Desir PTA .     Subjective     Chief Complaint: none stated   Patient/Family Comments/goals: get better   Pain/Comfort:  Pain Rating 1: 0/10    Patients cultural, spiritual, [employer]ology conflicts given the current situation: no    Objective:     Communicated with RN prior to session.  Patient found HOB elevated with peripheral IV, PureWick, telemetry  upon PT entry to room.    General Precautions: Standard, fall  Orthopedic Precautions:RLE weight bearing as tolerated   Braces: N/A  Respiratory Status: Room air  Blood Pressure: 124/67      Exams:  RLE ROM: limited at hip and knee  RLE Strength: grossly 4-/5  LLE ROM: limited at hip and knee actively  LLE Strength: grossly 4-/5  Skin integrity: Visible skin intact      Functional Mobility:  Bed Mobility:     Supine to Sit: minimum assistance  Transfers:     Sit to Stand:  minimum assistance with rolling walker from bed   Sit to stand : Mod A with RW from recliner   Gait: Pt amb 8ft +8ft + 12ft CGA-Min A with RW demo short stepping pattern, decreased boris, and increased swing time on LLE. Pt required standing and sitting rest breaks 2/2 fatigue.         Treatment & Education:  Pt ambulated from bed <> bathroom. Pt with +void at toilet. Pt req motivation throughout session secondary to fear.       Patient provided with verbal education education regarding PT role/goals/POC, fall prevention, and safety awareness.  Understanding was verbalized, however additional teaching warranted.     Patient left up in chair with all lines intact, call button in reach, chair alarm on, posey vest and RN notified.    GOALS:   Multidisciplinary Problems       Physical Therapy Goals          Problem: Physical Therapy    Goal Priority Disciplines Outcome Interventions   Physical Therapy Goal     PT, PT/OT Progressing    Description: Goals to be met by: 10/28/24     Patient will increase functional independence with mobility by performin. Supine to sit with Stand-by  Assistance  2. Sit to supine with Stand-by Assistance  3. Sit to stand transfer with Stand-by Assistance  4. Bed to chair transfer with Stand-by Assistance using Rolling Walker  5. Gait  x 200 feet with Stand-by Assistance using Rolling Walker.                          History:     History reviewed. No pertinent past medical history.    Past Surgical History:   Procedure Laterality Date    KNEE ARTHROSCOPY W/ DEBRIDEMENT Right 9/27/2024    Procedure: ARTHROSCOPY, KNEE, WITH DEBRIDEMENT;  Surgeon: Omid Castro MD;  Location: [facility];  Service: Orthopedics;  Laterality: Right;       Time Tracking:     PT Received On: 10/08/24  PT Start Time: 1001     PT Stop Time: 1043  PT Total Time (min): 42 min     Billable Minutes: Re-eval 20 and Therapeutic Activity 22      10/08/2024

## 2024-10-08 NOTE — PLAN OF CARE
[facility] denied the appeal for LTAC placement.    VANGIE Keita made patient and family aware. Resent SNF referrals to nursing facilities in [place] including update ID note regarding change in iv abx. Awaiting response at this time.

## 2024-10-08 NOTE — PLAN OF CARE
Problem: Adult Inpatient Plan of Care  Goal: Optimal Comfort and Wellbeing  Outcome: Progressing     Problem: Stroke, Subarachnoid Hemorrhage  Goal: Optimal Coping  Outcome: Progressing     Problem: Stroke, Subarachnoid Hemorrhage  Goal: Optimal Cerebral Tissue Perfusion  Outcome: Progressing     Problem: Stroke, Subarachnoid Hemorrhage  Goal: Optimal Cognitive Function  Outcome: Progressing     Problem: Stroke, Subarachnoid Hemorrhage  Goal: Effective Communication Skills  Outcome: Progressing     Problem: Stroke, Subarachnoid Hemorrhage  Goal: Optimal Functional Ability  Outcome: Progressing     Problem: Stroke, Subarachnoid Hemorrhage  Goal: Safe and Effective Swallow  Outcome: Progressing

## 2024-10-09 LAB
BACTERIA BLD CULT: NORMAL
BACTERIA BLD CULT: NORMAL
BACTERIA UR CULT: ABNORMAL
POCT GLUCOSE: 70 MG/DL (ref 70–110)
POCT GLUCOSE: 78 MG/DL (ref 70–110)
POCT GLUCOSE: 79 MG/DL (ref 70–110)
POCT GLUCOSE: 84 MG/DL (ref 70–110)
POCT GLUCOSE: 86 MG/DL (ref 70–110)

## 2024-10-09 PROCEDURE — 63600175 PHARM REV CODE 636 W HCPCS: Performed by: INTERNAL MEDICINE

## 2024-10-09 PROCEDURE — 21400001 HC TELEMETRY ROOM

## 2024-10-09 PROCEDURE — 97530 THERAPEUTIC ACTIVITIES: CPT | Mod: CQ

## 2024-10-09 PROCEDURE — 25000003 PHARM REV CODE 250: Performed by: GENERAL PRACTICE

## 2024-10-09 PROCEDURE — 97535 SELF CARE MNGMENT TRAINING: CPT | Mod: CO

## 2024-10-09 PROCEDURE — 25000003 PHARM REV CODE 250: Performed by: INTERNAL MEDICINE

## 2024-10-09 PROCEDURE — 25000003 PHARM REV CODE 250: Performed by: NEUROLOGICAL SURGERY

## 2024-10-09 PROCEDURE — 27000207 HC ISOLATION

## 2024-10-09 RX ORDER — CIPROFLOXACIN 500 MG/1
500 TABLET ORAL EVERY 12 HOURS
Status: COMPLETED | OUTPATIENT
Start: 2024-10-09 | End: 2024-10-12

## 2024-10-09 RX ADMIN — FAMOTIDINE 20 MG: 20 TABLET, FILM COATED ORAL at 08:10

## 2024-10-09 RX ADMIN — AMLODIPINE BESYLATE 5 MG: 5 TABLET ORAL at 09:10

## 2024-10-09 RX ADMIN — QUETIAPINE FUMARATE 25 MG: 25 TABLET ORAL at 04:10

## 2024-10-09 RX ADMIN — CIPROFLOXACIN HYDROCHLORIDE 500 MG: 500 TABLET, FILM COATED ORAL at 08:10

## 2024-10-09 RX ADMIN — LEVOFLOXACIN 750 MG: 500 TABLET, FILM COATED ORAL at 09:10

## 2024-10-09 RX ADMIN — ENOXAPARIN SODIUM 40 MG: 40 INJECTION SUBCUTANEOUS at 04:10

## 2024-10-09 RX ADMIN — METOPROLOL SUCCINATE 12.5 MG: 25 TABLET, EXTENDED RELEASE ORAL at 09:10

## 2024-10-09 RX ADMIN — LEVETIRACETAM 500 MG: 500 TABLET, FILM COATED ORAL at 08:10

## 2024-10-09 RX ADMIN — FAMOTIDINE 20 MG: 20 TABLET, FILM COATED ORAL at 09:10

## 2024-10-09 RX ADMIN — LEVETIRACETAM 500 MG: 500 TABLET, FILM COATED ORAL at 09:10

## 2024-10-09 NOTE — PLAN OF CARE
Problem: Adult Inpatient Plan of Care  Goal: Plan of Care Review  Outcome: Not Progressing  Goal: Patient-Specific Goal (Individualized)  Outcome: Not Progressing  Goal: Absence of Hospital-Acquired Illness or Injury  Outcome: Not Progressing  Goal: Optimal Comfort and Wellbeing  Outcome: Not Progressing  Goal: Readiness for Transition of Care  Outcome: Not Progressing     Problem: Stroke, Subarachnoid Hemorrhage  Goal: Optimal Coping  Outcome: Not Progressing  Goal: Effective Bowel Elimination  Outcome: Not Progressing  Goal: Optimal Cerebral Tissue Perfusion  Outcome: Not Progressing  Goal: Optimal Cognitive Function  Outcome: Not Progressing  Goal: Effective Communication Skills  Outcome: Not Progressing  Goal: Optimal Functional Ability  Outcome: Not Progressing  Goal: Optimal Nutrition Intake  Outcome: Not Progressing  Goal: Optimal Pain Control and Function  Outcome: Not Progressing  Goal: Effective Oxygenation and Ventilation  Outcome: Not Progressing  Goal: Improved Sensorimotor Function  Outcome: Not Progressing  Goal: Safe and Effective Swallow  Outcome: Not Progressing  Goal: Effective Urinary Elimination  Outcome: Not Progressing     Problem: Skin Injury Risk Increased  Goal: Skin Health and Integrity  Outcome: Not Progressing     Problem: Wound  Goal: Optimal Coping  Outcome: Not Progressing  Goal: Optimal Functional Ability  Outcome: Not Progressing  Goal: Absence of Infection Signs and Symptoms  Outcome: Not Progressing  Goal: Improved Oral Intake  Outcome: Not Progressing  Goal: Optimal Pain Control and Function  Outcome: Not Progressing  Goal: Skin Health and Integrity  Outcome: Not Progressing  Goal: Optimal Wound Healing  Outcome: Not Progressing     Problem: Infection  Goal: Absence of Infection Signs and Symptoms  Outcome: Not Progressing     Problem: Fall Injury Risk  Goal: Absence of Fall and Fall-Related Injury  Outcome: Not Progressing

## 2024-10-09 NOTE — PLAN OF CARE
LTAC denied appeal. [facility] and [facility] denied SNF placement. CM phoned [employer] to get assistance from CM with insurance. CM was transferred to 4 different people to get assistance the final person CM spoke with [employer] informd CM she could not connect CM to a CM . CM would have to write a letter requesting to speak to Provider representative (), why CM is requesting this.  Letter has to include Member information ,CM email and phone number.        VANGIE spoke to Edil , patient;'s spouse who informed CM he had a number and name for patient's CM with [employer]. Mauro, [phone].  VANGIE phoned Mauro's number but no answer , left voice message.    10/10/24 9:40 am CM phoned [facility] CM for patient. No answer . CM left message requesting a return call.

## 2024-10-09 NOTE — PLAN OF CARE
[facility] is not able to accommodate an iv abx continuous over 24 hours. Made CM aware. Discussed with her reaching out to the insurance company for assistance in finding a facility that accommodate her iv meds, since they continue to deny ltac placement.     [facility] is also unable to accept this patient, stating unable to accommodate iv abx due to cost of care.

## 2024-10-09 NOTE — PT/OT/SLP PROGRESS
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   12 Days Post-Op    Recommendations:     Recommended therapy intensity at discharge: Moderate Intensity Therapy   Discharge Equipment Recommendations:  to be determined by next level of care  Barriers to discharge:       Assessment:     Orlando Black is a 74 y.o. 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 Performance deficits affecting function are weakness, impaired cognition, impaired endurance, impaired self care skills, impaired functional mobility, gait instability, impaired balance, decreased safety awareness, decreased lower extremity function.     Rehab Prognosis:  Good; patient would benefit from acute skilled OT services to address these deficits and reach maximum level of function.       Plan:     Patient to be seen 5 x/week to address the above listed problems via self-care/home management, therapeutic activities, therapeutic exercises  Plan of Care Expires: 10/22/24  Plan of Care Reviewed with: patient, family    Subjective     Pain/Comfort:  Pain Rating 1: 10/10  Location -  Side 1: Right  Location 1:  (At SI Joint in R Hip, limiting mobility)  Pain Addressed 1: Reposition, Cessation of Activity, Distraction    Objective:     Communicated with: RN prior to session.  Patient found HOB elevated with peripheral IV, PureWick, telemetry upon OT entry to room.    General Precautions: Standard, fall, contact    Orthopedic Precautions:RLE weight bearing as tolerated  Braces: N/A  Respiratory Status: Room air  Vital Signs: Blood Pressure: 118/61 EOB prior to session  HR: 110 EOB prior to session, 120-150s standing     Occupational Performance:     Bed Mobility:    Patient completed Rolling/Turning to Left with  minimum assistance  Patient completed Rolling/Turning to Right with minimum assistance  Patient completed Supine to Sit with minimum assistance     Functional Mobility/Transfers:  Patient completed Sit <> Stand Transfer with minimum assistance  with  rolling walker   Bed>Chair: step t/f with Min-A X2 persons; pt demo'd decreased balance  Functional Mobility: mobility limited 2/2 elevated HR in standing    Activities of Daily Living:  Toileting: Total-A for pericare in bed; -BM; Total-A for purewick management    Therapeutic Positioning    OT interventions performed during the course of today's session in an effort to prevent and/or reduce acquired pressure injuries:   Education was provided on benefits of and recommendations for therapeutic positioning  Therapeutic positioning was provided at the conclusion of session to offload all bony prominences for the prevention and/or reduction of pressure injuries and for pain management      [facility] ADL: 15    Patient Education:  Patient and family were provided with verbal education education regarding OT role/goals/POC, post op precautions, fall prevention, safety awareness, and pressure ulcer prevention.  Understanding was verbalized, however additional teaching warranted.      Patient left up in chair with call button in reach and RN  notified.    GOALS:   Multidisciplinary Problems       Occupational Therapy Goals          Problem: Occupational Therapy    Goal Priority Disciplines Outcome Interventions   Occupational Therapy Goal     OT, PT/OT Progressing    Description: LTG: Pt will perform basic ADLs and ADL transfers with Modified independence using LRAD by discharge.    STG: to be met by 11/8/24    Pt will complete grooming standing at sink with LRAD with SBA.  Pt will complete UB dressing with SBA.  Pt will complete LB dressing with SBA using LRAD.  Pt will complete toileting with SBA using LRAD.  Pt will complete functional mobility to/from toilet and toilet transfer with SBA using LRAD.                         Time Tracking:     OT Date of Treatment: 10/09/24  OT Start Time: 1039  OT Stop Time: 1107  OT Total Time (min): 28 min    Billable Minutes:Self Care/Home Management 2    OT/TY: TY     Number of TY visits since last OT visit: 1    10/9/2024

## 2024-10-09 NOTE — PLAN OF CARE
Problem: Adult Inpatient Plan of Care  Goal: Optimal Comfort and Wellbeing  Outcome: Progressing     Problem: Stroke, Subarachnoid Hemorrhage  Goal: Optimal Coping  Outcome: Progressing     Problem: Stroke, Subarachnoid Hemorrhage  Goal: Optimal Cerebral Tissue Perfusion  Outcome: Progressing     Problem: Stroke, Subarachnoid Hemorrhage  Goal: Optimal Cognitive Function  Outcome: Progressing     Problem: Stroke, Subarachnoid Hemorrhage  Goal: Effective Communication Skills  Outcome: Progressing     Problem: Stroke, Subarachnoid Hemorrhage  Goal: Optimal Functional Ability  Outcome: Progressing

## 2024-10-09 NOTE — PROGRESS NOTES
Inpatient Nutrition Evaluation    Admit Date: 9/24/2024   Total duration of encounter: 15 days   Patient Age: 74 y.o.    Nutrition Recommendation/Prescription     Continue Diet Heart Healthy as ordered.   Encouraged increased oral intake, small and frequent meals.     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   Discitis/ OM at C3-C6, left C3-C3 facet joint and small prevertebral abscess  Concern for right knee septic arthritis s/p washout 9/27  Rt knee Baker's cyst   Right hip arthritis, 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  Left occipital intraparenchymal hemorrhage- regressed on repeat test 10/7/24  Incidental left cavernous ICA aneurysm  Electrolyte abnormality - Hypokalemia, Hypomagnesemia   Essential HTN  Age appropriate Cognitive decline   Delirium     Relevant Medical History:   None on file    Scheduled Medications:  amLODIPine, 5 mg, Daily  enoxparin, 40 mg, Q24H (prophylaxis, 1700)  famotidine, 20 mg, BID  levETIRAcetam, 500 mg, BID  levoFLOXacin, 750 mg, Daily  metoprolol succinate, 12.5 mg, Daily  oxacillin 12 g in  mL CONTINUOUS INFUSION, 12 g, Q24H  QUEtiapine, 25 mg, After dinner    Continuous Infusions:   PRN Medications:   Current Facility-Administered Medications:     acetaminophen, 650 mg, Oral, Q6H PRN    bisacodyL, 10 mg, Rectal, Daily PRN    camphor-methyl salicyl-menthoL, , Topical (Top), BID PRN    dextrose 10%, 12.5 g, Intravenous, PRN    dextrose 10%, 25 g, Intravenous, PRN    labetalol, 10 mg, Intravenous, Q15 Min PRN    LIDOcaine, 1 patch, Transdermal,  "Q24H PRN    morphine, 2 mg, Intravenous, Q6H PRN    ondansetron, 8 mg, Oral, Q8H PRN    sodium chloride 0.9%, 10 mL, Intravenous, PRN    sodium chloride 0.9%, 10 mL, Intravenous, PRN    Recent Labs   Lab 10/03/24  0250 10/04/24  0248 10/05/24  0435 10/07/24  0228 10/08/24  0342    142 141 140 140   K 3.2* 2.9* 3.9 2.7* 3.5   CALCIUM 8.1* 8.3* 8.4 8.3* 8.5   PHOS  --  2.8  --  3.6 3.3   MG 2.30 1.90  --  1.70 1.70    102 107 104 104   CO2 23 28 25 25 21*   BUN 7.1* 7.6* 10.8 13.6 14.6   CREATININE 0.56 0.58 0.65 0.68 0.63   EGFRNORACEVR >60 >60 >60 >60 >60   GLUCOSE 80* 120* 108 80* 68*   BILITOT 0.5  --   --   --   --    ALKPHOS 153*  --   --   --   --    ALT <5  --   --   --   --    AST 18  --   --   --   --    ALBUMIN 2.0*  --   --   --   --    CRP  --   --   --  166.20*  --    WBC 11.69* 11.34  --  8.33  --    HGB 9.8* 9.6*  --  9.0*  --    HCT 31.3* 30.2*  --  28.9*  --      Nutrition Orders:  Diet Heart Healthy      Appetite/Oral Intake: fair/50-75% of meals  Factors Affecting Nutritional Intake: NPO  Food/Oriental orthodox/Cultural Preferences:  Breakfast: peach or blueberry yogurt with coffee  Food Allergies: no known food allergies  Last Bowel Movement: 10/09/24  Wound(s):  No partial or full thickness pressure injuries documented    Comments    9/26/24: Pt currently NPO. Pt denies any decreased appetite PTA, unsure of UBW but states she suspects it has been stable. ONS added for extra nourishment once diet has advanced.   10/2/24: Reports decreased appetite since admit s/t food preferences. Typically eats yogurt (blueberry or peach yo plait only) at home with regular coffee. Dinner is her "best" meal. Mentioned for family/visitors to bring meals from home if she wanted to increase po intake. She does not like oral supplements, discontinued order.   10/9/24: Pt states eating enough of her meals, but unable to elaborate on what she is actually eating. Nurse reports ate a better lunch compared to " "breakfast. She has some snacks and mineral water at bedside she is consuming. Continues to decline oral supplement. Denies any n/v/d/c.   Anthropometrics    Height: 5' 11" (180.3 cm), Height Method: Estimated  Last Weight: 63.1 kg (139 lb 1.6 oz) (10/06/24 0500), Weight Method: Bed Scale  BMI (Calculated): 19.4  BMI Classification: underweight (BMI less than 22 if >65 years of age)     Ideal Body Weight (IBW), Female: 155 lb     % Ideal Body Weight, Female (lb): 74.95 %                             Usual Weight Provided By: patient denies unintentional weight loss    Wt Readings from Last 5 Encounters:   10/06/24 63.1 kg (139 lb 1.6 oz)     Weight Change(s) Since Admission: +10.4kg, unsure accuracy of weights, significant fluctuations throughout admit  Wt Readings from Last 1 Encounters:   10/06/24 0500 63.1 kg (139 lb 1.6 oz)   10/03/24 0600 70.2 kg (154 lb 12.8 oz)   10/02/24 0600 65.3 kg (143 lb 14.4 oz)   09/24/24 1835 52.7 kg (116 lb 2.9 oz)   Admit Weight: 52.7 kg (116 lb 2.9 oz) (09/24/24 1835), Weight Method: Bed Scale    Patient Education     Not applicable.    Nutrition Goals & Monitoring     Dietitian will monitor: energy intake and weight    Nutrition Risk/Follow-Up: low (follow-up in 5-7 days)  Patients assigned 'low nutrition risk' status do not qualify for a full nutritional assessment but will be monitored and re-evaluated in a 5-7 day time period. Please consult if re-evaluation needed sooner.    "

## 2024-10-09 NOTE — PROGRESS NOTES
[facility]  Hospital Medicine Progress Note        Chief Complaint: Inpatient Follow-up for Disseminated MSSA infection        HPI:   The Pt is a 75 yo female without significant past medical history on file presented to [facility] on 9/24/2024 transferred from [facility] for neurosurgery and CV surgery services.  Patient originally presented to outside hospital on 09/21/2024 for fever and body aches.  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  Patient developed altered mental status overnight on 09/23/2024 and underwent CT head on 09/24/2024 which showed new left occipital intraparenchymal hematoma measuring 2 x 1.8 x 1.5 cm.  Patient was transferred to [facility] ICU for higher level care.  Neurosurgery, interventional neurology, and CV surgery services were consulted.  Patient was started on broad-spectrum antibiotics Vancomycin and Zosyn.  CV surgery recommended 6 weeks of antibiotics treatment per ID guidance and repeat echocardiogram after treatment.  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  Vascular Neurology suggested no antiplatelet or anticoagulation for now and follow up in the OP clinic for ICA aneurysm.   Neurosurgery recommended q.2 hours neurochecks, BP parameters below 140/90, and Keppra 500 mg bid. Infectious Disease and orthopedic services were consulted.  Patient was started on Oxacillin 2 g q.4 hours after blood cultures confirmed MSSA.  MRI cervical spine findings consistent with diskitis/osteomyelitis at C3-C6,  left C2-C3 facet joint with epidural enhancement and small prevertebral abscess, moderate to severe canal narrowing at C5-C6, suspected areas of fluid in the posterior paraspinal soft tissues of the upper thoracic spine.  Patient underwent washout of right knee effusion  on 09/27 with Dr. Castro.  Rt knee aspiration neg for bacterial or fungal growth. Repeat blood cultures x 2 from 9/29/24 remained neg. On 10/1/24 oxacillin transitioned to cefazolin 2 g every 8 hours for OPAT convenience with estimated end date 11/13/2024. Patient was cleared for downgrade out of ICU on 10/01/2024 and admitted to hospital medicine service      Therapy services were consulted.  Patient is currently awaiting LTAC evaluation and acceptance.  Venous U/S delvis lower ext performed on 10/5/24 due to some swelling of RLE related to known Baker's cyst was neg for DVT.      Pt noted to have intermittent low grade fever. 100.9 on 10/3/24 again  100.7 on 10/5/24. Blood cultures x 2 repeated on 10/4/24 and again on 10/6/24. Reached out to ID for further evaluation. Pt was placed back on Oxacillin 12 g continuous infusion for the possibility of drug fever secondary to cefazolin. Limited TTE ordered to re evaluate MV.  Stable vegetation with mod MR again noted. ID extended antibiotic treatment to 8 weeks with estimated end date 11/24/24. Reached out Vascular Neurology and cleared Pt to start VTE prophylaxis with Lovenox as of 10/8/24. Urine culture was sent on 10/7/24 due to intermittent confusion. Culture grew ESBL E.coli sensitive to Cipro. 3 days treatment ordered per ID.       Interval Hx:   No fever > 72h since antibiotic switched to cont Oxacillin. Other vitals are stable.  Intermittently confused but very pleasant, cooperative , no agitation.   No AM labs today     Case was discussed with patient's nurse and  on the floor.    Objective/physical exam:    General: In no acute distress, afebrile  Chest: Raised manubrium, Clear to  auscultation bilaterally  Heart: RRR, +S1, S2, (+) murmur  Abdomen: Soft, nontender, BS +  MSK: Warm, trace  lower extremity edema RLE, no clubbing or cyanosis  Neurologic:Awake , alert, intermittent confusion,  move all ext spontaneously       VITAL SIGNS: 24 HRS MIN & MAX LAST   Temp  Min: 97.5 °F (36.4 °C)  Max: 98.1 °F (36.7 °C) 98.1 °F (36.7 °C)   BP  Min: 110/68  Max: 122/74 111/69   Pulse  Min: 88  Max: 104  104   Resp  Min: 18  Max: 18 18   SpO2  Min: 97 %  Max: 100 % 99 %     I have reviewed the following labs:  Recent Labs   Lab 10/03/24  0250 10/04/24  0248 10/07/24  0228   WBC 11.69* 11.34 8.33   RBC 3.15* 3.07* 2.93*   HGB 9.8* 9.6* 9.0*   HCT 31.3* 30.2* 28.9*   MCV 99.4* 98.4* 98.6*   MCH 31.1* 31.3* 30.7   MCHC 31.3* 31.8* 31.1*   RDW 13.5 13.5 13.3   * 509* 460*   MPV 9.1 8.7 9.1     Recent Labs   Lab 10/03/24  0250 10/04/24  0248 10/05/24  0435 10/07/24  0228 10/08/24  0342    142 141 140 140   K 3.2* 2.9* 3.9 2.7* 3.5    102 107 104 104   CO2 23 28 25 25 21*   BUN 7.1* 7.6* 10.8 13.6 14.6   CREATININE 0.56 0.58 0.65 0.68 0.63   CALCIUM 8.1* 8.3* 8.4 8.3* 8.5   MG 2.30 1.90  --  1.70 1.70   ALBUMIN 2.0*  --   --   --   --    ALKPHOS 153*  --   --   --   --    ALT <5  --   --   --   --    AST 18  --   --   --   --    BILITOT 0.5  --   --   --   --      Microbiology Results (last 7 days)       Procedure Component Value Units Date/Time    Blood Culture [[id number]]  (Normal) Collected: 10/04/24 0842    Order Status: Completed Specimen: Blood from Arm, Right Updated: 10/09/24 1102     Blood Culture No Growth at 5 days    Blood Culture [[id number]]  (Normal) Collected: 10/04/24 0842    Order Status: Completed Specimen: Blood from Arm, Left Updated: 10/09/24 1102     Blood Culture No Growth at 5 days    Blood Culture [[id number]]  (Normal) Collected: 10/06/24 0823    Order Status: Completed Specimen: Blood from Hand, Left Updated: 10/09/24 0902     Blood Culture No Growth At 72 Hours     Blood Culture [[id number]]  (Normal) Collected: 10/06/24 0823    Order Status: Completed Specimen: Blood from Arm, Right Updated: 10/09/24 0902     Blood Culture No Growth At 72 Hours    Urine culture [[id number]]  (Abnormal)  (Susceptibility) Collected: 10/07/24 1611    Order Status: Completed Specimen: Urine Updated: 10/09/24 0824     Urine Culture >/= 100,000 colonies/ml Escherichia coli ESBL    Blood Culture [[id number]]  (Normal) Collected: 09/29/24 1229    Order Status: Completed Specimen: Blood Updated: 10/04/24 1300     Blood Culture No Growth at 5 days    Blood Culture [[id number]]  (Normal) Collected: 09/29/24 1229    Order Status: Completed Specimen: Blood Updated: 10/04/24 1300     Blood Culture No Growth at 5 days             See below for Radiology    Assessment/Plan:  Disseminated MSSA infection with MSSA bacteremia   Native mitral valve endocarditis with mitral regurgitation   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   Discitis/ OM at C3-C6, left C3-C3 facet joint and small prevertebral abscess  Concern for right knee septic arthritis s/p washout 9/27  Rt knee Baker's cyst   Right hip arthritis, 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 Pt is placed back on Oxacillin 12 g continuous infusion from 10/6/24  for the possibility of drug fever due to cefazolin.   Limited TTE on 10/6/24 with persistent MV vegetation.  ID extended antibiotic to 8 weeks with end date 11/24/24     Precaution for delirium prevention   Low dose Seroquel added to mitigate delirium   Correct and replete  electrolytes as indicated   Antihypertensive include Amlodipine and Metoprolol initiated and continued   Continue PT/OT service   CM consulted to assist with DC planning      VTE prophylaxis:  Lovenox     Patient condition:  Fair     Anticipated discharge and Disposition:     TBD      All diagnosis and differential diagnosis have been reviewed; assessment and plan has been documented; I have personally reviewed the labs and test results that are presently available; I have reviewed the patients medication list; I have reviewed the consulting providers response and recommendations. I have reviewed or attempted to review medical records based upon their availability    All of the patient's questions have been  addressed and answered. Patient's is agreeable to the above stated plan. I will continue to monitor closely and make adjustments to medical management as needed.    Portions of this note dictated using EMR integrated voice recognition software, and may be subject to voice recognition errors not corrected at proofreading. Please contact writer for clarification if needed.   _____________________________________________________________________    Malnutrition Status:    Scheduled Med:   amLODIPine  5 mg Oral Daily    ciprofloxacin HCl  500 mg Oral Q12H    enoxparin  40 mg Subcutaneous Q24H (prophylaxis, 1700)    famotidine  20 mg Oral BID    levETIRAcetam  500 mg Oral BID    metoprolol succinate  12.5 mg Oral Daily    oxacillin 12 g in  mL CONTINUOUS INFUSION  12 g Intravenous Q24H    QUEtiapine  25 mg Oral After dinner      Continuous Infusions:     PRN Meds:    Current Facility-Administered Medications:     acetaminophen, 650 mg, Oral, Q6H PRN    bisacodyL, 10 mg, Rectal, Daily PRN    camphor-methyl salicyl-menthoL, , Topical (Top), BID PRN    dextrose 10%, 12.5 g, Intravenous, PRN    dextrose 10%, 25 g, Intravenous, PRN    labetalol, 10 mg, Intravenous, Q15 Min PRN    LIDOcaine, 1 patch, Transdermal, Q24H  PRN    morphine, 2 mg, Intravenous, Q6H PRN    ondansetron, 8 mg, Oral, Q8H PRN    sodium chloride 0.9%, 10 mL, Intravenous, PRN    sodium chloride 0.9%, 10 mL, Intravenous, PRN         Fercho Merchant MD  Department of Hospital Medicine   [facility]   10/09/2024

## 2024-10-09 NOTE — PT/OT/SLP PROGRESS
Physical Therapy Treatment    Patient Name:  Orlando Black   MRN:  [id number]    Recommendations:     Discharge therapy intensity: Moderate Intensity Therapy   Discharge Equipment Recommendations: to be determined by next level of care  Barriers to discharge: Impaired mobility, Ongoing medical needs, and placement.    Assessment:     Orlando Black is a 74 y.o. 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  She presents with the following impairments/functional limitations: weakness, gait instability, impaired endurance, impaired balance, impaired functional mobility, decreased lower extremity function, orthopedic precautions.    Rehab Prognosis: Good; patient would benefit from acute skilled PT services to address these deficits and reach maximum level of function.    Recent Surgery: Procedure(s) (LRB):  ARTHROSCOPY, KNEE, WITH DEBRIDEMENT (Right) 12 Days Post-Op    Plan:     During this hospitalization, patient would benefit from acute PT services 5 x/week to address the identified rehab impairments via gait training, therapeutic activities, therapeutic exercises and progress toward the following goals:    Plan of Care Expires:  11/08/24    Subjective     Chief Complaint: low back pain  Patient/Family Comments/goals:   Pain/Comfort:         Objective:     Communicated with RN prior to session.  Patient found up in chair with peripheral IV, PureWick, telemetry upon PT entry to room.     General Precautions: Standard, fall  Orthopedic Precautions: RLE weight bearing as tolerated  Braces:  N/A  Respiratory Status: Room air  Skin Integrity: Visible skin intact      Functional Mobility:  Bed Mobility:     Sit to Supine: minimum assistance  Transfers:     Sit to Stand:  minimum assistance with rolling walker  Bed to Chair: moderate assistance with  rolling walker  using  Step Transfer    Therapeutic Activities/Exercises:  Bschair > EOB t/f modA w/RW; pt needed max verbal cueing to finish t/f 2/2 anxiety; forward trunk flexion noted  Bed pan donned for pt 2/2 feeling the urge for a BM; doffed after ~5min w/ -BM    Education:  Patient provided with verbal education education regarding PT role/goals/POC, fall prevention, and safety awareness.  Understanding was verbalized.     Patient left HOB elevated with all lines intact, call button in reach, bed alarm on, and  on    GOALS:   Multidisciplinary Problems       Physical Therapy Goals          Problem: Physical Therapy    Goal Priority Disciplines Outcome Interventions   Physical Therapy Goal     PT, PT/OT Progressing    Description: Goals to be met by: 10/28/24     Patient will increase functional independence with mobility by performin. Supine to sit with Stand-by Assistance  2. Sit to supine with Stand-by Assistance  3. Sit to stand transfer with Stand-by Assistance  4. Bed to chair transfer with Stand-by Assistance using Rolling Walker  5. Gait  x 200 feet with Stand-by Assistance using Rolling Walker.                          Time Tracking:     PT Received On: 10/09/24  PT Start Time: 1230     PT Stop Time: 1257  PT Total Time (min): 27 min     Billable Minutes: Therapeutic Activity 2    Treatment Type: Treatment  PT/PTA: PTA     Number of PTA visits since last PT visit: 0     10/09/2024

## 2024-10-09 NOTE — PLAN OF CARE
Awaiting response from [facility] and [facility] regarding placement now that this patient's IV abx has been changed. Will follow up with both facilities later today.

## 2024-10-10 LAB — POCT GLUCOSE: 96 MG/DL (ref 70–110)

## 2024-10-10 PROCEDURE — 25000003 PHARM REV CODE 250: Performed by: INTERNAL MEDICINE

## 2024-10-10 PROCEDURE — 21400001 HC TELEMETRY ROOM

## 2024-10-10 PROCEDURE — 97535 SELF CARE MNGMENT TRAINING: CPT | Mod: CO

## 2024-10-10 PROCEDURE — 25000003 PHARM REV CODE 250: Performed by: GENERAL PRACTICE

## 2024-10-10 PROCEDURE — 63600175 PHARM REV CODE 636 W HCPCS: Performed by: INTERNAL MEDICINE

## 2024-10-10 PROCEDURE — 27000207 HC ISOLATION

## 2024-10-10 PROCEDURE — 97530 THERAPEUTIC ACTIVITIES: CPT | Mod: CQ

## 2024-10-10 PROCEDURE — 25000003 PHARM REV CODE 250: Performed by: NEUROLOGICAL SURGERY

## 2024-10-10 RX ADMIN — OXACILLIN 12 G: 2 INJECTION, POWDER, FOR SOLUTION INTRAMUSCULAR; INTRAVENOUS at 06:10

## 2024-10-10 RX ADMIN — QUETIAPINE FUMARATE 25 MG: 25 TABLET ORAL at 04:10

## 2024-10-10 RX ADMIN — LEVETIRACETAM 500 MG: 500 TABLET, FILM COATED ORAL at 08:10

## 2024-10-10 RX ADMIN — ENOXAPARIN SODIUM 40 MG: 40 INJECTION SUBCUTANEOUS at 04:10

## 2024-10-10 RX ADMIN — FAMOTIDINE 20 MG: 20 TABLET, FILM COATED ORAL at 09:10

## 2024-10-10 RX ADMIN — METOPROLOL SUCCINATE 12.5 MG: 25 TABLET, EXTENDED RELEASE ORAL at 08:10

## 2024-10-10 RX ADMIN — CIPROFLOXACIN HYDROCHLORIDE 500 MG: 500 TABLET, FILM COATED ORAL at 09:10

## 2024-10-10 RX ADMIN — LEVETIRACETAM 500 MG: 500 TABLET, FILM COATED ORAL at 09:10

## 2024-10-10 RX ADMIN — FAMOTIDINE 20 MG: 20 TABLET, FILM COATED ORAL at 08:10

## 2024-10-10 RX ADMIN — AMLODIPINE BESYLATE 5 MG: 5 TABLET ORAL at 08:10

## 2024-10-10 RX ADMIN — CIPROFLOXACIN HYDROCHLORIDE 500 MG: 500 TABLET, FILM COATED ORAL at 08:10

## 2024-10-10 NOTE — PT/OT/SLP PROGRESS
Physical Therapy Treatment    Patient Name:  Orlando Black   MRN:  [id number]    Recommendations:     Discharge therapy intensity: Moderate Intensity Therapy   Discharge Equipment Recommendations: to be determined by next level of care  Barriers to discharge: Impaired mobility, Ongoing medical needs, and placement.    Assessment:     Orlando Black is a 74 y.o. 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  She presents with the following impairments/functional limitations: weakness, gait instability, impaired endurance, impaired balance, impaired functional mobility, decreased lower extremity function, orthopedic precautions.    Rehab Prognosis: Good; patient would benefit from acute skilled PT services to address these deficits and reach maximum level of function.    Recent Surgery: Procedure(s) (LRB):  ARTHROSCOPY, KNEE, WITH DEBRIDEMENT (Right) 13 Days Post-Op    Plan:     During this hospitalization, patient would benefit from acute PT services 5 x/week to address the identified rehab impairments via gait training, therapeutic activities, therapeutic exercises and progress toward the following goals:    Plan of Care Expires:  11/08/24    Subjective     Chief Complaint: low back pain/R knee pain  Patient/Family Comments/goals:   Pain/Comfort:         Objective:     Communicated with RN prior to session.  Patient found up in chair with peripheral IV, PureWick, telemetry upon PT entry to room.     General Precautions: Standard, fall  Orthopedic Precautions: RLE weight bearing as tolerated  Braces:  N/A  Respiratory Status: Room air  Skin Integrity: Visible skin intact      Functional Mobility:  Bed Mobility:     Sit to Supine: minimum assistance  Transfers:     Sit to Stand:  minimum assistance with rolling walker  Bed to Chair: min assistance with  rolling walker  using  Step Transfer    Therapeutic Activities/Exercises:  Pt t/f B2B Keysha w/RW using step t/f    Education:  Patient provided with verbal education education regarding PT role/goals/POC, fall prevention, and safety awareness.  Understanding was verbalized.     Patient left HOB elevated with all lines intact, call button in reach, bed alarm on, and  on    GOALS:   Multidisciplinary Problems       Physical Therapy Goals          Problem: Physical Therapy    Goal Priority Disciplines Outcome Interventions   Physical Therapy Goal     PT, PT/OT Progressing    Description: Goals to be met by: 10/28/24     Patient will increase functional independence with mobility by performin. Supine to sit with Stand-by Assistance  2. Sit to supine with Stand-by Assistance  3. Sit to stand transfer with Stand-by Assistance  4. Bed to chair transfer with Stand-by Assistance using Rolling Walker  5. Gait  x 200 feet with Stand-by Assistance using Rolling Walker.                          Time Tracking:     PT Received On: 10/10/24  PT Start Time: 1440     PT Stop Time: 1450  PT Total Time (min): 10 min     Billable Minutes: Therapeutic Activity 1    Treatment Type: Treatment  PT/PTA: PTA     Number of PTA visits since last PT visit: 1     10/10/2024

## 2024-10-10 NOTE — PT/OT/SLP PROGRESS
Occupational Therapy   Treatment    Name: Orlando Black  MRN: [id number]  Admitting Diagnosis:  <principal problem not specified>  13 Days Post-Op    Recommendations:     Recommended therapy intensity at discharge: Moderate Intensity Therapy (Simultaneous filing. User may not have seen previous data.)   Discharge Equipment Recommendations:  to be determined by next level of care  Barriers to discharge:       Assessment:     Orlando Black is a 74 y.o. female with a medical diagnosis of <principal problem not specified>.  She presents with good participation with encouragement. Performance deficits affecting function are weakness, gait instability, impaired endurance, impaired cognition, impaired self care skills, impaired functional mobility, decreased coordination, decreased safety awareness.     Rehab Prognosis:  Good; patient would benefit from acute skilled OT services to address these deficits and reach maximum level of function.       Plan:     Patient to be seen 5 x/week to address the above listed problems via self-care/home management, therapeutic activities, therapeutic exercises  Plan of Care Expires: 10/22/24  Plan of Care Reviewed with: patient    Subjective     Pain/Comfort:  Pain Rating 1: 0/10    Objective:     Communicated with: nurse prior to session.  Patient found HOB elevated with peripheral IV, PureWick, telemetry upon OT entry to room.    General Precautions: Standard, fall, contact    Orthopedic Precautions:RLE weight bearing as tolerated  Braces: N/A  Respiratory Status: Room air     Occupational Performance:     Bed Mobility:    Patient completed Scooting/Bridging with minimum assistance  Patient completed Supine to Sit with minimum assistance     Functional Mobility/Transfers:  Patient completed Sit <> Stand Transfer with minimum assistance  with  rolling walker   Patient completed Bed <> Chair Transfer using Step Transfer technique with minimum assistance with rolling  walker    Activities of Daily Living:  Grooming: setup washing face in seated position    Therapeutic Positioning    OT interventions performed during the course of today's session in an effort to prevent and/or reduce acquired pressure injuries:   Therapeutic positioning was provided at the conclusion of session to offload all bony prominences for the prevention and/or reduction of pressure injuries    [facility] 6 Click ADL: 15    Patient Education:  Patient provided with verbal education and demonstrations education regarding fall prevention and safety awareness.  Understanding was verbalized, however additional teaching warranted.      Patient left up in chair with all lines intact and call button in reach.    GOALS:   Multidisciplinary Problems       Occupational Therapy Goals          Problem: Occupational Therapy    Goal Priority Disciplines Outcome Interventions   Occupational Therapy Goal     OT, PT/OT Progressing    Description: LTG: Pt will perform basic ADLs and ADL transfers with Modified independence using LRAD by discharge.    STG: to be met by 11/8/24    Pt will complete grooming standing at sink with LRAD with SBA.  Pt will complete UB dressing with SBA.  Pt will complete LB dressing with SBA using LRAD.  Pt will complete toileting with SBA using LRAD.  Pt will complete functional mobility to/from toilet and toilet transfer with SBA using LRAD.                         Time Tracking:     OT Date of Treatment: 10/10/24  OT Start Time: 1152  OT Stop Time: 1216  OT Total Time (min): 24 min    Billable Minutes:Self Care/Home Management 24    OT/TY: TY     Number of TY visits since last OT visit: 2    10/10/2024

## 2024-10-10 NOTE — PT/OT/SLP PROGRESS
Physical Therapy Treatment    Patient Name:  Orlando Black   MRN:  [id number]    Recommendations:     Discharge therapy intensity: Moderate Intensity Therapy   Discharge Equipment Recommendations: to be determined by next level of care  Barriers to discharge: Impaired mobility, Ongoing medical needs, and placement.    Assessment:     Orlando Black is a 74 y.o. 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  She presents with the following impairments/functional limitations: weakness, gait instability, impaired endurance, impaired balance, impaired functional mobility, decreased lower extremity function, orthopedic precautions.    Rehab Prognosis: Good; patient would benefit from acute skilled PT services to address these deficits and reach maximum level of function.    Recent Surgery: Procedure(s) (LRB):  ARTHROSCOPY, KNEE, WITH DEBRIDEMENT (Right) 13 Days Post-Op    Plan:     During this hospitalization, patient would benefit from acute PT services 5 x/week to address the identified rehab impairments via gait training, therapeutic activities, therapeutic exercises and progress toward the following goals:    Plan of Care Expires:  11/08/24    Subjective     Chief Complaint: low back pain/R knee pain  Patient/Family Comments/goals:   Pain/Comfort:         Objective:     Communicated with RN prior to session.  Patient found up in chair with peripheral IV, PureWick, telemetry upon PT entry to room.     General Precautions: Standard, fall  Orthopedic Precautions: RLE weight bearing as tolerated  Braces:  N/A  Respiratory Status: Room air  Skin Integrity: Visible skin intact      Functional Mobility:  Bed Mobility:     Sit to Supine: minimum assistance  Transfers:     Sit to Stand:  minimum assistance with rolling walker  Bed to Chair: min assistance with  rolling walker  using  Step Transfer    Therapeutic Activities/Exercises:  Pt sat EOB ~5min prior to t/f sba for static seated balance   EOB>bschair t/f Keysha w/RW; forward trunk flexion noted; increased anxiety  See OT note for ADL info    Education:  Patient provided with verbal education education regarding PT role/goals/POC, fall prevention, and safety awareness.  Understanding was verbalized.     Patient left HOB elevated with all lines intact, call button in reach, bed alarm on, and  on    GOALS:   Multidisciplinary Problems       Physical Therapy Goals          Problem: Physical Therapy    Goal Priority Disciplines Outcome Interventions   Physical Therapy Goal     PT, PT/OT Progressing    Description: Goals to be met by: 10/28/24     Patient will increase functional independence with mobility by performin. Supine to sit with Stand-by Assistance  2. Sit to supine with Stand-by Assistance  3. Sit to stand transfer with Stand-by Assistance  4. Bed to chair transfer with Stand-by Assistance using Rolling Walker  5. Gait  x 200 feet with Stand-by Assistance using Rolling Walker.                          Time Tracking:     PT Received On: 10/10/24  PT Start Time: 1152     PT Stop Time: 1216  PT Total Time (min): 24 min     Billable Minutes: Therapeutic Activity 2    Treatment Type: Treatment  PT/PTA: PTA     Number of PTA visits since last PT visit: 1     10/10/2024

## 2024-10-10 NOTE — PROGRESS NOTES
[facility]  Hospital Medicine Progress Note        Chief Complaint: Inpatient Follow-up for Disseminated MSSA infection        HPI:   The Pt is a 75 yo female without significant past medical history on file presented to [facility] on 9/24/2024 transferred from [place] for neurosurgery and CV surgery services.  Patient originally presented to outside hospital on 09/21/2024 for fever and body aches.  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  Patient developed altered mental status overnight on 09/23/2024 and underwent CT head on 09/24/2024 which showed new left occipital intraparenchymal hematoma measuring 2 x 1.8 x 1.5 cm.  Patient was transferred to [facility] ICU for higher level care.  Neurosurgery, interventional neurology, and CV surgery services were consulted.  Patient was started on broad-spectrum antibiotics Vancomycin and Zosyn.  CV surgery recommended 6 weeks of antibiotics treatment per ID guidance and repeat echocardiogram after treatment.  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  Vascular Neurology suggested no antiplatelet or anticoagulation for now and follow up in the OP clinic for ICA aneurysm.   Neurosurgery recommended q.2 hours neurochecks, BP parameters below 140/90, and Keppra 500 mg bid. Infectious Disease and orthopedic services were consulted.  Patient was started on Oxacillin 2 g q.4 hours after blood cultures confirmed MSSA.  MRI cervical spine findings consistent with diskitis/osteomyelitis at C3-C6,  left C2-C3 facet joint with epidural enhancement and small prevertebral abscess, moderate to severe canal narrowing at C5-C6, suspected areas of fluid in the posterior paraspinal soft tissues of the upper thoracic spine.  Patient underwent washout of right knee effusion  on 09/27 with Dr. Castro.  Rt knee aspiration neg for bacterial or fungal growth. Repeat blood cultures x 2 from 9/29/24 remained neg. On 10/1/24 oxacillin transitioned to cefazolin 2 g every 8 hours for OPAT convenience with estimated end date 11/13/2024. Patient was cleared for downgrade out of ICU on 10/01/2024 and admitted to hospital medicine service      Therapy services were consulted.  Patient is currently awaiting LTAC evaluation and acceptance.  Venous U/S delvis lower ext performed on 10/5/24 due to some swelling of RLE related to known Baker's cyst was neg for DVT.      Pt noted to have intermittent low grade fever. 100.9 on 10/3/24 again  100.7 on 10/5/24. Blood cultures x 2 repeated on 10/4/24 and again on 10/6/24. Reached out to ID for further evaluation. Pt was placed back on Oxacillin 12 g continuous infusion for the possibility of drug fever secondary to cefazolin. Limited TTE ordered to re evaluate MV.  Stable vegetation with mod MR again noted. ID extended antibiotic treatment to 8 weeks with estimated end date 11/24/24. Reached out Vascular Neurology and cleared Pt to start VTE prophylaxis with Lovenox as of 10/8/24. Urine culture was sent on 10/7/24 due to intermittent confusion. Culture grew ESBL E.coli sensitive to Cipro. 3 days treatment ordered per ID.       Interval Hx:   Patient seen and examined by bedside.  Remaining afebrile.  Intermittently will be confused but very pleasant cooperative with no agitation.  No acute overnight events    Case was discussed with patient's nurse and  on the floor.    Objective/physical exam:    General: In no acute distress, afebrile  Chest: Raised manubrium, Clear to  auscultation bilaterally  Heart: RRR, +S1, S2, (+) murmur  Abdomen: Soft, nontender, BS +  MSK: Warm, trace  lower extremity edema RLE, no clubbing or cyanosis  Neurologic:Awake , alert, intermittent confusion,  move all ext spontaneously       VITAL SIGNS: 24 HRS MIN & MAX LAST   Temp  Min: 97.9 °F (36.6 °C)  Max: 98.1 °F (36.7 °C) 98 °F (36.7 °C)   BP  Min: 111/69  Max: 118/58 (!) 118/58   Pulse  Min: 97  Max: 104  97   No data recorded 18   SpO2  Min: 95 %  Max: 99 % 95 %     I have reviewed the following labs:  Recent Labs   Lab 10/04/24  0248 10/07/24  0228   WBC 11.34 8.33   RBC 3.07* 2.93*   HGB 9.6* 9.0*   HCT 30.2* 28.9*   MCV 98.4* 98.6*   MCH 31.3* 30.7   MCHC 31.8* 31.1*   RDW 13.5 13.3   * 460*   MPV 8.7 9.1     Recent Labs   Lab 10/04/24  0248 10/05/24  0435 10/07/24  0228 10/08/24  0342    141 140 140   K 2.9* 3.9 2.7* 3.5    107 104 104   CO2 28 25 25 21*   BUN 7.6* 10.8 13.6 14.6   CREATININE 0.58 0.65 0.68 0.63   CALCIUM 8.3* 8.4 8.3* 8.5   MG 1.90  --  1.70 1.70     Microbiology Results (last 7 days)       Procedure Component Value Units Date/Time    Blood Culture [[id number]]  (Normal) Collected: 10/06/24 0823    Order Status: Completed Specimen: Blood from Hand, Left Updated: 10/10/24 0902     Blood Culture No Growth At 96 Hours    Blood Culture [[id number]]  (Normal) Collected: 10/06/24 0823    Order Status: Completed Specimen: Blood from Arm, Right Updated: 10/10/24 0902     Blood Culture No Growth At 96 Hours    Blood Culture [[id number]]  (Normal) Collected: 10/04/24 0842    Order Status: Completed Specimen: Blood from Arm, Right Updated: 10/09/24 1102     Blood Culture No Growth at 5 days    Blood Culture [[id number]]  (Normal) Collected: 10/04/24 0842    Order Status: Completed Specimen: Blood from Arm, Left Updated: 10/09/24 1102     Blood Culture No Growth at 5 days    Urine culture [[id number]]  (Abnormal)  (Susceptibility) Collected: 10/07/24 1611    Order Status:  Completed Specimen: Urine Updated: 10/09/24 0824     Urine Culture >/= 100,000 colonies/ml Escherichia coli ESBL    Blood Culture [[id number]]  (Normal) Collected: 09/29/24 1229    Order Status: Completed Specimen: Blood Updated: 10/04/24 1300     Blood Culture No Growth at 5 days    Blood Culture [[id number]]  (Normal) Collected: 09/29/24 1229    Order Status: Completed Specimen: Blood Updated: 10/04/24 1300     Blood Culture No Growth at 5 days             See below for Radiology    Assessment/Plan:  Disseminated MSSA infection with MSSA bacteremia   Native mitral valve endocarditis with mitral regurgitation   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 Pt is placed back on Oxacillin 12 g continuous infusion from 10/6/24  for the possibility of drug fever due to cefazolin.   Limited TTE on 10/6/24 with persistent MV vegetation.  ID extended antibiotic to 8 weeks with end date 11/24/24  Precaution for delirium prevention   Low dose Seroquel added to mitigate delirium   Correct and replete electrolytes as indicated   Antihypertensive include Amlodipine and Metoprolol initiated and continued   Continue PT/OT service , recommended SNF  CM consulted to assist with DC planning   Has been a difficult placement due to continuous IV antibiotics.      VTE prophylaxis:  Lovenox     Patient condition:  Fair     Anticipated discharge and  Disposition:     TBD      All diagnosis and differential diagnosis have been reviewed; assessment and plan has been documented; I have personally reviewed the labs and test results that are presently available; I have reviewed the patients medication list; I have reviewed the consulting providers response and recommendations. I have reviewed or attempted to review medical records based upon their availability    All of the patient's questions have been  addressed and answered. Patient's is agreeable to the above stated plan. I will continue to monitor closely and make adjustments to medical management as needed.    Portions of this note dictated using EMR integrated voice recognition software, and may be subject to voice recognition errors not corrected at proofreading. Please contact writer for clarification if needed.   _____________________________________________________________________    Malnutrition Status:    Scheduled Med:   amLODIPine  5 mg Oral Daily    ciprofloxacin HCl  500 mg Oral Q12H    enoxparin  40 mg Subcutaneous Q24H (prophylaxis, 1700)    famotidine  20 mg Oral BID    levETIRAcetam  500 mg Oral BID    metoprolol succinate  12.5 mg Oral Daily    oxacillin 12 g in  mL CONTINUOUS INFUSION  12 g Intravenous Q24H    QUEtiapine  25 mg Oral After dinner      Continuous Infusions:     PRN Meds:    Current Facility-Administered Medications:     acetaminophen, 650 mg, Oral, Q6H PRN    bisacodyL, 10 mg, Rectal, Daily PRN    camphor-methyl salicyl-menthoL, , Topical (Top), BID PRN    dextrose 10%, 12.5 g, Intravenous, PRN    dextrose 10%, 25 g, Intravenous, PRN    labetalol, 10 mg, Intravenous, Q15 Min PRN    LIDOcaine, 1 patch, Transdermal, Q24H PRN    morphine, 2 mg, Intravenous, Q6H PRN    ondansetron, 8 mg, Oral, Q8H PRN    sodium chloride 0.9%, 10 mL, Intravenous, PRN    sodium chloride 0.9%, 10 mL, Intravenous, PRN         Enedina Faria DO  Department of Hospital Medicine  [facility]  [facility]  10/10/2024

## 2024-10-10 NOTE — PLAN OF CARE
VANGIE spoke to Mauro with [employer] . She is not  . She did look up patient's appeal and informed CM  patient;s appeal was in [employer] reviewing  [phone] , member ID [id number]. VANGIE phoned [employer] review line  left a message awaiting call back.       placement to [facility] swing bed .Clinicals sent.

## 2024-10-11 LAB
ALBUMIN SERPL-MCNC: 2.2 G/DL (ref 3.4–4.8)
ALBUMIN/GLOB SERPL: 0.5 RATIO (ref 1.1–2)
ALP SERPL-CCNC: 109 UNIT/L (ref 40–150)
ALT SERPL-CCNC: 5 UNIT/L (ref 0–55)
ANION GAP SERPL CALC-SCNC: 15 MEQ/L
AST SERPL-CCNC: 22 UNIT/L (ref 5–34)
BACTERIA BLD CULT: NORMAL
BACTERIA BLD CULT: NORMAL
BASOPHILS # BLD AUTO: 0.06 X10(3)/MCL
BASOPHILS NFR BLD AUTO: 1 %
BILIRUB SERPL-MCNC: 0.4 MG/DL
BUN SERPL-MCNC: 5.7 MG/DL (ref 9.8–20.1)
CALCIUM SERPL-MCNC: 8.5 MG/DL (ref 8.4–10.2)
CHLORIDE SERPL-SCNC: 101 MMOL/L (ref 98–107)
CO2 SERPL-SCNC: 24 MMOL/L (ref 23–31)
CREAT SERPL-MCNC: 0.59 MG/DL (ref 0.55–1.02)
CREAT/UREA NIT SERPL: 10
EOSINOPHIL # BLD AUTO: 0.19 X10(3)/MCL (ref 0–0.9)
EOSINOPHIL NFR BLD AUTO: 3 %
ERYTHROCYTE [DISTWIDTH] IN BLOOD BY AUTOMATED COUNT: 13.2 % (ref 11.5–17)
FUNGUS SPEC CULT: NORMAL
GFR SERPLBLD CREATININE-BSD FMLA CKD-EPI: >60 ML/MIN/1.73/M2
GLOBULIN SER-MCNC: 4.2 GM/DL (ref 2.4–3.5)
GLUCOSE SERPL-MCNC: 87 MG/DL (ref 82–115)
HCT VFR BLD AUTO: 28.1 % (ref 37–47)
HGB BLD-MCNC: 9.3 G/DL (ref 12–16)
IMM GRANULOCYTES # BLD AUTO: 0.06 X10(3)/MCL (ref 0–0.04)
IMM GRANULOCYTES NFR BLD AUTO: 1 %
LYMPHOCYTES # BLD AUTO: 1.28 X10(3)/MCL (ref 0.6–4.6)
LYMPHOCYTES NFR BLD AUTO: 20.3 %
MCH RBC QN AUTO: 31.7 PG (ref 27–31)
MCHC RBC AUTO-ENTMCNC: 33.1 G/DL (ref 33–36)
MCV RBC AUTO: 95.9 FL (ref 80–94)
MONOCYTES # BLD AUTO: 0.73 X10(3)/MCL (ref 0.1–1.3)
MONOCYTES NFR BLD AUTO: 11.6 %
NEUTROPHILS # BLD AUTO: 3.99 X10(3)/MCL (ref 2.1–9.2)
NEUTROPHILS NFR BLD AUTO: 63.1 %
NRBC BLD AUTO-RTO: 0 %
PLATELET # BLD AUTO: 290 X10(3)/MCL (ref 130–400)
PMV BLD AUTO: 8.7 FL (ref 7.4–10.4)
POCT GLUCOSE: 84 MG/DL (ref 70–110)
POCT GLUCOSE: 87 MG/DL (ref 70–110)
POCT GLUCOSE: 88 MG/DL (ref 70–110)
POTASSIUM SERPL-SCNC: 3.2 MMOL/L (ref 3.5–5.1)
PROT SERPL-MCNC: 6.4 GM/DL (ref 5.8–7.6)
RBC # BLD AUTO: 2.93 X10(6)/MCL (ref 4.2–5.4)
SODIUM SERPL-SCNC: 140 MMOL/L (ref 136–145)
WBC # BLD AUTO: 6.31 X10(3)/MCL (ref 4.5–11.5)

## 2024-10-11 PROCEDURE — 80053 COMPREHEN METABOLIC PANEL: CPT | Performed by: INTERNAL MEDICINE

## 2024-10-11 PROCEDURE — 27000207 HC ISOLATION

## 2024-10-11 PROCEDURE — 25000003 PHARM REV CODE 250: Performed by: NEUROLOGICAL SURGERY

## 2024-10-11 PROCEDURE — 94760 N-INVAS EAR/PLS OXIMETRY 1: CPT

## 2024-10-11 PROCEDURE — 25000003 PHARM REV CODE 250: Performed by: INTERNAL MEDICINE

## 2024-10-11 PROCEDURE — 63600175 PHARM REV CODE 636 W HCPCS: Performed by: INTERNAL MEDICINE

## 2024-10-11 PROCEDURE — 63600175 PHARM REV CODE 636 W HCPCS: Performed by: GENERAL PRACTICE

## 2024-10-11 PROCEDURE — 21400001 HC TELEMETRY ROOM

## 2024-10-11 PROCEDURE — 85025 COMPLETE CBC W/AUTO DIFF WBC: CPT | Performed by: INTERNAL MEDICINE

## 2024-10-11 PROCEDURE — 36415 COLL VENOUS BLD VENIPUNCTURE: CPT | Performed by: INTERNAL MEDICINE

## 2024-10-11 PROCEDURE — 25000003 PHARM REV CODE 250: Performed by: GENERAL PRACTICE

## 2024-10-11 RX ORDER — POTASSIUM CHLORIDE 20 MEQ/1
40 TABLET, EXTENDED RELEASE ORAL EVERY 6 HOURS SCHEDULED
Status: DISPENSED | OUTPATIENT
Start: 2024-10-11 | End: 2024-10-12

## 2024-10-11 RX ADMIN — OXACILLIN 2 G: 2 INJECTION, POWDER, FOR SOLUTION INTRAMUSCULAR; INTRAVENOUS at 08:10

## 2024-10-11 RX ADMIN — METOPROLOL SUCCINATE 12.5 MG: 25 TABLET, EXTENDED RELEASE ORAL at 09:10

## 2024-10-11 RX ADMIN — CIPROFLOXACIN HYDROCHLORIDE 500 MG: 500 TABLET, FILM COATED ORAL at 08:10

## 2024-10-11 RX ADMIN — QUETIAPINE FUMARATE 25 MG: 25 TABLET ORAL at 05:10

## 2024-10-11 RX ADMIN — AMLODIPINE BESYLATE 5 MG: 5 TABLET ORAL at 09:10

## 2024-10-11 RX ADMIN — CIPROFLOXACIN HYDROCHLORIDE 500 MG: 500 TABLET, FILM COATED ORAL at 09:10

## 2024-10-11 RX ADMIN — FAMOTIDINE 20 MG: 20 TABLET, FILM COATED ORAL at 08:10

## 2024-10-11 RX ADMIN — POTASSIUM CHLORIDE 40 MEQ: 1500 TABLET, EXTENDED RELEASE ORAL at 11:10

## 2024-10-11 RX ADMIN — OXACILLIN 2 G: 2 INJECTION, POWDER, FOR SOLUTION INTRAMUSCULAR; INTRAVENOUS at 11:10

## 2024-10-11 RX ADMIN — ENOXAPARIN SODIUM 40 MG: 40 INJECTION SUBCUTANEOUS at 05:10

## 2024-10-11 RX ADMIN — LEVETIRACETAM 500 MG: 500 TABLET, FILM COATED ORAL at 09:10

## 2024-10-11 RX ADMIN — LEVETIRACETAM 500 MG: 500 TABLET, FILM COATED ORAL at 08:10

## 2024-10-11 RX ADMIN — FAMOTIDINE 20 MG: 20 TABLET, FILM COATED ORAL at 09:10

## 2024-10-11 NOTE — PROGRESS NOTES
[facility] Hospital Medicine Progress Note        Chief Complaint: Inpatient Follow-up    HPI:     73-year-old female with no significant past medical history presented to outlying facility with fever, arthralgia, back pain.  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  While in-house patient developed new onset encephalopathy and CT head showed intraparenchymal hematoma-left occipital lobe and was transferred to our hospital for higher level of care.  Patient was admitted to ICU.  CT surgery, Neurosurgery, Neurology, Infectious Disease consulted along with Orthopedics.  CV surgery recommended conservative management with IV antibiotics for endocarditis and repeat echocardiogram at the completion of antibiotics.  Neurosurgery recommended close neuro checks in no acute neurosurgical intervention, Keppra for seizure prevention.  Infectious Disease evaluated, cultures speciated as MSSA and therefore they changed antibiotics to oxacillin.  Neurosurgery ordered MRI brain and MRI C-spine.  Orthopedics took her to OR for right knee arthroscopy/incision/drainage.  Interventional Neurology did cerebral angiogram, no evidence of mycotic aneurysm, AVM or AV fistula.  Patient had an incidental left cavernous ICA aneurysm.  MRI C-spine with epidural enhancement, cervical osteomyelitis and fluid collection on the thoracic spine as well.  Patient cleared for downgraded from ICU on 10/12 hospital medicine services.  Therapy Services following, recommending skilled nursing facility placement.  Oxacillin latest switch to Ancef for convenience with end date 11/13, case management working on LTAC which unfortunately was denied by insurance.  While on cefazolin patient developed  low-grade temp spikes and therefore Infectious Disease re-evaluated and switched back to oxacillin.  Limited TTE repeated which showed persistent mitral valve vegetation, blood cultures repeated.  Also with change in mentation, CT head with no acute changes, on delirium precautions.  Patient noted to have some swelling involving lower extremity, no DVT, found to have Baker cyst.  Cleared for VTE prophylaxis of 10/8.  Given continued intermittent confusion a UA was obtained which was concerning for UTI and cultures growing ESBL E coli sensitive to Cipro, patient initiated on p.o. ciprofloxacin.  Fever spikes and confusion improved on Cipro.  Case management actively working on placement.  Infectious Disease decided to extend course to 8 weeks    Interval Hx:   Patient seen at bedside, comfortably laying in bed, asking when she is going to get discharged, hemodynamics are stable, no high or low-grade temp spikes, no acute events overnight, no active complaints at the time of my rounds    Objective/physical exam:  General: In no acute distress, afebrile  Chest: Clear to auscultation bilaterally  Heart: S1, S2, no appreciable murmur  Abdomen: Soft, nontender, BS +  MSK: Warm, no lower extremity edema, no clubbing or cyanosis  Neurologic: Alert and oriented x4,   VITAL SIGNS: 24 HRS MIN & MAX LAST   Temp  Min: 98.2 °F (36.8 °C)  Max: 99.7 °F (37.6 °C) 98.2 °F (36.8 °C)   BP  Min: 118/69  Max: 122/74 118/69   Pulse  Min: 92  Max: 96  92   No data recorded 18   SpO2  Min: 95 %  Max: 98 % 95 %       Recent Labs   Lab 10/07/24  0228   WBC 8.33   RBC 2.93*   HGB 9.0*   HCT 28.9*   MCV 98.6*   MCH 30.7   MCHC 31.1*   RDW 13.3   *   MPV 9.1         Recent Labs   Lab 10/08/24  0342      K 3.5      CO2 21*   BUN 14.6   CREATININE 0.63   CALCIUM 8.5   MG 1.70          Microbiology Results (last 7 days)       Procedure Component Value Units Date/Time    Blood Culture [[id number]]  (Normal) Collected: 10/06/24  0823    Order Status: Completed Specimen: Blood from Hand, Left Updated: 10/10/24 0902     Blood Culture No Growth At 96 Hours    Blood Culture [[id number]]  (Normal) Collected: 10/06/24 0823    Order Status: Completed Specimen: Blood from Arm, Right Updated: 10/10/24 0902     Blood Culture No Growth At 96 Hours    Blood Culture [[id number]]  (Normal) Collected: 10/04/24 0842    Order Status: Completed Specimen: Blood from Arm, Right Updated: 10/09/24 1102     Blood Culture No Growth at 5 days    Blood Culture [[id number]]  (Normal) Collected: 10/04/24 0842    Order Status: Completed Specimen: Blood from Arm, Left Updated: 10/09/24 1102     Blood Culture No Growth at 5 days    Urine culture [[id number]]  (Abnormal)  (Susceptibility) Collected: 10/07/24 1611    Order Status: Completed Specimen: Urine Updated: 10/09/24 0824     Urine Culture >/= 100,000 colonies/ml Escherichia coli ESBL    Blood Culture [[id number]]  (Normal) Collected: 09/29/24 1229    Order Status: Completed Specimen: Blood Updated: 10/04/24 1300     Blood Culture No Growth at 5 days    Blood Culture [[id number]]  (Normal) Collected: 09/29/24 1229    Order Status: Completed Specimen: Blood Updated: 10/04/24 1300     Blood Culture No Growth at 5 days             Scheduled Med:   amLODIPine  5 mg Oral Daily    ciprofloxacin HCl  500 mg Oral Q12H    enoxparin  40 mg Subcutaneous Q24H (prophylaxis, 1700)    famotidine  20 mg Oral BID    levETIRAcetam  500 mg Oral BID    metoprolol succinate  12.5 mg Oral Daily    oxacillin 12 g in  mL CONTINUOUS INFUSION  12 g Intravenous Q24H    QUEtiapine  25 mg Oral After dinner          Assessment/Plan:    MSSA bacteremia-cleared   Sepsis secondary to above-improved  Disseminated MSSA infection   Mitral valve endocarditis with mitral regurgitation  Septic arthritis/osteomyelitis sterno mandibular joint   Cervical diskitis/osteomyelitis with small 3 vertebral abscess  Right knee septic arthritis status post  arthroscopy, washout 9/27   Right knee Baker's cyst   Hypokalemia   Anemia of chronic disease  Acute bacterial UTI secondary to ESBL E coli  Avascular necrosis right femoral head   Intraparenchymal hemorrhage-left occipital lobe   Incidental finding left cavernous ICA aneurysm   Acute encephalopathy-likely metabolic/hospital associated delirium/secondary to IPH, improved   New diagnosis essential HTN   Prophylaxis    Patient currently on IV oxacillin, plan for a total course of 8 weeks given disseminated infection   Blood cultures cleared   End date for antibiotics is 11/24/2024  Patient is now afebrile, hemodynamically stable with no leukocytosis   Mentation improved, at baseline   Also being treated for ESBL E coli UTI with Cipro, plan for total 3 day course  Orthopedics, neurosurgery, Neurology and CV surgery signed off   Underwent washout of right knee by Orthopedics   No interventions needed for IPH and mitral valve endocarditis   Plan for repeat echocardiogram after completion of antibiotics   Keppra for seizure prevention in the setting of IPH, will have to be cautious with fluoroquinolone since it decrease seizure threshold, only doing a short course  BP stable on amlodipine  Continue Toprol-XL  Potassium appropriately replaced today-40 mEq x 2 doses   Continue Seroquel q.h.s. for insomnia   Continue Pepcid   DVT prophylaxis-cleared for subcu Lovenox as of 10/8    Patient was LTAC appropriate, but denied by insurance even after appeal  Case management now working on TCU/skilled nursing facility   Discussed with case management today      Rayna Gold MD   10/11/2024

## 2024-10-11 NOTE — PLAN OF CARE
Referral sent to [facility] for review now that the iv abx has changed. Awaiting response at this time.     Patient's  called VANGIE Keita to report that he called the facility to get a payment plan arranged. They agreed to re-review this patient's referral.

## 2024-10-12 LAB
ALBUMIN SERPL-MCNC: 1.9 G/DL (ref 3.4–4.8)
ALBUMIN/GLOB SERPL: 0.5 RATIO (ref 1.1–2)
ALP SERPL-CCNC: 102 UNIT/L (ref 40–150)
ALT SERPL-CCNC: 5 UNIT/L (ref 0–55)
ANION GAP SERPL CALC-SCNC: 15 MEQ/L
AST SERPL-CCNC: 22 UNIT/L (ref 5–34)
BASOPHILS # BLD AUTO: 0.08 X10(3)/MCL
BASOPHILS NFR BLD AUTO: 1.5 %
BILIRUB SERPL-MCNC: 0.3 MG/DL
BUN SERPL-MCNC: 6.4 MG/DL (ref 9.8–20.1)
CALCIUM SERPL-MCNC: 8.1 MG/DL (ref 8.4–10.2)
CHLORIDE SERPL-SCNC: 103 MMOL/L (ref 98–107)
CO2 SERPL-SCNC: 23 MMOL/L (ref 23–31)
CREAT SERPL-MCNC: 0.58 MG/DL (ref 0.55–1.02)
CREAT/UREA NIT SERPL: 11
EOSINOPHIL # BLD AUTO: 0.26 X10(3)/MCL (ref 0–0.9)
EOSINOPHIL NFR BLD AUTO: 4.7 %
ERYTHROCYTE [DISTWIDTH] IN BLOOD BY AUTOMATED COUNT: 13.2 % (ref 11.5–17)
GFR SERPLBLD CREATININE-BSD FMLA CKD-EPI: >60 ML/MIN/1.73/M2
GLOBULIN SER-MCNC: 3.6 GM/DL (ref 2.4–3.5)
GLUCOSE SERPL-MCNC: 75 MG/DL (ref 82–115)
HCT VFR BLD AUTO: 26.8 % (ref 37–47)
HGB BLD-MCNC: 8.5 G/DL (ref 12–16)
IMM GRANULOCYTES # BLD AUTO: 0.06 X10(3)/MCL (ref 0–0.04)
IMM GRANULOCYTES NFR BLD AUTO: 1.1 %
LYMPHOCYTES # BLD AUTO: 1.48 X10(3)/MCL (ref 0.6–4.6)
LYMPHOCYTES NFR BLD AUTO: 26.9 %
MCH RBC QN AUTO: 30.9 PG (ref 27–31)
MCHC RBC AUTO-ENTMCNC: 31.7 G/DL (ref 33–36)
MCV RBC AUTO: 97.5 FL (ref 80–94)
MONOCYTES # BLD AUTO: 0.57 X10(3)/MCL (ref 0.1–1.3)
MONOCYTES NFR BLD AUTO: 10.3 %
NEUTROPHILS # BLD AUTO: 3.06 X10(3)/MCL (ref 2.1–9.2)
NEUTROPHILS NFR BLD AUTO: 55.5 %
NRBC BLD AUTO-RTO: 0 %
PLATELET # BLD AUTO: 268 X10(3)/MCL (ref 130–400)
PMV BLD AUTO: 9.1 FL (ref 7.4–10.4)
POCT GLUCOSE: 147 MG/DL (ref 70–110)
POCT GLUCOSE: 70 MG/DL (ref 70–110)
POCT GLUCOSE: 72 MG/DL (ref 70–110)
POCT GLUCOSE: 74 MG/DL (ref 70–110)
POCT GLUCOSE: 76 MG/DL (ref 70–110)
POTASSIUM SERPL-SCNC: 3 MMOL/L (ref 3.5–5.1)
PROT SERPL-MCNC: 5.5 GM/DL (ref 5.8–7.6)
RBC # BLD AUTO: 2.75 X10(6)/MCL (ref 4.2–5.4)
SODIUM SERPL-SCNC: 141 MMOL/L (ref 136–145)
WBC # BLD AUTO: 5.51 X10(3)/MCL (ref 4.5–11.5)

## 2024-10-12 PROCEDURE — 21400001 HC TELEMETRY ROOM

## 2024-10-12 PROCEDURE — 25000003 PHARM REV CODE 250: Performed by: NEUROLOGICAL SURGERY

## 2024-10-12 PROCEDURE — 25000003 PHARM REV CODE 250: Performed by: INTERNAL MEDICINE

## 2024-10-12 PROCEDURE — 85025 COMPLETE CBC W/AUTO DIFF WBC: CPT | Performed by: INTERNAL MEDICINE

## 2024-10-12 PROCEDURE — 63600175 PHARM REV CODE 636 W HCPCS: Performed by: INTERNAL MEDICINE

## 2024-10-12 PROCEDURE — 80053 COMPREHEN METABOLIC PANEL: CPT | Performed by: INTERNAL MEDICINE

## 2024-10-12 PROCEDURE — 63600175 PHARM REV CODE 636 W HCPCS: Performed by: GENERAL PRACTICE

## 2024-10-12 PROCEDURE — 25000003 PHARM REV CODE 250: Performed by: GENERAL PRACTICE

## 2024-10-12 PROCEDURE — 25000003 PHARM REV CODE 250: Performed by: STUDENT IN AN ORGANIZED HEALTH CARE EDUCATION/TRAINING PROGRAM

## 2024-10-12 PROCEDURE — 36415 COLL VENOUS BLD VENIPUNCTURE: CPT | Performed by: INTERNAL MEDICINE

## 2024-10-12 PROCEDURE — 27000207 HC ISOLATION

## 2024-10-12 PROCEDURE — 25000003 PHARM REV CODE 250: Performed by: ANESTHESIOLOGY

## 2024-10-12 RX ORDER — QUETIAPINE FUMARATE 25 MG/1
25 TABLET, FILM COATED ORAL NIGHTLY
Status: DISCONTINUED | OUTPATIENT
Start: 2024-10-12 | End: 2024-10-21 | Stop reason: HOSPADM

## 2024-10-12 RX ADMIN — FAMOTIDINE 20 MG: 20 TABLET, FILM COATED ORAL at 08:10

## 2024-10-12 RX ADMIN — QUETIAPINE FUMARATE 25 MG: 25 TABLET ORAL at 08:10

## 2024-10-12 RX ADMIN — ACETAMINOPHEN 650 MG: 325 TABLET ORAL at 08:10

## 2024-10-12 RX ADMIN — CIPROFLOXACIN HYDROCHLORIDE 500 MG: 500 TABLET, FILM COATED ORAL at 08:10

## 2024-10-12 RX ADMIN — LEVETIRACETAM 500 MG: 500 TABLET, FILM COATED ORAL at 08:10

## 2024-10-12 RX ADMIN — POTASSIUM BICARBONATE 50 MEQ: 977.5 TABLET, EFFERVESCENT ORAL at 08:10

## 2024-10-12 RX ADMIN — OXACILLIN 2 G: 2 INJECTION, POWDER, FOR SOLUTION INTRAMUSCULAR; INTRAVENOUS at 12:10

## 2024-10-12 RX ADMIN — OXACILLIN 2 G: 2 INJECTION, POWDER, FOR SOLUTION INTRAMUSCULAR; INTRAVENOUS at 03:10

## 2024-10-12 RX ADMIN — OXACILLIN 2 G: 2 INJECTION, POWDER, FOR SOLUTION INTRAMUSCULAR; INTRAVENOUS at 04:10

## 2024-10-12 RX ADMIN — POTASSIUM BICARBONATE 50 MEQ: 977.5 TABLET, EFFERVESCENT ORAL at 12:10

## 2024-10-12 RX ADMIN — ENOXAPARIN SODIUM 40 MG: 40 INJECTION SUBCUTANEOUS at 03:10

## 2024-10-12 RX ADMIN — OXACILLIN 2 G: 2 INJECTION, POWDER, FOR SOLUTION INTRAMUSCULAR; INTRAVENOUS at 08:10

## 2024-10-12 RX ADMIN — METOPROLOL SUCCINATE 12.5 MG: 25 TABLET, EXTENDED RELEASE ORAL at 08:10

## 2024-10-12 RX ADMIN — AMLODIPINE BESYLATE 5 MG: 5 TABLET ORAL at 08:10

## 2024-10-12 RX ADMIN — DEXTROSE MONOHYDRATE 125 ML: 100 INJECTION, SOLUTION INTRAVENOUS at 06:10

## 2024-10-12 NOTE — PROGRESS NOTES
[facility] Hospital Medicine Progress Note        Chief Complaint: Inpatient Follow-up    HPI:     73-year-old female with no significant past medical history presented to outlying facility with fever, arthralgia, back pain.  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  While in-house patient developed new onset encephalopathy and CT head showed intraparenchymal hematoma-left occipital lobe and was transferred to our hospital for higher level of care.  Patient was admitted to ICU.  CT surgery, Neurosurgery, Neurology, Infectious Disease consulted along with Orthopedics.  CV surgery recommended conservative management with IV antibiotics for endocarditis and repeat echocardiogram at the completion of antibiotics.  Neurosurgery recommended close neuro checks in no acute neurosurgical intervention, Keppra for seizure prevention.  Infectious Disease evaluated, cultures speciated as MSSA and therefore they changed antibiotics to oxacillin.  Neurosurgery ordered MRI brain and MRI C-spine.  Orthopedics took her to OR for right knee arthroscopy/incision/drainage.  Interventional Neurology did cerebral angiogram, no evidence of mycotic aneurysm, AVM or AV fistula.  Patient had an incidental left cavernous ICA aneurysm.  MRI C-spine with epidural enhancement, cervical osteomyelitis and fluid collection on the thoracic spine as well.  Patient cleared for downgraded from ICU on 10/12 hospital medicine services.  Therapy Services following, recommending skilled nursing facility placement.  Oxacillin latest switch to Ancef for convenience with end date 11/13, case management working on LTAC which unfortunately was denied by insurance.  While on cefazolin patient developed  low-grade temp spikes and therefore Infectious Disease re-evaluated and switched back to oxacillin.  Limited TTE repeated which showed persistent mitral valve vegetation, blood cultures repeated.  Also with change in mentation, CT head with no acute changes, on delirium precautions.  Patient noted to have some swelling involving lower extremity, no DVT, found to have Baker cyst.  Cleared for VTE prophylaxis of 10/8.  Given continued intermittent confusion a UA was obtained which was concerning for UTI and cultures growing ESBL E coli sensitive to Cipro, patient initiated on p.o. ciprofloxacin.  Fever spikes and confusion improved on Cipro.  Case management actively working on placement.  Infectious Disease decided to extend course to 8 weeks    Interval Hx:   Patient seen at bedside, comfortably laying in bed, no acute events overnight, tolerating p.o. diet,  hemodynamics stable, no nausea, vomiting or diarrhea.  Low-grade temp spike noted today morning    Objective/physical exam:  General: In no acute distress, afebrile  Chest: Clear to auscultation bilaterally  Heart: S1, S2, no appreciable murmur  Abdomen: Soft, nontender, BS +  MSK: Warm, no lower extremity edema, no clubbing or cyanosis  Neurologic: Alert and oriented x4,   VITAL SIGNS: 24 HRS MIN & MAX LAST   Temp  Min: 97.9 °F (36.6 °C)  Max: 99.3 °F (37.4 °C) 99.3 °F (37.4 °C)   BP  Min: 118/87  Max: 154/68 120/71   Pulse  Min: 89  Max: 100  89   Resp  Min: 17  Max: 18 18   SpO2  Min: 95 %  Max: 99 % 97 %       Recent Labs   Lab 10/12/24  0236   WBC 5.51   RBC 2.75*   HGB 8.5*   HCT 26.8*   MCV 97.5*   MCH 30.9   MCHC 31.7*   RDW 13.2      MPV 9.1         Recent Labs   Lab 10/08/24  0342 10/11/24  0815 10/12/24  0236      < > 141   K 3.5   < > 3.0*      < > 103   CO2 21*   < > 23   BUN 14.6   < > 6.4*   CREATININE 0.63   < > 0.58   CALCIUM 8.5   < > 8.1*   MG 1.70  --   --    ALBUMIN  --    < > 1.9*   ALKPHOS  --    < > 102   ALT  --    <  > 5   AST  --    < > 22   BILITOT  --    < > 0.3    < > = values in this interval not displayed.          Microbiology Results (last 7 days)       Procedure Component Value Units Date/Time    Fungal Culture [[id number]]  (Normal) Collected: 09/27/24 1303    Order Status: Completed Specimen: Body Fluid from Joint Fluid, Right Knee Updated: 10/11/24 1401     Fungal Culture No Fungus Isolated at 2 Weeks    Blood Culture [[id number]]  (Normal) Collected: 10/06/24 0823    Order Status: Completed Specimen: Blood from Hand, Left Updated: 10/11/24 0901     Blood Culture No Growth at 5 days    Blood Culture [[id number]]  (Normal) Collected: 10/06/24 0823    Order Status: Completed Specimen: Blood from Arm, Right Updated: 10/11/24 0901     Blood Culture No Growth at 5 days    Blood Culture [[id number]]  (Normal) Collected: 10/04/24 0842    Order Status: Completed Specimen: Blood from Arm, Right Updated: 10/09/24 1102     Blood Culture No Growth at 5 days    Blood Culture [[id number]]  (Normal) Collected: 10/04/24 0842    Order Status: Completed Specimen: Blood from Arm, Left Updated: 10/09/24 1102     Blood Culture No Growth at 5 days    Urine culture [[id number]]  (Abnormal)  (Susceptibility) Collected: 10/07/24 1611    Order Status: Completed Specimen: Urine Updated: 10/09/24 0824     Urine Culture >/= 100,000 colonies/ml Escherichia coli ESBL             Scheduled Med:   amLODIPine  5 mg Oral Daily    ciprofloxacin HCl  500 mg Oral Q12H    enoxparin  40 mg Subcutaneous Q24H (prophylaxis, 1700)    famotidine  20 mg Oral BID    levETIRAcetam  500 mg Oral BID    metoprolol succinate  12.5 mg Oral Daily    oxacillin IV (PEDS and ADULTS)  2 g Intravenous Q4H    potassium bicarbonate  50 mEq Oral 4 times per day    QUEtiapine  25 mg Oral After dinner          Assessment/Plan:    MSSA bacteremia-cleared   Sepsis secondary to above-improved  Disseminated MSSA infection   Mitral valve endocarditis with mitral regurgitation  Septic  arthritis/osteomyelitis sterno mandibular joint   Cervical diskitis/osteomyelitis with small 3 vertebral abscess  Right knee septic arthritis status post arthroscopy, washout 9/27   Right knee Baker's cyst   Hypokalemia   Anemia of chronic disease  Acute bacterial UTI secondary to ESBL E coli-completed treatment  Avascular necrosis right femoral head   Intraparenchymal hemorrhage-left occipital lobe   Incidental finding left cavernous ICA aneurysm   Acute encephalopathy-likely metabolic/hospital associated delirium/secondary to IPH, improved   New diagnosis essential HTN -stable  Prophylaxis    Patient currently on IV oxacillin, plan for a total course of 8 weeks given disseminated infection   Blood cultures cleared   End date for antibiotics is 11/24/2024  Stable hemodynamics with no leukocytosis   Low-grade temp spikes noted today morning, if this continues notify Infectious Disease  Mentation improved, at baseline   Completed ciprofloxacin course for ESBL E coli UTI  Orthopedics, neurosurgery, Neurology and CV surgery signed off   Underwent washout of right knee by Orthopedics   No interventions needed for IPH and mitral valve endocarditis   Plan for repeat echocardiogram after completion of antibiotics   Keppra for seizure prevention in the setting of IPH,   BP stable on amlodipine  Continue Toprol-XL  Potassium appropriately replaced today-50 mEq x 2 doses (no N/V/D)  Continue Seroquel q.h.s. for insomnia   Continue Pepcid   DVT prophylaxis-cleared for subcu Lovenox as of 10/8    Patient was LTAC appropriate, but denied by insurance even after appeal  Case management now working on TCU/skilled nursing facility   If patient's functional status improves home with IV antibiotics can also be considered if denied for skilled nursing facility by insurance      Rayna Gold MD   10/12/2024

## 2024-10-13 LAB
ALBUMIN SERPL-MCNC: 1.8 G/DL (ref 3.4–4.8)
ALBUMIN/GLOB SERPL: 0.6 RATIO (ref 1.1–2)
ALP SERPL-CCNC: 96 UNIT/L (ref 40–150)
ALT SERPL-CCNC: 8 UNIT/L (ref 0–55)
ANION GAP SERPL CALC-SCNC: 14 MEQ/L
AST SERPL-CCNC: 25 UNIT/L (ref 5–34)
BASOPHILS # BLD AUTO: 0.06 X10(3)/MCL
BASOPHILS NFR BLD AUTO: 1.1 %
BILIRUB SERPL-MCNC: 0.3 MG/DL
BUN SERPL-MCNC: 6.4 MG/DL (ref 9.8–20.1)
CALCIUM SERPL-MCNC: 8 MG/DL (ref 8.4–10.2)
CHLORIDE SERPL-SCNC: 100 MMOL/L (ref 98–107)
CO2 SERPL-SCNC: 25 MMOL/L (ref 23–31)
CREAT SERPL-MCNC: 0.61 MG/DL (ref 0.55–1.02)
CREAT/UREA NIT SERPL: 10
EOSINOPHIL # BLD AUTO: 0.37 X10(3)/MCL (ref 0–0.9)
EOSINOPHIL NFR BLD AUTO: 6.7 %
ERYTHROCYTE [DISTWIDTH] IN BLOOD BY AUTOMATED COUNT: 13.1 % (ref 11.5–17)
GFR SERPLBLD CREATININE-BSD FMLA CKD-EPI: >60 ML/MIN/1.73/M2
GLOBULIN SER-MCNC: 3.2 GM/DL (ref 2.4–3.5)
GLUCOSE SERPL-MCNC: 104 MG/DL (ref 82–115)
HCT VFR BLD AUTO: 25.5 % (ref 37–47)
HGB BLD-MCNC: 8.2 G/DL (ref 12–16)
IMM GRANULOCYTES # BLD AUTO: 0.08 X10(3)/MCL (ref 0–0.04)
IMM GRANULOCYTES NFR BLD AUTO: 1.4 %
LYMPHOCYTES # BLD AUTO: 1.4 X10(3)/MCL (ref 0.6–4.6)
LYMPHOCYTES NFR BLD AUTO: 25.3 %
MAGNESIUM SERPL-MCNC: 1.4 MG/DL (ref 1.6–2.6)
MCH RBC QN AUTO: 31.3 PG (ref 27–31)
MCHC RBC AUTO-ENTMCNC: 32.2 G/DL (ref 33–36)
MCV RBC AUTO: 97.3 FL (ref 80–94)
MONOCYTES # BLD AUTO: 0.65 X10(3)/MCL (ref 0.1–1.3)
MONOCYTES NFR BLD AUTO: 11.7 %
NEUTROPHILS # BLD AUTO: 2.98 X10(3)/MCL (ref 2.1–9.2)
NEUTROPHILS NFR BLD AUTO: 53.8 %
NRBC BLD AUTO-RTO: 0 %
PLATELET # BLD AUTO: 248 X10(3)/MCL (ref 130–400)
PMV BLD AUTO: 8.4 FL (ref 7.4–10.4)
POCT GLUCOSE: 105 MG/DL (ref 70–110)
POCT GLUCOSE: 96 MG/DL (ref 70–110)
POCT GLUCOSE: 96 MG/DL (ref 70–110)
POCT GLUCOSE: 99 MG/DL (ref 70–110)
POTASSIUM SERPL-SCNC: 2.8 MMOL/L (ref 3.5–5.1)
PROT SERPL-MCNC: 5 GM/DL (ref 5.8–7.6)
RBC # BLD AUTO: 2.62 X10(6)/MCL (ref 4.2–5.4)
SODIUM SERPL-SCNC: 139 MMOL/L (ref 136–145)
WBC # BLD AUTO: 5.54 X10(3)/MCL (ref 4.5–11.5)

## 2024-10-13 PROCEDURE — 25000003 PHARM REV CODE 250: Performed by: INTERNAL MEDICINE

## 2024-10-13 PROCEDURE — 63600175 PHARM REV CODE 636 W HCPCS: Performed by: INTERNAL MEDICINE

## 2024-10-13 PROCEDURE — 80053 COMPREHEN METABOLIC PANEL: CPT | Performed by: INTERNAL MEDICINE

## 2024-10-13 PROCEDURE — 25000003 PHARM REV CODE 250: Performed by: GENERAL PRACTICE

## 2024-10-13 PROCEDURE — 36415 COLL VENOUS BLD VENIPUNCTURE: CPT | Performed by: INTERNAL MEDICINE

## 2024-10-13 PROCEDURE — 83735 ASSAY OF MAGNESIUM: CPT | Performed by: INTERNAL MEDICINE

## 2024-10-13 PROCEDURE — 25000003 PHARM REV CODE 250: Performed by: STUDENT IN AN ORGANIZED HEALTH CARE EDUCATION/TRAINING PROGRAM

## 2024-10-13 PROCEDURE — 25000003 PHARM REV CODE 250: Performed by: NEUROLOGICAL SURGERY

## 2024-10-13 PROCEDURE — 21400001 HC TELEMETRY ROOM

## 2024-10-13 PROCEDURE — 63600175 PHARM REV CODE 636 W HCPCS: Performed by: GENERAL PRACTICE

## 2024-10-13 PROCEDURE — 27000207 HC ISOLATION

## 2024-10-13 PROCEDURE — 85025 COMPLETE CBC W/AUTO DIFF WBC: CPT | Performed by: INTERNAL MEDICINE

## 2024-10-13 RX ADMIN — OXACILLIN 2 G: 2 INJECTION, POWDER, FOR SOLUTION INTRAMUSCULAR; INTRAVENOUS at 12:10

## 2024-10-13 RX ADMIN — METOPROLOL SUCCINATE 12.5 MG: 25 TABLET, EXTENDED RELEASE ORAL at 09:10

## 2024-10-13 RX ADMIN — QUETIAPINE FUMARATE 25 MG: 25 TABLET ORAL at 08:10

## 2024-10-13 RX ADMIN — POTASSIUM BICARBONATE 40 MEQ: 391 TABLET, EFFERVESCENT ORAL at 12:10

## 2024-10-13 RX ADMIN — LEVETIRACETAM 500 MG: 500 TABLET, FILM COATED ORAL at 09:10

## 2024-10-13 RX ADMIN — LEVETIRACETAM 500 MG: 500 TABLET, FILM COATED ORAL at 08:10

## 2024-10-13 RX ADMIN — POTASSIUM BICARBONATE 40 MEQ: 391 TABLET, EFFERVESCENT ORAL at 09:10

## 2024-10-13 RX ADMIN — ACETAMINOPHEN 650 MG: 325 TABLET ORAL at 05:10

## 2024-10-13 RX ADMIN — OXACILLIN 2 G: 2 INJECTION, POWDER, FOR SOLUTION INTRAMUSCULAR; INTRAVENOUS at 08:10

## 2024-10-13 RX ADMIN — ENOXAPARIN SODIUM 40 MG: 40 INJECTION SUBCUTANEOUS at 03:10

## 2024-10-13 RX ADMIN — FAMOTIDINE 20 MG: 20 TABLET, FILM COATED ORAL at 09:10

## 2024-10-13 RX ADMIN — FAMOTIDINE 20 MG: 20 TABLET, FILM COATED ORAL at 08:10

## 2024-10-13 RX ADMIN — OXACILLIN 2 G: 2 INJECTION, POWDER, FOR SOLUTION INTRAMUSCULAR; INTRAVENOUS at 09:10

## 2024-10-13 RX ADMIN — OXACILLIN 2 G: 2 INJECTION, POWDER, FOR SOLUTION INTRAMUSCULAR; INTRAVENOUS at 03:10

## 2024-10-13 RX ADMIN — OXACILLIN 2 G: 2 INJECTION, POWDER, FOR SOLUTION INTRAMUSCULAR; INTRAVENOUS at 04:10

## 2024-10-13 RX ADMIN — AMLODIPINE BESYLATE 5 MG: 5 TABLET ORAL at 09:10

## 2024-10-14 LAB
ANION GAP SERPL CALC-SCNC: 12 MEQ/L
BUN SERPL-MCNC: 6 MG/DL (ref 9.8–20.1)
CALCIUM SERPL-MCNC: 8.3 MG/DL (ref 8.4–10.2)
CHLORIDE SERPL-SCNC: 102 MMOL/L (ref 98–107)
CO2 SERPL-SCNC: 25 MMOL/L (ref 23–31)
CREAT SERPL-MCNC: 0.57 MG/DL (ref 0.55–1.02)
CREAT/UREA NIT SERPL: 11
GFR SERPLBLD CREATININE-BSD FMLA CKD-EPI: >60 ML/MIN/1.73/M2
GLUCOSE SERPL-MCNC: 91 MG/DL (ref 82–115)
MAGNESIUM SERPL-MCNC: 1.5 MG/DL (ref 1.6–2.6)
POCT GLUCOSE: 88 MG/DL (ref 70–110)
POCT GLUCOSE: 96 MG/DL (ref 70–110)
POCT GLUCOSE: 99 MG/DL (ref 70–110)
POTASSIUM SERPL-SCNC: 2.7 MMOL/L (ref 3.5–5.1)
SODIUM SERPL-SCNC: 139 MMOL/L (ref 136–145)

## 2024-10-14 PROCEDURE — 63600175 PHARM REV CODE 636 W HCPCS: Performed by: INTERNAL MEDICINE

## 2024-10-14 PROCEDURE — 27000207 HC ISOLATION

## 2024-10-14 PROCEDURE — 80048 BASIC METABOLIC PNL TOTAL CA: CPT | Performed by: INTERNAL MEDICINE

## 2024-10-14 PROCEDURE — 63600175 PHARM REV CODE 636 W HCPCS: Performed by: GENERAL PRACTICE

## 2024-10-14 PROCEDURE — 25000003 PHARM REV CODE 250: Performed by: INTERNAL MEDICINE

## 2024-10-14 PROCEDURE — 36415 COLL VENOUS BLD VENIPUNCTURE: CPT | Performed by: INTERNAL MEDICINE

## 2024-10-14 PROCEDURE — 21400001 HC TELEMETRY ROOM

## 2024-10-14 PROCEDURE — 83735 ASSAY OF MAGNESIUM: CPT | Performed by: NURSE PRACTITIONER

## 2024-10-14 PROCEDURE — 25000003 PHARM REV CODE 250: Performed by: GENERAL PRACTICE

## 2024-10-14 PROCEDURE — 25000003 PHARM REV CODE 250: Performed by: NEUROLOGICAL SURGERY

## 2024-10-14 RX ORDER — MAGNESIUM SULFATE HEPTAHYDRATE 40 MG/ML
2 INJECTION, SOLUTION INTRAVENOUS ONCE
Status: COMPLETED | OUTPATIENT
Start: 2024-10-14 | End: 2024-10-14

## 2024-10-14 RX ORDER — POTASSIUM CHLORIDE 20 MEQ/1
40 TABLET, EXTENDED RELEASE ORAL 2 TIMES DAILY
Status: COMPLETED | OUTPATIENT
Start: 2024-10-14 | End: 2024-10-15

## 2024-10-14 RX ADMIN — OXACILLIN 2 G: 2 INJECTION, POWDER, FOR SOLUTION INTRAMUSCULAR; INTRAVENOUS at 09:10

## 2024-10-14 RX ADMIN — POTASSIUM CHLORIDE 40 MEQ: 1500 TABLET, EXTENDED RELEASE ORAL at 09:10

## 2024-10-14 RX ADMIN — LEVETIRACETAM 500 MG: 500 TABLET, FILM COATED ORAL at 09:10

## 2024-10-14 RX ADMIN — METOPROLOL SUCCINATE 12.5 MG: 25 TABLET, EXTENDED RELEASE ORAL at 09:10

## 2024-10-14 RX ADMIN — MAGNESIUM SULFATE HEPTAHYDRATE 2 G: 40 INJECTION, SOLUTION INTRAVENOUS at 09:10

## 2024-10-14 RX ADMIN — OXACILLIN 2 G: 2 INJECTION, POWDER, FOR SOLUTION INTRAMUSCULAR; INTRAVENOUS at 11:10

## 2024-10-14 RX ADMIN — OXACILLIN 2 G: 2 INJECTION, POWDER, FOR SOLUTION INTRAMUSCULAR; INTRAVENOUS at 12:10

## 2024-10-14 RX ADMIN — FAMOTIDINE 20 MG: 20 TABLET, FILM COATED ORAL at 09:10

## 2024-10-14 RX ADMIN — AMLODIPINE BESYLATE 5 MG: 5 TABLET ORAL at 09:10

## 2024-10-14 RX ADMIN — OXACILLIN 2 G: 2 INJECTION, POWDER, FOR SOLUTION INTRAMUSCULAR; INTRAVENOUS at 03:10

## 2024-10-14 RX ADMIN — OXACILLIN 2 G: 2 INJECTION, POWDER, FOR SOLUTION INTRAMUSCULAR; INTRAVENOUS at 05:10

## 2024-10-14 RX ADMIN — QUETIAPINE FUMARATE 25 MG: 25 TABLET ORAL at 09:10

## 2024-10-14 RX ADMIN — ENOXAPARIN SODIUM 40 MG: 40 INJECTION SUBCUTANEOUS at 05:10

## 2024-10-14 NOTE — PLAN OF CARE
Spoke with Zuri via phone at [facility] facility. She informed that the patient was initially denied placement due to insurance being out of network. However, she informed that she received a call from the family requesting a reconsideration.    She requested that I resend the referral in [facility] for the reconsideration, and that she will look over it and will be in touch with an update. Referral resent to [facility] via [facility];

## 2024-10-14 NOTE — PT/OT/SLP PROGRESS
Physical Therapy      Patient Name:  Orlando Black   MRN:  [id number]    Patient not seen today secondary to low K (2.7), mobility contraindicated at this time. Plans to replace per nsg. Will follow-up after repeat labs if appropriate.    10/14/24

## 2024-10-14 NOTE — PROGRESS NOTES
[facility] Hospital Medicine Progress Note        Chief Complaint: Inpatient Follow-up     HPI:      73-year-old female with no significant past medical history presented to outlying facility with fever, arthralgia, back pain.  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  While in-house patient developed new onset encephalopathy and CT head showed intraparenchymal hematoma-left occipital lobe and was transferred to our hospital for higher level of care.  Patient was admitted to ICU.  CT surgery, Neurosurgery, Neurology, Infectious Disease consulted along with Orthopedics.  CV surgery recommended conservative management with IV antibiotics for endocarditis and repeat echocardiogram at the completion of antibiotics.  Neurosurgery recommended close neuro checks in no acute neurosurgical intervention, Keppra for seizure prevention.  Infectious Disease evaluated, cultures speciated as MSSA and therefore they changed antibiotics to oxacillin.  Neurosurgery ordered MRI brain and MRI C-spine.  Orthopedics took her to OR for right knee arthroscopy/incision/drainage.  Interventional Neurology did cerebral angiogram, no evidence of mycotic aneurysm, AVM or AV fistula.  Patient had an incidental left cavernous ICA aneurysm.  MRI C-spine with epidural enhancement, cervical osteomyelitis and fluid collection on the thoracic spine as well.  Patient cleared for downgraded from ICU on 10/12 hospital medicine services.  Therapy Services following, recommending skilled nursing facility placement.  Oxacillin latest switch to Ancef for convenience with end date 11/13, case management working on LTAC which unfortunately was denied by insurance.  While on cefazolin patient developed  low-grade temp spikes and therefore Infectious Disease re-evaluated and switched back to oxacillin.  Limited TTE repeated which showed persistent mitral valve vegetation, blood cultures repeated.  Also with change in mentation, CT head with no acute changes, on delirium precautions.  Patient noted to have some swelling involving lower extremity, no DVT, found to have Baker cyst.  Cleared for VTE prophylaxis of 10/8.  Given continued intermittent confusion a UA was obtained which was concerning for UTI and cultures growing ESBL E coli sensitive to Cipro, patient initiated on p.o. ciprofloxacin.  Fever spikes and confusion improved on Cipro.  Case management actively working on placement.  Infectious Disease decided to extend course to 8 weeks     Interval Hx:   10/13/24  Patient seen at bedside, comfortably laying in bed, no acute events overnight, tolerating p.o. diet,  hemodynamics stable, no nausea, vomiting or diarrhea.    Labs significant for potassium of 2.8, low, hemoglobin levels 8.2 g per dL, stable     Objective/physical exam:  General: In no acute distress, afebrile  Chest: Clear to auscultation bilaterally  Heart: S1, S2, no appreciable murmur  Abdomen: Soft, nontender, BS +  MSK: Warm, no lower extremity edema, no clubbing or cyanosis  Neurologic: Alert and oriented x4,     Assessment/Plan:     MSSA bacteremia-cleared   Sepsis secondary to above-improved  Disseminated MSSA infection   Mitral valve endocarditis with mitral regurgitation  Septic arthritis/osteomyelitis sterno mandibular joint   Cervical diskitis/osteomyelitis with small 3 vertebral abscess  Right knee septic arthritis status post arthroscopy, washout 9/27   Right knee Baker's cyst   Hypokalemia , severe  Anemia of chronic disease  Acute bacterial UTI secondary to ESBL E coli-completed treatment  Avascular necrosis right femoral head   Intraparenchymal hemorrhage-left occipital lobe   Incidental finding left cavernous ICA aneurysm   Acute  encephalopathy-likely metabolic/hospital associated delirium/secondary to IPH, improved   New diagnosis essential HTN -stable  Prophylaxis     Plan  Add on IV potassium 40 meq x 2 dose, check mg levels  Patient currently on IV oxacillin, plan for a total course of 8 weeks given disseminated infection   Blood cultures cleared   End date for antibiotics is 11/24/2024  Stable hemodynamics with no leukocytosis   Low-grade temp spikes noted today morning, if this continues notify Infectious Disease  Mentation improved, at baseline   Completed ciprofloxacin course for ESBL E coli UTI  Orthopedics, neurosurgery, Neurology and CV surgery signed off   Underwent washout of right knee by Orthopedics   No interventions needed for IPH and mitral valve endocarditis   Plan for repeat echocardiogram after completion of antibiotics   Keppra for seizure prevention in the setting of IPH,   BP stable on amlodipine  Continue Toprol-XL  Continue Seroquel q.h.s. for insomnia   Continue Pepcid   DVT prophylaxis-cleared for subcu Lovenox as of 10/8     Patient was LTAC appropriate, but denied by insurance even after appeal  Case management now working on TCU/skilled nursing facility   If patient's functional status improves home with IV antibiotics can also be considered if denied for skilled nursing facility by insurance            VITAL SIGNS: 24 HRS MIN & MAX LAST   Temp  Min: 97.5 °F (36.4 °C)  Max: 98.4 °F (36.9 °C) 98.4 °F (36.9 °C)   BP  Min: 113/62  Max: 127/72 124/72   Pulse  Min: 76  Max: 97  96   Resp  Min: 17  Max: 18 18   SpO2  Min: 96 %  Max: 100 % 96 %     I have reviewed the following labs:  Recent Labs   Lab 10/11/24  0955 10/12/24  0236 10/13/24  0301   WBC 6.31 5.51 5.54   RBC 2.93* 2.75* 2.62*   HGB 9.3* 8.5* 8.2*   HCT 28.1* 26.8* 25.5*   MCV 95.9* 97.5* 97.3*   MCH 31.7* 30.9 31.3*   MCHC 33.1 31.7* 32.2*   RDW 13.2 13.2 13.1    268 248   MPV 8.7 9.1 8.4     Recent Labs   Lab 10/07/24  0228 10/08/24  0342  10/11/24  0815 10/12/24  0236 10/13/24  0301    140 140 141 139   K 2.7* 3.5 3.2* 3.0* 2.8*    104 101 103 100   CO2 25 21* 24 23 25   BUN 13.6 14.6 5.7* 6.4* 6.4*   CREATININE 0.68 0.63 0.59 0.58 0.61   CALCIUM 8.3* 8.5 8.5 8.1* 8.0*   MG 1.70 1.70  --   --  1.40*   ALBUMIN  --   --  2.2* 1.9* 1.8*   ALKPHOS  --   --  109 102 96   ALT  --   --  5 5 8   AST  --   --  22 22 25   BILITOT  --   --  0.4 0.3 0.3     Microbiology Results (last 7 days)       Procedure Component Value Units Date/Time    Fungal Culture [[id number]]  (Normal) Collected: 09/27/24 1303    Order Status: Completed Specimen: Body Fluid from Joint Fluid, Right Knee Updated: 10/11/24 1401     Fungal Culture No Fungus Isolated at 2 Weeks    Blood Culture [[id number]]  (Normal) Collected: 10/06/24 0823    Order Status: Completed Specimen: Blood from Hand, Left Updated: 10/11/24 0901     Blood Culture No Growth at 5 days    Blood Culture [[id number]]  (Normal) Collected: 10/06/24 0823    Order Status: Completed Specimen: Blood from Arm, Right Updated: 10/11/24 0901     Blood Culture No Growth at 5 days    Blood Culture [[id number]]  (Normal) Collected: 10/04/24 0842    Order Status: Completed Specimen: Blood from Arm, Right Updated: 10/09/24 1102     Blood Culture No Growth at 5 days    Blood Culture [[id number]]  (Normal) Collected: 10/04/24 0842    Order Status: Completed Specimen: Blood from Arm, Left Updated: 10/09/24 1102     Blood Culture No Growth at 5 days    Urine culture [[id number]]  (Abnormal)  (Susceptibility) Collected: 10/07/24 1611    Order Status: Completed Specimen: Urine Updated: 10/09/24 0824     Urine Culture >/= 100,000 colonies/ml Escherichia coli ESBL             See below for Radiology    Assessment/Plan:      VTE prophylaxis:     Patient condition:  Stable/Fair/Guarded/ Serious/ Critical    Anticipated discharge and Disposition:         All diagnosis and differential diagnosis have been reviewed; assessment and  plan has been documented; I have personally reviewed the labs and test results that are presently available; I have reviewed the patients medication list; I have reviewed the consulting providers response and recommendations. I have reviewed or attempted to review medical records based upon their availability    All of the patient's questions have been  addressed and answered. Patient's is agreeable to the above stated plan. I will continue to monitor closely and make adjustments to medical management as needed.    Portions of this note dictated using EMR integrated voice recognition software, and may be subject to voice recognition errors not corrected at proofreading. Please contact writer for clarification if needed.   _____________________________________________________________________    Malnutrition Status:    Scheduled Med:   amLODIPine  5 mg Oral Daily    enoxparin  40 mg Subcutaneous Q24H (prophylaxis, 1700)    famotidine  20 mg Oral BID    levETIRAcetam  500 mg Oral BID    metoprolol succinate  12.5 mg Oral Daily    oxacillin IV (PEDS and ADULTS)  2 g Intravenous Q4H    QUEtiapine  25 mg Oral QHS      Continuous Infusions:     PRN Meds:    Current Facility-Administered Medications:     acetaminophen, 650 mg, Oral, Q6H PRN    bisacodyL, 10 mg, Rectal, Daily PRN    camphor-methyl salicyl-menthoL, , Topical (Top), BID PRN    dextrose 10%, 12.5 g, Intravenous, PRN    dextrose 10%, 25 g, Intravenous, PRN    labetalol, 10 mg, Intravenous, Q15 Min PRN    LIDOcaine, 1 patch, Transdermal, Q24H PRN    morphine, 2 mg, Intravenous, Q6H PRN    ondansetron, 8 mg, Oral, Q8H PRN    sodium chloride 0.9%, 10 mL, Intravenous, PRN    sodium chloride 0.9%, 10 mL, Intravenous, PRN     Radiology:  I have personally reviewed the following imaging and agree with the radiologist.     CV Ultrasound doppler venous legs bilat  The right lower extremity venous system is patent with no evidence of   superficial or deep vein  thrombosis.  The left lower extremity venous system is patent with no evidence of   superficial or deep vein thrombosis.  A 4 x 3 x 2 cm bakers cyst was identified in the left popliteal space.       Brayan Gonzalez MD  Department of Hospital Medicine   [facility]   10/13/2024

## 2024-10-14 NOTE — PROGRESS NOTES
[facility] Hospital Medicine Progress Note        Chief Complaint: Inpatient Follow-up for sepsis     HPI:   73-year-old female with no significant past medical history presented to outlying facility with fever, arthralgia, back pain.  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  While in-house patient developed new onset encephalopathy and CT head showed intraparenchymal hematoma-left occipital lobe and was transferred to our hospital for higher level of care.  Patient was admitted to ICU.  CT surgery, Neurosurgery, Neurology, Infectious Disease consulted along with Orthopedics.  CV surgery recommended conservative management with IV antibiotics for endocarditis and repeat echocardiogram at the completion of antibiotics.  Neurosurgery recommended close neuro checks in no acute neurosurgical intervention, Keppra for seizure prevention.  Infectious Disease evaluated, cultures speciated as MSSA and therefore they changed antibiotics to oxacillin.  Neurosurgery ordered MRI brain and MRI C-spine.  Orthopedics took her to OR for right knee arthroscopy/incision/drainage.  Interventional Neurology did cerebral angiogram, no evidence of mycotic aneurysm, AVM or AV fistula.  Patient had an incidental left cavernous ICA aneurysm.  MRI C-spine with epidural enhancement, cervical osteomyelitis and fluid collection on the thoracic spine as well.  Patient cleared for downgraded from ICU on 10/12 hospital medicine services.  Therapy Services following, recommending skilled nursing facility placement.  Oxacillin latest switch to Ancef for convenience with end date 11/13, case management working on LTAC which unfortunately was denied by insurance.  While on cefazolin patient  developed low-grade temp spikes and therefore Infectious Disease re-evaluated and switched back to oxacillin.  Limited TTE repeated which showed persistent mitral valve vegetation, blood cultures repeated.  Also with change in mentation, CT head with no acute changes, on delirium precautions.  Patient noted to have some swelling involving lower extremity, no DVT, found to have Baker cyst.  Cleared for VTE prophylaxis of 10/8.  Given continued intermittent confusion a UA was obtained which was concerning for UTI and cultures growing ESBL E coli sensitive to Cipro, patient initiated on p.o. ciprofloxacin.  Fever spikes and confusion improved on Cipro.  Case management actively working on placement.  Infectious Disease decided to extend course to 8 weeks     Interval Hx:   Patient today awake and comfortable. In a good mood. Following all verbal commands. Has mild cognitive deficits. Has been afebrile.     No family at bedside.     Case was discussed with patient's nurse and  on the floor.    Objective/physical exam:  General: In no acute distress  Chest: Clear to auscultation bilaterally  Heart: RRR, +S1, S2, no appreciable murmur  Abdomen: Soft, nontender, BS +  MSK: Warm, no lower extremity edema, no clubbing or cyanosis  Neurologic: Cranial nerve II-XII intact, Strength 5/5 in all 4 extremities    VITAL SIGNS: 24 HRS MIN & MAX LAST   Temp  Min: 97.5 °F (36.4 °C)  Max: 98.4 °F (36.9 °C) 98.3 °F (36.8 °C)   BP  Min: 112/73  Max: 130/72 124/74   Pulse  Min: 82  Max: 111  89   Resp  Min: 17  Max: 18 18   SpO2  Min: 96 %  Max: 100 % 100 %     I have reviewed the following labs:  Recent Labs   Lab 10/11/24  0955 10/12/24  0236 10/13/24  0301   WBC 6.31 5.51 5.54   RBC 2.93* 2.75* 2.62*   HGB 9.3* 8.5* 8.2*   HCT 28.1* 26.8* 25.5*   MCV 95.9* 97.5* 97.3*   MCH 31.7* 30.9 31.3*   MCHC 33.1 31.7* 32.2*   RDW 13.2 13.2 13.1    268 248   MPV 8.7 9.1 8.4     Recent Labs   Lab 10/08/24  0342 10/11/24  0815  10/12/24  0236 10/13/24  0301 10/14/24  0311    140 141 139 139   K 3.5 3.2* 3.0* 2.8* 2.7*    101 103 100 102   CO2 21* 24 23 25 25   BUN 14.6 5.7* 6.4* 6.4* 6.0*   CREATININE 0.63 0.59 0.58 0.61 0.57   CALCIUM 8.5 8.5 8.1* 8.0* 8.3*   MG 1.70  --   --  1.40* 1.50*   ALBUMIN  --  2.2* 1.9* 1.8*  --    ALKPHOS  --  109 102 96  --    ALT  --  5 5 8  --    AST  --  22 22 25  --    BILITOT  --  0.4 0.3 0.3  --      Microbiology Results (last 7 days)       Procedure Component Value Units Date/Time    Fungal Culture [[id number]]  (Normal) Collected: 09/27/24 1303    Order Status: Completed Specimen: Body Fluid from Joint Fluid, Right Knee Updated: 10/11/24 1401     Fungal Culture No Fungus Isolated at 2 Weeks    Blood Culture [[id number]]  (Normal) Collected: 10/06/24 0823    Order Status: Completed Specimen: Blood from Hand, Left Updated: 10/11/24 0901     Blood Culture No Growth at 5 days    Blood Culture [[id number]]  (Normal) Collected: 10/06/24 0823    Order Status: Completed Specimen: Blood from Arm, Right Updated: 10/11/24 0901     Blood Culture No Growth at 5 days    Blood Culture [[id number]]  (Normal) Collected: 10/04/24 0842    Order Status: Completed Specimen: Blood from Arm, Right Updated: 10/09/24 1102     Blood Culture No Growth at 5 days    Blood Culture [[id number]]  (Normal) Collected: 10/04/24 0842    Order Status: Completed Specimen: Blood from Arm, Left Updated: 10/09/24 1102     Blood Culture No Growth at 5 days    Urine culture [[id number]]  (Abnormal)  (Susceptibility) Collected: 10/07/24 1611    Order Status: Completed Specimen: Urine Updated: 10/09/24 0824     Urine Culture >/= 100,000 colonies/ml Escherichia coli ESBL             See below for Radiology    Assessment/Plan:  MSSA bacteremia-cleared   Sepsis secondary to above-improved  Disseminated MSSA infection   Mitral valve endocarditis with mitral regurgitation  Septic arthritis/osteomyelitis sterno mandibular joint   Cervical  diskitis/osteomyelitis with small 3 vertebral abscess  Right knee septic arthritis status post arthroscopy, washout 9/27   Hypokalemia   Hypomagnesemia     Right knee Baker's cyst   Anemia of chronic disease  Acute bacterial UTI secondary to ESBL E coli-completed treatment  Avascular necrosis right femoral head   Intraparenchymal hemorrhage-left occipital lobe   Incidental finding left cavernous ICA aneurysm   Acute encephalopathy-likely metabolic/hospital associated : resolved delirium/secondary to IPH, improved   New diagnosis essential HTN -stable    Plan:  Patient now doing well and has been afebrile  She does have some cognitive deficits   Continue OT/PT   Potassium and mag was low and replaced   K 2.7, Mag 1.50  Continue iv oxacillin per ID team     CM working on placement but if unsuccessful then will have to consider Home with HH and iv abx infusion     VTE prophylaxis: Lovenox     Patient condition:  Fair    Anticipated discharge and Disposition:   SNF vs home with HH       All diagnosis and differential diagnosis have been reviewed; assessment and plan has been documented; I have personally reviewed the labs and test results that are presently available; I have reviewed the patients medication list; I have reviewed the consulting providers response and recommendations. I have reviewed or attempted to review medical records based upon their availability    All of the patient's questions have been  addressed and answered. Patient's is agreeable to the above stated plan. I will continue to monitor closely and make adjustments to medical management as needed.    Portions of this note dictated using EMR integrated voice recognition software, and may be subject to voice recognition errors not corrected at proofreading. Please contact writer for clarification if needed.   _____________________________________________________________________    Malnutrition Status:    Scheduled Med:   amLODIPine  5 mg Oral Daily     enoxparin  40 mg Subcutaneous Q24H (prophylaxis, 1700)    famotidine  20 mg Oral BID    levETIRAcetam  500 mg Oral BID    metoprolol succinate  12.5 mg Oral Daily    oxacillin IV (PEDS and ADULTS)  2 g Intravenous Q4H    potassium chloride  40 mEq Oral BID    QUEtiapine  25 mg Oral QHS      Continuous Infusions:     PRN Meds:    Current Facility-Administered Medications:     acetaminophen, 650 mg, Oral, Q6H PRN    bisacodyL, 10 mg, Rectal, Daily PRN    camphor-methyl salicyl-menthoL, , Topical (Top), BID PRN    dextrose 10%, 12.5 g, Intravenous, PRN    dextrose 10%, 25 g, Intravenous, PRN    labetalol, 10 mg, Intravenous, Q15 Min PRN    LIDOcaine, 1 patch, Transdermal, Q24H PRN    morphine, 2 mg, Intravenous, Q6H PRN    ondansetron, 8 mg, Oral, Q8H PRN    sodium chloride 0.9%, 10 mL, Intravenous, PRN    sodium chloride 0.9%, 10 mL, Intravenous, PRN     Radiology:  I have personally reviewed the following imaging and agree with the radiologist.     CV Ultrasound doppler venous legs bilat  The right lower extremity venous system is patent with no evidence of   superficial or deep vein thrombosis.  The left lower extremity venous system is patent with no evidence of   superficial or deep vein thrombosis.  A 4 x 3 x 2 cm bakers cyst was identified in the left popliteal space.       Mika Liu MD  Department of Hospital Medicine   [facility]   10/14/2024

## 2024-10-14 NOTE — PHYSICIAN QUERY
Please provide further clarification on the procedure performed on the Right Knee.  Excisional debridement of other tissue type (please specify): synovium

## 2024-10-15 LAB
ANION GAP SERPL CALC-SCNC: 12 MEQ/L
BUN SERPL-MCNC: 6 MG/DL (ref 9.8–20.1)
CALCIUM SERPL-MCNC: 8 MG/DL (ref 8.4–10.2)
CHLORIDE SERPL-SCNC: 104 MMOL/L (ref 98–107)
CO2 SERPL-SCNC: 23 MMOL/L (ref 23–31)
CREAT SERPL-MCNC: 0.6 MG/DL (ref 0.55–1.02)
CREAT/UREA NIT SERPL: 10
GFR SERPLBLD CREATININE-BSD FMLA CKD-EPI: >60 ML/MIN/1.73/M2
GLUCOSE SERPL-MCNC: 77 MG/DL (ref 82–115)
POCT GLUCOSE: 131 MG/DL (ref 70–110)
POCT GLUCOSE: 84 MG/DL (ref 70–110)
POTASSIUM SERPL-SCNC: 3.8 MMOL/L (ref 3.5–5.1)
SODIUM SERPL-SCNC: 139 MMOL/L (ref 136–145)

## 2024-10-15 PROCEDURE — 63600175 PHARM REV CODE 636 W HCPCS: Performed by: INTERNAL MEDICINE

## 2024-10-15 PROCEDURE — 27000207 HC ISOLATION

## 2024-10-15 PROCEDURE — 25000003 PHARM REV CODE 250: Performed by: INTERNAL MEDICINE

## 2024-10-15 PROCEDURE — 80048 BASIC METABOLIC PNL TOTAL CA: CPT | Performed by: INTERNAL MEDICINE

## 2024-10-15 PROCEDURE — 97535 SELF CARE MNGMENT TRAINING: CPT

## 2024-10-15 PROCEDURE — 36415 COLL VENOUS BLD VENIPUNCTURE: CPT | Performed by: INTERNAL MEDICINE

## 2024-10-15 PROCEDURE — 97116 GAIT TRAINING THERAPY: CPT | Mod: CQ

## 2024-10-15 PROCEDURE — 25000003 PHARM REV CODE 250: Performed by: NEUROLOGICAL SURGERY

## 2024-10-15 PROCEDURE — 21400001 HC TELEMETRY ROOM

## 2024-10-15 RX ADMIN — LEVETIRACETAM 500 MG: 500 TABLET, FILM COATED ORAL at 08:10

## 2024-10-15 RX ADMIN — FAMOTIDINE 20 MG: 20 TABLET, FILM COATED ORAL at 09:10

## 2024-10-15 RX ADMIN — OXACILLIN 2 G: 2 INJECTION, POWDER, FOR SOLUTION INTRAMUSCULAR; INTRAVENOUS at 08:10

## 2024-10-15 RX ADMIN — LEVETIRACETAM 500 MG: 500 TABLET, FILM COATED ORAL at 09:10

## 2024-10-15 RX ADMIN — POTASSIUM CHLORIDE 40 MEQ: 1500 TABLET, EXTENDED RELEASE ORAL at 08:10

## 2024-10-15 RX ADMIN — OXACILLIN 2 G: 2 INJECTION, POWDER, FOR SOLUTION INTRAMUSCULAR; INTRAVENOUS at 05:10

## 2024-10-15 RX ADMIN — AMLODIPINE BESYLATE 5 MG: 5 TABLET ORAL at 09:10

## 2024-10-15 RX ADMIN — OXACILLIN 2 G: 2 INJECTION, POWDER, FOR SOLUTION INTRAMUSCULAR; INTRAVENOUS at 09:10

## 2024-10-15 RX ADMIN — POTASSIUM CHLORIDE 40 MEQ: 1500 TABLET, EXTENDED RELEASE ORAL at 09:10

## 2024-10-15 RX ADMIN — QUETIAPINE FUMARATE 25 MG: 25 TABLET ORAL at 08:10

## 2024-10-15 RX ADMIN — METOPROLOL SUCCINATE 12.5 MG: 25 TABLET, EXTENDED RELEASE ORAL at 09:10

## 2024-10-15 RX ADMIN — FAMOTIDINE 20 MG: 20 TABLET, FILM COATED ORAL at 08:10

## 2024-10-15 RX ADMIN — OXACILLIN 2 G: 2 INJECTION, POWDER, FOR SOLUTION INTRAMUSCULAR; INTRAVENOUS at 01:10

## 2024-10-15 RX ADMIN — OXACILLIN 2 G: 2 INJECTION, POWDER, FOR SOLUTION INTRAMUSCULAR; INTRAVENOUS at 12:10

## 2024-10-15 RX ADMIN — ENOXAPARIN SODIUM 40 MG: 40 INJECTION SUBCUTANEOUS at 05:10

## 2024-10-15 NOTE — PT/OT/SLP PROGRESS
Occupational Therapy   Treatment    Name: Orlando Black  MRN: [id number]  Admitting Diagnosis:  <principal problem not specified>  18 Days Post-Op    Recommendations:     Recommended therapy intensity at discharge: Moderate Intensity Therapy   Discharge Equipment Recommendations:  to be determined by next level of care  Barriers to discharge:       Assessment:     Orlando Black is a 74 y.o. 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  She presents with the following performance deficits affecting function are weakness, impaired cognition, impaired self care skills, impaired functional mobility, impaired balance, gait instability, decreased lower extremity function, decreased safety awareness.     Rehab Prognosis:  Good; patient would benefit from acute skilled OT services to address these deficits and reach maximum level of function.       Plan:     Patient to be seen 5 x/week to address the above listed problems via self-care/home management, therapeutic activities, therapeutic exercises  Plan of Care Expires: 10/22/24  Plan of Care Reviewed with: patient    Subjective     Pain/Comfort:  Pain Rating 1: 0/10    Objective:     Communicated with: RN prior to session.  Patient found HOB elevated with bed alarm, telemetry, pulse ox (continuous) upon OT entry to room.    General Precautions: Standard, fall, contact, other (see comments) (SBP<140)    Orthopedic Precautions:RLE weight bearing as tolerated  Braces: N/A  Respiratory Status: Room air  Vital Signs: Blood Pressure: 129/72  HR: 94     Occupational  Performance:     Bed Mobility:    Patient completed Supine to Sit with contact guard assistance     Functional Mobility/Transfers:  Patient completed Sit <> Stand Transfer with minimum assistance  with  rolling walker   Patient completed Bed <> Chair Transfer using Step Transfer technique with minimum assistance with rolling walker  Functional Mobility: pt ambulated from EOB to bathroom sink ~14 ft using RW with Min A, pt then requesting to sit    Activities of Daily Living:  Grooming: stand by assistance for oral care, washing face, and combing hair while seated at bathroom sink. Pt declining to complete ADLs in standing due to fatigue after ambulation despite seated rest break.     Therapeutic Positioning    OT interventions performed during the course of today's session in an effort to prevent and/or reduce acquired pressure injuries:   Education was provided on benefits of and recommendations for therapeutic positioning    Patient Education:  Patient provided with verbal education education regarding OT role/goals/POC, fall prevention, and safety awareness.  Understanding was verbalized, however additional teaching warranted.    Patient left up in chair with all lines intact, call button in reach, chair alarm on, and posey vest donned .    GOALS:   Multidisciplinary Problems       Occupational Therapy Goals          Problem: Occupational Therapy    Goal Priority Disciplines Outcome Interventions   Occupational Therapy Goal     OT, PT/OT Progressing    Description: LTG: Pt will perform basic ADLs and ADL transfers with Modified independence using LRAD by discharge.    STG: to be met by 11/8/24    Pt will complete grooming standing at sink with LRAD with SBA.  Pt will complete UB dressing with SBA.  Pt will complete LB dressing with SBA using LRAD.  Pt will complete toileting with SBA using LRAD.  Pt will complete functional mobility to/from toilet and toilet transfer with SBA using LRAD.                          Time Tracking:     OT Date of Treatment: 10/15/24  OT Start Time: 1000  OT Stop Time: 1029  OT Total Time (min): 29 min    Billable Minutes:Self Care/Home Management 29 mins    OT/TY: OT     Number of TY visits since last OT visit: 3    10/15/2024

## 2024-10-15 NOTE — PROGRESS NOTES
[facility] Hospital Medicine Progress Note        Chief Complaint: Inpatient Follow-up for sepsis     HPI:   73-year-old female with no significant past medical history presented to outlying facility with fever, arthralgia, back pain.  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  While in-house patient developed new onset encephalopathy and CT head showed intraparenchymal hematoma-left occipital lobe and was transferred to our hospital for higher level of care.  Patient was admitted to ICU.  CT surgery, Neurosurgery, Neurology, Infectious Disease consulted along with Orthopedics.  CV surgery recommended conservative management with IV antibiotics for endocarditis and repeat echocardiogram at the completion of antibiotics.  Neurosurgery recommended close neuro checks in no acute neurosurgical intervention, Keppra for seizure prevention.  Infectious Disease evaluated, cultures speciated as MSSA and therefore they changed antibiotics to oxacillin.  Neurosurgery ordered MRI brain and MRI C-spine.  Orthopedics took her to OR for right knee arthroscopy/incision/drainage.  Interventional Neurology did cerebral angiogram, no evidence of mycotic aneurysm, AVM or AV fistula.  Patient had an incidental left cavernous ICA aneurysm.  MRI C-spine with epidural enhancement, cervical osteomyelitis and fluid collection on the thoracic spine as well.  Patient cleared for downgraded from ICU on 10/12 hospital medicine services.  Therapy Services following, recommending skilled nursing facility placement.  Oxacillin latest switch to Ancef for convenience with end date 11/13, case management working on LTAC which unfortunately was denied by insurance.  While on cefazolin patient  developed low-grade temp spikes and therefore Infectious Disease re-evaluated and switched back to oxacillin.  Limited TTE repeated which showed persistent mitral valve vegetation, blood cultures repeated.  Also with change in mentation, CT head with no acute changes, on delirium precautions.  Patient noted to have some swelling involving lower extremity, no DVT, found to have Baker cyst.  Cleared for VTE prophylaxis of 10/8.  Given continued intermittent confusion a UA was obtained which was concerning for UTI and cultures growing ESBL E coli sensitive to Cipro, patient initiated on p.o. ciprofloxacin.  Fever spikes and confusion improved on Cipro.  Case management actively working on placement.  Infectious Disease decided to extend course to 8 weeks     Interval Hx:   Patient today awake and comfortable. Participating with PT. Needed more encouragement to participate.   No family at bedside.     Case was discussed with patient's nurse and  on the floor.    Objective/physical exam:  General: In no acute distress  Chest: Clear to auscultation bilaterally  Heart: RRR, +S1, S2, no appreciable murmur  Abdomen: Soft, nontender, BS +  MSK: Warm, no lower extremity edema, no clubbing or cyanosis  Neurologic: Cranial nerve II-XII intact, Strength 5/5 in all 4 extremities    VITAL SIGNS: 24 HRS MIN & MAX LAST   Temp  Min: 97.5 °F (36.4 °C)  Max: 98.1 °F (36.7 °C) 98.1 °F (36.7 °C)   BP  Min: 108/68  Max: 125/73 113/70   Pulse  Min: 93  Max: 102  94   No data recorded 18   SpO2  Min: 97 %  Max: 100 % 99 %     I have reviewed the following labs:  Recent Labs   Lab 10/11/24  0955 10/12/24  0236 10/13/24  0301   WBC 6.31 5.51 5.54   RBC 2.93* 2.75* 2.62*   HGB 9.3* 8.5* 8.2*   HCT 28.1* 26.8* 25.5*   MCV 95.9* 97.5* 97.3*   MCH 31.7* 30.9 31.3*   MCHC 33.1 31.7* 32.2*   RDW 13.2 13.2 13.1    268 248   MPV 8.7 9.1 8.4     Recent Labs   Lab 10/11/24  0815 10/12/24  0236 10/13/24  0301 10/14/24  0315  10/15/24  0352    141 139 139 139   K 3.2* 3.0* 2.8* 2.7* 3.8    103 100 102 104   CO2 24 23 25 25 23   BUN 5.7* 6.4* 6.4* 6.0* 6.0*   CREATININE 0.59 0.58 0.61 0.57 0.60   CALCIUM 8.5 8.1* 8.0* 8.3* 8.0*   MG  --   --  1.40* 1.50*  --    ALBUMIN 2.2* 1.9* 1.8*  --   --    ALKPHOS 109 102 96  --   --    ALT 5 5 8  --   --    AST 22 22 25  --   --    BILITOT 0.4 0.3 0.3  --   --      Microbiology Results (last 7 days)       Procedure Component Value Units Date/Time    Fungal Culture [[id number]]  (Normal) Collected: 09/27/24 1303    Order Status: Completed Specimen: Body Fluid from Joint Fluid, Right Knee Updated: 10/11/24 1401     Fungal Culture No Fungus Isolated at 2 Weeks    Blood Culture [[id number]]  (Normal) Collected: 10/06/24 0823    Order Status: Completed Specimen: Blood from Hand, Left Updated: 10/11/24 0901     Blood Culture No Growth at 5 days    Blood Culture [[id number]]  (Normal) Collected: 10/06/24 0823    Order Status: Completed Specimen: Blood from Arm, Right Updated: 10/11/24 0901     Blood Culture No Growth at 5 days    Blood Culture [[id number]]  (Normal) Collected: 10/04/24 0842    Order Status: Completed Specimen: Blood from Arm, Right Updated: 10/09/24 1102     Blood Culture No Growth at 5 days    Blood Culture [[id number]]  (Normal) Collected: 10/04/24 0842    Order Status: Completed Specimen: Blood from Arm, Left Updated: 10/09/24 1102     Blood Culture No Growth at 5 days    Urine culture [[id number]]  (Abnormal)  (Susceptibility) Collected: 10/07/24 1611    Order Status: Completed Specimen: Urine Updated: 10/09/24 0824     Urine Culture >/= 100,000 colonies/ml Escherichia coli ESBL             See below for Radiology    Assessment/Plan:  MSSA bacteremia-cleared   Sepsis secondary to above-improved  Disseminated MSSA infection   Mitral valve endocarditis with mitral regurgitation  Septic arthritis/osteomyelitis sterno mandibular joint   Cervical diskitis/osteomyelitis with  small 3 vertebral abscess  Right knee septic arthritis status post arthroscopy, washout 9/27   Hypokalemia   Hypomagnesemia     Right knee Baker's cyst   Anemia of chronic disease  Acute bacterial UTI secondary to ESBL E coli-completed treatment  Avascular necrosis right femoral head   Intraparenchymal hemorrhage-left occipital lobe   Incidental finding left cavernous ICA aneurysm   Acute encephalopathy-likely metabolic/hospital associated : resolved delirium/secondary to IPH, improved   New diagnosis essential HTN -stable    Plan:  Patient looks comfortable today. Encouraged to participate more with PT  She does have some cognitive deficits   Continue OT/PT   Potassium and mag was low and replaced   K 3.8,  Continue iv oxacillin per ID team     CM working on placement but if unsuccessful then will have to consider Home with HH and iv abx infusion     VTE prophylaxis: Lovenox     Patient condition:  Fair    Anticipated discharge and Disposition:   SNF vs home with HH       All diagnosis and differential diagnosis have been reviewed; assessment and plan has been documented; I have personally reviewed the labs and test results that are presently available; I have reviewed the patients medication list; I have reviewed the consulting providers response and recommendations. I have reviewed or attempted to review medical records based upon their availability    All of the patient's questions have been  addressed and answered. Patient's is agreeable to the above stated plan. I will continue to monitor closely and make adjustments to medical management as needed.    Portions of this note dictated using EMR integrated voice recognition software, and may be subject to voice recognition errors not corrected at proofreading. Please contact writer for clarification if needed.   _____________________________________________________________________    Malnutrition Status:    Scheduled Med:   amLODIPine  5 mg Oral Daily     enoxparin  40 mg Subcutaneous Q24H (prophylaxis, 1700)    famotidine  20 mg Oral BID    levETIRAcetam  500 mg Oral BID    metoprolol succinate  12.5 mg Oral Daily    oxacillin IV (PEDS and ADULTS)  2 g Intravenous Q4H    potassium chloride  40 mEq Oral BID    QUEtiapine  25 mg Oral QHS      Continuous Infusions:     PRN Meds:    Current Facility-Administered Medications:     acetaminophen, 650 mg, Oral, Q6H PRN    bisacodyL, 10 mg, Rectal, Daily PRN    camphor-methyl salicyl-menthoL, , Topical (Top), BID PRN    dextrose 10%, 12.5 g, Intravenous, PRN    dextrose 10%, 25 g, Intravenous, PRN    labetalol, 10 mg, Intravenous, Q15 Min PRN    LIDOcaine, 1 patch, Transdermal, Q24H PRN    morphine, 2 mg, Intravenous, Q6H PRN    ondansetron, 8 mg, Oral, Q8H PRN    sodium chloride 0.9%, 10 mL, Intravenous, PRN    sodium chloride 0.9%, 10 mL, Intravenous, PRN     Radiology:  I have personally reviewed the following imaging and agree with the radiologist.     CV Ultrasound doppler venous legs bilat  The right lower extremity venous system is patent with no evidence of   superficial or deep vein thrombosis.  The left lower extremity venous system is patent with no evidence of   superficial or deep vein thrombosis.  A 4 x 3 x 2 cm bakers cyst was identified in the left popliteal space.       Mika Liu MD  Department of Hospital Medicine   [facility]   10/15/2024

## 2024-10-15 NOTE — PT/OT/SLP PROGRESS
Physical Therapy Treatment    Patient Name:  Orlando Black   MRN:  [id number]    Recommendations:     Discharge therapy intensity: Moderate Intensity Therapy   Discharge Equipment Recommendations: to be determined by next level of care  Barriers to discharge: Impaired mobility and Ongoing medical needs    Assessment:     Orlando Black is a 74 y.o. 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  She presents with the following impairments/functional limitations: weakness, gait instability, impaired endurance, impaired balance, impaired functional mobility, decreased lower extremity function, orthopedic precautions.    Rehab Prognosis: Good; patient would benefit from acute skilled PT services to address these deficits and reach maximum level of function.    Recent Surgery: Procedure(s) (LRB):  ARTHROSCOPY, KNEE, WITH DEBRIDEMENT (Right) 18 Days Post-Op    Plan:     During this hospitalization, patient would benefit from acute PT services 5 x/week to address the identified rehab impairments via gait training, therapeutic activities, therapeutic exercises and progress toward the following goals:    Plan of Care Expires:  11/08/24    Subjective     Chief Complaint: none stated  Patient/Family Comments/goals: none stated  Pain/Comfort:         Objective:     Communicated with nursing prior to session.  Patient found up in chair with telemetry, pulse ox (continuous), chair check, posey vest upon PT entry to room.     General Precautions: Standard, fall, contact  Orthopedic Precautions: RLE weight bearing  as tolerated  Braces: N/A  Respiratory Status: Room air  Blood Pressure: NT    Functional Mobility:  Transfers:     Sit to Stand:  minimum assistance and moderate assistance with rolling walker  Gait: 34'/14' w/RW, CGA  Antalgic gait pattern - decreased stance time & foot drag on LLE  Seated rest break between trails    Therapeutic Activities/Exercises:      Education:  Patient provided with verbal education education regarding PT role/goals/POC, fall prevention, and safety awareness.  Understanding was verbalized, however additional teaching warranted.     Patient left up in chair with all lines intact, call button in reach, chair alarm on, nurse notified, nurse present, and posey vest donned    GOALS:   Multidisciplinary Problems       Physical Therapy Goals          Problem: Physical Therapy    Goal Priority Disciplines Outcome Interventions   Physical Therapy Goal     PT, PT/OT Progressing    Description: Goals to be met by: 10/28/24     Patient will increase functional independence with mobility by performin. Supine to sit with Stand-by Assistance  2. Sit to supine with Stand-by Assistance  3. Sit to stand transfer with Stand-by Assistance  4. Bed to chair transfer with Stand-by Assistance using Rolling Walker  5. Gait  x 200 feet with Stand-by Assistance using Rolling Walker.                          Time Tracking:     PT Received On: 10/15/24  PT Start Time: 1145     PT Stop Time: 1205  PT Total Time (min): 20 min     Billable Minutes: Gait Training 20    Treatment Type: Treatment  PT/PTA: PTA     Number of PTA visits since last PT visit: 2     10/15/2024

## 2024-10-16 LAB
POCT GLUCOSE: 112 MG/DL (ref 70–110)
POCT GLUCOSE: 112 MG/DL (ref 70–110)

## 2024-10-16 PROCEDURE — 21400001 HC TELEMETRY ROOM

## 2024-10-16 PROCEDURE — 27000207 HC ISOLATION

## 2024-10-16 PROCEDURE — 97116 GAIT TRAINING THERAPY: CPT | Mod: CQ

## 2024-10-16 PROCEDURE — 63600175 PHARM REV CODE 636 W HCPCS: Performed by: INTERNAL MEDICINE

## 2024-10-16 PROCEDURE — 25000003 PHARM REV CODE 250: Performed by: INTERNAL MEDICINE

## 2024-10-16 PROCEDURE — 25000003 PHARM REV CODE 250: Performed by: NEUROLOGICAL SURGERY

## 2024-10-16 RX ADMIN — ENOXAPARIN SODIUM 40 MG: 40 INJECTION SUBCUTANEOUS at 05:10

## 2024-10-16 RX ADMIN — OXACILLIN 2 G: 2 INJECTION, POWDER, FOR SOLUTION INTRAMUSCULAR; INTRAVENOUS at 08:10

## 2024-10-16 RX ADMIN — OXACILLIN 2 G: 2 INJECTION, POWDER, FOR SOLUTION INTRAMUSCULAR; INTRAVENOUS at 05:10

## 2024-10-16 RX ADMIN — OXACILLIN 2 G: 2 INJECTION, POWDER, FOR SOLUTION INTRAMUSCULAR; INTRAVENOUS at 12:10

## 2024-10-16 RX ADMIN — AMLODIPINE BESYLATE 5 MG: 5 TABLET ORAL at 09:10

## 2024-10-16 RX ADMIN — FAMOTIDINE 20 MG: 20 TABLET, FILM COATED ORAL at 08:10

## 2024-10-16 RX ADMIN — FAMOTIDINE 20 MG: 20 TABLET, FILM COATED ORAL at 09:10

## 2024-10-16 RX ADMIN — LEVETIRACETAM 500 MG: 500 TABLET, FILM COATED ORAL at 08:10

## 2024-10-16 RX ADMIN — METOPROLOL SUCCINATE 12.5 MG: 25 TABLET, EXTENDED RELEASE ORAL at 09:10

## 2024-10-16 RX ADMIN — OXACILLIN 2 G: 2 INJECTION, POWDER, FOR SOLUTION INTRAMUSCULAR; INTRAVENOUS at 09:10

## 2024-10-16 RX ADMIN — QUETIAPINE FUMARATE 25 MG: 25 TABLET ORAL at 08:10

## 2024-10-16 RX ADMIN — LEVETIRACETAM 500 MG: 500 TABLET, FILM COATED ORAL at 09:10

## 2024-10-16 NOTE — PT/OT/SLP PROGRESS
Physical Therapy Treatment    Patient Name:  Orlando Black   MRN:  [id number]    Recommendations:     Discharge therapy intensity: Moderate Intensity Therapy   Discharge Equipment Recommendations: to be determined by next level of care  Barriers to discharge: Impaired mobility and Ongoing medical needs    Assessment:     Orlando Black is a 74 y.o. 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  She presents with the following impairments/functional limitations: weakness, gait instability, impaired endurance, impaired balance, impaired functional mobility, decreased lower extremity function, orthopedic precautions.    Rehab Prognosis: Good; patient would benefit from acute skilled PT services to address these deficits and reach maximum level of function.    Recent Surgery: Procedure(s) (LRB):  ARTHROSCOPY, KNEE, WITH DEBRIDEMENT (Right) 19 Days Post-Op    Plan:     During this hospitalization, patient would benefit from acute PT services 5 x/week to address the identified rehab impairments via gait training, therapeutic activities, therapeutic exercises and progress toward the following goals:    Plan of Care Expires:  11/08/24    Subjective     Chief Complaint: none stated  Patient/Family Comments/goals: none stated  Pain/Comfort:         Objective:     Communicated with nursing prior to session.  Patient found HOB elevated with telemetry, pulse ox (continuous), chair check, posey vest upon PT entry to room.     General Precautions: Standard, fall, contact  Orthopedic Precautions: RLE weight bearing  as tolerated  Braces: N/A  Respiratory Status: Room air  Blood Pressure: NT    Functional Mobility:  Bed Mobility:     Supine to Sit: stand by assistance  Transfers:     Sit to Stand:  minimum assistance with rolling walker  X1 from bed  X2-3 from chair  Gait: 60' x2 w/RW, CGA  VC for foot clearance on L foot  Seated rest break between trails    Therapeutic Activities/Exercises:      Education:  Patient provided with verbal education education regarding PT role/goals/POC, fall prevention, and safety awareness.  Understanding was verbalized, however additional teaching warranted.     Patient left up in chair with all lines intact, call button in reach, chair alarm on, nurse notified, and posey vest donned    GOALS:   Multidisciplinary Problems       Physical Therapy Goals          Problem: Physical Therapy    Goal Priority Disciplines Outcome Interventions   Physical Therapy Goal     PT, PT/OT Progressing    Description: Goals to be met by: 10/28/24     Patient will increase functional independence with mobility by performin. Supine to sit with Stand-by Assistance  2. Sit to supine with Stand-by Assistance  3. Sit to stand transfer with Stand-by Assistance  4. Bed to chair transfer with Stand-by Assistance using Rolling Walker  5. Gait  x 200 feet with Stand-by Assistance using Rolling Walker.                          Time Tracking:     PT Received On: 10/16/24  PT Start Time: 1010     PT Stop Time: 1040  PT Total Time (min): 30 min     Billable Minutes: Gait Training 30    Treatment Type: Treatment  PT/PTA: PTA     Number of PTA visits since last PT visit: 3     10/16/2024

## 2024-10-16 NOTE — PLAN OF CARE
Problem: Adult Inpatient Plan of Care  Goal: Plan of Care Review  Outcome: Progressing  Goal: Patient-Specific Goal (Individualized)  Outcome: Progressing  Goal: Absence of Hospital-Acquired Illness or Injury  Outcome: Progressing  Goal: Optimal Comfort and Wellbeing  Outcome: Progressing  Goal: Readiness for Transition of Care  Outcome: Progressing     Problem: Stroke, Subarachnoid Hemorrhage  Goal: Optimal Coping  Outcome: Progressing  Goal: Effective Bowel Elimination  Outcome: Progressing  Goal: Optimal Cerebral Tissue Perfusion  Outcome: Progressing  Goal: Optimal Cognitive Function  Outcome: Progressing  Goal: Effective Communication Skills  Outcome: Progressing  Goal: Optimal Functional Ability  Outcome: Progressing  Goal: Optimal Nutrition Intake  Outcome: Progressing  Goal: Optimal Pain Control and Function  Outcome: Progressing  Goal: Effective Oxygenation and Ventilation  Outcome: Progressing  Goal: Improved Sensorimotor Function  Outcome: Progressing  Goal: Safe and Effective Swallow  Outcome: Progressing  Goal: Effective Urinary Elimination  Outcome: Progressing

## 2024-10-16 NOTE — PLAN OF CARE
SSC spoke with family who expressed pt's primary MD works at [facility] and would like referral sent to them. Sent referral via [employer]. Spoke with Savanah @ [facility], who states pt looks good to them and they will reach out to family and let me know if they will submit for auth. Family has expressed interests in Home Health, I explained to them about Home infusions and pt''s  Edil thinks that would be a little concerning for him to take care. Informed Edil that Savanah with [facility] will be reaching out to him today.

## 2024-10-16 NOTE — PROGRESS NOTES
[facility] Hospital Medicine Progress Note        Chief Complaint: Inpatient Follow-up     HPI:   73-year-old female with no significant past medical history presented to outlying facility with fever, arthralgia, back pain.  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  While in-house patient developed new onset encephalopathy and CT head showed intraparenchymal hematoma-left occipital lobe and was transferred to our hospital for higher level of care.  Patient was admitted to ICU.  CT surgery, Neurosurgery, Neurology, Infectious Disease consulted along with Orthopedics.  CV surgery recommended conservative management with IV antibiotics for endocarditis and repeat echocardiogram at the completion of antibiotics.  Neurosurgery recommended close neuro checks in no acute neurosurgical intervention, Keppra for seizure prevention.  Infectious Disease evaluated, cultures speciated as MSSA and therefore they changed antibiotics to oxacillin.  Neurosurgery ordered MRI brain and MRI C-spine.  Orthopedics took her to OR for right knee arthroscopy/incision/drainage.  Interventional Neurology did cerebral angiogram, no evidence of mycotic aneurysm, AVM or AV fistula.  Patient had an incidental left cavernous ICA aneurysm.  MRI C-spine with epidural enhancement, cervical osteomyelitis and fluid collection on the thoracic spine as well.  Patient cleared for downgraded from ICU on 10/12 hospital medicine services.  Therapy Services following, recommending skilled nursing facility placement.  Oxacillin latest switch to Ancef for convenience with end date 11/13, case management working on LTAC which unfortunately was denied by insurance.  While on cefazolin patient developed  low-grade temp spikes and therefore Infectious Disease re-evaluated and switched back to oxacillin.  Limited TTE repeated which showed persistent mitral valve vegetation, blood cultures repeated.  Also with change in mentation, CT head with no acute changes, on delirium precautions.  Patient noted to have some swelling involving lower extremity, no DVT, found to have Baker cyst.  Cleared for VTE prophylaxis of 10/8.  Given continued intermittent confusion a UA was obtained which was concerning for UTI and cultures growing ESBL E coli sensitive to Cipro, patient initiated on p.o. ciprofloxacin.  Fever spikes and confusion improved on Cipro.  Case management actively working on placement.  Infectious Disease decided to extend course to 8 weeks.     OT/PT was started and she was progressing well. Labs and vitals was stable. She did have some cognitive deficits. But overall was making a good clinical recovery. CM working on placement. If unable to place in a SNF unit then we will have to dc home with HH and iv abx infusion.      Interval Hx:   No significant events overnight. Afebrile. Vitals stable.  Tolerating PO.  Discussed with nursing.     Objective/physical exam:  General: In no acute distress  Chest: Clear to auscultation bilaterally  Heart: RRR, +S1, S2, no appreciable murmur  Abdomen: Soft, nontender, BS +  MSK: Warm, no lower extremity edema, no clubbing or cyanosis  Neurologic: Cranial nerve II-XII intact, Strength 5/5 in all 4 extremities    VITAL SIGNS: 24 HRS MIN & MAX LAST   Temp  Min: 97.3 °F (36.3 °C)  Max: 99.1 °F (37.3 °C) 97.6 °F (36.4 °C)   BP  Min: 106/70  Max: 136/83 117/66   Pulse  Min: 90  Max: 111  90   Resp  Min: 18  Max: 18 18   SpO2  Min: 96 %  Max: 97 % 97 %       Recent Labs   Lab 10/11/24  0955 10/12/24  0236 10/13/24  0301   WBC 6.31 5.51 5.54   RBC 2.93* 2.75* 2.62*   HGB 9.3* 8.5* 8.2*   HCT 28.1* 26.8* 25.5*   MCV 95.9* 97.5* 97.3*   MCH 31.7* 30.9 31.3*   MCHC 33.1 31.7* 32.2*   RDW  13.2 13.2 13.1    268 248   MPV 8.7 9.1 8.4       Recent Labs   Lab 10/11/24  0815 10/12/24  0236 10/13/24  0301 10/14/24  0311 10/15/24  0352    141 139 139 139   K 3.2* 3.0* 2.8* 2.7* 3.8    103 100 102 104   CO2 24 23 25 25 23   BUN 5.7* 6.4* 6.4* 6.0* 6.0*   CREATININE 0.59 0.58 0.61 0.57 0.60   CALCIUM 8.5 8.1* 8.0* 8.3* 8.0*   MG  --   --  1.40* 1.50*  --    ALBUMIN 2.2* 1.9* 1.8*  --   --    ALKPHOS 109 102 96  --   --    ALT 5 5 8  --   --    AST 22 22 25  --   --    BILITOT 0.4 0.3 0.3  --   --           Microbiology Results (last 7 days)       Procedure Component Value Units Date/Time    Fungal Culture [[id number]]  (Normal) Collected: 09/27/24 1303    Order Status: Completed Specimen: Body Fluid from Joint Fluid, Right Knee Updated: 10/11/24 1401     Fungal Culture No Fungus Isolated at 2 Weeks    Blood Culture [[id number]]  (Normal) Collected: 10/06/24 0823    Order Status: Completed Specimen: Blood from Hand, Left Updated: 10/11/24 0901     Blood Culture No Growth at 5 days    Blood Culture [[id number]]  (Normal) Collected: 10/06/24 0823    Order Status: Completed Specimen: Blood from Arm, Right Updated: 10/11/24 0901     Blood Culture No Growth at 5 days             Radiology:  CV Ultrasound doppler venous legs bilat  The right lower extremity venous system is patent with no evidence of   superficial or deep vein thrombosis.  The left lower extremity venous system is patent with no evidence of   superficial or deep vein thrombosis.  A 4 x 3 x 2 cm bakers cyst was identified in the left popliteal space.         Medications:  Scheduled Meds:   amLODIPine  5 mg Oral Daily    enoxparin  40 mg Subcutaneous Q24H (prophylaxis, 1700)    famotidine  20 mg Oral BID    levETIRAcetam  500 mg Oral BID    metoprolol succinate  12.5 mg Oral Daily    oxacillin IV (PEDS and ADULTS)  2 g Intravenous Q4H    QUEtiapine  25 mg Oral QHS     Continuous Infusions:  PRN Meds:.  Current Facility-Administered  Medications:     acetaminophen, 650 mg, Oral, Q6H PRN    bisacodyL, 10 mg, Rectal, Daily PRN    camphor-methyl salicyl-menthoL, , Topical (Top), BID PRN    dextrose 10%, 12.5 g, Intravenous, PRN    dextrose 10%, 25 g, Intravenous, PRN    labetalol, 10 mg, Intravenous, Q15 Min PRN    LIDOcaine, 1 patch, Transdermal, Q24H PRN    morphine, 2 mg, Intravenous, Q6H PRN    ondansetron, 8 mg, Oral, Q8H PRN    sodium chloride 0.9%, 10 mL, Intravenous, PRN    sodium chloride 0.9%, 10 mL, Intravenous, PRN        Assessment/Plan:   MSSA bacteremia-cleared   Sepsis secondary to above-improved  Disseminated MSSA infection   Mitral valve endocarditis with mitral regurgitation  Septic arthritis/osteomyelitis sterno mandibular joint   Cervical diskitis/osteomyelitis with small 3 vertebral abscess  Right knee septic arthritis status post arthroscopy, washout 9/27   Hypokalemia   Hypomagnesemia      Right knee Baker's cyst   Anemia of chronic disease  Acute bacterial UTI secondary to ESBL E coli-completed treatment  Avascular necrosis right femoral head   Intraparenchymal hemorrhage-left occipital lobe   Incidental finding left cavernous ICA aneurysm   Acute encephalopathy-likely metabolic/hospital associated : resolved delirium/secondary to IPH, improved   New diagnosis essential HTN -stable    IV Oxacillin until 11/24. Completed Raine   working on rehab with antibiotics vs. Home infusions.  Family has concerns about level of care needed at home  PT/OT: moderate intensity therapy  Follow up labs this morning but have been stable. Plan for weekly labs hereafter, q Mon      Rupert Desir MD   10/17/2024    All diagnosis and differential diagnosis have been reviewed; assessment and plan has been documented; I have personally reviewed the labs and test results that are presently available; I have reviewed the patients medication list; I have reviewed the consulting providers response and recommendations. I have reviewed or attempted  to review medical records based upon their availability    All of the patient's questions have been  addressed and answered. Patient's is agreeable to the above stated plan. I will continue to monitor closely and make adjustments to medical management as needed.  _____________________________________________________________________

## 2024-10-16 NOTE — PROGRESS NOTES
Inpatient Nutrition Assessment    Admit Date: 9/24/2024   Total duration of encounter: 22 days   Patient Age: 74 y.o.    Nutrition Recommendation/Prescription     Continue Diet Heart Healthy as tolerated.   Encouraged increased oral intake; small and frequent meals.    Communication of Recommendations: reviewed with patient    Nutrition Assessment     Malnutrition Assessment/Nutrition-Focused Physical Exam    Malnutrition Context: acute illness or injury (10/16/24 0920)  Malnutrition Level:  (does not meet criteria) (10/16/24 0920)  Energy Intake (Malnutrition): less than 75% for greater than 7 days (does not meet criteria) (10/16/24 0920)  Weight Loss (Malnutrition):  (does not meet criteria) (10/16/24 0920)  Subcutaneous Fat (Malnutrition):  (does not meet criteria) (10/16/24 0920)           Muscle Mass (Malnutrition):  (does not meet criteria) (10/16/24 0920)                          Fluid Accumulation (Malnutrition):  (does not meet criteria) (10/16/24 0920)  Hand  Strength, Left (Malnutrition): unable to determine (10/16/24 0920)  Hand  Strength, Right (Malnutrition): unable to determine (10/16/24 0920)  A minimum of two characteristics is recommended for diagnosis of either severe or non-severe malnutrition.    Chart Review    Reason Seen: malnutrition screening tool (MST) and follow-up    Malnutrition Screening Tool Results   Have you recently lost weight without trying?: Unsure  Have you been eating poorly because of a decreased appetite?: Yes   MST Score: 3   Diagnosis:  MSSA bacteremia-cleared   Sepsis secondary to above-improved  Disseminated MSSA infection   Mitral valve endocarditis with mitral regurgitation  Septic arthritis/osteomyelitis sterno mandibular joint   Cervical diskitis/osteomyelitis with small 3 vertebral abscess  Right knee septic arthritis status post arthroscopy, washout 9/27   Hypokalemia   Hypomagnesemia        Relevant Medical History: History reviewed. No pertinent past  medical history.     Scheduled Medications:  amLODIPine, 5 mg, Daily  enoxparin, 40 mg, Q24H (prophylaxis, 1700)  famotidine, 20 mg, BID  levETIRAcetam, 500 mg, BID  metoprolol succinate, 12.5 mg, Daily  oxacillin IV (PEDS and ADULTS), 2 g, Q4H  QUEtiapine, 25 mg, QHS    Continuous Infusions:   PRN Medications:  acetaminophen, 650 mg, Q6H PRN  bisacodyL, 10 mg, Daily PRN  camphor-methyl salicyl-menthoL, , BID PRN  dextrose 10%, 12.5 g, PRN  dextrose 10%, 25 g, PRN  labetalol, 10 mg, Q15 Min PRN  LIDOcaine, 1 patch, Q24H PRN  morphine, 2 mg, Q6H PRN  ondansetron, 8 mg, Q8H PRN  sodium chloride 0.9%, 10 mL, PRN  sodium chloride 0.9%, 10 mL, PRN    Calorie Containing IV Medications: no significant kcals from medications at this time    Recent Labs   Lab 10/11/24  0815 10/11/24  0955 10/12/24  0236 10/13/24  0301 10/14/24  0311 10/15/24  0352     --  141 139 139 139   K 3.2*  --  3.0* 2.8* 2.7* 3.8   CALCIUM 8.5  --  8.1* 8.0* 8.3* 8.0*   MG  --   --   --  1.40* 1.50*  --      --  103 100 102 104   CO2 24  --  23 25 25 23   BUN 5.7*  --  6.4* 6.4* 6.0* 6.0*   CREATININE 0.59  --  0.58 0.61 0.57 0.60   EGFRNORACEVR >60  --  >60 >60 >60 >60   GLUCOSE 87  --  75* 104 91 77*   BILITOT 0.4  --  0.3 0.3  --   --    ALKPHOS 109  --  102 96  --   --    ALT 5  --  5 8  --   --    AST 22  --  22 25  --   --    ALBUMIN 2.2*  --  1.9* 1.8*  --   --    WBC  --  6.31 5.51 5.54  --   --    HGB  --  9.3* 8.5* 8.2*  --   --    HCT  --  28.1* 26.8* 25.5*  --   --      Nutrition Orders:  Diet Heart Healthy      Appetite/Oral Intake: fair/50-75% of meals (lunch and dinner)  Factors Affecting Nutritional Intake: decreased appetite and food preference  Social Needs Impacting Access to Food: none identified  Food/Rastafari/Cultural Preferences: none reported  Food Allergies: none reported  Last Bowel Movement: 10/14/24  Wound(s):  No partial or full thickness pressure injuries documented     Comments    9/26/24: Pt currently NPO.  "Pt denies any decreased appetite PTA, unsure of UBW but states she suspects it has been stable. ONS added for extra nourishment once diet has advanced.   10/2/24: Reports decreased appetite since admit s/t food preferences. Typically eats yogurt (blueberry or peach yo plait only) at home with regular coffee. Dinner is her "best" meal. Mentioned for family/visitors to bring meals from home if she wanted to increase po intake. She does not like oral supplements, discontinued order.   10/9/24: Pt states eating enough of her meals, but unable to elaborate on what she is actually eating. Nurse reports ate a better lunch compared to breakfast. She has some snacks and mineral water at bedside she is consuming. Continues to decline oral supplement. Denies any n/v/d/c.   10/16/24: Fair appetite/intake of meals continue. Eating ~50% lunch and dinners. Eager to work with therapy today. Denies any GI complaints.   Anthropometrics    Height: 5' 10.98" (180.3 cm), Height Method: Estimated  Last Weight: 68.1 kg (150 lb 2.1 oz) (10/16/24 0600), Weight Method: Bed Scale  BMI (Calculated): 20.9  BMI Classification: underweight (BMI less than 22 if >65 years of age)     Ideal Body Weight (IBW), Female: 154.9 lb     % Ideal Body Weight, Female (lb): 74.95 %                             Usual Weight Provided By: patient denies unintentional weight loss    Wt Readings from Last 5 Encounters:   10/16/24 68.1 kg (150 lb 2.1 oz)     Weight Change(s) Since Admission: +2.8kg since 10/2/24  Wt Readings from Last 1 Encounters:   10/16/24 0600 68.1 kg (150 lb 2.1 oz)   10/12/24 0600 68.1 kg (150 lb 1 oz)   10/06/24 0500 63.1 kg (139 lb 1.6 oz)   10/03/24 0600 70.2 kg (154 lb 12.8 oz)   10/02/24 0600 65.3 kg (143 lb 14.4 oz)   09/24/24 1835 52.7 kg (116 lb 2.9 oz)   Admit Weight: 52.7 kg (116 lb 2.9 oz) (09/24/24 7428), Weight Method: Bed Scale    Estimated Needs    Weight Used For Calorie Calculations: 68.1 kg (150 lb 2.1 oz)  Energy Calorie " Requirements (kcal): 1703kcals/d (25kcals/kg)  Energy Need Method: Kcal/kg  Weight Used For Protein Calculations: 68.1 kg (150 lb 2.1 oz)  Protein Requirements: 82g/d (1.2g/kg)  Fluid Requirements (mL): 1703ml fl/d (1ml/kcal)        Enteral Nutrition     Patient not receiving enteral nutrition at this time.    Parenteral Nutrition     Patient not receiving parenteral nutrition support at this time.    Evaluation of Received Nutrient Intake    Calories: not meeting estimated needs  Protein: not meeting estimated needs    Patient Education     Not applicable.    Nutrition Diagnosis     PES: Inadequate oral intake related to acute illness as evidenced by </=75% EER >7 days. (new)     Nutrition Interventions     Intervention(s): general/healthful diet, prescription medication, and collaboration with other providers    Goal: Meet greater than 80% of nutritional needs by follow-up. (new)  Goal: Maintain weight throughout hospitalization. (new)    Nutrition Goals & Monitoring     Dietitian will monitor: energy intake, weight change, and gastrointestinal profile  Discharge planning: resume home regimen  Nutrition Risk/Follow-Up: moderate (follow-up in 3-5 days)   Please consult if re-assessment needed sooner.

## 2024-10-16 NOTE — PROGRESS NOTES
[facility] Hospital Medicine Progress Note        Chief Complaint: Inpatient Follow-up for sepsis     HPI:   73-year-old female with no significant past medical history presented to outlying facility with fever, arthralgia, back pain.  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  While in-house patient developed new onset encephalopathy and CT head showed intraparenchymal hematoma-left occipital lobe and was transferred to our hospital for higher level of care.  Patient was admitted to ICU.  CT surgery, Neurosurgery, Neurology, Infectious Disease consulted along with Orthopedics.  CV surgery recommended conservative management with IV antibiotics for endocarditis and repeat echocardiogram at the completion of antibiotics.  Neurosurgery recommended close neuro checks in no acute neurosurgical intervention, Keppra for seizure prevention.  Infectious Disease evaluated, cultures speciated as MSSA and therefore they changed antibiotics to oxacillin.  Neurosurgery ordered MRI brain and MRI C-spine.  Orthopedics took her to OR for right knee arthroscopy/incision/drainage.  Interventional Neurology did cerebral angiogram, no evidence of mycotic aneurysm, AVM or AV fistula.  Patient had an incidental left cavernous ICA aneurysm.  MRI C-spine with epidural enhancement, cervical osteomyelitis and fluid collection on the thoracic spine as well.  Patient cleared for downgraded from ICU on 10/12 hospital medicine services.  Therapy Services following, recommending skilled nursing facility placement.  Oxacillin latest switch to Ancef for convenience with end date 11/13, case management working on LTAC which unfortunately was denied by insurance.  While on cefazolin patient  developed low-grade temp spikes and therefore Infectious Disease re-evaluated and switched back to oxacillin.  Limited TTE repeated which showed persistent mitral valve vegetation, blood cultures repeated.  Also with change in mentation, CT head with no acute changes, on delirium precautions.  Patient noted to have some swelling involving lower extremity, no DVT, found to have Baker cyst.  Cleared for VTE prophylaxis of 10/8.  Given continued intermittent confusion a UA was obtained which was concerning for UTI and cultures growing ESBL E coli sensitive to Cipro, patient initiated on p.o. ciprofloxacin.  Fever spikes and confusion improved on Cipro.  Case management actively working on placement.  Infectious Disease decided to extend course to 8 weeks.    OT/PT was started and she was progressing well. Labs and vitals was stable. She did have some cognitive deficits. But overall was making a good clinical recovery. CM working on placement. If unable to place in a SNF unit then we will have to dc home with HH and iv abx infusion.      Interval Hx:   Patient today awake and comfortable. Has been afebrile. NO fever or chills  Has mild cognitive deficits.     Case was discussed with patient's nurse and  on the floor.    Objective/physical exam:  General: In no acute distress  Chest: Clear to auscultation bilaterally  Heart: RRR, +S1, S2, no appreciable murmur  Abdomen: Soft, nontender, BS +  MSK: Warm, no lower extremity edema, no clubbing or cyanosis  Neurologic: Cranial nerve II-XII intact, Strength 5/5 in all 4 extremities    VITAL SIGNS: 24 HRS MIN & MAX LAST   Temp  Min: 97.9 °F (36.6 °C)  Max: 99.1 °F (37.3 °C) 98 °F (36.7 °C)   BP  Min: 106/70  Max: 134/88 127/79   Pulse  Min: 87  Max: 111  (!) 111   Resp  Min: 18  Max: 18 18   SpO2  Min: 96 %  Max: 99 % 96 %     I have reviewed the following labs:  Recent Labs   Lab 10/11/24  0955 10/12/24  0236 10/13/24  0301   WBC 6.31 5.51 5.54   RBC 2.93* 2.75*  2.62*   HGB 9.3* 8.5* 8.2*   HCT 28.1* 26.8* 25.5*   MCV 95.9* 97.5* 97.3*   MCH 31.7* 30.9 31.3*   MCHC 33.1 31.7* 32.2*   RDW 13.2 13.2 13.1    268 248   MPV 8.7 9.1 8.4     Recent Labs   Lab 10/11/24  0815 10/12/24  0236 10/13/24  0301 10/14/24  0311 10/15/24  0352    141 139 139 139   K 3.2* 3.0* 2.8* 2.7* 3.8    103 100 102 104   CO2 24 23 25 25 23   BUN 5.7* 6.4* 6.4* 6.0* 6.0*   CREATININE 0.59 0.58 0.61 0.57 0.60   CALCIUM 8.5 8.1* 8.0* 8.3* 8.0*   MG  --   --  1.40* 1.50*  --    ALBUMIN 2.2* 1.9* 1.8*  --   --    ALKPHOS 109 102 96  --   --    ALT 5 5 8  --   --    AST 22 22 25  --   --    BILITOT 0.4 0.3 0.3  --   --      Microbiology Results (last 7 days)       Procedure Component Value Units Date/Time    Fungal Culture [[id number]]  (Normal) Collected: 09/27/24 1303    Order Status: Completed Specimen: Body Fluid from Joint Fluid, Right Knee Updated: 10/11/24 1401     Fungal Culture No Fungus Isolated at 2 Weeks    Blood Culture [[id number]]  (Normal) Collected: 10/06/24 0823    Order Status: Completed Specimen: Blood from Hand, Left Updated: 10/11/24 0901     Blood Culture No Growth at 5 days    Blood Culture [[id number]]  (Normal) Collected: 10/06/24 0823    Order Status: Completed Specimen: Blood from Arm, Right Updated: 10/11/24 0901     Blood Culture No Growth at 5 days    Blood Culture [[id number]]  (Normal) Collected: 10/04/24 0842    Order Status: Completed Specimen: Blood from Arm, Right Updated: 10/09/24 1102     Blood Culture No Growth at 5 days    Blood Culture [[id number]]  (Normal) Collected: 10/04/24 0842    Order Status: Completed Specimen: Blood from Arm, Left Updated: 10/09/24 1102     Blood Culture No Growth at 5 days             See below for Radiology    Assessment/Plan:  MSSA bacteremia-cleared   Sepsis secondary to above-improved  Disseminated MSSA infection   Mitral valve endocarditis with mitral regurgitation  Septic arthritis/osteomyelitis sterno mandibular  joint   Cervical diskitis/osteomyelitis with small 3 vertebral abscess  Right knee septic arthritis status post arthroscopy, washout 9/27   Hypokalemia   Hypomagnesemia     Right knee Baker's cyst   Anemia of chronic disease  Acute bacterial UTI secondary to ESBL E coli-completed treatment  Avascular necrosis right femoral head   Intraparenchymal hemorrhage-left occipital lobe   Incidental finding left cavernous ICA aneurysm   Acute encephalopathy-likely metabolic/hospital associated : resolved delirium/secondary to IPH, improved   New diagnosis essential HTN -stable    Plan:  Patient has no new issues. In a good mood   She is ready to participate with PT  She does have some cognitive deficits, continue OT  Continue iv oxacillin per ID team     BMP, mag in am     CM working on placement but if unsuccessful then will have to consider Home with HH and iv abx infusion     VTE prophylaxis: Lovenox     Patient condition:  Fair    Anticipated discharge and Disposition:   SNF vs home with HH       All diagnosis and differential diagnosis have been reviewed; assessment and plan has been documented; I have personally reviewed the labs and test results that are presently available; I have reviewed the patients medication list; I have reviewed the consulting providers response and recommendations. I have reviewed or attempted to review medical records based upon their availability    All of the patient's questions have been  addressed and answered. Patient's is agreeable to the above stated plan. I will continue to monitor closely and make adjustments to medical management as needed.    Portions of this note dictated using EMR integrated voice recognition software, and may be subject to voice recognition errors not corrected at proofreading. Please contact writer for clarification if needed.   _____________________________________________________________________    Malnutrition Status:    Scheduled Med:   amLODIPine  5 mg Oral  Daily    enoxparin  40 mg Subcutaneous Q24H (prophylaxis, 1700)    famotidine  20 mg Oral BID    levETIRAcetam  500 mg Oral BID    metoprolol succinate  12.5 mg Oral Daily    oxacillin IV (PEDS and ADULTS)  2 g Intravenous Q4H    QUEtiapine  25 mg Oral QHS      Continuous Infusions:     PRN Meds:    Current Facility-Administered Medications:     acetaminophen, 650 mg, Oral, Q6H PRN    bisacodyL, 10 mg, Rectal, Daily PRN    camphor-methyl salicyl-menthoL, , Topical (Top), BID PRN    dextrose 10%, 12.5 g, Intravenous, PRN    dextrose 10%, 25 g, Intravenous, PRN    labetalol, 10 mg, Intravenous, Q15 Min PRN    LIDOcaine, 1 patch, Transdermal, Q24H PRN    morphine, 2 mg, Intravenous, Q6H PRN    ondansetron, 8 mg, Oral, Q8H PRN    sodium chloride 0.9%, 10 mL, Intravenous, PRN    sodium chloride 0.9%, 10 mL, Intravenous, PRN     Radiology:  I have personally reviewed the following imaging and agree with the radiologist.     CV Ultrasound doppler venous legs bilat  The right lower extremity venous system is patent with no evidence of   superficial or deep vein thrombosis.  The left lower extremity venous system is patent with no evidence of   superficial or deep vein thrombosis.  A 4 x 3 x 2 cm bakers cyst was identified in the left popliteal space.       Mika Liu MD  Department of Hospital Medicine   [facility]   10/16/2024

## 2024-10-16 NOTE — PT/OT/SLP PROGRESS
Occupational Therapy      Patient Name:  Orlando Black   MRN:  [id number]    Patient not seen today secondary to refusing Tx. Will follow-up as schedule allows.    10/16/2024

## 2024-10-17 LAB
ANION GAP SERPL CALC-SCNC: 13 MEQ/L
BASOPHILS # BLD AUTO: 0.08 X10(3)/MCL
BASOPHILS NFR BLD AUTO: 1.1 %
BUN SERPL-MCNC: 5.6 MG/DL (ref 9.8–20.1)
CALCIUM SERPL-MCNC: 8.1 MG/DL (ref 8.4–10.2)
CHLORIDE SERPL-SCNC: 104 MMOL/L (ref 98–107)
CO2 SERPL-SCNC: 22 MMOL/L (ref 23–31)
CREAT SERPL-MCNC: 0.71 MG/DL (ref 0.55–1.02)
CREAT/UREA NIT SERPL: 8
EOSINOPHIL # BLD AUTO: 0.5 X10(3)/MCL (ref 0–0.9)
EOSINOPHIL NFR BLD AUTO: 6.6 %
ERYTHROCYTE [DISTWIDTH] IN BLOOD BY AUTOMATED COUNT: 13.7 % (ref 11.5–17)
GFR SERPLBLD CREATININE-BSD FMLA CKD-EPI: >60 ML/MIN/1.73/M2
GLUCOSE SERPL-MCNC: 98 MG/DL (ref 82–115)
HCT VFR BLD AUTO: 27.2 % (ref 37–47)
HGB BLD-MCNC: 8.4 G/DL (ref 12–16)
IMM GRANULOCYTES # BLD AUTO: 0.08 X10(3)/MCL (ref 0–0.04)
IMM GRANULOCYTES NFR BLD AUTO: 1.1 %
LYMPHOCYTES # BLD AUTO: 1.51 X10(3)/MCL (ref 0.6–4.6)
LYMPHOCYTES NFR BLD AUTO: 20.1 %
MAGNESIUM SERPL-MCNC: 1.56 MG/DL (ref 1.6–2.6)
MCH RBC QN AUTO: 31 PG (ref 27–31)
MCHC RBC AUTO-ENTMCNC: 30.9 G/DL (ref 33–36)
MCV RBC AUTO: 100.4 FL (ref 80–94)
MONOCYTES # BLD AUTO: 0.75 X10(3)/MCL (ref 0.1–1.3)
MONOCYTES NFR BLD AUTO: 10 %
NEUTROPHILS # BLD AUTO: 4.61 X10(3)/MCL (ref 2.1–9.2)
NEUTROPHILS NFR BLD AUTO: 61.1 %
NRBC BLD AUTO-RTO: 0 %
PLATELET # BLD AUTO: 276 X10(3)/MCL (ref 130–400)
PMV BLD AUTO: 8.5 FL (ref 7.4–10.4)
POCT GLUCOSE: 89 MG/DL (ref 70–110)
POCT GLUCOSE: 91 MG/DL (ref 70–110)
POCT GLUCOSE: 94 MG/DL (ref 70–110)
POTASSIUM SERPL-SCNC: 3.3 MMOL/L (ref 3.5–5.1)
RBC # BLD AUTO: 2.71 X10(6)/MCL (ref 4.2–5.4)
SODIUM SERPL-SCNC: 139 MMOL/L (ref 136–145)
WBC # BLD AUTO: 7.53 X10(3)/MCL (ref 4.5–11.5)

## 2024-10-17 PROCEDURE — 21400001 HC TELEMETRY ROOM

## 2024-10-17 PROCEDURE — 80048 BASIC METABOLIC PNL TOTAL CA: CPT | Performed by: INTERNAL MEDICINE

## 2024-10-17 PROCEDURE — 27000207 HC ISOLATION

## 2024-10-17 PROCEDURE — 25000003 PHARM REV CODE 250: Performed by: INTERNAL MEDICINE

## 2024-10-17 PROCEDURE — 63600175 PHARM REV CODE 636 W HCPCS: Performed by: INTERNAL MEDICINE

## 2024-10-17 PROCEDURE — 25000003 PHARM REV CODE 250: Performed by: HOSPITALIST

## 2024-10-17 PROCEDURE — 97535 SELF CARE MNGMENT TRAINING: CPT | Mod: CO

## 2024-10-17 PROCEDURE — 85025 COMPLETE CBC W/AUTO DIFF WBC: CPT | Performed by: INTERNAL MEDICINE

## 2024-10-17 PROCEDURE — 97530 THERAPEUTIC ACTIVITIES: CPT | Mod: CO

## 2024-10-17 PROCEDURE — 36415 COLL VENOUS BLD VENIPUNCTURE: CPT | Performed by: INTERNAL MEDICINE

## 2024-10-17 PROCEDURE — 83735 ASSAY OF MAGNESIUM: CPT | Performed by: INTERNAL MEDICINE

## 2024-10-17 PROCEDURE — 97116 GAIT TRAINING THERAPY: CPT | Mod: CQ

## 2024-10-17 PROCEDURE — 25000003 PHARM REV CODE 250: Performed by: STUDENT IN AN ORGANIZED HEALTH CARE EDUCATION/TRAINING PROGRAM

## 2024-10-17 PROCEDURE — 97530 THERAPEUTIC ACTIVITIES: CPT | Mod: CQ

## 2024-10-17 PROCEDURE — 25000003 PHARM REV CODE 250: Performed by: NEUROLOGICAL SURGERY

## 2024-10-17 RX ORDER — LANOLIN ALCOHOL/MO/W.PET/CERES
400 CREAM (GRAM) TOPICAL ONCE
Status: COMPLETED | OUTPATIENT
Start: 2024-10-17 | End: 2024-10-17

## 2024-10-17 RX ORDER — POTASSIUM CHLORIDE 20 MEQ/1
40 TABLET, EXTENDED RELEASE ORAL ONCE
Status: COMPLETED | OUTPATIENT
Start: 2024-10-17 | End: 2024-10-17

## 2024-10-17 RX ADMIN — AMLODIPINE BESYLATE 5 MG: 5 TABLET ORAL at 10:10

## 2024-10-17 RX ADMIN — OXACILLIN 2 G: 2 INJECTION, POWDER, FOR SOLUTION INTRAMUSCULAR; INTRAVENOUS at 10:10

## 2024-10-17 RX ADMIN — POTASSIUM CHLORIDE 40 MEQ: 1500 TABLET, EXTENDED RELEASE ORAL at 06:10

## 2024-10-17 RX ADMIN — LEVETIRACETAM 500 MG: 500 TABLET, FILM COATED ORAL at 09:10

## 2024-10-17 RX ADMIN — OXACILLIN 2 G: 2 INJECTION, POWDER, FOR SOLUTION INTRAMUSCULAR; INTRAVENOUS at 05:10

## 2024-10-17 RX ADMIN — ENOXAPARIN SODIUM 40 MG: 40 INJECTION SUBCUTANEOUS at 05:10

## 2024-10-17 RX ADMIN — METOPROLOL SUCCINATE 12.5 MG: 25 TABLET, EXTENDED RELEASE ORAL at 10:10

## 2024-10-17 RX ADMIN — LEVETIRACETAM 500 MG: 500 TABLET, FILM COATED ORAL at 10:10

## 2024-10-17 RX ADMIN — Medication 400 MG: at 06:10

## 2024-10-17 RX ADMIN — FAMOTIDINE 20 MG: 20 TABLET, FILM COATED ORAL at 09:10

## 2024-10-17 RX ADMIN — OXACILLIN 2 G: 2 INJECTION, POWDER, FOR SOLUTION INTRAMUSCULAR; INTRAVENOUS at 09:10

## 2024-10-17 RX ADMIN — FAMOTIDINE 20 MG: 20 TABLET, FILM COATED ORAL at 10:10

## 2024-10-17 RX ADMIN — OXACILLIN 2 G: 2 INJECTION, POWDER, FOR SOLUTION INTRAMUSCULAR; INTRAVENOUS at 02:10

## 2024-10-17 RX ADMIN — OXACILLIN 2 G: 2 INJECTION, POWDER, FOR SOLUTION INTRAMUSCULAR; INTRAVENOUS at 03:10

## 2024-10-17 RX ADMIN — ACETAMINOPHEN 650 MG: 325 TABLET ORAL at 03:10

## 2024-10-17 RX ADMIN — QUETIAPINE FUMARATE 25 MG: 25 TABLET ORAL at 09:10

## 2024-10-17 NOTE — PLAN OF CARE
Problem: Adult Inpatient Plan of Care  Goal: Plan of Care Review  10/17/2024 0526 by Yunior Diggs LPN  Outcome: Progressing  10/17/2024 0043 by Yunior Diggs LPN  Outcome: Progressing  Goal: Patient-Specific Goal (Individualized)  10/17/2024 0526 by Yunior Diggs LPN  Outcome: Progressing  10/17/2024 0043 by Yunior Diggs LPN  Outcome: Progressing  Goal: Absence of Hospital-Acquired Illness or Injury  10/17/2024 0526 by Yunior Diggs LPN  Outcome: Progressing  10/17/2024 0043 by Yunior Diggs LPN  Outcome: Progressing  Goal: Optimal Comfort and Wellbeing  10/17/2024 0526 by Yunior Diggs LPN  Outcome: Progressing  10/17/2024 0043 by Yunior Diggs LPN  Outcome: Progressing  Goal: Readiness for Transition of Care  10/17/2024 0526 by Yunior Diggs LPN  Outcome: Progressing  10/17/2024 0043 by Yunior Diggs LPN  Outcome: Progressing     Problem: Stroke, Subarachnoid Hemorrhage  Goal: Optimal Coping  10/17/2024 0526 by Yunior Diggs LPN  Outcome: Progressing  10/17/2024 0043 by Yunior Diggs LPN  Outcome: Progressing  Goal: Effective Bowel Elimination  10/17/2024 0526 by Yunior Diggs LPN  Outcome: Progressing  10/17/2024 0043 by Yunior Diggs LPN  Outcome: Progressing  Goal: Optimal Cerebral Tissue Perfusion  10/17/2024 0526 by Yunior Diggs LPN  Outcome: Progressing  10/17/2024 0043 by Yunior Diggs LPN  Outcome: Progressing  Goal: Optimal Cognitive Function  10/17/2024 0526 by Yunior Diggs LPN  Outcome: Progressing  10/17/2024 0043 by Yunior Diggs LPN  Outcome: Progressing  Goal: Effective Communication Skills  10/17/2024 0526 by Yunior Diggs LPN  Outcome: Progressing  10/17/2024 0043 by Yunior Diggs LPN  Outcome: Progressing  Goal: Optimal Functional Ability  10/17/2024 0526 by Yunior Diggs LPN  Outcome: Progressing  10/17/2024 0043 by Yunior Diggs LPN  Outcome: Progressing  Goal: Optimal Nutrition Intake  10/17/2024 0526 by Yunior Diggs LPN  Outcome: Progressing  10/17/2024 0043 by  White, Tamsyn, LPN  Outcome: Progressing  Goal: Optimal Pain Control and Function  10/17/2024 0526 by Yunior Diggs LPN  Outcome: Progressing  10/17/2024 0043 by Yunior Diggs LPN  Outcome: Progressing  Goal: Effective Oxygenation and Ventilation  10/17/2024 0526 by Yunior Diggs LPN  Outcome: Progressing  10/17/2024 0043 by Yunior Diggs LPN  Outcome: Progressing  Goal: Improved Sensorimotor Function  10/17/2024 0526 by Yunior Diggs LPN  Outcome: Progressing  10/17/2024 0043 by Yunior Diggs LPN  Outcome: Progressing  Goal: Safe and Effective Swallow  10/17/2024 0526 by Yunior Diggs LPN  Outcome: Progressing  10/17/2024 0043 by Yunior Diggs LPN  Outcome: Progressing  Goal: Effective Urinary Elimination  10/17/2024 0526 by Yunior Diggs LPN  Outcome: Progressing  10/17/2024 0043 by Yunior Diggs LPN  Outcome: Progressing

## 2024-10-17 NOTE — PROGRESS NOTES
[facility] Hospital Medicine Progress Note        Chief Complaint: Inpatient Follow-up     HPI:   73-year-old female with no significant past medical history presented to outlying facility with fever, arthralgia, back pain.  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  While in-house patient developed new onset encephalopathy and CT head showed intraparenchymal hematoma-left occipital lobe and was transferred to our hospital for higher level of care.  Patient was admitted to ICU.  CT surgery, Neurosurgery, Neurology, Infectious Disease consulted along with Orthopedics.  CV surgery recommended conservative management with IV antibiotics for endocarditis and repeat echocardiogram at the completion of antibiotics.  Neurosurgery recommended close neuro checks in no acute neurosurgical intervention, Keppra for seizure prevention.  Infectious Disease evaluated, cultures speciated as MSSA and therefore they changed antibiotics to oxacillin.  Neurosurgery ordered MRI brain and MRI C-spine.  Orthopedics took her to OR for right knee arthroscopy/incision/drainage.  Interventional Neurology did cerebral angiogram, no evidence of mycotic aneurysm, AVM or AV fistula.  Patient had an incidental left cavernous ICA aneurysm.  MRI C-spine with epidural enhancement, cervical osteomyelitis and fluid collection on the thoracic spine as well.  Patient cleared for downgraded from ICU on 10/12 hospital medicine services.  Therapy Services following, recommending skilled nursing facility placement.  Oxacillin latest switch to Ancef for convenience with end date 11/13, case management working on LTAC which unfortunately was denied by insurance.  While on cefazolin patient developed  low-grade temp spikes and therefore Infectious Disease re-evaluated and switched back to oxacillin.  Limited TTE repeated which showed persistent mitral valve vegetation, blood cultures repeated.  Also with change in mentation, CT head with no acute changes, on delirium precautions.  Patient noted to have some swelling involving lower extremity, no DVT, found to have Baker cyst.  Cleared for VTE prophylaxis of 10/8.  Given continued intermittent confusion a UA was obtained which was concerning for UTI and cultures growing ESBL E coli sensitive to Cipro, patient initiated on p.o. ciprofloxacin.  Fever spikes and confusion improved on Cipro.  Case management actively working on placement.  Infectious Disease decided to extend course to 8 weeks.     OT/PT was started and she was progressing well. Labs and vitals was stable. She did have some cognitive deficits. But overall was making a good clinical recovery. CM working on placement. If unable to place in a SNF unit then we will have to dc home with HH and iv abx infusion.      Interval Hx:   Resting comfortably in bed.  No significant changes overnight. Vitals stable     Objective/physical exam:  General: In no acute distress  Chest: Clear to auscultation bilaterally  Heart: RRR, +S1, S2, no appreciable murmur  Abdomen: Soft, nontender, BS +  MSK: Warm, no lower extremity edema, no clubbing or cyanosis  Neurologic: Cranial nerve II-XII intact, Strength 5/5 in all 4 extremities    VITAL SIGNS: 24 HRS MIN & MAX LAST   Temp  Min: 97.4 °F (36.3 °C)  Max: 98 °F (36.7 °C) 97.4 °F (36.3 °C)   BP  Min: 108/61  Max: 117/72 112/74   Pulse  Min: 92  Max: 100  97   Resp  Min: 18  Max: 19 18   SpO2  Min: 97 %  Max: 100 % 100 %       Recent Labs   Lab 10/12/24  0236 10/13/24  0301 10/17/24  0211   WBC 5.51 5.54 7.53   RBC 2.75* 2.62* 2.71*   HGB 8.5* 8.2* 8.4*   HCT 26.8* 25.5* 27.2*   MCV 97.5* 97.3* 100.4*   MCH 30.9 31.3* 31.0   MCHC 31.7* 32.2* 30.9*   RDW 13.2 13.1 13.7   PLT  268 248 276   MPV 9.1 8.4 8.5       Recent Labs   Lab 10/11/24  0815 10/12/24  0236 10/13/24  0301 10/14/24  0311 10/15/24  0352 10/17/24  0211    141 139 139 139 139   K 3.2* 3.0* 2.8* 2.7* 3.8 3.3*    103 100 102 104 104   CO2 24 23 25 25 23 22*   BUN 5.7* 6.4* 6.4* 6.0* 6.0* 5.6*   CREATININE 0.59 0.58 0.61 0.57 0.60 0.71   CALCIUM 8.5 8.1* 8.0* 8.3* 8.0* 8.1*   MG  --   --  1.40* 1.50*  --  1.56*   ALBUMIN 2.2* 1.9* 1.8*  --   --   --    ALKPHOS 109 102 96  --   --   --    ALT 5 5 8  --   --   --    AST 22 22 25  --   --   --    BILITOT 0.4 0.3 0.3  --   --   --           Microbiology Results (last 7 days)       Procedure Component Value Units Date/Time    Fungal Culture [[id number]]  (Normal) Collected: 09/27/24 1303    Order Status: Completed Specimen: Body Fluid from Joint Fluid, Right Knee Updated: 10/11/24 1401     Fungal Culture No Fungus Isolated at 2 Weeks    Blood Culture [[id number]]  (Normal) Collected: 10/06/24 0823    Order Status: Completed Specimen: Blood from Hand, Left Updated: 10/11/24 0901     Blood Culture No Growth at 5 days    Blood Culture [[id number]]  (Normal) Collected: 10/06/24 0823    Order Status: Completed Specimen: Blood from Arm, Right Updated: 10/11/24 0901     Blood Culture No Growth at 5 days             Radiology:  CV Ultrasound doppler venous legs bilat  The right lower extremity venous system is patent with no evidence of   superficial or deep vein thrombosis.  The left lower extremity venous system is patent with no evidence of   superficial or deep vein thrombosis.  A 4 x 3 x 2 cm bakers cyst was identified in the left popliteal space.         Medications:  Scheduled Meds:   amLODIPine  5 mg Oral Daily    enoxparin  40 mg Subcutaneous Q24H (prophylaxis, 1700)    famotidine  20 mg Oral BID    levETIRAcetam  500 mg Oral BID    metoprolol succinate  12.5 mg Oral Daily    oxacillin IV (PEDS and ADULTS)  2 g Intravenous Q4H    QUEtiapine  25 mg Oral QHS      Continuous Infusions:  PRN Meds:.  Current Facility-Administered Medications:     acetaminophen, 650 mg, Oral, Q6H PRN    bisacodyL, 10 mg, Rectal, Daily PRN    camphor-methyl salicyl-menthoL, , Topical (Top), BID PRN    dextrose 10%, 12.5 g, Intravenous, PRN    dextrose 10%, 25 g, Intravenous, PRN    labetalol, 10 mg, Intravenous, Q15 Min PRN    LIDOcaine, 1 patch, Transdermal, Q24H PRN    morphine, 2 mg, Intravenous, Q6H PRN    ondansetron, 8 mg, Oral, Q8H PRN    sodium chloride 0.9%, 10 mL, Intravenous, PRN    sodium chloride 0.9%, 10 mL, Intravenous, PRN        Assessment/Plan:   MSSA bacteremia-cleared   Sepsis secondary to above-improved  Disseminated MSSA infection   Mitral valve endocarditis with mitral regurgitation  Septic arthritis/osteomyelitis sterno mandibular joint   Cervical diskitis/osteomyelitis with small 3 vertebral abscess  Right knee septic arthritis status post arthroscopy, washout 9/27   Hypokalemia   Hypomagnesemia   Right knee Baker's cyst   Anemia of chronic disease  Acute bacterial UTI secondary to ESBL E coli-completed treatment  Avascular necrosis right femoral head   Intraparenchymal hemorrhage-left occipital lobe   Incidental finding left cavernous ICA aneurysm   Acute encephalopathy-likely metabolic/hospital associated : resolved delirium/secondary to IPH, improved   New diagnosis essential HTN -stable     IV Oxacillin until 11/24. Completed [facility]. Medically accepted but won't have bed until Monday (10/21)  PT/OT: moderate intensity therapy  Hypokalemia and hypomag yesterday replaced. Anemia stable.  Will repeat labs tomorrow  Add oral iron for anemia      Rupert Desir MD   10/18/2024    All diagnosis and differential diagnosis have been reviewed; assessment and plan has been documented; I have personally reviewed the labs and test results that are presently available; I have reviewed the patients medication list; I  have reviewed the consulting providers response and recommendations. I have reviewed or attempted to review medical records based upon their availability    All of the patient's questions have been  addressed and answered. Patient's is agreeable to the above stated plan. I will continue to monitor closely and make adjustments to medical management as needed.  _____________________________________________________________________

## 2024-10-17 NOTE — PT/OT/SLP PROGRESS
Occupational Therapy   Treatment    Name: Orlando Black  MRN: [id number]  Admitting Diagnosis:  <principal problem not specified>  20 Days Post-Op    Recommendations:     Recommended therapy intensity at discharge: Moderate Intensity Therapy   Discharge Equipment Recommendations:  to be determined by next level of care  Barriers to discharge:       Assessment:     Orlando Black is a 74 y.o. female with a medical diagnosis of <principal problem not specified>.  She presents with good participation. Performance deficits affecting function are weakness, impaired cognition, impaired self care skills, impaired functional mobility, impaired balance, gait instability, decreased lower extremity function, decreased safety awareness.     Rehab Prognosis:  Good; patient would benefit from acute skilled OT services to address these deficits and reach maximum level of function.       Plan:     Patient to be seen 5 x/week to address the above listed problems via self-care/home management, therapeutic activities, therapeutic exercises  Plan of Care Expires: 10/22/24  Plan of Care Reviewed with: patient    Subjective     Pain/Comfort:  Pain Rating 1: 0/10    Objective:     Communicated with: nurse prior to session.  Patient found HOB elevated with pulse ox (continuous), telemetry, peripheral IV upon OT entry to room.    General Precautions: Standard, fall    Orthopedic Precautions:RLE weight bearing as tolerated  Braces: N/A  Respiratory Status: Room air     Occupational Performance:     Bed Mobility:    Patient completed Rolling/Turning to Left with  minimum assistance  Patient completed Scooting/Bridging with contact guard assistance  Patient completed Supine to Sit with minimum assistance     Functional Mobility/Transfers:  Patient completed Sit <> Stand Transfer with minimum assistance  with  rolling walker   Functional Mobility: ambulate to bathroom with Min A with RW    Activities of Daily Living:  Grooming: contact guard  assistance standing at sink with Rw to complete oral care, washing face and brushing hair, tolerated standing approximately 7 minutes for entire task    [facility] 6 Click ADL: 16    Patient Education:  Patient provided with verbal education and demonstrations education regarding fall prevention and safety awareness.  Understanding was verbalized, however additional teaching warranted.      Patient left up in chair with all lines intact and call button in reach.    GOALS:   Multidisciplinary Problems       Occupational Therapy Goals          Problem: Occupational Therapy    Goal Priority Disciplines Outcome Interventions   Occupational Therapy Goal     OT, PT/OT Progressing    Description: LTG: Pt will perform basic ADLs and ADL transfers with Modified independence using LRAD by discharge.    STG: to be met by 11/8/24    Pt will complete grooming standing at sink with LRAD with SBA.  Pt will complete UB dressing with SBA.  Pt will complete LB dressing with SBA using LRAD.  Pt will complete toileting with SBA using LRAD.  Pt will complete functional mobility to/from toilet and toilet transfer with SBA using LRAD.                         Time Tracking:     OT Date of Treatment: 10/17/24  OT Start Time: 0945  OT Stop Time: 1008  OT Total Time (min): 23 min    Billable Minutes:Self Care/Home Management 13  Therapeutic Activity 10    OT/TY: TY     Number of TY visits since last OT visit: 4    10/17/2024

## 2024-10-17 NOTE — PLAN OF CARE
[facility] sent requested MAR to [facility] via [employer]. Spoke with Savanah @ [facility], who states their DR. Cohen is working on getting the insurance approval and in the event of a denial he would do the Peer to Peer.

## 2024-10-17 NOTE — PLAN OF CARE
Problem: Adult Inpatient Plan of Care  Goal: Plan of Care Review  Outcome: Progressing     Problem: Adult Inpatient Plan of Care  Goal: Plan of Care Review  Outcome: Progressing  Goal: Patient-Specific Goal (Individualized)  Outcome: Progressing  Goal: Absence of Hospital-Acquired Illness or Injury  Outcome: Progressing  Goal: Optimal Comfort and Wellbeing  Outcome: Progressing  Goal: Readiness for Transition of Care  Outcome: Progressing     Problem: Stroke, Subarachnoid Hemorrhage  Goal: Optimal Coping  Outcome: Progressing  Goal: Effective Bowel Elimination  Outcome: Progressing  Goal: Optimal Cerebral Tissue Perfusion  Outcome: Progressing  Goal: Optimal Cognitive Function  Outcome: Progressing  Goal: Effective Communication Skills  Outcome: Progressing  Goal: Optimal Functional Ability  Outcome: Progressing  Goal: Optimal Nutrition Intake  Outcome: Progressing  Goal: Optimal Pain Control and Function  Outcome: Progressing  Goal: Effective Oxygenation and Ventilation  Outcome: Progressing  Goal: Improved Sensorimotor Function  Outcome: Progressing  Goal: Safe and Effective Swallow  Outcome: Progressing  Goal: Effective Urinary Elimination  Outcome: Progressing     Problem: Skin Injury Risk Increased  Goal: Skin Health and Integrity  Outcome: Progressing     Problem: Wound  Goal: Optimal Coping  Outcome: Progressing  Goal: Optimal Functional Ability  Outcome: Progressing  Goal: Absence of Infection Signs and Symptoms  Outcome: Progressing  Goal: Improved Oral Intake  Outcome: Progressing  Goal: Optimal Pain Control and Function  Outcome: Progressing  Goal: Skin Health and Integrity  Outcome: Progressing  Goal: Optimal Wound Healing  Outcome: Progressing     Problem: Infection  Goal: Absence of Infection Signs and Symptoms  Outcome: Progressing     Problem: Fall Injury Risk  Goal: Absence of Fall and Fall-Related Injury  Outcome: Progressing

## 2024-10-17 NOTE — PT/OT/SLP PROGRESS
Physical Therapy Treatment    Patient Name:  Orlando Black   MRN:  [id number]    Recommendations:     Discharge therapy intensity: Moderate Intensity Therapy   Discharge Equipment Recommendations: to be determined by next level of care  Barriers to discharge: Impaired mobility and Ongoing medical needs    Assessment:     Orlando Black is a 74 y.o. 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  She presents with the following impairments/functional limitations: weakness, gait instability, impaired endurance, impaired balance, impaired functional mobility, decreased lower extremity function, orthopedic precautions.    Rehab Prognosis: Good; patient would benefit from acute skilled PT services to address these deficits and reach maximum level of function.    Recent Surgery: Procedure(s) (LRB):  ARTHROSCOPY, KNEE, WITH DEBRIDEMENT (Right) 20 Days Post-Op    Plan:     During this hospitalization, patient would benefit from acute PT services 5 x/week to address the identified rehab impairments via gait training, therapeutic activities, therapeutic exercises and progress toward the following goals:    Plan of Care Expires:  11/08/24    Subjective     Chief Complaint: none stated  Patient/Family Comments/goals: none stated  Pain/Comfort:         Objective:     Communicated with nursing prior to session.  Patient found HOB elevated with telemetry, pulse ox (continuous), purewick upon PT entry to room.     General Precautions: Standard, fall  Orthopedic Precautions: RLE weight bearing as tolerated  Braces:  N/A  Respiratory Status: Room air  Blood Pressure: NT    Functional Mobility:  Bed Mobility:     Supine to Sit: stand by assistance  Transfers:     Sit to Stand:  minimum assistance with rolling walker  X2 from bed  X1 from chair  Gait: 60'/80' w/RW, CGA  Seated rest break between trails    Therapeutic Activities/Exercises:  Upon initial stand pt with (+) void. Pt sat while floor was cleaned. Then stood again for brief change. Pt then ambulated in hoff for 2 gait trails. Pt left [facility].    Education:  Patient provided with verbal education education regarding PT role/goals/POC, fall prevention, and safety awareness.  Understanding was verbalized, however additional teaching warranted.     Patient left up in chair with all lines intact, call button in reach, chair alarm on, and nurse notified    GOALS:   Multidisciplinary Problems       Physical Therapy Goals          Problem: Physical Therapy    Goal Priority Disciplines Outcome Interventions   Physical Therapy Goal     PT, PT/OT Progressing    Description: Goals to be met by: 10/28/24     Patient will increase functional independence with mobility by performin. Supine to sit with Stand-by Assistance  2. Sit to supine with Stand-by Assistance  3. Sit to stand transfer with Stand-by Assistance  4. Bed to chair transfer with Stand-by Assistance using Rolling Walker  5. Gait  x 200 feet with Stand-by Assistance using Rolling Walker.                          Time Tracking:     PT Received On: 10/17/24  PT Start Time: 1449     PT Stop Time: 1516  PT Total Time (min): 27 min     Billable Minutes: Gait Training 15 and Therapeutic Activity 12    Treatment Type: Treatment  PT/PTA: PTA     Number of PTA visits since last PT visit: 4     10/17/2024

## 2024-10-18 LAB
POCT GLUCOSE: 75 MG/DL (ref 70–110)
POCT GLUCOSE: 79 MG/DL (ref 70–110)

## 2024-10-18 PROCEDURE — 25000003 PHARM REV CODE 250: Performed by: HOSPITALIST

## 2024-10-18 PROCEDURE — 25000003 PHARM REV CODE 250: Performed by: INTERNAL MEDICINE

## 2024-10-18 PROCEDURE — 97535 SELF CARE MNGMENT TRAINING: CPT

## 2024-10-18 PROCEDURE — 63600175 PHARM REV CODE 636 W HCPCS: Performed by: INTERNAL MEDICINE

## 2024-10-18 PROCEDURE — 25000003 PHARM REV CODE 250: Performed by: NEUROLOGICAL SURGERY

## 2024-10-18 PROCEDURE — 97530 THERAPEUTIC ACTIVITIES: CPT | Mod: CQ

## 2024-10-18 PROCEDURE — 27000207 HC ISOLATION

## 2024-10-18 PROCEDURE — 21400001 HC TELEMETRY ROOM

## 2024-10-18 RX ORDER — LANOLIN ALCOHOL/MO/W.PET/CERES
1 CREAM (GRAM) TOPICAL DAILY
Status: DISCONTINUED | OUTPATIENT
Start: 2024-10-18 | End: 2024-10-21 | Stop reason: HOSPADM

## 2024-10-18 RX ADMIN — OXACILLIN 2 G: 2 INJECTION, POWDER, FOR SOLUTION INTRAMUSCULAR; INTRAVENOUS at 01:10

## 2024-10-18 RX ADMIN — FAMOTIDINE 20 MG: 20 TABLET, FILM COATED ORAL at 09:10

## 2024-10-18 RX ADMIN — OXACILLIN 2 G: 2 INJECTION, POWDER, FOR SOLUTION INTRAMUSCULAR; INTRAVENOUS at 05:10

## 2024-10-18 RX ADMIN — AMLODIPINE BESYLATE 5 MG: 5 TABLET ORAL at 09:10

## 2024-10-18 RX ADMIN — LEVETIRACETAM 500 MG: 500 TABLET, FILM COATED ORAL at 09:10

## 2024-10-18 RX ADMIN — OXACILLIN 2 G: 2 INJECTION, POWDER, FOR SOLUTION INTRAMUSCULAR; INTRAVENOUS at 09:10

## 2024-10-18 RX ADMIN — FAMOTIDINE 20 MG: 20 TABLET, FILM COATED ORAL at 08:10

## 2024-10-18 RX ADMIN — LEVETIRACETAM 500 MG: 500 TABLET, FILM COATED ORAL at 08:10

## 2024-10-18 RX ADMIN — METOPROLOL SUCCINATE 12.5 MG: 25 TABLET, EXTENDED RELEASE ORAL at 09:10

## 2024-10-18 RX ADMIN — QUETIAPINE FUMARATE 25 MG: 25 TABLET ORAL at 08:10

## 2024-10-18 RX ADMIN — ENOXAPARIN SODIUM 40 MG: 40 INJECTION SUBCUTANEOUS at 05:10

## 2024-10-18 RX ADMIN — FERROUS SULFATE TAB 325 MG (65 MG ELEMENTAL FE) 1 EACH: 325 (65 FE) TAB at 09:10

## 2024-10-18 NOTE — PT/OT/SLP PROGRESS
Physical Therapy Treatment    Patient Name:  Orlando Black   MRN:  [id number]    Recommendations:     Discharge therapy intensity: Moderate Intensity Therapy   Discharge Equipment Recommendations: to be determined by next level of care  Barriers to discharge: Impaired mobility and Ongoing medical needs    Assessment:     Orlando Black is a 74 y.o. 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  She presents with the following impairments/functional limitations: weakness, gait instability, impaired endurance, impaired balance, impaired functional mobility, decreased lower extremity function, orthopedic precautions.    Rehab Prognosis: Good; patient would benefit from acute skilled PT services to address these deficits and reach maximum level of function.    Recent Surgery: Procedure(s) (LRB):  ARTHROSCOPY, KNEE, WITH DEBRIDEMENT (Right) 21 Days Post-Op    Plan:     During this hospitalization, patient would benefit from acute PT services 5 x/week to address the identified rehab impairments via gait training, therapeutic activities, therapeutic exercises and progress toward the following goals:    Plan of Care Expires:  11/08/24    Subjective     Chief Complaint: none stated  Patient/Family Comments/goals: none stated  Pain/Comfort:         Objective:     Communicated with nursing prior to session.  Patient found HOB elevated with pulse ox (continuous), telemetry, PureWick upon PT entry to room.     General Precautions: Standard, fall, contact  Orthopedic Precautions: RLE weight bearing as  tolerated  Braces: N/A  Respiratory Status: Room air  Blood Pressure: NT    Functional Mobility:  Bed Mobility:     Supine to Sit: minimum assistance  Sit to Supine: minimum assistance  Transfers:     Sit to Stand:  minimum assistance with rolling walker  Toilet Transfer: minimum assistance with  rolling walker  using  Step Transfer  Gait: 15' x2 w/RW, CGA  No LOB noted  Pt easily distracted and needs frequent redirection    Therapeutic Activities/Exercises:  Pt ambulated to bathroom for grooming activities then t/f to toilet and then ambulated back to bed.    Education:  Patient provided with verbal education education regarding PT role/goals/POC.  Understanding was verbalized.     Patient left HOB elevated with all lines intact, call button in reach, nurse notified, and  present    GOALS:   Multidisciplinary Problems       Physical Therapy Goals          Problem: Physical Therapy    Goal Priority Disciplines Outcome Interventions   Physical Therapy Goal     PT, PT/OT Progressing    Description: Goals to be met by: 10/28/24     Patient will increase functional independence with mobility by performin. Supine to sit with Stand-by Assistance  2. Sit to supine with Stand-by Assistance  3. Sit to stand transfer with Stand-by Assistance  4. Bed to chair transfer with Stand-by Assistance using Rolling Walker  5. Gait  x 200 feet with Stand-by Assistance using Rolling Walker.                          Time Tracking:     PT Received On: 10/18/24  PT Start Time: 1504     PT Stop Time: 1527  PT Total Time (min): 23 min     Billable Minutes: Therapeutic Activity 23    Treatment Type: Treatment  PT/PTA: PTA     Number of PTA visits since last PT visit: 5     10/18/2024

## 2024-10-18 NOTE — PT/OT/SLP PROGRESS
Occupational Therapy   Treatment    Name: Orlando Black  MRN: [id number]    Recommendations:     Recommended therapy intensity at discharge: Moderate Intensity Therapy   Discharge Equipment Recommendations:  to be determined by next level of care  Barriers to discharge:       Assessment:     Orlando Black is a 74 y.o. 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  She presents with motivation to participate in therapy. Performance deficits affecting function are weakness, impaired cognition, impaired self care skills, impaired functional mobility, impaired balance, gait instability, decreased lower extremity function, decreased safety awareness.     Rehab Prognosis:  Good; patient would benefit from acute skilled OT services to address these deficits and reach maximum level of function.       Plan:     Patient to be seen 5 x/week to address the above listed problems via self-care/home management, therapeutic activities, therapeutic exercises  Plan of Care Expires: 10/22/24  Plan of Care Reviewed with: patient    Subjective     Pain/Comfort:  Pain Rating 1: 0/10    Objective:     Communicated with: nursing prior to session.  Patient found HOB elevated with PureWick, peripheral IV, Other (comments) () upon OT entry to room.    General Precautions: Standard, fall    Orthopedic Precautions:RLE weight bearing as tolerated  Braces: N/A  Respiratory Status: Room air     Occupational Performance:     Bed Mobility:    Patient completed Supine to Sit with contact guard assistance  Patient completed Sit to Supine  with minimum assistance     Functional Mobility/Transfers:  Patient completed Sit <> Stand Transfer with minimum assistance  with  rolling walker   Patient completed Toilet Transfer Step Transfer technique with minimum assistance with  rolling walker  Functional Mobility: Pt ambulated to/from bathroom with RW and CGA.    Activities of Daily Living:  Grooming: stand by assistance for oral care while standing at sink  Lower Body Dressing: maximal assistance to doff brief in standing  Toileting: total assistance pt incontinent/PureWick in place    Therapeutic Positioning    OT interventions performed during the course of today's session in an effort to prevent and/or reduce acquired pressure injuries:   Education was provided on benefits of and recommendations for therapeutic positioning    [facility] 6 Click ADL: 15    Patient Education:  Patient provided with verbal education education regarding OT role/goals/POC, fall prevention, and safety awareness.  Understanding was verbalized, however additional teaching warranted.      Patient left HOB elevated with all lines intact and call button in reach.    GOALS:   Multidisciplinary Problems       Occupational Therapy Goals          Problem: Occupational Therapy    Goal Priority Disciplines Outcome Interventions   Occupational Therapy Goal     OT, PT/OT Progressing    Description: LTG: Pt will perform basic ADLs and ADL transfers with Modified independence using LRAD by discharge.    STG: to be met by 11/8/24    Pt will complete grooming standing at sink with LRAD with SBA.  Pt will complete UB dressing with SBA.  Pt will complete LB dressing with SBA using LRAD.  Pt will complete toileting with SBA using LRAD.  Pt will complete functional mobility to/from toilet and toilet transfer with SBA using LRAD.                         Time Tracking:     OT Date of Treatment: 10/18/24  OT Start Time: 1504  OT Stop Time: 1527  OT Total Time (min): 23 min    Billable Minutes:Self  Care/Home Management 2 units    OT/TY: OT     Number of TY visits since last OT visit: 5    10/18/2024

## 2024-10-19 LAB
ANION GAP SERPL CALC-SCNC: 10 MEQ/L
BASOPHILS # BLD AUTO: 0.07 X10(3)/MCL
BASOPHILS NFR BLD AUTO: 1.3 %
BUN SERPL-MCNC: 4.6 MG/DL (ref 9.8–20.1)
CALCIUM SERPL-MCNC: 7.7 MG/DL (ref 8.4–10.2)
CHLORIDE SERPL-SCNC: 108 MMOL/L (ref 98–107)
CO2 SERPL-SCNC: 23 MMOL/L (ref 23–31)
CREAT SERPL-MCNC: 0.58 MG/DL (ref 0.55–1.02)
CREAT/UREA NIT SERPL: 8
EOSINOPHIL # BLD AUTO: 0.42 X10(3)/MCL (ref 0–0.9)
EOSINOPHIL NFR BLD AUTO: 7.6 %
ERYTHROCYTE [DISTWIDTH] IN BLOOD BY AUTOMATED COUNT: 14 % (ref 11.5–17)
GFR SERPLBLD CREATININE-BSD FMLA CKD-EPI: >60 ML/MIN/1.73/M2
GLUCOSE SERPL-MCNC: 75 MG/DL (ref 82–115)
HCT VFR BLD AUTO: 24.5 % (ref 37–47)
HGB BLD-MCNC: 7.7 G/DL (ref 12–16)
IMM GRANULOCYTES # BLD AUTO: 0.02 X10(3)/MCL (ref 0–0.04)
IMM GRANULOCYTES NFR BLD AUTO: 0.4 %
LYMPHOCYTES # BLD AUTO: 1.37 X10(3)/MCL (ref 0.6–4.6)
LYMPHOCYTES NFR BLD AUTO: 24.8 %
MAGNESIUM SERPL-MCNC: 1.6 MG/DL (ref 1.6–2.6)
MCH RBC QN AUTO: 31 PG (ref 27–31)
MCHC RBC AUTO-ENTMCNC: 31.4 G/DL (ref 33–36)
MCV RBC AUTO: 98.8 FL (ref 80–94)
MONOCYTES # BLD AUTO: 0.64 X10(3)/MCL (ref 0.1–1.3)
MONOCYTES NFR BLD AUTO: 11.6 %
NEUTROPHILS # BLD AUTO: 3.01 X10(3)/MCL (ref 2.1–9.2)
NEUTROPHILS NFR BLD AUTO: 54.3 %
NRBC BLD AUTO-RTO: 0 %
PLATELET # BLD AUTO: 298 X10(3)/MCL (ref 130–400)
PMV BLD AUTO: 8.4 FL (ref 7.4–10.4)
POCT GLUCOSE: 90 MG/DL (ref 70–110)
POTASSIUM SERPL-SCNC: 3.1 MMOL/L (ref 3.5–5.1)
RBC # BLD AUTO: 2.48 X10(6)/MCL (ref 4.2–5.4)
SODIUM SERPL-SCNC: 141 MMOL/L (ref 136–145)
WBC # BLD AUTO: 5.53 X10(3)/MCL (ref 4.5–11.5)

## 2024-10-19 PROCEDURE — 63600175 PHARM REV CODE 636 W HCPCS: Performed by: STUDENT IN AN ORGANIZED HEALTH CARE EDUCATION/TRAINING PROGRAM

## 2024-10-19 PROCEDURE — 85025 COMPLETE CBC W/AUTO DIFF WBC: CPT | Performed by: HOSPITALIST

## 2024-10-19 PROCEDURE — 21400001 HC TELEMETRY ROOM

## 2024-10-19 PROCEDURE — 63600175 PHARM REV CODE 636 W HCPCS: Performed by: INTERNAL MEDICINE

## 2024-10-19 PROCEDURE — 25000003 PHARM REV CODE 250: Performed by: NEUROLOGICAL SURGERY

## 2024-10-19 PROCEDURE — 27000207 HC ISOLATION

## 2024-10-19 PROCEDURE — 80048 BASIC METABOLIC PNL TOTAL CA: CPT | Performed by: HOSPITALIST

## 2024-10-19 PROCEDURE — 36415 COLL VENOUS BLD VENIPUNCTURE: CPT | Performed by: HOSPITALIST

## 2024-10-19 PROCEDURE — 25000003 PHARM REV CODE 250: Performed by: STUDENT IN AN ORGANIZED HEALTH CARE EDUCATION/TRAINING PROGRAM

## 2024-10-19 PROCEDURE — 25000003 PHARM REV CODE 250: Performed by: INTERNAL MEDICINE

## 2024-10-19 PROCEDURE — 25000003 PHARM REV CODE 250: Performed by: HOSPITALIST

## 2024-10-19 PROCEDURE — 63600175 PHARM REV CODE 636 W HCPCS: Performed by: HOSPITALIST

## 2024-10-19 PROCEDURE — 83735 ASSAY OF MAGNESIUM: CPT | Performed by: HOSPITALIST

## 2024-10-19 RX ORDER — POTASSIUM CHLORIDE 20 MEQ/1
40 TABLET, EXTENDED RELEASE ORAL 2 TIMES DAILY
Status: COMPLETED | OUTPATIENT
Start: 2024-10-19 | End: 2024-10-19

## 2024-10-19 RX ORDER — MAGNESIUM SULFATE HEPTAHYDRATE 40 MG/ML
2 INJECTION, SOLUTION INTRAVENOUS
Status: DISPENSED | OUTPATIENT
Start: 2024-10-19 | End: 2024-10-19

## 2024-10-19 RX ORDER — MAGNESIUM SULFATE HEPTAHYDRATE 40 MG/ML
2 INJECTION, SOLUTION INTRAVENOUS
Status: COMPLETED | OUTPATIENT
Start: 2024-10-19 | End: 2024-10-20

## 2024-10-19 RX ADMIN — OXACILLIN 2 G: 2 INJECTION, POWDER, FOR SOLUTION INTRAMUSCULAR; INTRAVENOUS at 10:10

## 2024-10-19 RX ADMIN — MAGNESIUM SULFATE HEPTAHYDRATE 2 G: 40 INJECTION, SOLUTION INTRAVENOUS at 10:10

## 2024-10-19 RX ADMIN — FAMOTIDINE 20 MG: 20 TABLET, FILM COATED ORAL at 08:10

## 2024-10-19 RX ADMIN — POTASSIUM CHLORIDE 40 MEQ: 1500 TABLET, EXTENDED RELEASE ORAL at 08:10

## 2024-10-19 RX ADMIN — OXACILLIN 2 G: 2 INJECTION, POWDER, FOR SOLUTION INTRAMUSCULAR; INTRAVENOUS at 02:10

## 2024-10-19 RX ADMIN — MAGNESIUM SULFATE HEPTAHYDRATE 2 G: 40 INJECTION, SOLUTION INTRAVENOUS at 03:10

## 2024-10-19 RX ADMIN — QUETIAPINE FUMARATE 25 MG: 25 TABLET ORAL at 08:10

## 2024-10-19 RX ADMIN — METOPROLOL SUCCINATE 12.5 MG: 25 TABLET, EXTENDED RELEASE ORAL at 09:10

## 2024-10-19 RX ADMIN — POTASSIUM CHLORIDE 40 MEQ: 1500 TABLET, EXTENDED RELEASE ORAL at 01:10

## 2024-10-19 RX ADMIN — FAMOTIDINE 20 MG: 20 TABLET, FILM COATED ORAL at 09:10

## 2024-10-19 RX ADMIN — LEVETIRACETAM 500 MG: 500 TABLET, FILM COATED ORAL at 08:10

## 2024-10-19 RX ADMIN — AMLODIPINE BESYLATE 5 MG: 5 TABLET ORAL at 09:10

## 2024-10-19 RX ADMIN — LEVETIRACETAM 500 MG: 500 TABLET, FILM COATED ORAL at 09:10

## 2024-10-19 RX ADMIN — OXACILLIN 2 G: 2 INJECTION, POWDER, FOR SOLUTION INTRAMUSCULAR; INTRAVENOUS at 05:10

## 2024-10-19 RX ADMIN — OXACILLIN 2 G: 2 INJECTION, POWDER, FOR SOLUTION INTRAMUSCULAR; INTRAVENOUS at 12:10

## 2024-10-19 RX ADMIN — FERROUS SULFATE TAB 325 MG (65 MG ELEMENTAL FE) 1 EACH: 325 (65 FE) TAB at 09:10

## 2024-10-19 RX ADMIN — OXACILLIN 2 G: 2 INJECTION, POWDER, FOR SOLUTION INTRAMUSCULAR; INTRAVENOUS at 08:10

## 2024-10-19 RX ADMIN — ENOXAPARIN SODIUM 40 MG: 40 INJECTION SUBCUTANEOUS at 05:10

## 2024-10-19 NOTE — PROGRESS NOTES
[facility] Hospital Medicine Progress Note        Chief Complaint: Inpatient Follow-up     HPI:   73-year-old female with no significant past medical history presented to outlying facility with fever, arthralgia, back pain.  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  While in-house patient developed new onset encephalopathy and CT head showed intraparenchymal hematoma-left occipital lobe and was transferred to our hospital for higher level of care.  Patient was admitted to ICU.  CT surgery, Neurosurgery, Neurology, Infectious Disease consulted along with Orthopedics.  CV surgery recommended conservative management with IV antibiotics for endocarditis and repeat echocardiogram at the completion of antibiotics.  Neurosurgery recommended close neuro checks in no acute neurosurgical intervention, Keppra for seizure prevention.  Infectious Disease evaluated, cultures speciated as MSSA and therefore they changed antibiotics to oxacillin.  Neurosurgery ordered MRI brain and MRI C-spine.  Orthopedics took her to OR for right knee arthroscopy/incision/drainage.  Interventional Neurology did cerebral angiogram, no evidence of mycotic aneurysm, AVM or AV fistula.  Patient had an incidental left cavernous ICA aneurysm.  MRI C-spine with epidural enhancement, cervical osteomyelitis and fluid collection on the thoracic spine as well.  Patient cleared for downgraded from ICU on 10/12 hospital medicine services.  Therapy Services following, recommending skilled nursing facility placement.  Oxacillin latest switch to Ancef for convenience with end date 11/13, case management working on LTAC which unfortunately was denied by insurance.  While on cefazolin patient developed  low-grade temp spikes and therefore Infectious Disease re-evaluated and switched back to oxacillin.  Limited TTE repeated which showed persistent mitral valve vegetation, blood cultures repeated.  Also with change in mentation, CT head with no acute changes, on delirium precautions.  Patient noted to have some swelling involving lower extremity, no DVT, found to have Baker cyst.  Cleared for VTE prophylaxis of 10/8.  Given continued intermittent confusion a UA was obtained which was concerning for UTI and cultures growing ESBL E coli sensitive to Cipro, patient initiated on p.o. ciprofloxacin.  Fever spikes and confusion improved on Cipro.  Case management actively working on placement.  Infectious Disease decided to extend course to 8 weeks.     OT/PT was started and she was progressing well. Labs and vitals was stable. She did have some cognitive deficits. But overall was making a good clinical recovery. CM working on placement. If unable to place in a SNF unit then we will have to dc home with HH and iv abx infusion.      Interval Hx:   Resting comfortably in bed. , daughter at bedside. Hgb with slight drop, she is already receiving PO ferrous. Denies any complaints.      Objective/physical exam:  General: In no acute distress  Chest: Clear to auscultation bilaterally  Heart: RRR, +S1, S2, grade IV/VI 5th ICS LSB murmur  Abdomen: Soft, nontender, BS +  MSK: Warm, no lower extremity edema, no clubbing or cyanosis  Neurologic: Cranial nerve II-XII intact, Strength 5/5 in all 4 extremities    VITAL SIGNS: 24 HRS MIN & MAX LAST   Temp  Min: 98.1 °F (36.7 °C)  Max: 99.5 °F (37.5 °C) 98.3 °F (36.8 °C)   BP  Min: 113/65  Max: 128/78 113/65   Pulse  Min: 88  Max: 103  103   No data recorded 18   SpO2  Min: 95 %  Max: 98 % 97 %       Recent Labs   Lab 10/13/24  0301 10/17/24  0211 10/19/24  0153   WBC 5.54 7.53 5.53   RBC 2.62* 2.71* 2.48*   HGB 8.2* 8.4* 7.7*   HCT 25.5* 27.2* 24.5*   MCV 97.3* 100.4* 98.8*   MCH  31.3* 31.0 31.0   MCHC 32.2* 30.9* 31.4*   RDW 13.1 13.7 14.0    276 298   MPV 8.4 8.5 8.4       Recent Labs   Lab 10/13/24  0301 10/14/24  0311 10/15/24  0352 10/17/24  0211 10/19/24  0153    139 139 139 141   K 2.8* 2.7* 3.8 3.3* 3.1*    102 104 104 108*   CO2 25 25 23 22* 23   BUN 6.4* 6.0* 6.0* 5.6* 4.6*   CREATININE 0.61 0.57 0.60 0.71 0.58   CALCIUM 8.0* 8.3* 8.0* 8.1* 7.7*   MG 1.40* 1.50*  --  1.56* 1.60   ALBUMIN 1.8*  --   --   --   --    ALKPHOS 96  --   --   --   --    ALT 8  --   --   --   --    AST 25  --   --   --   --    BILITOT 0.3  --   --   --   --           Microbiology Results (last 7 days)       ** No results found for the last 168 hours. **             Radiology:  CV Ultrasound doppler venous legs bilat  The right lower extremity venous system is patent with no evidence of   superficial or deep vein thrombosis.  The left lower extremity venous system is patent with no evidence of   superficial or deep vein thrombosis.  A 4 x 3 x 2 cm bakers cyst was identified in the left popliteal space.         Medications:  Scheduled Meds:   amLODIPine  5 mg Oral Daily    enoxparin  40 mg Subcutaneous Q24H (prophylaxis, 1700)    famotidine  20 mg Oral BID    ferrous sulfate  1 tablet Oral Daily    levETIRAcetam  500 mg Oral BID    magnesium sulfate IVPB  2 g Intravenous Q2H    metoprolol succinate  12.5 mg Oral Daily    oxacillin IV (PEDS and ADULTS)  2 g Intravenous Q4H    potassium chloride  40 mEq Oral BID    QUEtiapine  25 mg Oral QHS     Continuous Infusions:  PRN Meds:.  Current Facility-Administered Medications:     acetaminophen, 650 mg, Oral, Q6H PRN    bisacodyL, 10 mg, Rectal, Daily PRN    camphor-methyl salicyl-menthoL, , Topical (Top), BID PRN    dextrose 10%, 12.5 g, Intravenous, PRN    dextrose 10%, 25 g, Intravenous, PRN    labetalol, 10 mg, Intravenous, Q15 Min PRN    LIDOcaine, 1 patch, Transdermal, Q24H PRN    morphine, 2 mg, Intravenous, Q6H PRN    ondansetron, 8 mg,  Oral, Q8H PRN    sodium chloride 0.9%, 10 mL, Intravenous, PRN    sodium chloride 0.9%, 10 mL, Intravenous, PRN        Assessment/Plan:   MSSA bacteremia-cleared   Sepsis secondary to above-improved  Disseminated MSSA infection   Mitral valve endocarditis with mitral regurgitation  Septic arthritis/osteomyelitis sterno mandibular joint   Cervical diskitis/osteomyelitis with small 3 vertebral abscess  Right knee septic arthritis status post arthroscopy, washout 9/27   Hypokalemia   Hypomagnesemia   Right knee Baker's cyst   Anemia of chronic disease  Acute bacterial UTI secondary to ESBL E coli-completed treatment  Avascular necrosis right femoral head   Intraparenchymal hemorrhage-left occipital lobe   Incidental finding left cavernous ICA aneurysm   Acute encephalopathy-likely metabolic/hospital associated : resolved delirium/secondary to IPH, improved   New diagnosis essential HTN -stable     IV Oxacillin until 11/24. Completed [facility] VENCES [facility] SN[facility] with [facility]. Medically accepted but won't have bed until Monday (10/21)  PT/OT: moderate intensity therapy  Hypokalemia and hypomag- replace as condition requires. Anemia stable. Add oral iron for anemia    Thairy G Reyes,    10/18/2024    All diagnosis and differential diagnosis have been reviewed; assessment and plan has been documented; I have personally reviewed the labs and test results that are presently available; I have reviewed the patients medication list; I have reviewed the consulting providers response and recommendations. I have reviewed or attempted to review medical records based upon their availability    All of the patient's questions have been  addressed and answered. Patient's is agreeable to the above stated plan. I will continue to monitor closely and make adjustments to medical management as needed.  _____________________________________________________________________

## 2024-10-20 LAB
ANION GAP SERPL CALC-SCNC: 15 MEQ/L
BASOPHILS # BLD AUTO: 0.06 X10(3)/MCL
BASOPHILS NFR BLD AUTO: 1 %
BUN SERPL-MCNC: 4 MG/DL (ref 9.8–20.1)
CALCIUM SERPL-MCNC: 7.7 MG/DL (ref 8.4–10.2)
CHLORIDE SERPL-SCNC: 107 MMOL/L (ref 98–107)
CO2 SERPL-SCNC: 21 MMOL/L (ref 23–31)
CREAT SERPL-MCNC: 0.62 MG/DL (ref 0.55–1.02)
CREAT/UREA NIT SERPL: 6
EOSINOPHIL # BLD AUTO: 0.38 X10(3)/MCL (ref 0–0.9)
EOSINOPHIL NFR BLD AUTO: 6.6 %
ERYTHROCYTE [DISTWIDTH] IN BLOOD BY AUTOMATED COUNT: 14 % (ref 11.5–17)
GFR SERPLBLD CREATININE-BSD FMLA CKD-EPI: >60 ML/MIN/1.73/M2
GLUCOSE SERPL-MCNC: 70 MG/DL (ref 82–115)
HCT VFR BLD AUTO: 24.2 % (ref 37–47)
HGB BLD-MCNC: 7.7 G/DL (ref 12–16)
IMM GRANULOCYTES # BLD AUTO: 0.04 X10(3)/MCL (ref 0–0.04)
IMM GRANULOCYTES NFR BLD AUTO: 0.7 %
LYMPHOCYTES # BLD AUTO: 1.22 X10(3)/MCL (ref 0.6–4.6)
LYMPHOCYTES NFR BLD AUTO: 21.1 %
MAGNESIUM SERPL-MCNC: 2.5 MG/DL (ref 1.6–2.6)
MCH RBC QN AUTO: 31 PG (ref 27–31)
MCHC RBC AUTO-ENTMCNC: 31.8 G/DL (ref 33–36)
MCV RBC AUTO: 97.6 FL (ref 80–94)
MONOCYTES # BLD AUTO: 0.59 X10(3)/MCL (ref 0.1–1.3)
MONOCYTES NFR BLD AUTO: 10.2 %
NEUTROPHILS # BLD AUTO: 3.49 X10(3)/MCL (ref 2.1–9.2)
NEUTROPHILS NFR BLD AUTO: 60.4 %
NRBC BLD AUTO-RTO: 0 %
PLATELET # BLD AUTO: 264 X10(3)/MCL (ref 130–400)
PMV BLD AUTO: 8.3 FL (ref 7.4–10.4)
POCT GLUCOSE: 91 MG/DL (ref 70–110)
POTASSIUM SERPL-SCNC: 4 MMOL/L (ref 3.5–5.1)
RBC # BLD AUTO: 2.48 X10(6)/MCL (ref 4.2–5.4)
SODIUM SERPL-SCNC: 143 MMOL/L (ref 136–145)
WBC # BLD AUTO: 5.78 X10(3)/MCL (ref 4.5–11.5)

## 2024-10-20 PROCEDURE — 36415 COLL VENOUS BLD VENIPUNCTURE: CPT | Performed by: STUDENT IN AN ORGANIZED HEALTH CARE EDUCATION/TRAINING PROGRAM

## 2024-10-20 PROCEDURE — 85025 COMPLETE CBC W/AUTO DIFF WBC: CPT | Performed by: STUDENT IN AN ORGANIZED HEALTH CARE EDUCATION/TRAINING PROGRAM

## 2024-10-20 PROCEDURE — 63600175 PHARM REV CODE 636 W HCPCS: Performed by: INTERNAL MEDICINE

## 2024-10-20 PROCEDURE — 21400001 HC TELEMETRY ROOM

## 2024-10-20 PROCEDURE — 25000003 PHARM REV CODE 250: Performed by: INTERNAL MEDICINE

## 2024-10-20 PROCEDURE — 25000003 PHARM REV CODE 250: Performed by: HOSPITALIST

## 2024-10-20 PROCEDURE — 83735 ASSAY OF MAGNESIUM: CPT | Performed by: STUDENT IN AN ORGANIZED HEALTH CARE EDUCATION/TRAINING PROGRAM

## 2024-10-20 PROCEDURE — 80048 BASIC METABOLIC PNL TOTAL CA: CPT | Performed by: STUDENT IN AN ORGANIZED HEALTH CARE EDUCATION/TRAINING PROGRAM

## 2024-10-20 PROCEDURE — 25000003 PHARM REV CODE 250: Performed by: NEUROLOGICAL SURGERY

## 2024-10-20 PROCEDURE — 27000207 HC ISOLATION

## 2024-10-20 RX ADMIN — OXACILLIN 2 G: 2 INJECTION, POWDER, FOR SOLUTION INTRAMUSCULAR; INTRAVENOUS at 01:10

## 2024-10-20 RX ADMIN — FAMOTIDINE 20 MG: 20 TABLET, FILM COATED ORAL at 08:10

## 2024-10-20 RX ADMIN — LEVETIRACETAM 500 MG: 500 TABLET, FILM COATED ORAL at 08:10

## 2024-10-20 RX ADMIN — OXACILLIN 2 G: 2 INJECTION, POWDER, FOR SOLUTION INTRAMUSCULAR; INTRAVENOUS at 06:10

## 2024-10-20 RX ADMIN — QUETIAPINE FUMARATE 25 MG: 25 TABLET ORAL at 08:10

## 2024-10-20 RX ADMIN — FAMOTIDINE 20 MG: 20 TABLET, FILM COATED ORAL at 09:10

## 2024-10-20 RX ADMIN — METOPROLOL SUCCINATE 12.5 MG: 25 TABLET, EXTENDED RELEASE ORAL at 09:10

## 2024-10-20 RX ADMIN — ENOXAPARIN SODIUM 40 MG: 40 INJECTION SUBCUTANEOUS at 05:10

## 2024-10-20 RX ADMIN — AMLODIPINE BESYLATE 5 MG: 5 TABLET ORAL at 09:10

## 2024-10-20 RX ADMIN — LEVETIRACETAM 500 MG: 500 TABLET, FILM COATED ORAL at 09:10

## 2024-10-20 RX ADMIN — FERROUS SULFATE TAB 325 MG (65 MG ELEMENTAL FE) 1 EACH: 325 (65 FE) TAB at 09:10

## 2024-10-20 RX ADMIN — OXACILLIN 2 G: 2 INJECTION, POWDER, FOR SOLUTION INTRAMUSCULAR; INTRAVENOUS at 08:10

## 2024-10-20 RX ADMIN — OXACILLIN 2 G: 2 INJECTION, POWDER, FOR SOLUTION INTRAMUSCULAR; INTRAVENOUS at 09:10

## 2024-10-20 RX ADMIN — OXACILLIN 2 G: 2 INJECTION, POWDER, FOR SOLUTION INTRAMUSCULAR; INTRAVENOUS at 05:10

## 2024-10-20 NOTE — PROGRESS NOTES
[facility] Hospital Medicine Progress Note        Chief Complaint: Inpatient Follow-up     HPI:   73-year-old female with no significant past medical history presented to outlying facility with fever, arthralgia, back pain.  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  While in-house patient developed new onset encephalopathy and CT head showed intraparenchymal hematoma-left occipital lobe and was transferred to our hospital for higher level of care.  Patient was admitted to ICU.  CT surgery, Neurosurgery, Neurology, Infectious Disease consulted along with Orthopedics.  CV surgery recommended conservative management with IV antibiotics for endocarditis and repeat echocardiogram at the completion of antibiotics.  Neurosurgery recommended close neuro checks in no acute neurosurgical intervention, Keppra for seizure prevention.  Infectious Disease evaluated, cultures speciated as MSSA and therefore they changed antibiotics to oxacillin.  Neurosurgery ordered MRI brain and MRI C-spine.  Orthopedics took her to OR for right knee arthroscopy/incision/drainage.  Interventional Neurology did cerebral angiogram, no evidence of mycotic aneurysm, AVM or AV fistula.  Patient had an incidental left cavernous ICA aneurysm.  MRI C-spine with epidural enhancement, cervical osteomyelitis and fluid collection on the thoracic spine as well.  Patient cleared for downgraded from ICU on 10/12 hospital medicine services.  Therapy Services following, recommending skilled nursing facility placement.  Oxacillin latest switch to Ancef for convenience with end date 11/13, case management working on LTAC which unfortunately was denied by insurance.  While on cefazolin patient developed  low-grade temp spikes and therefore Infectious Disease re-evaluated and switched back to oxacillin.  Limited TTE repeated which showed persistent mitral valve vegetation, blood cultures repeated.  Also with change in mentation, CT head with no acute changes, on delirium precautions.  Patient noted to have some swelling involving lower extremity, no DVT, found to have Baker cyst.  Cleared for VTE prophylaxis of 10/8.  Given continued intermittent confusion a UA was obtained which was concerning for UTI and cultures growing ESBL E coli sensitive to Cipro, patient initiated on p.o. ciprofloxacin.  Fever spikes and confusion improved on Cipro.  Case management actively working on placement.  Infectious Disease decided to extend course to 8 weeks.     OT/PT was started and she was progressing well. Labs and vitals was stable. She did have some cognitive deficits. But overall was making a good clinical recovery. CM working on placement. If unable to place in a SNF unit then we will have to dc home with HH and iv abx infusion.      Interval Hx:   Resting comfortably in bed. No family at bedside. Electrolyte disturbances have resolved today.      Objective/physical exam:  General: In no acute distress  Chest: Clear to auscultation bilaterally  Heart: RRR, +S1, S2, grade IV/VI 5th ICS LSB murmur  Abdomen: Soft, nontender, BS +  MSK: Warm, no lower extremity edema, no clubbing or cyanosis  Neurologic: Cranial nerve II-XII intact, Strength 5/5 in all 4 extremities    VITAL SIGNS: 24 HRS MIN & MAX LAST   Temp  Min: 97.9 °F (36.6 °C)  Max: 100.1 °F (37.8 °C) 97.9 °F (36.6 °C)   BP  Min: 113/65  Max: 138/84 129/76   Pulse  Min: 88  Max: 112  89   Resp  Min: 20  Max: 20 20   SpO2  Min: 96 %  Max: 98 % 96 %       Recent Labs   Lab 10/17/24  0211 10/19/24  0153 10/20/24  0222   WBC 7.53 5.53 5.78   RBC 2.71* 2.48* 2.48*   HGB 8.4* 7.7* 7.7*   HCT 27.2* 24.5* 24.2*   .4* 98.8* 97.6*   MCH 31.0 31.0 31.0   MCHC 30.9* 31.4*  31.8*   RDW 13.7 14.0 14.0    298 264   MPV 8.5 8.4 8.3       Recent Labs   Lab 10/17/24  0211 10/19/24  0153 10/20/24  0222    141 143   K 3.3* 3.1* 4.0    108* 107   CO2 22* 23 21*   BUN 5.6* 4.6* 4.0*   CREATININE 0.71 0.58 0.62   CALCIUM 8.1* 7.7* 7.7*   MG 1.56* 1.60 2.50          Microbiology Results (last 7 days)       ** No results found for the last 168 hours. **             Radiology:  CV Ultrasound doppler venous legs bilat  The right lower extremity venous system is patent with no evidence of   superficial or deep vein thrombosis.  The left lower extremity venous system is patent with no evidence of   superficial or deep vein thrombosis.  A 4 x 3 x 2 cm bakers cyst was identified in the left popliteal space.         Medications:  Scheduled Meds:   amLODIPine  5 mg Oral Daily    enoxparin  40 mg Subcutaneous Q24H (prophylaxis, 1700)    famotidine  20 mg Oral BID    ferrous sulfate  1 tablet Oral Daily    levETIRAcetam  500 mg Oral BID    metoprolol succinate  12.5 mg Oral Daily    oxacillin IV (PEDS and ADULTS)  2 g Intravenous Q4H    QUEtiapine  25 mg Oral QHS     Continuous Infusions:  PRN Meds:.  Current Facility-Administered Medications:     acetaminophen, 650 mg, Oral, Q6H PRN    bisacodyL, 10 mg, Rectal, Daily PRN    camphor-methyl salicyl-menthoL, , Topical (Top), BID PRN    dextrose 10%, 12.5 g, Intravenous, PRN    dextrose 10%, 25 g, Intravenous, PRN    labetalol, 10 mg, Intravenous, Q15 Min PRN    LIDOcaine, 1 patch, Transdermal, Q24H PRN    morphine, 2 mg, Intravenous, Q6H PRN    ondansetron, 8 mg, Oral, Q8H PRN    sodium chloride 0.9%, 10 mL, Intravenous, PRN    sodium chloride 0.9%, 10 mL, Intravenous, PRN        Assessment/Plan:   MSSA bacteremia-cleared   Sepsis secondary to above-improved  Disseminated MSSA infection   Mitral valve endocarditis with mitral regurgitation  Septic arthritis/osteomyelitis sterno mandibular joint   Cervical diskitis/osteomyelitis with small 3  vertebral abscess  Right knee septic arthritis status post arthroscopy, washout 9/27   Hypokalemia   Hypomagnesemia   Right knee Baker's cyst   Anemia of chronic disease  Acute bacterial UTI secondary to ESBL E coli-completed treatment  Avascular necrosis right femoral head   Intraparenchymal hemorrhage-left occipital lobe   Incidental finding left cavernous ICA aneurysm   Acute encephalopathy-likely metabolic/hospital associated : resolved delirium/secondary to IPH, improved   New diagnosis essential HTN -stable     IV Oxacillin until 11/24. Completed Raine VENCES working on SNF at [facility] with IV antibiotics. Medically accepted but won't have bed until Monday (10/21)  PT/OT: moderate intensity therapy  Hypokalemia and hypomag- replace as condition requires. Anemia stable. Add oral iron for anemia    Thairy G Reyes,    10/18/2024    All diagnosis and differential diagnosis have been reviewed; assessment and plan has been documented; I have personally reviewed the labs and test results that are presently available; I have reviewed the patients medication list; I have reviewed the consulting providers response and recommendations. I have reviewed or attempted to review medical records based upon their availability    All of the patient's questions have been  addressed and answered. Patient's is agreeable to the above stated plan. I will continue to monitor closely and make adjustments to medical management as needed.  _____________________________________________________________________

## 2024-10-20 NOTE — PLAN OF CARE
Problem: Adult Inpatient Plan of Care  Goal: Absence of Hospital-Acquired Illness or Injury  Outcome: Progressing  Goal: Optimal Comfort and Wellbeing  Outcome: Progressing     Problem: Skin Injury Risk Increased  Goal: Skin Health and Integrity  Outcome: Progressing     Problem: Infection  Goal: Absence of Infection Signs and Symptoms  Outcome: Progressing     Problem: Fall Injury Risk  Goal: Absence of Fall and Fall-Related Injury  Outcome: Progressing

## 2024-10-21 VITALS
BODY MASS INDEX: 21.02 KG/M2 | SYSTOLIC BLOOD PRESSURE: 119 MMHG | HEART RATE: 91 BPM | RESPIRATION RATE: 20 BRPM | WEIGHT: 150.13 LBS | HEIGHT: 71 IN | OXYGEN SATURATION: 97 % | TEMPERATURE: 98 F | DIASTOLIC BLOOD PRESSURE: 68 MMHG

## 2024-10-21 LAB
POCT GLUCOSE: 79 MG/DL (ref 70–110)
POCT GLUCOSE: 79 MG/DL (ref 70–110)

## 2024-10-21 PROCEDURE — 25000003 PHARM REV CODE 250: Performed by: INTERNAL MEDICINE

## 2024-10-21 PROCEDURE — C1751 CATH, INF, PER/CENT/MIDLINE: HCPCS

## 2024-10-21 PROCEDURE — 97164 PT RE-EVAL EST PLAN CARE: CPT

## 2024-10-21 PROCEDURE — 63600175 PHARM REV CODE 636 W HCPCS: Performed by: INTERNAL MEDICINE

## 2024-10-21 PROCEDURE — 25000003 PHARM REV CODE 250: Performed by: HOSPITALIST

## 2024-10-21 PROCEDURE — 76937 US GUIDE VASCULAR ACCESS: CPT

## 2024-10-21 PROCEDURE — 36410 VNPNXR 3YR/> PHY/QHP DX/THER: CPT

## 2024-10-21 PROCEDURE — 25000003 PHARM REV CODE 250: Performed by: NEUROLOGICAL SURGERY

## 2024-10-21 RX ORDER — ENOXAPARIN SODIUM 100 MG/ML
40 INJECTION SUBCUTANEOUS EVERY 24 HOURS
Start: 2024-10-21

## 2024-10-21 RX ORDER — FERROUS SULFATE 325(65) MG
325 TABLET, DELAYED RELEASE (ENTERIC COATED) ORAL DAILY
Start: 2024-10-21

## 2024-10-21 RX ORDER — LEVETIRACETAM 500 MG/1
500 TABLET ORAL 2 TIMES DAILY
Qty: 60 TABLET | Refills: 11 | Status: SHIPPED | OUTPATIENT
Start: 2024-10-21 | End: 2025-10-21

## 2024-10-21 RX ORDER — AMLODIPINE BESYLATE 5 MG/1
5 TABLET ORAL DAILY
Qty: 90 TABLET | Refills: 3 | Status: SHIPPED | OUTPATIENT
Start: 2024-10-22 | End: 2025-10-22

## 2024-10-21 RX ORDER — FAMOTIDINE 20 MG/1
20 TABLET, FILM COATED ORAL 2 TIMES DAILY
Qty: 60 TABLET | Refills: 11 | Status: SHIPPED | OUTPATIENT
Start: 2024-10-21 | End: 2025-10-21

## 2024-10-21 RX ORDER — ONDANSETRON 8 MG/1
8 TABLET, ORALLY DISINTEGRATING ORAL EVERY 8 HOURS PRN
Start: 2024-10-21

## 2024-10-21 RX ORDER — QUETIAPINE FUMARATE 25 MG/1
25 TABLET, FILM COATED ORAL NIGHTLY
Qty: 30 TABLET | Refills: 11 | Status: SHIPPED | OUTPATIENT
Start: 2024-10-21 | End: 2025-10-21

## 2024-10-21 RX ORDER — METOPROLOL SUCCINATE 25 MG/1
12.5 TABLET, EXTENDED RELEASE ORAL DAILY
Qty: 45 TABLET | Refills: 3 | Status: SHIPPED | OUTPATIENT
Start: 2024-10-22 | End: 2025-10-22

## 2024-10-21 RX ORDER — LIDOCAINE 50 MG/G
1 PATCH TOPICAL
Start: 2024-10-21

## 2024-10-21 RX ORDER — ACETAMINOPHEN 325 MG/1
650 TABLET ORAL EVERY 6 HOURS PRN
Start: 2024-10-21

## 2024-10-21 RX ADMIN — FERROUS SULFATE TAB 325 MG (65 MG ELEMENTAL FE) 1 EACH: 325 (65 FE) TAB at 08:10

## 2024-10-21 RX ADMIN — LEVETIRACETAM 500 MG: 500 TABLET, FILM COATED ORAL at 08:10

## 2024-10-21 RX ADMIN — OXACILLIN 2 G: 2 INJECTION, POWDER, FOR SOLUTION INTRAMUSCULAR; INTRAVENOUS at 12:10

## 2024-10-21 RX ADMIN — OXACILLIN 2 G: 2 INJECTION, POWDER, FOR SOLUTION INTRAMUSCULAR; INTRAVENOUS at 01:10

## 2024-10-21 RX ADMIN — FAMOTIDINE 20 MG: 20 TABLET, FILM COATED ORAL at 08:10

## 2024-10-21 RX ADMIN — AMLODIPINE BESYLATE 5 MG: 5 TABLET ORAL at 08:10

## 2024-10-21 RX ADMIN — OXACILLIN 2 G: 2 INJECTION, POWDER, FOR SOLUTION INTRAMUSCULAR; INTRAVENOUS at 08:10

## 2024-10-21 RX ADMIN — OXACILLIN 2 G: 2 INJECTION, POWDER, FOR SOLUTION INTRAMUSCULAR; INTRAVENOUS at 04:10

## 2024-10-21 RX ADMIN — METOPROLOL SUCCINATE 12.5 MG: 25 TABLET, EXTENDED RELEASE ORAL at 08:10

## 2024-10-21 NOTE — HOSPITAL COURSE
10/21/24-Patient has been accepted to SNF for continued treatment and antibiotics.  She is stable for transfer.  She will receive midline prior to discharge.  Exam(alert, NAD, RRR, +murmur, CTA, BS +, NTTP, ROM I)

## 2024-10-21 NOTE — HPI
73-year-old female with no significant past medical history presented to outlying facility with fever, arthralgia, back pain.  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  While in-house patient developed new onset encephalopathy and CT head showed intraparenchymal hematoma-left occipital lobe and was transferred to our hospital for higher level of care.  Patient was admitted to ICU.  CT surgery, Neurosurgery, Neurology, Infectious Disease consulted along with Orthopedics.  CV surgery recommended conservative management with IV antibiotics for endocarditis and repeat echocardiogram at the completion of antibiotics.  Neurosurgery recommended close neuro checks in no acute neurosurgical intervention, Keppra for seizure prevention.  Infectious Disease evaluated, cultures speciated as MSSA and therefore they changed antibiotics to oxacillin.  Neurosurgery ordered MRI brain and MRI C-spine.  Orthopedics took her to OR for right knee arthroscopy/incision/drainage.  Interventional Neurology did cerebral angiogram, no evidence of mycotic aneurysm, AVM or AV fistula.  Patient had an incidental left cavernous ICA aneurysm.  MRI C-spine with epidural enhancement, cervical osteomyelitis and fluid collection on the thoracic spine as well.  Patient cleared for downgraded from ICU on 10/12 hospital medicine services.  Therapy Services following, recommending skilled nursing facility placement.  Oxacillin latest switch to Ancef for convenience with end date 11/13, case management working on LTAC which unfortunately was denied by insurance.  While on cefazolin patient developed low-grade temp spikes and therefore Infectious Disease re-evaluated and switched back to oxacillin.  Limited TTE repeated which showed  persistent mitral valve vegetation, blood cultures repeated.  Also with change in mentation, CT head with no acute changes, on delirium precautions.  Patient noted to have some swelling involving lower extremity, no DVT, found to have Baker cyst.  Cleared for VTE prophylaxis of 10/8.  Given continued intermittent confusion a UA was obtained which was concerning for UTI and cultures growing ESBL E coli sensitive to Cipro, patient initiated on p.o. ciprofloxacin.  Fever spikes and confusion improved on Cipro.  Case management actively working on placement.  Infectious Disease decided to extend course to 8 weeks.     OT/PT was started and she was progressing well. Labs and vitals was stable. She did have some cognitive deficits. But overall was making a good clinical recovery. CM working on placement. If unable to place in a SNF unit then we will have to dc home with HH and iv abx infusion.

## 2024-10-21 NOTE — PLAN OF CARE
Problem: Adult Inpatient Plan of Care  Goal: Plan of Care Review  Outcome: Met  Goal: Patient-Specific Goal (Individualized)  Outcome: Met  Goal: Absence of Hospital-Acquired Illness or Injury  Outcome: Met  Goal: Optimal Comfort and Wellbeing  Outcome: Met  Goal: Readiness for Transition of Care  Outcome: Met     Problem: Stroke, Subarachnoid Hemorrhage  Goal: Optimal Coping  Outcome: Met  Goal: Effective Bowel Elimination  Outcome: Met  Goal: Optimal Cerebral Tissue Perfusion  Outcome: Met  Goal: Optimal Cognitive Function  Outcome: Met  Goal: Effective Communication Skills  Outcome: Met  Goal: Optimal Functional Ability  Outcome: Met  Goal: Optimal Nutrition Intake  Outcome: Met  Goal: Optimal Pain Control and Function  Outcome: Met  Goal: Effective Oxygenation and Ventilation  Outcome: Met  Goal: Improved Sensorimotor Function  Outcome: Met  Goal: Safe and Effective Swallow  Outcome: Met  Goal: Effective Urinary Elimination  Outcome: Met     Problem: Skin Injury Risk Increased  Goal: Skin Health and Integrity  Outcome: Met     Problem: Wound  Goal: Optimal Coping  Outcome: Met  Goal: Optimal Functional Ability  Outcome: Met  Goal: Absence of Infection Signs and Symptoms  Outcome: Met  Goal: Improved Oral Intake  Outcome: Met  Goal: Optimal Pain Control and Function  Outcome: Met  Goal: Skin Health and Integrity  Outcome: Met  Goal: Optimal Wound Healing  Outcome: Met     Problem: Infection  Goal: Absence of Infection Signs and Symptoms  Outcome: Met     Problem: Fall Injury Risk  Goal: Absence of Fall and Fall-Related Injury  Outcome: Met

## 2024-10-21 NOTE — PLAN OF CARE
Reached out to Prachi at [facility] for an update on the status of this patient's referral and insurance auth. She stated that she would have Savanah reach out to me. Awaiting call from Savanah at this time.

## 2024-10-21 NOTE — PROCEDURES
"Orlando Black is a 74 y.o. female patient.    Temp: 98.2 °F (36.8 °C) (10/21/24 0725)  Pulse: 91 (10/21/24 0854)  Resp: 20 (10/20/24 0743)  BP: 119/68 (10/21/24 0854)  SpO2: 97 % (10/21/24 0725)  Weight: 68.1 kg (150 lb 2.1 oz) (10/16/24 0600)  Height: 5' 10.98" (180.3 cm) (10/16/24 0919)    PICC  Date/Time: 10/21/2024 1:31 PM  Performed by: Darinel Shipman, RN  Consent Done: Yes  Time out: Immediately prior to procedure a time out was called to verify the correct patient, procedure, equipment, support staff and site/side marked as required  Indications: med administration and vascular access  Anesthesia: local infiltration  Local anesthetic: lidocaine 1% without epinephrine  Anesthetic Total (mL): 3  Preparation: skin prepped with ChloraPrep  Skin prep agent dried: skin prep agent completely dried prior to procedure  Sterile barriers: all five maximum sterile barriers used - cap, mask, sterile gown, sterile gloves, and large sterile sheet  Hand hygiene: hand hygiene performed prior to central venous catheter insertion  Location details: left brachial  Catheter type: single lumen  Catheter size: 4 Fr  Catheter Length: 17cm    Ultrasound guidance: yes  Vessel Caliber: patent, compressibility normal  Vascular Doppler: not done  Needle advanced into vessel with real time Ultrasound guidance.  Guidewire confirmed in vessel.  Sterile sheath used.  no esophageal manometryNumber of attempts: 1  Post-procedure: sterile dressing applied, blood return through all ports and chlorhexidine patch    Assessment: successful placement  Complications: none          Darinel Shipman RN  10/21/2024    "

## 2024-10-21 NOTE — PLAN OF CARE
Discharge information sent to [facility] via [facility]. Awaiting who to call report at this time.

## 2024-10-21 NOTE — PT/OT/SLP RE-EVAL
Physical Therapy Re-Evaluation    Patient Name:  Orlando Black   MRN:  [id number]    Recommendations:     Discharge therapy intensity: High Intensity Therapy   Discharge Equipment Recommendations: to be determined by next level of care   Barriers to discharge: Impaired mobility and Ongoing medical needs    Assessment:     Orlando Black is a 74 y.o. 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  She presents with the following impairments/functional limitations: weakness, gait instability, impaired endurance, impaired balance, impaired functional mobility, decreased lower extremity function, orthopedic precautions . Pt making progress with therapy, now min-CGA for functional mobility with RW. Still with cognitive impairments but memory issues appear to be resolving. Pt's confidence with mobility is improving. Recommend high intensity therapy at discharge.    Rehab Prognosis: Good; patient would benefit from acute skilled PT services to address these deficits and reach maximum level of function.    Recent Surgery: Procedure(s) (LRB):  ARTHROSCOPY, KNEE, WITH DEBRIDEMENT (Right) 24 Days Post-Op    Plan:     During this hospitalization, patient would benefit from acute PT services 5 x/week to address the identified rehab impairments via gait training, therapeutic activities, therapeutic exercises, neuromuscular re-education and progress toward the following goals:    Plan of Care Expires:  11/08/24    PT/PTA conference to discuss PT POC and patient's progression towards goals held with Nisha Sanz  PTA.     Subjective     Chief Complaint: none  Patient/Family Comments/goals: get better, back to work  Pain/Comfort:  Pain Rating 1: 0/10    Patients cultural, spiritual, Yazidism conflicts given the current situation: no    Objective:     Communicated with RN prior to session.  Patient found HOB elevated with PureWick, peripheral IV ()  upon PT entry to room.    General Precautions: Standard, fall, contact  Orthopedic Precautions:RLE weight bearing as tolerated   Braces: N/A  Respiratory Status: Room air  Blood Pressure: NT      Exams:  Cognitive Exam:  Patient is oriented to Person, Situation, and disoriented to time  RLE Strength: WFL  LLE Strength: WFL  Skin integrity: Visible skin intact      Functional Mobility:  Bed Mobility:     Supine to Sit: minimum assistance  Transfers:     Sit to Stand:  minimum assistance with rolling walker  Gait: 2x85ft with RW with CGA, mild path deviation and increased instability with turns  Tinetti Total Score: 22  < 18 = High Risk of Falls, 19-23 = Moderate Risk of Falls, > 24 = Low Risk of Falls      AM-PAC 6 CLICK MOBILITY  Total Score:18       Treatment & Education:      Patient provided with verbal education education regarding PT role/goals/POC, fall prevention, safety awareness, and discharge/DME recommendations.  Understanding was verbalized.     Patient left up in chair with all lines intact, call button in reach, chair alarm on, geomat cushion, RN notified, and posey vest .    GOALS:   Multidisciplinary Problems       Physical Therapy Goals          Problem: Physical Therapy    Goal Priority Disciplines Outcome Interventions   Physical Therapy Goal     PT, PT/OT Progressing    Description: Goals to be met by: 10/28/24     Patient will increase functional independence with mobility by performin. Supine to sit with Stand-by Assistance  2. Sit to supine with Stand-by Assistance  3. Sit to stand transfer with Stand-by Assistance  4. Bed to chair transfer with  Stand-by Assistance using Rolling Walker  5. Gait  x 200 feet with Stand-by Assistance using Rolling Walker.                          History:     History reviewed. No pertinent past medical history.    Past Surgical History:   Procedure Laterality Date    KNEE ARTHROSCOPY W/ DEBRIDEMENT Right 9/27/2024    Procedure: ARTHROSCOPY, KNEE, WITH DEBRIDEMENT;  Surgeon: Omid Castro MD;  Location: [facility];  Service: Orthopedics;  Laterality: Right;       Time Tracking:     PT Received On: 10/21/24  PT Start Time: 0957     PT Stop Time: 1011  PT Total Time (min): 14 min     Billable Minutes: Re-eval 14 min      10/21/2024

## 2024-10-21 NOTE — PLAN OF CARE
Problem: Stroke, Subarachnoid Hemorrhage  Goal: Optimal Cognitive Function  Outcome: Progressing  Goal: Effective Communication Skills  Outcome: Progressing  Goal: Optimal Functional Ability  Outcome: Progressing  Goal: Optimal Pain Control and Function  Outcome: Progressing  Goal: Safe and Effective Swallow  Outcome: Progressing     Problem: Skin Injury Risk Increased  Goal: Skin Health and Integrity  Outcome: Progressing     Problem: Infection  Goal: Absence of Infection Signs and Symptoms  Outcome: Progressing     Problem: Fall Injury Risk  Goal: Absence of Fall and Fall-Related Injury  Outcome: Progressing

## 2024-10-21 NOTE — DISCHARGE SUMMARY
[facility] - Neurology Hospital Medicine  Discharge Summary      Patient Name: Orlando Black  MRN: [id number]  EDITH: [id number]  Patient Class: IP- Inpatient  Admission Date: 9/24/2024  Hospital Length of Stay: 27 days  Discharge Date and Time:  10/21/2024 12:07 PM  Attending Physician: Rupert Desir MD   Discharging Provider: Everett Valero MD  Primary Care Provider: Janeth, Primary Doctor    Primary Care Team: Networked reference to record PCT     HPI:   73-year-old female with no significant past medical history presented to outlying facility with fever, arthralgia, back pain.  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  While in-house patient developed new onset encephalopathy and CT head showed intraparenchymal hematoma-left occipital lobe and was transferred to our hospital for higher level of care.  Patient was admitted to ICU.  CT surgery, Neurosurgery, Neurology, Infectious Disease consulted along with Orthopedics.  CV surgery recommended conservative management with IV antibiotics for endocarditis and repeat echocardiogram at the completion of antibiotics.  Neurosurgery recommended close neuro checks in no acute neurosurgical intervention, Keppra for seizure prevention.  Infectious Disease evaluated, cultures speciated as MSSA and therefore they changed antibiotics to oxacillin.  Neurosurgery ordered MRI brain and MRI C-spine.  Orthopedics took her to OR for right knee arthroscopy/incision/drainage.  Interventional Neurology did cerebral angiogram, no evidence of mycotic aneurysm, AVM or AV fistula.  Patient had an incidental left cavernous ICA aneurysm.  MRI C-spine with epidural enhancement, cervical osteomyelitis and fluid collection on the thoracic spine as  well.  Patient cleared for downgraded from ICU on 10/12 hospital medicine services.  Therapy Services following, recommending skilled nursing facility placement.  Oxacillin latest switch to Ancef for convenience with end date 11/13, case management working on LTAC which unfortunately was denied by insurance.  While on cefazolin patient developed low-grade temp spikes and therefore Infectious Disease re-evaluated and switched back to oxacillin.  Limited TTE repeated which showed persistent mitral valve vegetation, blood cultures repeated.  Also with change in mentation, CT head with no acute changes, on delirium precautions.  Patient noted to have some swelling involving lower extremity, no DVT, found to have Baker cyst.  Cleared for VTE prophylaxis of 10/8.  Given continued intermittent confusion a UA was obtained which was concerning for UTI and cultures growing ESBL E coli sensitive to Cipro, patient initiated on p.o. ciprofloxacin.  Fever spikes and confusion improved on Cipro.  Case management actively working on placement.  Infectious Disease decided to extend course to 8 weeks.     OT/PT was started and she was progressing well. Labs and vitals was stable. She did have some cognitive deficits. But overall was making a good clinical recovery. CM working on placement. If unable to place in a SNF unit then we will have to dc home with HH and iv abx infusion.        Procedure(s) (LRB):  ARTHROSCOPY, KNEE, WITH DEBRIDEMENT (Right)      Hospital Course:   10/21/24-Patient has been accepted to SNF for continued treatment and antibiotics.  She is stable for transfer.  She will receive midline prior to discharge.  Exam(alert, NAD, RRR, +murmur, CTA, BS +, NTTP, ROM I)     Goals of Care Treatment Preferences:  Code Status: Full Code      SDOH Screening:  The patient declined to be screened for utility difficulties, food insecurity, transport difficulties, housing insecurity, and interpersonal safety, so no concerns  could be identified this admission.     Consults:   Consults (From admission, onward)          Status Ordering Provider     Inpatient consult to Midline team  Once        Provider:  (Not yet assigned)    Ordered SONA MOSES     Inpatient consult to Social Work/Case Management  Once        Provider:  (Not yet assigned)    Acknowledged DILLON ZAFAR     Inpatient consult to Infectious Diseases  Once        Provider:  Saúl Olivarez MD    Completed RANJITH ALDANA     Inpatient consult to Social Work/Case Management  Once        Provider:  (Not yet assigned)    Acknowledged NELLY GOLDEN     Inpatient consult to Infectious Diseases  Once        Provider:  Saúl Olivarez MD    Completed NIRANJAN FOWLER     Inpatient consult to Social Work/Case Management  Once        Provider:  (Not yet assigned)    Completed DOMINICK QUINONES     Inpatient consult to Orthopedic Surgery  Once        Provider:  Omid Castro MD    Completed DOMINICK QUINONES     Inpatient consult to Vascular (Stroke) Neurology  Once        Provider:  Zion Solis MD    Completed DOMINICK QUINONES     Inpatient consult to Neurosurgery  Once        Provider:  Radha Macedo MD    Completed DOMINICK QUINONES     Inpatient consult to Cardiothoracic Surgery  Once        Provider:  Tdod Wise MD    Acknowledged DOMINICK QUINONES            Cardiac/Vascular  Endocarditis of mitral valve  IV antibiotics      ID  * MSSA bacteremia  Resume IV antibiotics  Transfer to SNF today.        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    Specialty: Family Medicine  Why: Will need appointment  Contact information:  [address]  [place] [id number] [phone]               Mariann Moser FNP Follow up in 2 month(s).    Specialty: Neurology  Why: Left occipital lobe hemorrhage. Discuss management of the cavernous ICA aneurysm.  Contact information:  [address] [place] [address] [place] [id number] [phone]                           Patient Instructions:   No discharge procedures on file.    Significant Diagnostic Studies: Labs: BMP:   Recent Labs   Lab 10/20/24  0222      K 4.0      CO2 21*   BUN 4.0*   CREATININE 0.62   CALCIUM 7.7*   MG 2.50   , CMP   Recent Labs   Lab 10/20/24  0222      K 4.0      CO2 21*   BUN 4.0*   CREATININE 0.62   CALCIUM 7.7*   , and CBC   Recent Labs   Lab 10/20/24  0222   WBC 5.78   HGB 7.7*   HCT 24.2*          Pending Diagnostic Studies:       None           Medications:  Reconciled Home Medications:      Medication List        START taking these medications      0.9% NaCl PgBk 100 mL with oxacillin 2 gram SolR 2 g  Inject 2 g into the vein every 4 (four) hours.     acetaminophen 325 MG tablet  Commonly known as: TYLENOL  Take 2 tablets (650 mg total) by mouth every 6 (six) hours as needed for Pain.     amLODIPine 5 MG tablet  Commonly known as: NORVASC  Take 1 tablet (5 mg total) by mouth once daily.  Start taking on: October 22, 2024     enoxaparin 40 mg/0.4 mL Syrg  Commonly known as: LOVENOX  Inject 0.4 mLs (40 mg total) into the skin Q24H (prophylaxis, 1700).     famotidine 20 MG tablet  Commonly known as: PEPCID  Take 1 tablet (20 mg total) by mouth 2 (two) times daily.     ferrous sulfate 325 (65 FE) MG EC tablet  Take 1 tablet (325 mg total) by mouth once daily.     levETIRAcetam 500 MG Tab  Commonly known as: KEPPRA  Take 1 tablet (500 mg total) by mouth 2 (two) times  daily.     LIDOcaine 5 %  Commonly known as: LIDODERM  Place 1 patch onto the skin every 24 hours as needed. Remove & Discard patch within 12 hours or as directed by MD     metoprolol succinate 25 MG 24 hr tablet  Commonly known as: TOPROL-XL  Take 0.5 tablets (12.5 mg total) by mouth once daily.  Start taking on: October 22, 2024     ondansetron 8 MG Tbdl  Commonly known as: ZOFRAN-ODT  Take 1 tablet (8 mg total) by mouth every 8 (eight) hours as needed.     QUEtiapine 25 MG Tab  Commonly known as: SEROQUEL  Take 1 tablet (25 mg total) by mouth every evening.              Indwelling Lines/Drains at time of discharge:   Lines/Drains/Airways       Drain  Duration             Female External Urinary Catheter w/ Suction 09/29/24 1700 21 days                    Time spent on the discharge of patient: 39 minutes        Patient screened for food insecurity, housing instability, transportation needs, utility difficulties, and interpersonal safety.   Patient has been accepted to SNF for continued treatment.    Everett Valero MD  Department of Hospital Medicine  [facility] - Neurology

## 2024-10-21 NOTE — NURSING
[facility]  She verbally states understanding of report.  Bernadette Amb here to  patient.   Edil notified

## 2024-10-21 NOTE — PLAN OF CARE
10/21/24 1551   Final Note   Assessment Type Final Discharge Note   Anticipated Discharge Disposition Rehab   Post-Acute Status   Post-Acute Authorization Placement   Post-Acute Placement Status Set-up Complete/Auth obtained   Coverage [facility]   Patient choice form signed by patient/caregiver List from CMS Compare   Discharge Delays None known at this time

## 2024-10-22 ENCOUNTER — HOSPITAL ENCOUNTER (OUTPATIENT)
Dept: RADIOLOGY | Facility: HOSPITAL | Age: 74
Discharge: HOME | End: 2024-10-22
Attending: PHYSICAL MEDICINE & REHABILITATION
Payer: COMMERCIAL

## 2024-10-22 DIAGNOSIS — Z46.82 ENCOUNTER FOR CHEST TUBE PLACEMENT: ICD-10-CM

## 2024-10-28 ENCOUNTER — OUTSIDE PLACE OF SERVICE (OUTPATIENT)
Dept: FAMILY MEDICINE | Facility: CLINIC | Age: 74
End: 2024-10-28
Payer: MEDICARE

## 2024-10-28 PROCEDURE — 99232 SBSQ HOSP IP/OBS MODERATE 35: CPT | Mod: ,,, | Performed by: FAMILY MEDICINE

## 2024-10-30 ENCOUNTER — OUTSIDE PLACE OF SERVICE (OUTPATIENT)
Dept: FAMILY MEDICINE | Facility: CLINIC | Age: 74
End: 2024-10-30
Payer: MEDICARE

## 2024-10-30 PROCEDURE — 99232 SBSQ HOSP IP/OBS MODERATE 35: CPT | Mod: ,,, | Performed by: FAMILY MEDICINE

## 2024-11-01 ENCOUNTER — TELEPHONE (OUTPATIENT)
Dept: FAMILY MEDICINE | Facility: CLINIC | Age: 74
End: 2024-11-01
Payer: MEDICARE

## 2024-11-02 ENCOUNTER — OUTSIDE PLACE OF SERVICE (OUTPATIENT)
Dept: FAMILY MEDICINE | Facility: CLINIC | Age: 74
End: 2024-11-02
Payer: MEDICARE

## 2024-11-02 PROCEDURE — 99232 SBSQ HOSP IP/OBS MODERATE 35: CPT | Mod: ,,, | Performed by: FAMILY MEDICINE

## 2024-11-04 ENCOUNTER — TELEPHONE (OUTPATIENT)
Dept: FAMILY MEDICINE | Facility: CLINIC | Age: 74
End: 2024-11-04
Payer: MEDICARE

## 2024-11-04 DIAGNOSIS — R33.9 URINARY RETENTION: Primary | ICD-10-CM

## 2024-11-08 ENCOUNTER — OFFICE VISIT (OUTPATIENT)
Dept: UROLOGY | Facility: CLINIC | Age: 74
End: 2024-11-08
Payer: MEDICARE

## 2024-11-08 DIAGNOSIS — R82.90 ABNORMAL URINALYSIS: ICD-10-CM

## 2024-11-08 DIAGNOSIS — Z97.8 INDWELLING FOLEY CATHETER PRESENT: Primary | ICD-10-CM

## 2024-11-08 LAB
BILIRUBIN, UA POC OHS: NEGATIVE
BLOOD, UA POC OHS: ABNORMAL
CLARITY, UA POC OHS: ABNORMAL
COLOR, UA POC OHS: YELLOW
GLUCOSE, UA POC OHS: NEGATIVE
KETONES, UA POC OHS: ABNORMAL
LEUKOCYTES, UA POC OHS: ABNORMAL
NITRITE, UA POC OHS: NEGATIVE
PH, UA POC OHS: 5.5
PROTEIN, UA POC OHS: 30
SPECIFIC GRAVITY, UA POC OHS: 1.02
UROBILINOGEN, UA POC OHS: 0.2

## 2024-11-08 RX ORDER — OXACILLIN SODIUM 10 G/100ML
INJECTION, POWDER, FOR SOLUTION INTRAVENOUS
COMMUNITY
Start: 2024-11-04

## 2024-11-08 RX ORDER — POTASSIUM CHLORIDE 1500 MG/1
TABLET, EXTENDED RELEASE ORAL
COMMUNITY
Start: 2024-11-05

## 2024-11-08 RX ORDER — FERROUS SULFATE 325(65) MG
TABLET ORAL
COMMUNITY
Start: 2024-11-05

## 2024-11-08 RX ORDER — FLUCONAZOLE 200 MG/1
200 TABLET ORAL DAILY
Qty: 4 TABLET | Refills: 0 | Status: SHIPPED | OUTPATIENT
Start: 2024-11-08 | End: 2024-11-12

## 2024-11-08 RX ORDER — LANOLIN ALCOHOL/MO/W.PET/CERES
CREAM (GRAM) TOPICAL
COMMUNITY
Start: 2024-11-05

## 2024-11-08 NOTE — PROGRESS NOTES
Subjective:       Patient ID: Orlando Black is a 74 y.o. female.    Chief Complaint: No chief complaint on file.      HPI: 74-year-old female new patient presents today for Tom catheter removal.  Patient reports on September 18th she became weak with altered mental status and debility.  She went to the emergency room and was diagnosed with sepsis and transferred to a hospital in [place].  There she received 2 weeks of IV antibiotic treatment and was discharged to rehab in [place].  While patient was in [place] they inserted an indwelling Tom catheter and she is here today to discuss removal.    Patient reports prior to them inserting the Tom catheter she did not have any difficulty with urinating and has always been fully continent.  She reports that she is unsure why they inserted the Tom but she feels it is because it was easier for the staff versus helping her in and out of the bed during the day to urinate.   reports at no time throughout her hospitalization did she have a urinary tract infection.  She was told upon discharge from [facility] that the Tom cath would need to be left in place and removed by Urology because she would require bladder training.  Patient reports the indwelling Tom catheter has only been in place for 2 weeks.       Past Medical History: History reviewed. No pertinent past medical history.    Past Surgical Historical:   Past Surgical History:   Procedure Laterality Date    KNEE ARTHROSCOPY W/ DEBRIDEMENT Right 9/27/2024    Procedure: ARTHROSCOPY, KNEE, WITH DEBRIDEMENT;  Surgeon: Omid Castro MD;  Location: [facility];  Service: Orthopedics;  Laterality: Right;        Medications:   Medication List with Changes/Refills   Current Medications    0.9% NACL PGBK 100 ML WITH OXACILLIN 2 GRAM SOLR 2 G    Inject 2 g into the vein every 4 (four) hours.    ACETAMINOPHEN (TYLENOL) 325 MG TABLET    Take 2 tablets (650 mg total) by mouth every 6 (six) hours as  needed for Pain.    AMLODIPINE (NORVASC) 5 MG TABLET    Take 1 tablet (5 mg total) by mouth once daily.    ENOXAPARIN (LOVENOX) 40 MG/0.4 ML SYRG    Inject 0.4 mLs (40 mg total) into the skin Q24H (prophylaxis, 1700).    FAMOTIDINE (PEPCID) 20 MG TABLET    Take 1 tablet (20 mg total) by mouth 2 (two) times daily.    FERROUS SULFATE (FEOSOL) 325 MG (65 MG IRON) TAB TABLET    TAKE 1 TABLET BY MOUTH EVERY DAY FOR HEMATOPOIETIC AGENTS    FERROUS SULFATE 325 (65 FE) MG EC TABLET    Take 1 tablet (325 mg total) by mouth once daily.    LEVETIRACETAM (KEPPRA) 500 MG TAB    Take 1 tablet (500 mg total) by mouth 2 (two) times daily.    LIDOCAINE (LIDODERM) 5 %    Place 1 patch onto the skin every 24 hours as needed. Remove & Discard patch within 12 hours or as directed by MD    MAGNESIUM OXIDE (MAG-OX) 400 MG (241.3 MG MAGNESIUM) TABLET    TAKE 1 TABLET BY MOUTH TWICE A DAY FOR MINERALS AND ELECTROLYTES X7 DAYS    METOPROLOL SUCCINATE (TOPROL-XL) 25 MG 24 HR TABLET    Take 0.5 tablets (12.5 mg total) by mouth once daily.    ONDANSETRON (ZOFRAN-ODT) 8 MG TBDL    Take 1 tablet (8 mg total) by mouth every 8 (eight) hours as needed.    OXACILLIN 10 GRAM SOLR        POTASSIUM CHLORIDE (K-TAB) 20 MEQ    TAKE 2 TABLETS BY MOUTH EVERY DAY UNTIL 11/24/24 11:08 UNTIL ANTIBIOTIC COMPLETE    QUETIAPINE (SEROQUEL) 25 MG TAB    Take 1 tablet (25 mg total) by mouth every evening.        Past Social History:   Social History     Socioeconomic History    Marital status:    Tobacco Use    Smoking status: Never    Smokeless tobacco: Never     Social Drivers of Health     Financial Resource Strain: Patient Declined (9/26/2024)    Overall Financial Resource Strain (CARDIA)     Difficulty of Paying Living Expenses: Patient declined   Food Insecurity: Patient Declined (9/26/2024)    Hunger Vital Sign     Worried About Running Out of Food in the Last Year: Patient declined     Ran Out of Food in the Last Year: Patient declined    Transportation Needs: Patient Declined (9/26/2024)    TRANSPORTATION NEEDS     Transportation : Patient declined   Stress: Patient Declined (9/26/2024)    [employer] - Occupational Stress Questionnaire     Feeling of Stress : Patient declined   Housing Stability: Patient Declined (9/26/2024)    Housing Stability Vital Sign     Unable to Pay for Housing in the Last Year: Patient declined     Homeless in the Last Year: Patient declined       Allergies: Review of patient's allergies indicates:  No Known Allergies     Family History: No family history on file.     Review of Systems:  Review of Systems   Constitutional:  Negative for activity change, appetite change, chills, diaphoresis, fatigue, fever and unexpected weight change.   HENT:  Negative for congestion, dental problem, drooling, ear discharge, ear pain, facial swelling, hearing loss, mouth sores, nosebleeds, postnasal drip, rhinorrhea, sinus pressure, sinus pain, sneezing, sore throat, tinnitus, trouble swallowing and voice change.    Eyes:  Negative for photophobia, pain, discharge, redness, itching and visual disturbance.   Respiratory:  Negative for apnea, cough, choking, chest tightness, shortness of breath, wheezing and stridor.    Cardiovascular:  Negative for chest pain and leg swelling.   Gastrointestinal:  Negative for abdominal distention, abdominal pain, anal bleeding, blood in stool, constipation, diarrhea, nausea, rectal pain and vomiting.   Endocrine: Negative for cold intolerance, heat intolerance, polydipsia, polyphagia and polyuria.   Genitourinary: Negative.  Negative for decreased urine volume, difficulty urinating, dyspareunia, dysuria, enuresis, flank pain, frequency, genital sores, hematuria, menstrual problem, pelvic pain, urgency, vaginal bleeding, vaginal discharge and vaginal pain.   Musculoskeletal:  Negative for arthralgias, back pain, gait problem, joint swelling, myalgias, neck pain and neck  stiffness.   Skin:  Negative for color change, pallor, rash and wound.   Allergic/Immunologic: Negative for environmental allergies, food allergies and immunocompromised state.   Neurological:  Negative for dizziness, tremors, seizures, syncope, facial asymmetry, speech difficulty, weakness, light-headedness, numbness and headaches.   Hematological:  Negative for adenopathy. Does not bruise/bleed easily.   Psychiatric/Behavioral:  Negative for agitation, behavioral problems, confusion, decreased concentration, dysphoric mood, hallucinations, self-injury, sleep disturbance and suicidal ideas. The patient is not nervous/anxious and is not hyperactive.      Physical Exam:  Physical Exam  Cardiovascular:      Rate and Rhythm: Normal rate.   Pulmonary:      Effort: Pulmonary effort is normal.   Abdominal:      General: Abdomen is flat. Bowel sounds are normal.      Palpations: Abdomen is soft.   Neurological:      Mental Status: She is alert and oriented to person, place, and time.   Urinalysis: WBCs 10-15, RBCs 6-10 large, epithelial +1, bacteria +1, mucus negative, nitrite negative, yeast 2+  Assessment/Plan:     Indwelling Tom catheter/abnormal urinalysis:  Patient's urinalysis is abnormal today however this is likely contamination and possibly from yeast and sediment.  However patient would like to avoid the possibility of returning to the emergency room if she is unable to void secondary to a possible urinary tract infection.  We will send today's urine for culture and on Monday if there is no bacterial infection patient will come in to undergo voiding trial with the understanding that within 6 hours if she is unable to urinate she is to return to our clinic to have the Tom reinserted.  If patient does have bacterial infection we will treat according to sensitivity and then perform the voiding trial once the urinary tract infection has cleared.    Patient is currently on oxacillin IV for her previous infection  of unknown origin which is likely the culprit of the yeast so we will give her several tablets of Diflucan to take while she continues her course of oxacillin.    Follow up pending results of urine culture on Monday  Problem List Items Addressed This Visit    None  Visit Diagnoses       Indwelling Tom catheter present    -  Primary    Relevant Orders    POCT Urinalysis(Instrument)

## 2024-11-09 LAB — URINE CULTURE, ROUTINE: NORMAL

## 2024-11-11 ENCOUNTER — CLINICAL SUPPORT (OUTPATIENT)
Dept: UROLOGY | Facility: CLINIC | Age: 74
End: 2024-11-11
Payer: MEDICARE

## 2024-11-11 DIAGNOSIS — Z97.8 INDWELLING FOLEY CATHETER PRESENT: Primary | ICD-10-CM

## 2024-11-11 PROCEDURE — 51700 IRRIGATION OF BLADDER: CPT | Mod: S$GLB,,,

## 2024-11-11 NOTE — PROGRESS NOTES
Ms. Perry, came in today to do a voiding trial.   A bladder scan was done before the  cath was taken out and before any saline was pushed. The bladder scan was 439, She had a cath already. In sterile form using saline I pushed 350 ml of saline, and the cath balloon was released, I with shun 10 ml out of the cath balloon and the cath was then pulled out. I helped Ms. Perry to the bathroom,  placed a nuns hat in the toliet and she voided 800 in the hat. I helped her back to the room and we did another bladder scan with the result of 125. I explained to her and her  that she needed to drink plenty of fluids this afternoon and if she couldn't urinate on her own to please come back before 3 pm, and after 3 pm she needed to go to the ER to be checked. They both verbalized understanding and were in agreement. She was then told she could go home and make sure she drank plenty fluids.   
Patient

## 2024-11-20 ENCOUNTER — OFFICE VISIT (OUTPATIENT)
Dept: FAMILY MEDICINE | Facility: CLINIC | Age: 74
End: 2024-11-20
Payer: MEDICARE

## 2024-11-20 DIAGNOSIS — I05.9 ENDOCARDITIS OF MITRAL VALVE: Primary | ICD-10-CM

## 2024-11-20 NOTE — PROGRESS NOTES
SUBJECTIVE:  Orlando Black is a 74 y.o. female here for No chief complaint on file.      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  Overall she is doing much better she is still on IV oxacillin getting 10 g a day.  She initially went into the hospital on September 21st and is scheduled to receive antibiotics until November 23rd which will complete 9 weeks of IV antibiotics.  She is no longer having fever.  She is back home doing well.  She is walking.  She did have a small cerebral bleed which is why she was transferred to [facility] for evaluation by Neurosurgery however nothing came of this and currently having no neurologic symptoms.  She never underwent any type of surgical procedures other than the washout of the right knee.  Orlando's allergies, medications, history, and problem list were updated as appropriate.    Review of Systems   Overall doing much better with no new symptoms today    Recent Results (from the past 3 weeks)   Sedimentation rate    Collection Time: 10/31/24  4:00 AM   Result Value Ref Range    Sed Rate 15 0 - 20 mm/hr   Comprehensive Metabolic Panel    Collection Time: 10/31/24  4:00 AM   Result Value Ref Range    Sodium 138 136 - 145 mmol/L    Potassium 1.7 (LL) 3.5 - 5.1 mmol/L    Chloride 100 98 - 110 mmol/L    CO2 33 (H) 21 - 32 mmol/L    Glucose 88 70 - 115 mg/dL    Blood Urea Nitrogen 3 (L) 7.0 - 20.0 mg/dL    Creatinine 0.59 (L) 0.66 - 1.25 mg/dL    Calcium 7.9 (L) 8.4 - 10.2 mg/dL    Protein Total 4.8 (L) 6.3 - 8.2 gm/dL    Albumin 2.2 (L) 3.4 - 5.0 g/dL    Globulin 2.6 2.0 - 3.9 gm/dL    Albumin/Globulin Ratio 0.8 ratio    Bilirubin Total 0.3 0.0 - 1.0 mg/dL    ALP 98 50 - 144 unit/L    ALT 18 1 - 45 unit/L     AST 31 14 - 36 unit/L    eGFR >90 mL/min/1.73/m2    Anion Gap 5.0 2.0 - 13.0 mEq/L    BUN/Creatinine Ratio 5 (L) 12 - 20   C-Reactive Protein    Collection Time: 10/31/24  4:00 AM   Result Value Ref Range    CRP 5.50 (H) <=0.90 mg/dL   CBC with Differential    Collection Time: 10/31/24  4:00 AM   Result Value Ref Range    WBC 5.29 4.00 - 11.50 x10(3)/mcL    RBC 2.20 (L) 4.00 - 5.10 x10(6)/mcL    Hgb 6.8 (L) 11.8 - 16.0 g/dL    Hct 21.0 (L) 36.0 - 48.0 %    MCV 95.5 79.0 - 99.0 fL    MCH 30.9 27.0 - 34.0 pg    MCHC 32.4 31.0 - 37.0 g/dL    RDW 15.2 (H) 11.0 - 14.5 %    Platelet 326 140 - 371 x10(3)/mcL    MPV 8.6 (L) 9.4 - 12.4 fL    Neut % 39.8 37 - 73 %    Lymph % 36.5 20 - 55 %    Mono % 11.2 4.7 - 12.5 %    Eos % 11.2 (H) 0.7 - 7 %    Basophil % 0.9 0.1 - 1.2 %    Lymph # 1.93 1.16 - 3.74 x10(3)/mcL    Neut # 2.11 1.56 - 6.13 x10(3)/mcL    Mono # 0.59 (H) 0.24 - 0.36 x10(3)/mcL    Eos # 0.59 (H) 0.04 - 0.36 x10(3)/mcL    Baso # 0.05 0.01 - 0.08 x10(3)/mcL    IG# 0.02 0.0001 - 0.031 x10(3)/mcL    IG% 0.4 0 - 0.5 %    NRBC% 0.0 <=1 %   Comprehensive Metabolic Panel    Collection Time: 10/31/24  6:54 AM   Result Value Ref Range    Sodium 135 (L) 136 - 145 mmol/L    Potassium 1.8 (LL) 3.5 - 5.1 mmol/L    Chloride 100 98 - 110 mmol/L    CO2 32 21 - 32 mmol/L    Glucose 84 70 - 115 mg/dL    Blood Urea Nitrogen 3 (L) 7.0 - 20.0 mg/dL    Creatinine 0.62 (L) 0.66 - 1.25 mg/dL    Calcium 7.8 (L) 8.4 - 10.2 mg/dL    Protein Total 4.7 (L) 6.3 - 8.2 gm/dL    Albumin 2.1 (L) 3.4 - 5.0 g/dL    Globulin 2.6 2.0 - 3.9 gm/dL    Albumin/Globulin Ratio 0.8 ratio    Bilirubin Total 0.2 0.0 - 1.0 mg/dL    ALP 94 50 - 144 unit/L    ALT 16 1 - 45 unit/L    AST 32 14 - 36 unit/L    eGFR >90 mL/min/1.73/m2    Anion Gap 3.0 2.0 - 13.0 mEq/L    BUN/Creatinine Ratio 5 (L) 12 - 20   Occult Blood Stool, CA Screen    Collection Time: 10/31/24  5:55 PM   Result Value Ref Range    Stool Color 1 Brown     Stool Consistancy 1 formed     Occult  Blood Stool 1 Positive (A) Negative   Occult Blood, Stool 2nd Specimen    Collection Time: 10/31/24  9:45 PM   Result Value Ref Range    Stool Color 2 Brown     Stool Consistancy 2 formed     Occult Blood Stool 2 Positive (A) Negative, N/A   Potassium    Collection Time: 11/01/24  8:00 AM   Result Value Ref Range    Potassium 2.7 (L) 3.5 - 5.1 mmol/L   Comprehensive Metabolic Panel    Collection Time: 11/02/24  4:15 AM   Result Value Ref Range    Sodium 139 136 - 145 mmol/L    Potassium 3.2 (L) 3.5 - 5.1 mmol/L    Chloride 109 98 - 110 mmol/L    CO2 30 21 - 32 mmol/L    Glucose 86 70 - 115 mg/dL    Blood Urea Nitrogen 2 (L) 7.0 - 20.0 mg/dL    Creatinine 0.65 (L) 0.66 - 1.25 mg/dL    Calcium 7.8 (L) 8.4 - 10.2 mg/dL    Protein Total 4.7 (L) 6.3 - 8.2 gm/dL    Albumin 2.1 (L) 3.4 - 5.0 g/dL    Globulin 2.6 2.0 - 3.9 gm/dL    Albumin/Globulin Ratio 0.8 ratio    Bilirubin Total 0.3 0.0 - 1.0 mg/dL    ALP 99 50 - 144 unit/L    ALT 16 1 - 45 unit/L    AST 32 14 - 36 unit/L    eGFR >90 mL/min/1.73/m2    Anion Gap 0.0 (L) 2.0 - 13.0 mEq/L    BUN/Creatinine Ratio 3 (L) 12 - 20   Magnesium    Collection Time: 11/02/24  4:15 AM   Result Value Ref Range    Magnesium Level 1.50 (L) 1.80 - 2.40 mg/dL   CBC with Differential    Collection Time: 11/02/24  4:15 AM   Result Value Ref Range    WBC 5.23 4.00 - 11.50 x10(3)/mcL    RBC 2.99 (L) 4.00 - 5.10 x10(6)/mcL    Hgb 9.0 (L) 11.8 - 16.0 g/dL    Hct 26.6 (L) 36.0 - 48.0 %    MCV 89.0 79.0 - 99.0 fL    MCH 30.1 27.0 - 34.0 pg    MCHC 33.8 31.0 - 37.0 g/dL    RDW 19.2 (H) 11.0 - 14.5 %    Platelet 316 140 - 371 x10(3)/mcL    MPV 8.3 (L) 9.4 - 12.4 fL    Neut % 41.3 37 - 73 %    Lymph % 33.1 20 - 55 %    Mono % 11.1 4.7 - 12.5 %    Eos % 12.8 (H) 0.7 - 7 %    Basophil % 1.3 (H) 0.1 - 1.2 %    Lymph # 1.73 1.16 - 3.74 x10(3)/mcL    Neut # 2.16 1.56 - 6.13 x10(3)/mcL    Mono # 0.58 (H) 0.24 - 0.36 x10(3)/mcL    Eos # 0.67 (H) 0.04 - 0.36 x10(3)/mcL    Baso # 0.07 0.01 - 0.08 x10(3)/mcL     IG# 0.02 0.0001 - 0.031 x10(3)/mcL    IG% 0.4 0 - 0.5 %    NRBC% 0.0 <=1 %   Basic Metabolic Panel    Collection Time: 11/03/24  4:30 AM   Result Value Ref Range    Sodium 138 136 - 145 mmol/L    Potassium 3.8 3.5 - 5.1 mmol/L    Chloride 108 98 - 110 mmol/L    CO2 27 21 - 32 mmol/L    Glucose 84 70 - 115 mg/dL    Blood Urea Nitrogen 4 (L) 7.0 - 20.0 mg/dL    Creatinine 0.61 (L) 0.66 - 1.25 mg/dL    BUN/Creatinine Ratio 7 (L) 12 - 20    Calcium 8.0 (L) 8.4 - 10.2 mg/dL    Anion Gap 3.0 2.0 - 13.0 mEq/L    eGFR >90 mL/min/1.73/m2   Urine culture    Collection Time: 11/08/24 12:03 PM    Specimen: Urine, Clean Catch   Result Value Ref Range    Urine Culture, Routine PENDING    POCT Urinalysis(Instrument)    Collection Time: 11/08/24  1:24 PM   Result Value Ref Range    Color, POC UA Yellow Yellow, Straw, Colorless    Clarity, POC UA Cloudy (A) Clear    Glucose, POC UA Negative Negative    Bilirubin, POC UA Negative Negative    Ketones, POC UA Trace (A) Negative    Spec Grav POC UA 1.020 1.005 - 1.030    Blood, POC UA Large (A) Negative    pH, POC UA 5.5 5.0 - 8.0    Protein, POC UA 30 (A) Negative    Urobilinogen, POC UA 0.2 <=1.0    Nitrite, POC UA Negative Negative    WBC, POC UA Large (A) Negative       OBJECTIVE:  Vital signs  There were no vitals filed for this visit.     Physical Exam this is a TeleMed visit.  Patient appeared to be in good spirits and in no distress.    ASSESSMENT/PLAN:  1. Endocarditis of mitral valve  We will complete 9 weeks of IV antibiotics most recently oxacillin for the last 6 weeks.  After this we will have the line pulled.  A week later we will repeat blood cultures on 2 separate days from 2 separate areas.  I have talked with Infectious Disease about her case.  She has an appointment for a repeat echocardiogram with Cardiology on December 6th.    2. Anemia - has been stable.  Current hemoglobin around 8 which is where it was when she left the rehab hospital.         Follow  Up:  Follow up in about 2 weeks (around 12/4/2024) for Virtual Visit.

## 2024-12-03 RX ORDER — FLUCONAZOLE 200 MG/1
200 TABLET ORAL DAILY
Qty: 14 TABLET | Refills: 0 | Status: SHIPPED | OUTPATIENT
Start: 2024-12-03 | End: 2024-12-17

## 2024-12-04 ENCOUNTER — TELEPHONE (OUTPATIENT)
Dept: UROLOGY | Facility: CLINIC | Age: 74
End: 2024-12-04
Payer: MEDICARE

## 2024-12-04 DIAGNOSIS — R78.81 MSSA BACTEREMIA: Primary | ICD-10-CM

## 2024-12-04 DIAGNOSIS — B95.61 MSSA BACTEREMIA: Primary | ICD-10-CM

## 2024-12-04 RX ORDER — FLUCONAZOLE 200 MG/1
200 TABLET ORAL DAILY
Qty: 14 TABLET | Refills: 0 | Status: CANCELLED | OUTPATIENT
Start: 2024-12-04 | End: 2024-12-18

## 2024-12-04 NOTE — TELEPHONE ENCOUNTER
Patient was called and informed that diflucan was sent to pharmacy. Patient stated pharmacy had called and stated they were getting it ready.   ----- Message from Mary Lou Jacobson NP sent at 12/3/2024  4:26 PM CST -----  Please notify patient of urine culture results. Diflucan sent to her pharmacy.

## 2024-12-05 ENCOUNTER — PATIENT MESSAGE (OUTPATIENT)
Dept: FAMILY MEDICINE | Facility: CLINIC | Age: 74
End: 2024-12-05

## 2024-12-05 ENCOUNTER — OFFICE VISIT (OUTPATIENT)
Dept: FAMILY MEDICINE | Facility: CLINIC | Age: 74
End: 2024-12-05
Payer: MEDICARE

## 2024-12-05 DIAGNOSIS — I05.9 ENDOCARDITIS OF MITRAL VALVE: Primary | ICD-10-CM

## 2024-12-05 DIAGNOSIS — D64.9 ANEMIA, UNSPECIFIED TYPE: ICD-10-CM

## 2024-12-05 PROCEDURE — 3044F HG A1C LEVEL LT 7.0%: CPT | Mod: 95,,, | Performed by: FAMILY MEDICINE

## 2024-12-05 PROCEDURE — 99214 OFFICE O/P EST MOD 30 MIN: CPT | Mod: 95,,, | Performed by: FAMILY MEDICINE

## 2024-12-05 RX ORDER — IRON,CARBONYL/ASCORBIC ACID 100-250 MG
1 TABLET ORAL DAILY
Qty: 90 TABLET | Refills: 0 | Status: SHIPPED | OUTPATIENT
Start: 2024-12-05 | End: 2025-03-05

## 2024-12-05 NOTE — PROGRESS NOTES
SUBJECTIVE:  Orlando Black is a 74 y.o. female here for No chief complaint on file.      HPI  Patient being seen via TeleMed visit today for follow-up on infectious endocarditis.  She had also had an intraparenchymal hemorrhage of the brain along with infected joint and sternum.  She is overall doing much better after prolonged hospital stay as well as rehab.  She is now off of IV antibiotics.  She has had no recurrence of fevers.  She had blood work done yesterday which showed a white blood cell count that was normal but sed rate is still 60.  She is still quite anemic with a hemoglobin of 8.6.  MCV is normal but RDW is elevated.  She has been walking but does get tired easily.    Orlando's allergies, medications, history, and problem list were updated as appropriate.    Review of Systems   See [facility]    No results found for this or any previous visit (from the past 3 weeks).    OBJECTIVE:  Vital signs  There were no vitals filed for this visit.     Physical Exam this is a TeleMed visit.  Patient appears to be doing great and in no distress    ASSESSMENT/PLAN:  1. Endocarditis of mitral valve  She has completed 8 weeks of IV oxacillin.  She sees Cardiology tomorrow and should be getting an echo soon.  I will also get her set up with Infectious Disease  [facility] was at the house today trying to get a blood culture but unable to obtain so I told him to retry next week  2. Anemia, unspecified type  I would like to start her on iron therapy and then repeat her CBC and a month         Follow Up:  No follow-ups on file.  This is a TeleMed visit took place between 11:00 a.m. and 11:15 a.m. at the patient's home in [facility]

## 2025-01-09 ENCOUNTER — OFFICE VISIT (OUTPATIENT)
Dept: FAMILY MEDICINE | Facility: CLINIC | Age: 75
End: 2025-01-09
Payer: MEDICARE

## 2025-01-09 DIAGNOSIS — D64.9 ANEMIA, UNSPECIFIED TYPE: ICD-10-CM

## 2025-01-09 DIAGNOSIS — I05.9 ENDOCARDITIS OF MITRAL VALVE: Primary | ICD-10-CM

## 2025-01-09 NOTE — PROGRESS NOTES
SUBJECTIVE:  Orlando Black is a 74 y.o. female here for No chief complaint on file.      HPI  Patient being seen via TeleMed visit today per request of the patient as she has trouble coming to the office.  She is being seen for follow-up on her endocarditis send anemia.  Overall she is feeling much better.  She is able to do some tasks in the house again.  She is able to walk about 400 m before having to stop.  She has been able to go due short shopping trips.  She is not having any shortness of breath or fever.  She saw the cardiologist and had an echocardiogram and so far bowels look to be okay.  Orlando's allergies, medications, history, and problem list were updated as appropriate.    Review of Systems   See HPI    No results found for this or any previous visit (from the past 3 weeks).    OBJECTIVE:  Vital signs  There were no vitals filed for this visit.     Physical Exam patient appears to be doing well today and in no distress.  She is happy and talkative moving around her house well    ASSESSMENT/PLAN:  1. Endocarditis of mitral valve  She has completed 8 weeks of IV antibiotics and no signs of recurrence.  I would like to check a CBC CMP and sed rate  2. Anemia - has been on iron replacement.  We will repeat a CBC this week with home health         Follow Up:  No follow-ups on file.  This is a TeleMed visit took place between 11:00 a.m. and 11:15 a.m. at the patient's home in [facility]

## 2025-02-01 DIAGNOSIS — D64.9 ANEMIA, UNSPECIFIED TYPE: Primary | ICD-10-CM

## 2025-02-03 RX ORDER — IRON,CARBONYL/ASCORBIC ACID 100-250 MG
1 TABLET ORAL
Qty: 90 TABLET | Refills: 0 | Status: SHIPPED | OUTPATIENT
Start: 2025-02-03

## 2025-04-10 RX ORDER — CLOBETASOL PROPIONATE 0.5 MG/G
CREAM TOPICAL 2 TIMES DAILY
Qty: 45 G | Refills: 0 | Status: SHIPPED | OUTPATIENT
Start: 2025-04-10 | End: 2025-04-20

## 2025-04-15 RX ORDER — CLOBETASOL PROPIONATE 0.5 MG/G
CREAM TOPICAL 2 TIMES DAILY
Qty: 45 G | Refills: 0 | Status: SHIPPED | OUTPATIENT
Start: 2025-04-15 | End: 2025-04-15 | Stop reason: SDUPTHER

## 2025-04-15 RX ORDER — CLOBETASOL PROPIONATE 0.5 MG/G
CREAM TOPICAL 2 TIMES DAILY
Qty: 45 G | Refills: 0 | Status: SHIPPED | OUTPATIENT
Start: 2025-04-15 | End: 2025-04-25

## 2025-08-25 RX ORDER — CLOBETASOL PROPIONATE 0.5 MG/G
CREAM TOPICAL 2 TIMES DAILY
Qty: 45 G | Refills: 0 | Status: SHIPPED | OUTPATIENT
Start: 2025-08-25 | End: 2025-09-04

## (undated) DEVICE — DRAPE STERI U-SHAPED 47X51IN

## (undated) DEVICE — GLOVE 7.0 PROTEXIS PI BLUE

## (undated) DEVICE — NDL SAFETY 22G X 1.5 ECLIPSE

## (undated) DEVICE — BLADE COOLCUT EXCALIBER 4X13

## (undated) DEVICE — SET 24K OUTFLOW TUBE SHV SUC

## (undated) DEVICE — SOL NACL IRR 3000ML

## (undated) DEVICE — POSITIONER HEAD ADULT

## (undated) DEVICE — BLADE SHAVER LANZA 4.2X13CM

## (undated) DEVICE — TUBING PUMP ARTHROSCOPY STRL

## (undated) DEVICE — CONTAINER SPECIMEN SCREW 4OZ

## (undated) DEVICE — CULTSWAB+ AMIES W/O CHARC SNG

## (undated) DEVICE — PACK SURGICAL KNEE SCOPE

## (undated) DEVICE — CUFF ATS 2 PORT SNGL BLDR 34IN

## (undated) DEVICE — SUPPORT ULNA NERVE PROTECTOR

## (undated) DEVICE — COVER FULLGUARD SHOE HIGH-TOP

## (undated) DEVICE — FOAM DEVON WELL LEG HOLDER

## (undated) DEVICE — PAD PREP CUFFED NS 24X48IN

## (undated) DEVICE — STRIP MEDI WND CLSR 1/2X4IN

## (undated) DEVICE — GLOVE SURG SZ7 PLYSPHRN ORTH

## (undated) DEVICE — APPLICATOR CHLORAPREP ORN 26ML

## (undated) DEVICE — SET CASSETTE TUBE DW OUTFLOW

## (undated) DEVICE — DRAPE FULL SHEET 70X100IN

## (undated) DEVICE — KIT SURGICAL TURNOVER

## (undated) DEVICE — SUT MONOCRYL 3-0 PS-2 UND

## (undated) DEVICE — SWAB CULTURETTE SINGLE

## (undated) DEVICE — TUBE SET INFLOW/OUTFLOW